# Patient Record
Sex: FEMALE | Race: WHITE | NOT HISPANIC OR LATINO | Employment: UNEMPLOYED
[De-identification: names, ages, dates, MRNs, and addresses within clinical notes are randomized per-mention and may not be internally consistent; named-entity substitution may affect disease eponyms.]

---

## 2021-11-16 ENCOUNTER — TRANSCRIBE ORDERS (OUTPATIENT)
Dept: OTHER | Age: 41
End: 2021-11-16

## 2021-11-16 DIAGNOSIS — Q82.8 DARIER'S DISEASE: Primary | ICD-10-CM

## 2021-12-04 ENCOUNTER — HEALTH MAINTENANCE LETTER (OUTPATIENT)
Age: 41
End: 2021-12-04

## 2022-06-15 ENCOUNTER — TRANSFERRED RECORDS (OUTPATIENT)
Dept: HEALTH INFORMATION MANAGEMENT | Facility: CLINIC | Age: 42
End: 2022-06-15

## 2022-07-21 ENCOUNTER — LAB REQUISITION (OUTPATIENT)
Dept: LAB | Facility: CLINIC | Age: 42
End: 2022-07-21
Payer: COMMERCIAL

## 2022-07-21 LAB — C DIFF TOX B STL QL: NEGATIVE

## 2022-07-21 PROCEDURE — 87493 C DIFF AMPLIFIED PROBE: CPT | Mod: ORL | Performed by: FAMILY MEDICINE

## 2022-09-17 ENCOUNTER — HEALTH MAINTENANCE LETTER (OUTPATIENT)
Age: 42
End: 2022-09-17

## 2023-01-28 ENCOUNTER — HEALTH MAINTENANCE LETTER (OUTPATIENT)
Age: 43
End: 2023-01-28

## 2023-10-24 ENCOUNTER — TRANSFERRED RECORDS (OUTPATIENT)
Dept: HEALTH INFORMATION MANAGEMENT | Facility: CLINIC | Age: 43
End: 2023-10-24
Payer: COMMERCIAL

## 2023-11-01 ENCOUNTER — MEDICAL CORRESPONDENCE (OUTPATIENT)
Dept: HEALTH INFORMATION MANAGEMENT | Facility: CLINIC | Age: 43
End: 2023-11-01
Payer: COMMERCIAL

## 2023-11-03 ENCOUNTER — TRANSCRIBE ORDERS (OUTPATIENT)
Dept: OTHER | Age: 43
End: 2023-11-03

## 2023-11-03 DIAGNOSIS — R11.15 CYCLICAL VOMITING SYNDROME: ICD-10-CM

## 2023-11-03 DIAGNOSIS — K44.9 HIATAL HERNIA: ICD-10-CM

## 2023-11-03 DIAGNOSIS — K76.0 FATTY LIVER: Primary | ICD-10-CM

## 2023-11-03 DIAGNOSIS — K22.4 ESOPHAGEAL DYSMOTILITY: ICD-10-CM

## 2023-11-03 DIAGNOSIS — K21.9 GASTROESOPHAGEAL REFLUX DISEASE WITHOUT ESOPHAGITIS: ICD-10-CM

## 2023-11-06 ENCOUNTER — DOCUMENTATION ONLY (OUTPATIENT)
Dept: GASTROENTEROLOGY | Facility: CLINIC | Age: 43
End: 2023-11-06
Payer: COMMERCIAL

## 2023-11-06 NOTE — PROGRESS NOTES
Called [facility] to request that they fax over medical records pertaining to recent referral.      MEGAN Representative noted that they had a consult from October 2023 and one from 2022.     Clinic Information:  [facility]  Phone #: [phone] extension [phone] sk

## 2023-11-09 ENCOUNTER — TELEPHONE (OUTPATIENT)
Dept: GASTROENTEROLOGY | Facility: CLINIC | Age: 43
End: 2023-11-09
Payer: COMMERCIAL

## 2023-11-09 NOTE — TELEPHONE ENCOUNTER
[facility] Call Center    Phone Message    May a detailed message be left on voicemail: Yes    Reason for Call: Other: Patient is currently scheduled on 02/20/2024, as visit type New Esophageal Urgent. This is outside the expected timeline for this referral. Patient has been added to the waitlist.      Action Taken: Message routed to:  Other: GI REFERRAL TRIAGE POOL     Travel Screening: Not Applicable

## 2023-11-20 ENCOUNTER — OFFICE VISIT (OUTPATIENT)
Dept: GASTROENTEROLOGY | Facility: CLINIC | Age: 43
End: 2023-11-20
Payer: COMMERCIAL

## 2023-11-20 ENCOUNTER — PRE VISIT (OUTPATIENT)
Dept: GASTROENTEROLOGY | Facility: CLINIC | Age: 43
End: 2023-11-20

## 2023-11-20 VITALS
HEART RATE: 87 BPM | DIASTOLIC BLOOD PRESSURE: 90 MMHG | BODY MASS INDEX: 26.93 KG/M2 | SYSTOLIC BLOOD PRESSURE: 120 MMHG | WEIGHT: 152 LBS | HEIGHT: 63 IN

## 2023-11-20 DIAGNOSIS — R09.A2 GLOBUS SENSATION: ICD-10-CM

## 2023-11-20 DIAGNOSIS — R11.10 REGURGITATION OF FOOD: Primary | ICD-10-CM

## 2023-11-20 DIAGNOSIS — Z98.84 H/O GASTRIC BYPASS: ICD-10-CM

## 2023-11-20 PROCEDURE — 99205 OFFICE O/P NEW HI 60 MIN: CPT | Performed by: INTERNAL MEDICINE

## 2023-11-20 RX ORDER — CLINDAMYCIN HCL 300 MG
CAPSULE ORAL
COMMUNITY
Start: 2023-05-11 | End: 2024-02-12

## 2023-11-20 RX ORDER — DIVALPROEX SODIUM 500 MG/1
TABLET, DELAYED RELEASE ORAL
COMMUNITY
Start: 2023-06-21 | End: 2024-02-12

## 2023-11-20 RX ORDER — IPRATROPIUM BROMIDE AND ALBUTEROL SULFATE 2.5; .5 MG/3ML; MG/3ML
SOLUTION RESPIRATORY (INHALATION)
COMMUNITY

## 2023-11-20 RX ORDER — HYDROXYZINE PAMOATE 50 MG/1
1 CAPSULE ORAL
COMMUNITY
Start: 2023-10-27 | End: 2024-02-12

## 2023-11-20 RX ORDER — ESZOPICLONE 1 MG/1
2 TABLET, FILM COATED ORAL
COMMUNITY
Start: 2023-03-20 | End: 2024-02-12

## 2023-11-20 RX ORDER — DULOXETIN HYDROCHLORIDE 30 MG/1
3 CAPSULE, DELAYED RELEASE ORAL DAILY
COMMUNITY
Start: 2023-03-30 | End: 2024-02-12

## 2023-11-20 RX ORDER — METHOCARBAMOL 500 MG/1
500 TABLET, FILM COATED ORAL
COMMUNITY
Start: 2023-10-27 | End: 2024-02-12

## 2023-11-20 RX ORDER — BUPROPION HYDROCHLORIDE 150 MG/1
1 TABLET ORAL DAILY
COMMUNITY
Start: 2023-03-20 | End: 2024-02-12

## 2023-11-20 RX ORDER — ALBUTEROL SULFATE 90 UG/1
1-2 AEROSOL, METERED RESPIRATORY (INHALATION)
Status: ON HOLD | COMMUNITY
Start: 2023-05-30 | End: 2024-05-07

## 2023-11-20 RX ORDER — LIDOCAINE 4 G/G
PATCH TOPICAL
COMMUNITY
Start: 2023-10-28 | End: 2024-10-05

## 2023-11-20 RX ORDER — ENOXAPARIN SODIUM 100 MG/ML
INJECTION SUBCUTANEOUS
COMMUNITY
End: 2024-02-12

## 2023-11-20 RX ORDER — DIPHENHYDRAMINE HCL 25 MG
25 CAPSULE ORAL EVERY 4 HOURS
COMMUNITY
End: 2024-02-12

## 2023-11-20 RX ORDER — CLONAZEPAM 0.5 MG/1
0.5 TABLET ORAL
COMMUNITY
Start: 2023-03-20 | End: 2024-02-12

## 2023-11-20 RX ORDER — RIVAROXABAN 20 MG/1
20 TABLET, FILM COATED ORAL
COMMUNITY
Start: 2023-03-07 | End: 2024-02-12

## 2023-11-20 RX ORDER — OLANZAPINE 5 MG/1
5 TABLET ORAL
COMMUNITY
Start: 2022-09-01 | End: 2024-02-12

## 2023-11-20 RX ORDER — MULTIVITAMIN,THER AND MINERALS
1 TABLET ORAL
COMMUNITY
Start: 2022-09-03 | End: 2024-02-12

## 2023-11-20 RX ORDER — UBROGEPANT 100 MG/1
100 TABLET ORAL
COMMUNITY
Start: 2022-09-01 | End: 2024-02-12

## 2023-11-20 RX ORDER — ASPIRIN 81 MG/1
81 TABLET ORAL
COMMUNITY
Start: 2022-09-03 | End: 2024-02-12

## 2023-11-20 RX ORDER — CEPHALEXIN 500 MG/1
CAPSULE ORAL
COMMUNITY
Start: 2023-09-13 | End: 2024-02-12

## 2023-11-20 RX ORDER — NORTRIPTYLINE HCL 10 MG
20 CAPSULE ORAL AT BEDTIME
COMMUNITY
Start: 2022-09-01

## 2023-11-20 RX ORDER — PREGABALIN 150 MG/1
150 CAPSULE ORAL
COMMUNITY
Start: 2022-09-01 | End: 2024-02-12

## 2023-11-20 RX ORDER — FAMOTIDINE 40 MG/1
40 TABLET, FILM COATED ORAL
Status: ON HOLD | COMMUNITY
End: 2024-02-27

## 2023-11-20 RX ORDER — POLYETHYLENE GLYCOL 3350 17 G/17G
17 POWDER, FOR SOLUTION ORAL 2 TIMES DAILY
Status: ON HOLD | COMMUNITY
Start: 2022-09-01 | End: 2024-02-27

## 2023-11-20 RX ORDER — ALPRAZOLAM 0.25 MG
0.25 TABLET ORAL
COMMUNITY
Start: 2022-09-01 | End: 2024-02-12

## 2023-11-20 RX ORDER — NYSTATIN 100000 [USP'U]/G
1 POWDER TOPICAL PRN
COMMUNITY
Start: 2022-09-01 | End: 2024-03-11

## 2023-11-20 RX ORDER — PROMETHAZINE HYDROCHLORIDE 12.5 MG/1
TABLET ORAL
Status: ON HOLD | COMMUNITY
Start: 2023-10-27 | End: 2024-02-27

## 2023-11-20 RX ORDER — ONDANSETRON 4 MG/1
1 TABLET, FILM COATED ORAL EVERY 6 HOURS PRN
Status: ON HOLD | COMMUNITY
End: 2024-05-07

## 2023-11-20 RX ORDER — AMMONIUM LACTATE 12 G/100G
CREAM TOPICAL DAILY PRN
COMMUNITY
Start: 2023-03-21

## 2023-11-20 RX ORDER — AMITRIPTYLINE HYDROCHLORIDE 10 MG/1
2 TABLET ORAL
COMMUNITY
End: 2024-02-12

## 2023-11-20 RX ORDER — PREDNISONE 20 MG/1
TABLET ORAL
COMMUNITY
End: 2024-02-12

## 2023-11-20 RX ORDER — GAUZE BANDAGE 2" X 2"
BANDAGE TOPICAL
COMMUNITY
End: 2024-02-12

## 2023-11-20 RX ORDER — CLOBETASOL PROPIONATE 0.05 G/100ML
SHAMPOO TOPICAL
Status: ON HOLD | COMMUNITY
Start: 2023-03-21 | End: 2024-05-07

## 2023-11-20 RX ORDER — PANTOPRAZOLE SODIUM 40 MG/1
40 TABLET, DELAYED RELEASE ORAL DAILY
Status: ON HOLD | COMMUNITY
Start: 2022-09-01 | End: 2024-05-07

## 2023-11-20 RX ORDER — HYOSCYAMINE SULFATE 0.125 MG
0.25 TABLET ORAL
COMMUNITY
End: 2024-02-12

## 2023-11-20 RX ORDER — OXYCODONE AND ACETAMINOPHEN 5; 325 MG/1; MG/1
TABLET ORAL
COMMUNITY
Start: 2023-01-26 | End: 2024-02-12

## 2023-11-20 RX ORDER — ESCITALOPRAM OXALATE 20 MG/1
20 TABLET ORAL DAILY
COMMUNITY
End: 2024-02-12

## 2023-11-20 RX ORDER — ATORVASTATIN CALCIUM 20 MG/1
20 TABLET, FILM COATED ORAL
COMMUNITY
Start: 2022-09-01 | End: 2024-02-12

## 2023-11-20 RX ORDER — CALCIUM POLYCARBOPHIL 625 MG
TABLET ORAL
COMMUNITY
Start: 2023-01-09 | End: 2024-02-12

## 2023-11-20 RX ORDER — DOCUSATE SODIUM 100 MG/1
1 CAPSULE, LIQUID FILLED ORAL
Status: ON HOLD | COMMUNITY
End: 2024-02-27

## 2023-11-20 RX ORDER — TRETINOIN 0.5 MG/G
CREAM TOPICAL DAILY PRN
Status: ON HOLD | COMMUNITY
Start: 2022-02-04 | End: 2024-05-07

## 2023-11-20 RX ORDER — THERMOMETER, ELECTRONIC,ORAL
EACH MISCELLANEOUS
COMMUNITY
Start: 2023-07-12 | End: 2024-02-12

## 2023-11-20 RX ORDER — SULFACETAMIDE SODIUM, SULFUR 100; 50 MG/G; MG/G
SUSPENSION TOPICAL DAILY PRN
Status: ON HOLD | COMMUNITY
Start: 2023-03-21 | End: 2024-05-07

## 2023-11-20 RX ORDER — HYDROCODONE BITARTRATE AND ACETAMINOPHEN 5; 325 MG/1; MG/1
TABLET ORAL
COMMUNITY
Start: 2023-05-10 | End: 2024-02-12

## 2023-11-20 ASSESSMENT — PAIN SCALES - GENERAL: PAINLEVEL: NO PAIN (0)

## 2023-11-20 NOTE — LETTER
"    11/20/2023         RE: Eduarda Nogueira  [address] [place] [address]        Dear Colleague,    Thank you for referring your patient, Eduarda Nogueira, to the [facility] [place]. Please see a copy of my visit note below.    Gastroenterology Visit for: Eduarda Nogueira 1980   MRN: [id number]     Reason for Visit:  chief complaint    Referred by: Louis  / [address] / [place] [address]  Patient Care Team:  No Ref-Primary, Physician as PCP - Hellen Rosario as Referring Physician (Physician Assistant)  Victor Hugo Diamond MD as MD (Dermatology)  Henri Davidson DO as Physician (Gastroenterology)  Esther Cochran MD as MD (Gastroenterology)  System, Provider Not In (Clinic)    History of Present Illness:   Eduarda Nogueira is a 43 year old female with anxiety, gastric bypass 3/17/22, subdural hematoma, EtOH abuse,migraine headaches previous stroke, asthma, reflux, morbid obesity, cyclic vomiting syndrome, marijuana dependence, PFO, chronic pain, urinary and fecal incontinence sp pelvic floor therapy, who is presenting as a new patient in consultation at the request of Dr. Myers with a chief complaint of regurgitation beginning post bypass.  ---------------------------------------------------------------  11/20/23  Eduarda Nogueira states se was in the hospital and was having issues with coffee ground emesis and black stools. She was told there was nothing to do in the hospital. Her \"advocate\" [facility]. She feels there is something trapped between the front and the back of her throat. Every day it feels like it is there and makes her want to gag and dry heave. She has associated gassiness. No issues with bowel movements although they are black. Symptoms were the same in the hospital. These symptoms have been ongoing for an undisclosed amount of time \"so long\". Anything that she eats she feels is going to come " "right back out. When she eats or drinks she has the sensation that something is there and it makes her want to vomit. There is an associated tightness in her stomach. Nothing makes it better and nothing makes it worse. While in the hospital she was on \"a whole bunch of meds\" and when she went home she says she stopped all medications. All pills including ondansetron would result in dry heave. Regurgitates and spits into buckets at home.     +nausea, all over chills, hot, cold, globus all associated with regurgitation events.    Currently on no medications except for a single ondansetron today.     Patient takes CBD.     Gabapentin causes heat sensation in head and itchiness all over.  Has tried lyrica without success.    Lost 10 pounds in the last week per patient.     Previous IV contrast caused hot sensation and nausea/vomiting. No respiratory symptoms or rash.     [facility] inpatient note 10/24/23      ---------------------------------------------------------------     Wt Readings from Last 5 Encounters:   11/20/23 68.9 kg (152 lb)   09/08/15 129.5 kg (285 lb 6.4 oz)        Esophageal Questionnaire(s)    BEDQ Questionnaire       No data to display                   No data to display                Eckardt Questionnaire       No data to display                Promis 10 Questionnaire       No data to display              STUDIES & PROCEDURES:    EGD:       Colonoscopy:  Date:  Impression:  Pathology Report:     EndoFLIP directed at the UES or LES (8cm (EF-325) balloon length or 16cm (EF-322) balloon length):   Date:  8cm balloon  Balloon inflation Balloon pressure CSA (mm^2) DI (mm^2/mmHg) Dmin (mm) Compliance   20 (ladmark ID)        30        40        50           16cm balloon  Balloon inflation Balloon pressure CSA (mm^2) DI (mm^2/mmHg) Dmin (mm) Compliance   30 (ladmark ID)        40        50        60        70           High Resolution " Manometry:  Date:  Impression:    PH/Impedance:  Date:  Impression:     Bravo:  48 or 96hr  Date:  Impression:    CT:  Date:  Impression:    Esophagram:  Date:  Impression:     Prior medical records were reviewed including, but not limited to, notes from referring providers, lab work, radiographic tests, and other diagnostic tests. Pertinent results were summarized above.     History     Past Medical History:   Diagnosis Date    Anemia     No Comments Provided    Anxiety state     No Comments Provided    Arthropathy     No Comments Provided    Asthma     No Comments Provided    Backache     No Comments Provided    Edema     No Comments Provided    Esophageal reflux     No Comments Provided    Female stress incontinence     No Comments Provided    Hypothyroidism     No Comments Provided    Irregular menstrual cycle     No Comments Provided    Irritable colon     No Comments Provided    Migraine     No Comments Provided    Morbid obesity (H)     No Comments Provided    Other chronic nonalcoholic liver disease     No Comments Provided    Other depressive disorder     No Comments Provided    Other malaise and fatigue     No Comments Provided    Pain in joint     hips, knees, ankles, feet, neck    Restless legs syndrome     self-diagnosed    Shortness of breath     No Comments Provided    Synovial cyst of popliteal space     No Comments Provided    Vitamin D deficiency     Rx 3/14, re check 6/14       Past Surgical History:   Procedure Laterality Date    ATTEMPTED ARTHROSCOPY      x3, rt knee    EXTRACTION(S) DENTAL      No Comments Provided    LAPAROSCOPIC CHOLECYSTECTOMY      2010    OSTEOTOMY FEMUR DISTAL      right       Social History     Socioeconomic History    Marital status: Single     Spouse name: Not on file    Number of children: Not on file    Years of education: Not on file    Highest education level: Not on file   Occupational History    Not on file   Tobacco Use    Smoking status: Every Day     Packs/day:  .3     Types: Cigarettes     Start date: 3/18/2013    Smokeless tobacco: Never    Tobacco comments:     3 cigarettes per day    Substance and Sexual Activity    Alcohol use: Yes     Comment: Alcoholic Drinks/day: Quit 3/2013    Drug use: Unknown     Types: Other     Comment: Drug use: No    Sexual activity: Not on file   Other Topics Concern    Not on file   Social History Narrative    Not on file     Social Determinants of Health     Financial Resource Strain: Not on file   Food Insecurity: Not on file   Transportation Needs: Not on file   Physical Activity: Not on file   Stress: Not on file   Social Connections: Not on file   Interpersonal Safety: Not on file   Housing Stability: Not on file       Family History   Problem Relation Age of Onset    Diabetes Mother         Diabetes    Diabetes Father         Diabetes    Other - See Comments Father         colon clot then DVT inpt    Other - See Comments Paternal Grandfather         PE     Family history reviewed and edited as appropriate    Medications and Allergies:     Outpatient Encounter Medications as of 11/20/2023   Medication Sig Dispense Refill    albuterol (PROAIR HFA/PROVENTIL HFA/VENTOLIN HFA) 108 (90 Base) MCG/ACT inhaler Inhale 1-2 puffs into the lungs      ALPRAZolam (XANAX) 0.25 MG tablet 0.25 mg      amitriptyline (ELAVIL) 10 MG tablet Take 2 tablets by mouth      ammonium lactate (AMLACTIN) 12 % external cream Twice daily for body      aspirin 81 MG EC tablet 81 mg      aspirin-acetaminophen-caffeine (EXCEDRIN MIGRAINE) 250-250-65 MG tablet Take by mouth 2 times daily as needed      atorvastatin (LIPITOR) 20 MG tablet 20 mg      buPROPion (WELLBUTRIN XL) 150 MG 24 hr tablet Take 1 tablet by mouth daily      Calcium Polycarbophil (FIBER) 625 MG tablet Take 1 Tablet (625 mg) by mouth once daily.      cephALEXin (KEFLEX) 500 MG capsule Take 1 capsule by mouth two times daily for 7 days      chlorhexidine (HIBICLENS) 4 % liquid Chlorhexidine Topical  Liquid 4 %  topically 1.0  once    active      clindamycin (CLEOCIN) 300 MG capsule Take 1 capsule 4 times a day until gone      clobetasol propionate (CLOBEX) 0.05 % external shampoo Apply a thin layer onto dry, affected area of scalp once daily. Leave for 15 minutes before lathering and rinsing.      clonazePAM (KLONOPIN) 0.5 MG tablet Take 0.5 mg by mouth      CLOTRIMAZOLE ANTI-FUNGAL 1 % external cream Apply topically to affected area(s) two times daily.      Cranberry 400 MG CAPS Take 400 mg by mouth      diphenhydrAMINE (BENADRYL) 25 MG capsule Take 25 mg by mouth every 4 hours      divalproex sodium delayed-release (DEPAKOTE) 500 MG DR tablet       docusate sodium (DSS) 100 MG capsule Take 1 capsule by mouth      DULoxetine (CYMBALTA) 30 MG capsule Take 3 capsules by mouth daily      enoxaparin ANTICOAGULANT (LOVENOX) 60 MG/0.6ML syringe Enoxaparin Subcutaneous  subcutaneously 60.0  every 12 hours    inactive      escitalopram (LEXAPRO) 20 MG tablet Take 20 mg by mouth daily      eszopiclone (LUNESTA) 1 MG tablet Take 2 mg by mouth      famotidine (PEPCID) 40 MG tablet Take 40 mg by mouth      HYDROcodone-acetaminophen (NORCO) 5-325 MG tablet take 1 tablet by mouth every 4 to 6 hours as needed for pain      hydrOXYzine (VISTARIL) 50 MG capsule Take 1 capsule by mouth      hyoscyamine (LEVSIN) 0.125 MG tablet Place 0.25 mg under the tongue      ipratropium - albuterol 0.5 mg/2.5 mg/3 mL (DUONEB) 0.5-2.5 (3) MG/3ML neb solution Inhale 1 Neb via a nebulizer 4 times daily if needed for Shortness Of Breath or Wheezing.      Lidocaine (LIDOCARE) 4 % Patch Apply 1-3 patches to intact skin to cover most painful area of back pain for max 12hr per 24hr period.      methocarbamol (ROBAXIN) 500 MG tablet Take 500 mg by mouth      Multiple Vitamins-Minerals (SUPER THERA CELESTINO M) TABS Take 1 tablet by mouth      nortriptyline (PAMELOR) 10 MG capsule 20 mg      nystatin (MYCOSTATIN) 532329 UNIT/GM external powder 1 strip    "   OLANZapine (ZYPREXA) 5 MG tablet 5 mg      ondansetron (ZOFRAN) 4 MG tablet Take 1 tablet by mouth      oxyCODONE-acetaminophen (PERCOCET) 5-325 MG tablet Take 1 tablet by mouth every 4 hours if needed for Pain. Max acetaminophen dose 4000mg in 24 hrs.      pantoprazole (PROTONIX) 40 MG EC tablet 40 mg      polyethylene glycol (MIRALAX) 17 g packet Take 17 g by mouth      predniSONE (DELTASONE) 20 MG tablet Take 2 Tablets (40 mg) by mouth once daily with a meal for 5 days.      pregabalin (LYRICA) 150 MG capsule 150 mg      promethazine (PHENERGAN) 12.5 MG tablet Take half a tablet three times daily before meals and every 6 hrs as needed for nausea      sodium chloride, PF, (NORMAL SALINE FLUSH) 0.9% PF flush Inject 5 mLs into the vein      Sulfacetamide Sodium-Sulfur 10-5 % SUSP Apply topically.      tretinoin (RETIN-A) 0.05 % external cream Apply topically to affected area(s) at bedtime if needed (Darier's facial flare).      UBRELVY 100 MG tablet 100 mg      vitamin B1 (THIAMINE) 100 MG tablet Miscellaneous Drug     Thiamine mononitrate, vit B1, (VITAMIN B1) 100mg tablet    active      XARELTO ANTICOAGULANT 20 MG TABS tablet 20 mg       No facility-administered encounter medications on file as of 11/20/2023.        Allergies   Allergen Reactions    Acetaminophen Hives, Itching, Rash and Shortness Of Breath     Several Rx's for Norco in 2021 & 22    No Clinical Screening - See Comments Anaphylaxis     steroids    Gabapentin Unknown and Itching    Hydrocodone-Acetaminophen Itching     Several Rx's for Norco in 2021 & 22    Iodinated Contrast Media Unknown and Nausea and Vomiting     Extremely anxious, vomited once after CT. No wheezing or SOB.    Latex Itching    Morphine And Related Itching     Several Rx's for Norco in 2021 & 22    Nsaids Other (See Comments)     H/o german-n-y gastric bypass\". AVOID NSAIDs and aspirin due to risk of gastric and/or G-J anastomotic ulcers. If Eduarda must be on short course of " "NSAIDs or aspirin, use enteric coated if possible and use PPI // Ira L Anabell RN, Bariatric Nurse Clinician, [facility] 3/23/2022    Oxycodone-Acetaminophen Itching     Several Rx's for Norco in 2021 & 22        Review of systems:  A full 10 point review of systems was obtained and was negative except for the pertinent positives and negatives stated within the HPI.    Objective Findings:   Physical Exam:    Constitutional: BP (!) 120/90 (BP Location: Left arm, Patient Position: Sitting, Cuff Size: Adult Regular)   Pulse 87   Ht 1.6 m (5' 3\")   Wt 68.9 kg (152 lb)   BMI 26.93 kg/m    General: Alert, cooperative, no distress, well-appearing  Head: Atraumatic, normocephalic, no obvious abnormalities   Eyes: EOMI, Sclera anicteric, no obvious conjunctival hemorrhage   Nose: Nares normal, no obvious malformation, no obvious rhinorrhea   Respiratory: normal appearing respirations, cough associated with regurgitation  Gastrointestinal: active regurgitation of gastric contents with dry heave, hiccups associated.   Musculoskeletal: uses walker to ambulate  Skin: No jaundice, no obvious rash  Neurologic: AAOx3, no obvious neurologic abnormality  Psychiatric: Normal Affect, appropriate mood  Extremities: No obvious edema, no obvious malformation     Labs, Radiology, Pathology     Lab Results   Component Value Date    HGB 16.4 (H) 09/08/2015     09/08/2015     (L) 09/08/2015    BUN 9 09/08/2015    CO2 26 09/08/2015      Liver Function Studies -   Recent Labs   Lab Test 09/08/15  1551   PROTTOTAL 7.1   ALBUMIN 4.2   BILITOTAL 0.5   ALKPHOS 52      Patient Active Problem List    Diagnosis Date Noted    Regurgitation of food 11/20/2023     Priority: Medium    H/O gastric bypass 11/20/2023     Priority: Medium    Globus sensation 11/20/2023     Priority: Medium      Assessment and Plan   Assessment:    Eduarda Nogueira is a 43 year old female with anxiety, gastric bypass 3/17/22, subdural " hematoma, EtOH abuse,migraine headaches previous stroke, asthma, reflux, morbid obesity, cyclic vomiting syndrome, marijuana dependence, PFO, chronic pain, urinary and fecal incontinence sp pelvic floor therapy, who is presenting as a new patient in consultation at the request of Dr. Myers with a chief complaint of regurgitation beginning post bypass.    At this time it is unclear what is driving the patient's symptoms of regurgitation which she actively did in the room with me. I do not believe it is aerophagia or true rumination syndrome. I have recommended barium esophagram and modified barium esophagram along with endoscopy and HRM +pH impedance off therapy. Ultimately she may require surgical intervention for her bypass as her symptoms began only after her bypass surgery.     1. Regurgitation of food    2. H/O gastric bypass    3. Globus sensation       Orders Placed This Encounter   Procedures    XR Esophagram    XR Video Swallow with SLP or OT - Order with Speech Therapy Referral    Adult GI  Referral - Procedure Only    Speech Therapy Referral     Plan:  Schedule endoscopy to evaluate foregut and to evaluate a cause of dark stool  Schedule high resolution esophageal manometry and 24hr pH impedance off of acid suppression therapy  Schedule barium esophagram and modified barium esophagram  Future consideration for surgical intervention pending above testing  Optimize nutritional supplementation with boost or ensure. If you continue to lose weight you may need a feeding tube    Follow up plan:   Return to clinic 6 months and as needed.    The risks and benefits of my recommendations, as well as other treatment options were discussed with the patient and any available family today. All questions were answered.     Follow up: As planned above. Today, I personally spent 40 minutes in direct face to face time with the patient, of which greater than 50% of the time was spent in patient education and  counseling as described above. Approximately 10 minutes were spent on indirect care associated with the patient's consultation including but not limited to review of: patient medical records to date, clinic visits, hospital records, lab results, imaging studies, procedural documentation, and coordinating care with other providers. The findings from this review are summarized in the above note. All of the above accounted for a cumulative time of 50 minutes and was performed on the date of service.     The patient verbalized understanding of the plan and was appreciative for the time spent and information provided during the office visit.     Author:   Henri Davidson DO   of Medicine  Director, Esophageal Disorders Program  Division of Gastroenterology, Hepatology, and Nutrition  [facility]    Dr. Davidson speaks for [employer] regarding dupilumab and has participated in advisory boards for the medication. He receives income for these activities. When discussing the medication, patients were informed of Dr. Davidson's role and potential conflict of interest. All questions and/or concerns were answered during the encounter.     Documentation assisted by voice recognition and documentation system.      Again, thank you for allowing me to participate in the care of your patient.      Sincerely,    Henri Davidson DO

## 2023-11-20 NOTE — NURSING NOTE
"Chief Complaint   Patient presents with    New Patient       Vitals:    11/20/23 1218   BP: (!) 120/90   BP Location: Left arm   Patient Position: Sitting   Cuff Size: Adult Regular   Pulse: 87   Weight: 68.9 kg (152 lb)   Height: 1.6 m (5' 3\")       Body mass index is 26.93 kg/m .    Patient didn't have list and said everything listed is what she is taking.    Sari Cheng LPN                      "

## 2023-11-20 NOTE — TELEPHONE ENCOUNTER
Records Requested     November 20, 2023 9:12 AM  [place] [address]   Facility  [facility]  Fax: [phone]    Outcome Urgent request faxed to [facility] to push images to PACS.     Update 11/20/23 12:18 PM -  Images resolved in PACS.        REFERRAL INFORMATION:  Referring Provider:  Dione Myers NP  Referring Clinic:  [facility]   Reason for Visit/Diagnosis: Gastroesophageal reflux disease without esophagitis [K21.9]; Hiatal hernia [K44.9]; Esophageal dysmotility [K22.4]; Cyclical vomiting syndrome [R11.15]; Fatty liver [K76.0]     FUTURE VISIT INFORMATION:  Appointment Date: 11/20/23  Appointment Time: 12:20 PM      NOTES STATUS DETAILS   OFFICE NOTE from Referring Provider Care Everywhere 11/1/23, 7/14/22, 1/21/21 - Dione Myers NP - [facility] - hospital follow up; GERD; cyclic vomiting syndrome; Esophageal dysmotility    OFFICE NOTE from Other Specialist Care Everywhere 10/24/23 - Isaura Robles MD; Gissell Carney PA-C - [facility] inpatient consult -  Vomiting; coffee ground emesis; dark stools     10/23/23 - Cadence Varela, CNP - [facility] - ED follow up; emesis; black stool     9/6/22 - Cally Martinez PA-C - [facility] - s/p gastric bypass; GERD    6/16/22 - Vasiliy Sosa MD - [facility] Inpatient Consult - Esophageal dysmotility    3/25/22 - Ashlee Anton PA-C - [facility] - s/p gastric bypass ; post-op follow up     11/16/21, 6/15/21 - Reginald Michaud MD - [facility] - Morbid obesity BMI 50-59.9 ; GERD; Hiatal Hernia     4/15/21 - Prieto Lowery MD - [facility] - Projectile vomiting; EGD follow up    HOSPITAL DISCHARGE SUMMARY/  ED VISITS Care Everywhere 10/23/23-10/27/23 - [facility] Luis Villegas MD - GERD; GI Bleed; Chronic pain syndrom; cyclic vomiting syndrome     10/20/23 - [facility]  [facility] ED - Reginald Michaud MD - black stools; low back pain     [facility] Elroy Olea [facility] - Acute ischemic left MCA stroke; s/p gastric bypass; constipation; cyclic vomiting syndrome; GERD; IBS     7/10/22-7/20/22 - [facility] - Cuauhtemoc Sweet DO - Failure to thrive in adult; dysphagia; s/p gastric bypass     7/7/22 - [facility] - Rian Nunn MD - Nausea; chronic pain syndrome; weakness of both lower extremities; Nausea and vomiting; S/p Gastric bypass    6/15/22-6/19/22 - [facility] ED to Hospital Admission - Christy Lang MD - Hypokalemia; Esophageal dysmotility    3/21/22 - [facility] ED - Eduarda Campos DO - post operative pain; s/p gastric bypass; nausea and vomiting     3/17/22-3/20/22 - [facility] - Reginald Michaud MD - S/p gastric bypass; hiatal hernia; GERD    OPERATIVE REPORT Care Everywhere 3/17/22 - LAPAROSCOPIC BYPASS GASTRIC DORINDA-N-Y AND HIATAL HERNIA REPAIR - Reginald Michaud MD - [facility]    MEDICATION LIST Care Everywhere         ENDOSCOPY  Care Everywhere 6/1/22, 2/17/21 - [facility]    STOOL TESTING Care Everywhere Ova and Parasite - 7/26/23, 7/25/23    Stool Pathogen Multiplex - 7/25/23    C. Diff Toxin - 7/25/23    Cryptosporidium/ Giardia - 7/25/23    H Pylori - 2/24/14   PERTINENT LABS Care Everywhere 11/1/23 - CBC no diff; Magnesium; Liver Panel     10/27/23 - CBC w/ Diff    PATHOLOGY REPORTS (RELATED) Care Everywhere 6/1/22 - [place] bx     2/17/21 - [facility] bx    IMAGING (CT, MRI, EGD, MRCP, Small Bowel Follow Through/SBT, MR/CT Enterography) In PACS [facility]:   CT Abdomen Pelvis - 10/23/23, 6/24/22, 3/21/22    MR Soft Tissue Neck - 6/17/23    CT Angio Head Neck Carotid - 8/15/22, 7/7/21    CT Chest - 6/17/23, 8/21/22    XR Chest - 10/8/22, 5/18/22, 4/13/20    XR Upper GI - 6/13/22

## 2023-11-20 NOTE — PATIENT INSTRUCTIONS
It was a pleasure taking care of you today.  I've included a brief summary of our discussion and care plan from today's visit below.  Please review this information with your primary care provider.  _______________________________________________________________________    My recommendations are summarized as follows:    Schedule endoscopy to evaluate foregut and to evaluate a cause of dark stool  Schedule high resolution esophageal manometry and 24hr pH impedance off of acid suppression therapy  Schedule barium esophagram and modified barium esophagram  Future consideration for surgical intervention pending above testing  Optimize nutritional supplementation with boost or ensure. If you continue to lose weight you may need a feeding tube      To schedule endoscopic procedures you may call: [phone]  To schedule radiology tests you may call: [phone]  To schedule an ENT appointment you may call: [phone]    Please call my nurse care coordinator Andie ([phone]) or Gabrielle ([phone]), with any questions or concerns.  If you were seen through the [facility] please feel free to reach out to Tammi at [phone]   --    Return to GI Clinic in 6 months to review your progress.    _______________________________________________________________________    Who do I call with any questions after my visit?  Please be in touch if there are any further questions that arise following today's visit.  There are multiple ways to contact your gastroenterology care team.      During business hours, you may reach a Gastroenterology nurse at [phone] and choose option 3.       To schedule or reschedule an appointment, please call [phone].     You can always send a secure message through LiquidHub.  L[employer] messages are answered by your nurse or doctor typically within 24 hours.  Please allow extra time on weekends and holidays.      For urgent/emergent questions after business hours, you may reach the  on-call GI Fellow by contacting the [facility]  at [phone].     How will I get the results of any tests ordered?    You will receive all of your results.  If you have signed up for [employer], any tests ordered at your visit will be available to you after your physician reviews them.  Typically this takes 1-2 weeks.  If there are urgent results that require a change in your care plan, your physician or nurse will call you to discuss the next steps.      What is L2?  L2 is a secure way for you to access all of your healthcare records from the [facility].  It is a web based computer program, so you can sign on to it from any location.  It also allows you to send secure messages to your care team.  I recommend signing up for L2 access if you have not already done so and are comfortable with using a computer.      How to I schedule a follow-up visit?  If you did not schedule a follow-up visit today, please call [phone] to schedule a follow-up office visit.      If you feel you received exceptional care and are interested in supporting the clinical and research goals of Dr. Davidson or the Division of Gastroenterology, Hepatology, and Nutrition please contact him directly through L2  to discuss opportunities to donate.    Sincerely,    Henri Davidson DO   of Medicine  Director, Esophageal Disorders Program  Division of Gastroenterology, Hepatology, and Nutrition  [facility]

## 2023-11-20 NOTE — PROGRESS NOTES
"Gastroenterology Visit for: Eduarda Nogueira 1980   MRN: [id number]     Reason for Visit:  chief complaint    Referred by: Louis  / [address] / [place] [id number]  Patient Care Team:  No Ref-Primary, Physician as PCP - Hellen Rosario as Referring Physician (Physician Assistant)  Victor Hugo Diamond MD as MD (Dermatology)  Henri Davidson DO as Physician (Gastroenterology)  Esther Cochran MD as MD (Gastroenterology)  System, Provider Not In (Clinic)    History of Present Illness:   Eduarda Nogueira is a 43 year old female with anxiety, gastric bypass 3/17/22, subdural hematoma, EtOH abuse,migraine headaches previous stroke, asthma, reflux, morbid obesity, cyclic vomiting syndrome, marijuana dependence, PFO, chronic pain, urinary and fecal incontinence sp pelvic floor therapy, who is presenting as a new patient in consultation at the request of Dr. Myers with a chief complaint of regurgitation beginning post bypass.  ---------------------------------------------------------------  11/20/23  Eduarda Nogueira states se was in the hospital and was having issues with coffee ground emesis and black stools. She was told there was nothing to do in the hospital. Her \"advocate\" directed her to the [facility] to get checked. She feels there is something trapped between the front and the back of her throat. Every day it feels like it is there and makes her want to gag and dry heave. She has associated gassiness. No issues with bowel movements although they are black. Symptoms were the same in the hospital. These symptoms have been ongoing for an undisclosed amount of time \"so long\". Anything that she eats she feels is going to come right back out. When she eats or drinks she has the sensation that something is there and it makes her want to vomit. There is an associated tightness in her stomach. Nothing makes it better and nothing makes it worse. While in the hospital she was on \"a whole bunch of " "meds\" and when she went home she says she stopped all medications. All pills including ondansetron would result in dry heave. Regurgitates and spits into buckets at home.     +nausea, all over chills, hot, cold, globus all associated with regurgitation events.    Currently on no medications except for a single ondansetron today.     Patient takes CBD.     Gabapentin causes heat sensation in head and itchiness all over.  Has tried lyrica without success.    Lost 10 pounds in the last week per patient.     Previous IV contrast caused hot sensation and nausea/vomiting. No respiratory symptoms or rash.     [facility] inpatient note 10/24/23      ---------------------------------------------------------------     Wt Readings from Last 5 Encounters:   11/20/23 68.9 kg (152 lb)   09/08/15 129.5 kg (285 lb 6.4 oz)        Esophageal Questionnaire(s)    BEDQ Questionnaire       No data to display                   No data to display                Eckardt Questionnaire       No data to display                Promis 10 Questionnaire       No data to display              STUDIES & PROCEDURES:    EGD:       Colonoscopy:  Date:  Impression:  Pathology Report:     EndoFLIP directed at the UES or LES (8cm (EF-325) balloon length or 16cm (EF-322) balloon length):   Date:  8cm balloon  Balloon inflation Balloon pressure CSA (mm^2) DI (mm^2/mmHg) Dmin (mm) Compliance   20 (ladmark ID)        30        40        50           16cm balloon  Balloon inflation Balloon pressure CSA (mm^2) DI (mm^2/mmHg) Dmin (mm) Compliance   30 (ladmark ID)        40        50        60        70           High Resolution Manometry:  Date:  Impression:    PH/Impedance:  Date:  Impression:     Bravo:  48 or 96hr  Date:  Impression:    CT:  Date:  Impression:    Esophagram:  Date:  Impression:     Prior medical records were reviewed including, but not limited to, notes from referring providers, lab work, radiographic tests, and other diagnostic tests. Pertinent " results were summarized above.     History     Past Medical History:   Diagnosis Date    Anemia     No Comments Provided    Anxiety state     No Comments Provided    Arthropathy     No Comments Provided    Asthma     No Comments Provided    Backache     No Comments Provided    Edema     No Comments Provided    Esophageal reflux     No Comments Provided    Female stress incontinence     No Comments Provided    Hypothyroidism     No Comments Provided    Irregular menstrual cycle     No Comments Provided    Irritable colon     No Comments Provided    Migraine     No Comments Provided    Morbid obesity (H)     No Comments Provided    Other chronic nonalcoholic liver disease     No Comments Provided    Other depressive disorder     No Comments Provided    Other malaise and fatigue     No Comments Provided    Pain in joint     hips, knees, ankles, feet, neck    Restless legs syndrome     self-diagnosed    Shortness of breath     No Comments Provided    Synovial cyst of popliteal space     No Comments Provided    Vitamin D deficiency     Rx 3/14, re check 6/14       Past Surgical History:   Procedure Laterality Date    ATTEMPTED ARTHROSCOPY      x3, rt knee    EXTRACTION(S) DENTAL      No Comments Provided    LAPAROSCOPIC CHOLECYSTECTOMY      2010    OSTEOTOMY FEMUR DISTAL      right       Social History     Socioeconomic History    Marital status: Single     Spouse name: Not on file    Number of children: Not on file    Years of education: Not on file    Highest education level: Not on file   Occupational History    Not on file   Tobacco Use    Smoking status: Every Day     Packs/day: .3     Types: Cigarettes     Start date: 3/18/2013    Smokeless tobacco: Never    Tobacco comments:     3 cigarettes per day    Substance and Sexual Activity    Alcohol use: Yes     Comment: Alcoholic Drinks/day: Quit 3/2013    Drug use: Unknown     Types: Other     Comment: Drug use: No    Sexual activity: Not on file   Other Topics Concern     Not on file   Social History Narrative    Not on file     Social Determinants of Health     Financial Resource Strain: Not on file   Food Insecurity: Not on file   Transportation Needs: Not on file   Physical Activity: Not on file   Stress: Not on file   Social Connections: Not on file   Interpersonal Safety: Not on file   Housing Stability: Not on file       Family History   Problem Relation Age of Onset    Diabetes Mother         Diabetes    Diabetes Father         Diabetes    Other - See Comments Father         colon clot then DVT inpt    Other - See Comments Paternal Grandfather         PE     Family history reviewed and edited as appropriate    Medications and Allergies:     Outpatient Encounter Medications as of 11/20/2023   Medication Sig Dispense Refill    albuterol (PROAIR HFA/PROVENTIL HFA/VENTOLIN HFA) 108 (90 Base) MCG/ACT inhaler Inhale 1-2 puffs into the lungs      ALPRAZolam (XANAX) 0.25 MG tablet 0.25 mg      amitriptyline (ELAVIL) 10 MG tablet Take 2 tablets by mouth      ammonium lactate (AMLACTIN) 12 % external cream Twice daily for body      aspirin 81 MG EC tablet 81 mg      aspirin-acetaminophen-caffeine (EXCEDRIN MIGRAINE) 250-250-65 MG tablet Take by mouth 2 times daily as needed      atorvastatin (LIPITOR) 20 MG tablet 20 mg      buPROPion (WELLBUTRIN XL) 150 MG 24 hr tablet Take 1 tablet by mouth daily      Calcium Polycarbophil (FIBER) 625 MG tablet Take 1 Tablet (625 mg) by mouth once daily.      cephALEXin (KEFLEX) 500 MG capsule Take 1 capsule by mouth two times daily for 7 days      chlorhexidine (HIBICLENS) 4 % liquid Chlorhexidine Topical Liquid 4 %  topically 1.0  once    active      clindamycin (CLEOCIN) 300 MG capsule Take 1 capsule 4 times a day until gone      clobetasol propionate (CLOBEX) 0.05 % external shampoo Apply a thin layer onto dry, affected area of scalp once daily. Leave for 15 minutes before lathering and rinsing.      clonazePAM (KLONOPIN) 0.5 MG tablet Take 0.5  mg by mouth      CLOTRIMAZOLE ANTI-FUNGAL 1 % external cream Apply topically to affected area(s) two times daily.      Cranberry 400 MG CAPS Take 400 mg by mouth      diphenhydrAMINE (BENADRYL) 25 MG capsule Take 25 mg by mouth every 4 hours      divalproex sodium delayed-release (DEPAKOTE) 500 MG DR tablet       docusate sodium (DSS) 100 MG capsule Take 1 capsule by mouth      DULoxetine (CYMBALTA) 30 MG capsule Take 3 capsules by mouth daily      enoxaparin ANTICOAGULANT (LOVENOX) 60 MG/0.6ML syringe Enoxaparin Subcutaneous  subcutaneously 60.0  every 12 hours    inactive      escitalopram (LEXAPRO) 20 MG tablet Take 20 mg by mouth daily      eszopiclone (LUNESTA) 1 MG tablet Take 2 mg by mouth      famotidine (PEPCID) 40 MG tablet Take 40 mg by mouth      HYDROcodone-acetaminophen (NORCO) 5-325 MG tablet take 1 tablet by mouth every 4 to 6 hours as needed for pain      hydrOXYzine (VISTARIL) 50 MG capsule Take 1 capsule by mouth      hyoscyamine (LEVSIN) 0.125 MG tablet Place 0.25 mg under the tongue      ipratropium - albuterol 0.5 mg/2.5 mg/3 mL (DUONEB) 0.5-2.5 (3) MG/3ML neb solution Inhale 1 Neb via a nebulizer 4 times daily if needed for Shortness Of Breath or Wheezing.      Lidocaine (LIDOCARE) 4 % Patch Apply 1-3 patches to intact skin to cover most painful area of back pain for max 12hr per 24hr period.      methocarbamol (ROBAXIN) 500 MG tablet Take 500 mg by mouth      Multiple Vitamins-Minerals (SUPER THERA CELESTINO M) TABS Take 1 tablet by mouth      nortriptyline (PAMELOR) 10 MG capsule 20 mg      nystatin (MYCOSTATIN) 632091 UNIT/GM external powder 1 strip      OLANZapine (ZYPREXA) 5 MG tablet 5 mg      ondansetron (ZOFRAN) 4 MG tablet Take 1 tablet by mouth      oxyCODONE-acetaminophen (PERCOCET) 5-325 MG tablet Take 1 tablet by mouth every 4 hours if needed for Pain. Max acetaminophen dose 4000mg in 24 hrs.      pantoprazole (PROTONIX) 40 MG EC tablet 40 mg      polyethylene glycol (MIRALAX) 17 g  "packet Take 17 g by mouth      predniSONE (DELTASONE) 20 MG tablet Take 2 Tablets (40 mg) by mouth once daily with a meal for 5 days.      pregabalin (LYRICA) 150 MG capsule 150 mg      promethazine (PHENERGAN) 12.5 MG tablet Take half a tablet three times daily before meals and every 6 hrs as needed for nausea      sodium chloride, PF, (NORMAL SALINE FLUSH) 0.9% PF flush Inject 5 mLs into the vein      Sulfacetamide Sodium-Sulfur 10-5 % SUSP Apply topically.      tretinoin (RETIN-A) 0.05 % external cream Apply topically to affected area(s) at bedtime if needed (Darier's facial flare).      UBRELVY 100 MG tablet 100 mg      vitamin B1 (THIAMINE) 100 MG tablet Miscellaneous Drug     Thiamine mononitrate, vit B1, (VITAMIN B1) 100mg tablet    active      XARELTO ANTICOAGULANT 20 MG TABS tablet 20 mg       No facility-administered encounter medications on file as of 11/20/2023.        Allergies   Allergen Reactions    Acetaminophen Hives, Itching, Rash and Shortness Of Breath     Several Rx's for Norco in 2021 & 22    No Clinical Screening - See Comments Anaphylaxis     steroids    Gabapentin Unknown and Itching    Hydrocodone-Acetaminophen Itching     Several Rx's for Norco in 2021 & 22    Iodinated Contrast Media Unknown and Nausea and Vomiting     Extremely anxious, vomited once after CT. No wheezing or SOB.    Latex Itching    Morphine And Related Itching     Several Rx's for Norco in 2021 & 22    Nsaids Other (See Comments)     H/o german-n-y gastric bypass\". AVOID NSAIDs and aspirin due to risk of gastric and/or G-J anastomotic ulcers. If Eduarda must be on short course of NSAIDs or aspirin, use enteric coated if possible and use PPI // Ira Hung RN, Bariatric Nurse Clinician, [facility] 3/23/2022    Oxycodone-Acetaminophen Itching     Several Rx's for Norco in 2021 & 22        Review of systems:  A full 10 point review of systems was obtained and was negative except for the pertinent " "positives and negatives stated within the HPI.    Objective Findings:   Physical Exam:    Constitutional: BP (!) 120/90 (BP Location: Left arm, Patient Position: Sitting, Cuff Size: Adult Regular)   Pulse 87   Ht 1.6 m (5' 3\")   Wt 68.9 kg (152 lb)   BMI 26.93 kg/m    General: Alert, cooperative, no distress, well-appearing  Head: Atraumatic, normocephalic, no obvious abnormalities   Eyes: EOMI, Sclera anicteric, no obvious conjunctival hemorrhage   Nose: Nares normal, no obvious malformation, no obvious rhinorrhea   Respiratory: normal appearing respirations, cough associated with regurgitation  Gastrointestinal: active regurgitation of gastric contents with dry heave, hiccups associated.   Musculoskeletal: uses walker to ambulate  Skin: No jaundice, no obvious rash  Neurologic: AAOx3, no obvious neurologic abnormality  Psychiatric: Normal Affect, appropriate mood  Extremities: No obvious edema, no obvious malformation     Labs, Radiology, Pathology     Lab Results   Component Value Date    HGB 16.4 (H) 09/08/2015     09/08/2015     (L) 09/08/2015    BUN 9 09/08/2015    CO2 26 09/08/2015        Liver Function Studies -   Recent Labs   Lab Test 09/08/15  1551   PROTTOTAL 7.1   ALBUMIN 4.2   BILITOTAL 0.5   ALKPHOS 52        Patient Active Problem List    Diagnosis Date Noted    Regurgitation of food 11/20/2023     Priority: Medium    H/O gastric bypass 11/20/2023     Priority: Medium    Globus sensation 11/20/2023     Priority: Medium      Assessment and Plan   Assessment:    Eduarda Nogueira is a 43 year old female with anxiety, gastric bypass 3/17/22, subdural hematoma, EtOH abuse,migraine headaches previous stroke, asthma, reflux, morbid obesity, cyclic vomiting syndrome, marijuana dependence, PFO, chronic pain, urinary and fecal incontinence sp pelvic floor therapy, who is presenting as a new patient in consultation at the request of Dr. Myers with a chief complaint of regurgitation beginning post " bypass.    At this time it is unclear what is driving the patient's symptoms of regurgitation which she actively did in the room with me. I do not believe it is aerophagia or true rumination syndrome. I have recommended barium esophagram and modified barium esophagram along with endoscopy and HRM +pH impedance off therapy. Ultimately she may require surgical intervention for her bypass as her symptoms began only after her bypass surgery.     1. Regurgitation of food    2. H/O gastric bypass    3. Globus sensation       Orders Placed This Encounter   Procedures    XR Esophagram    XR Video Swallow with SLP or OT - Order with Speech Therapy Referral    Adult GI  Referral - Procedure Only    Speech Therapy Referral     Plan:  Schedule endoscopy to evaluate foregut and to evaluate a cause of dark stool  Schedule high resolution esophageal manometry and 24hr pH impedance off of acid suppression therapy  Schedule barium esophagram and modified barium esophagram  Future consideration for surgical intervention pending above testing  Optimize nutritional supplementation with boost or ensure. If you continue to lose weight you may need a feeding tube    Follow up plan:   Return to clinic 6 months and as needed.    The risks and benefits of my recommendations, as well as other treatment options were discussed with the patient and any available family today. All questions were answered.     Follow up: As planned above. Today, I personally spent 40 minutes in direct face to face time with the patient, of which greater than 50% of the time was spent in patient education and counseling as described above. Approximately 10 minutes were spent on indirect care associated with the patient's consultation including but not limited to review of: patient medical records to date, clinic visits, hospital records, lab results, imaging studies, procedural documentation, and coordinating care with other providers. The findings from  this review are summarized in the above note. All of the above accounted for a cumulative time of 50 minutes and was performed on the date of service.     The patient verbalized understanding of the plan and was appreciative for the time spent and information provided during the office visit.     Author:   Henri Davidson DO   of Medicine  Director, Esophageal Disorders Program  Division of Gastroenterology, Hepatology, and Nutrition  [facility]    Dr. Davidson speaks for agreement24 [email or web address] regarding dupilumab and has participated in advisory boards for the medication. He receives income for these activities. When discussing the medication, patients were informed of Dr. Davidson's role and potential conflict of interest. All questions and/or concerns were answered during the encounter.     Documentation assisted by voice recognition and documentation system.

## 2023-11-21 DIAGNOSIS — R11.10 REGURGITATION OF FOOD: Primary | ICD-10-CM

## 2023-11-21 DIAGNOSIS — R09.A2 GLOBUS SENSATION: ICD-10-CM

## 2023-11-21 DIAGNOSIS — Z98.84 H/O GASTRIC BYPASS: ICD-10-CM

## 2023-11-24 ENCOUNTER — TELEPHONE (OUTPATIENT)
Dept: GASTROENTEROLOGY | Facility: CLINIC | Age: 43
End: 2023-11-24
Payer: COMMERCIAL

## 2023-11-24 NOTE — TELEPHONE ENCOUNTER
"Endoscopy Scheduling Screen    Have you had a positive Covid test in the last 14 days?  No    Are you active on MyChart?   No    What insurance is in the chart?  Other:  [facility]    Ordering/Referring Provider:DYLAN   (If ordering provider performs procedure, schedule with ordering provider unless otherwise instructed. )    BMI: Estimated body mass index is 26.93 kg/m  as calculated from the following:    Height as of 11/20/23: 1.6 m (5' 3\").    Weight as of 11/20/23: 68.9 kg (152 lb).     Sedation Ordered  moderate sedation.   If patient BMI > 50 do not schedule in [facility].    If patient BMI > 45 do not schedule at [facility].    Are you taking methadone or Suboxone?  No    Are you taking any prescription medications for pain 3 or more times per week?   No    Do you have a history of malignant hyperthermia or adverse reaction to anesthesia?  No    (Females) Are you currently pregnant?   No     Have you been diagnosed or told you have pulmonary hypertension?   No    Do you have an LVAD?  No    Have you been told you have moderate to severe sleep apnea?  No    Have you been told you have COPD, asthma, or any other lung disease?  Yes     What breathing problems do you have?  Asthma     Do you use home oxygen?  No    Have your breathing problems required an ED visit or hospitalization in the last year?  No    Do you have any heart conditions?  No     Have you ever had an organ transplant?   No    Have you ever had or are you awaiting a heart or lung transplant?   No    Have you had a stroke or transient ischemic attack (TIA aka \"mini stroke\" in the last 6 months?   Yes (RN Review required for scheduling.)    Have you been diagnosed with or been told you have cirrhosis of the liver?   No    Are you currently on dialysis?   No    Do you need assistance transferring?   Yes (Hospital Only)    BMI: Estimated body mass index is 26.93 kg/m  as calculated from the following:    Height as of 11/20/23: 1.6 m (5' 3\").    Weight as of 11/20/23: " 68.9 kg (152 lb).     Is patients BMI > 40 and scheduling location [facility]?  No    Do you take an injectable medication for weight loss or diabetes (excluding insulin)?  No    Do you take the medication Naltrexone?  No    Do you take blood thinners?  No       Prep   Are you currently on dialysis or do you have chronic kidney disease?  No    Do you have a diagnosis of diabetes?  No    Do you have a diagnosis of cystic fibrosis (CF)?  No    On a regular basis do you go 3 -5 days between bowel movements?  No    BMI > 40?  No    Preferred Pharmacy:    [facility] - [place] - [address] [place] [address]  [address] [place] [address] [place] [address]  Phone: [phone] Fax: [phone]      Patient Reminders:   You will receive a call from a Nurse to review instructions and health history.  This assessment must be completed prior to your procedure.  Failure to complete the Nurse assessment may result in the procedure being cancelled.      On the day of your procedure, please designate an adult(s) who can drive you home stay with you for the next 24 hours. The medicines used in the exam will make you sleepy. You will not be able to drive.      You cannot take public transportation, ride share services, or non-medical taxi service without a responsible caregiver.  Medical transport services are allowed with the requirement that a responsible caregiver will receive you at your destination.  We require that drivers and caregivers are confirmed prior to your procedure.

## 2023-11-24 NOTE — TELEPHONE ENCOUNTER
Pre Assessment RN Review    Focused Assessments    Stroke/TIA Review    Patient has hx of TIA/Stroke in the last 6 months. Contacted PCP and ordering provider to discuss appropriateness of procedure and recommended delay of procedure for at least 6 months. Appropriateness of procedure will be reevaluated according to provider recommendation.    No TIA/stroke noted in the last 6 months. Multiple ED trips with headaches, but all show no TIA/stroke. Does have history of stroke.     Scheduling Status & Recommendations    Sedation: General Anesthesia - Per order.    Samira Peterson RN  Endoscopy Procedure Pre Assessment RN

## 2023-11-28 NOTE — TELEPHONE ENCOUNTER
Final Scheduling Details   Colonoscopy prep sent?  N/A    Procedure scheduled  Upper endoscopy (EGD) - 60 MINS PER DYLAN    Surgeon:       Date of procedure:  1/30/2024     Pre-OP / PAC:   No - Not required for this site.    Location  [facility] - Per RN assessment.    Sedation   General Anesthesia - Per order.      Patient Reminders:   You will receive a call from a Nurse to review instructions and health history.  This assessment must be completed prior to your procedure.  Failure to complete the Nurse assessment may result in the procedure being cancelled.      On the day of your procedure, please designate an adult(s) who can drive you home stay with you for the next 24 hours. The medicines used in the exam will make you sleepy. You will not be able to drive.      You cannot take public transportation, ride share services, or non-medical taxi service without a responsible caregiver.  Medical transport services are allowed with the requirement that a responsible caregiver will receive you at your destination.  We require that drivers and caregivers are confirmed prior to your procedure.

## 2023-12-05 ENCOUNTER — PATIENT OUTREACH (OUTPATIENT)
Dept: GASTROENTEROLOGY | Facility: CLINIC | Age: 43
End: 2023-12-05
Payer: COMMERCIAL

## 2023-12-05 NOTE — TELEPHONE ENCOUNTER
Spoke with pt and went over manometry and pH testing. Pt is currently not taking any PPI/acid blocking/reducing medications. Sent [employer] message to pt with instructions and information.    Pt to bring test meal for end of manometry.

## 2023-12-06 NOTE — CONFIDENTIAL NOTE
DIAGNOSIS:   Fatty liver    Appt Date:  02.26.2024   NOTES STATUS DETAILS   OFFICE NOTE from referring provider     OFFICE NOTES from other specialists Care Everywhere 11.01.2023 Dione Myers NP [facility]   DISCHARGE SUMMARY from hospital     MEDICATION LIST Care Everywhere    LIVER BIOSPY (IF APPLICABLE)      PATHOLOGY REPORTS      IMAGING     ENDOSCOPY (IF AVAILABLE) Care Everywhere 02.17.2021   COLONOSCOPY (IF AVAILABLE)     ULTRASOUND LIVER     CT OF ABDOMEN Care Everywhere 10.23.2023 CT Abd Pelvis   MRI OF LIVER     FIBROSCAN, US ELASTOGRAPHY, FIBROSIS SCAN, MR ELASTOGRAPHY     LABS     HEPATIC PANEL (LIVER PANEL) Care Everywhere 11.01.2023   BASIC METABOLIC PANEL Care Everywhere 07.27.2023   COMPLETE METABOLIC PANEL Care Everywhere 11.01.2023   COMPLETE BLOOD COUNT (CBC) Care Everywhere 11.01.2023   INTERNATIONAL NORMALIZED RATIO (INR) Care Everywhere 06.17.2023   HEPATITIS C ANTIBODY     HEPATITIS C VIRAL LOAD/PCR     HEPATITIS C GENOTYPE     HEPATITIS B SURFACE ANTIGEN     HEPATITIS B SURFACE ANTIBODY     HEPATITIS B DNA QUANT LEVEL     HEPATITIS B CORE ANTIBODY

## 2024-01-05 ENCOUNTER — HOSPITAL ENCOUNTER (OUTPATIENT)
Dept: GENERAL RADIOLOGY | Facility: CLINIC | Age: 44
Discharge: HOME | End: 2024-01-05
Attending: INTERNAL MEDICINE
Payer: COMMERCIAL

## 2024-01-05 ENCOUNTER — THERAPY VISIT (OUTPATIENT)
Dept: SPEECH THERAPY | Facility: CLINIC | Age: 44
End: 2024-01-05
Attending: INTERNAL MEDICINE
Payer: COMMERCIAL

## 2024-01-05 DIAGNOSIS — R11.10 REGURGITATION OF FOOD: ICD-10-CM

## 2024-01-05 PROCEDURE — 92611 MOTION FLUOROSCOPY/SWALLOW: CPT | Mod: GN | Performed by: SPEECH-LANGUAGE PATHOLOGIST

## 2024-01-05 PROCEDURE — 74230 X-RAY XM SWLNG FUNCJ C+: CPT

## 2024-01-05 PROCEDURE — 92610 EVALUATE SWALLOWING FUNCTION: CPT | Mod: GN | Performed by: SPEECH-LANGUAGE PATHOLOGIST

## 2024-01-05 PROCEDURE — 74220 X-RAY XM ESOPHAGUS 1CNTRST: CPT

## 2024-01-05 RX ORDER — BARIUM SULFATE 400 MG/ML
SUSPENSION ORAL ONCE
Status: COMPLETED | OUTPATIENT
Start: 2024-01-05 | End: 2024-01-05

## 2024-01-05 RX ADMIN — BARIUM SULFATE 1 ML: 400 SUSPENSION ORAL at 08:00

## 2024-01-05 NOTE — PROGRESS NOTES
Appropriate assistive devices provided during their visit. yes (Yes, No, N/A) walker/tech (list device)    Exam table and/or cart  placed in the lowest position. yes (Yes, No, N/A)    Brakes on tables/carts/wheelchairs used at all times. Yes  (Yes, No, N/A)    Non slip footwear applied. yes (Yes, No, NA)    Patient was accompanied by staff throughout visit. yes (Yes, No, N/A)    Equipment safety straps used. N/a (Yes, No, N/A)    Assist with toileting. N/a (Yes, No, N/A)      Lita Leos  1/5/2024  7:52 AM

## 2024-01-05 NOTE — PROGRESS NOTES
"SPEECH LANGUAGE PATHOLOGY EVALUATION    See electronic medical record for Abuse and Falls Screening details.    Subjective      Presenting condition or subjective complaint: Patient is reporting that she feels like \"food is getting caught between the front and back of my throat and it just makes me want to gag all the time.\"    Date of onset: 11/20/23    Relevant medical history: Stroke; Migraines or headaches; Asthma  Dates & types of surgery: Gastric bypass (3/17/22)    Prior diagnostic imaging/testing results:   none noted    Prior therapy history for the same diagnosis, illness or injury: none noted       Living Environment  Social support:     Help at home:    Equipment owned:   Walker    Employment:      Hobbies/Interests:      Patient goals for therapy:      Pain assessment:  Pt reported having tenderness on upper gums.     Objective     SWALLOW EVALUATION  Dysphagia history: Pt has a history of regurgitating food and a globus feeling in the back of her throat when eating and drinking. Pt reported that she coughed up phlegm this morning and had a globus feeling after drinking some water. PMH includes hx of stroke, gastric bypass on 03/17/2022, reflux, obesity, coffee ground emesis, Etoh, asthma, reflux, and marijuana dependency.  Current Diet/Method of Nutritional Intake: Pt reports that she has difficulties tolerating \"everything\" and that she tolerates gravy and mashed potatoes the best.thin liquids (level 0), soft & bite-sized (level 6)        VIDEOFLUOROSCOPIC SWALLOW STUDY  Radiologist: Dr. Love  Views Taken: left lateral   Physical location of procedure: [facility]   Patient sitting upright on chair/stool     VFSS textures trialed: * trials limited due to radiologist wanting to focus more on esophagram due to lack of deficits noted in oral, transit, and pharyngeal phases  VFSS Eval: Thin Liquids  Mode of Presentation: cup, self-fed   Order of Presentation: 1,2,3,4  Preparatory Phase: " WFL  Oral Phase: WFL  Bolus Location When Swallow Initiated: posterior angle of ramus  Pharyngeal Phase:  Reflux noted, See radiology report for additional details  Rosenbeck's Penetration Aspiration Scale: 1 - no aspiration, contrast does not enter airway  Response to Aspiration:  N/a  Strategies and Compensations:  N/a  Diagnostic Statement: WFL, no s/sx of aspiration some reflux noted see radiologist's report     VFSS Eval: Mildly Thick Liquids  Mode of Presentation: cup, self-fed   Order of Presentation: 5  Preparatory Phase: WFL  Oral Phase: WFL  Bolus Location When Swallow Initiated: posterior angle of ramus  Pharyngeal Phase: WFL  Rosenbeck's Penetration Aspiration Scale: 1 - no aspiration, contrast does not enter airway  Response to Aspiration:  N/a  Strategies and Compensations:  N/a  Diagnostic Statement: WFL, no s/sx of aspiration some reflux noted see radiologist's report     VFSS Eval: Purees  Mode of Presentation: spoon, self-fed   Order of Presentation: 6  Preparatory Phase: WFL  Oral Phase: WFL  Bolus Location When Swallow Initiated: posterior angle of ramus  Pharyngeal Phase: WFL  Rosenbeck s Penetration Aspiration Scale: 1 - no aspiration, contrast does not enter airway  Response to Aspiration:  N/a  Strategies and Compensations:  N/a  Diagnostic Statement: WFL, no s/sx of aspiration some reflux noted see radiologist's report     VFSS Eval: Solids  Mode of Presentation: spoon, self-fed   Order of Presentation: 7  Preparatory Phase: WFL  Oral Phase: WFL  Bolus Location When Swallow Initiated: posterior angle of ramus  Pharyngeal Phase: WFL   Rosenbeck s Penetration Aspiration Scale: 1 - no aspiration, contrast does not enter airway  Response to Aspiration:  N/a  Strategies and Compensations:  N/a  Diagnostic Statement: WFL, no s/sx of aspiration some reflux and emesis noted-see radiologist's report      ESOPHAGEAL PHASE OF SWALLOW  please refer to radiologist's report for details     SWALLOW  ASSESSMENT CLINICAL IMPRESSIONS AND RATIONALE  Diet Consistency Recommendations: regular diet  , avoiding known choking hazards  Recommended Feeding/Eating Techniques: reflux precautions including small bites and sips, upright posture for at least 30 minutes following oral intake, pacing bites/sips to at least 30 seconds between sips/bites to allow for esophageal clearance   Medication Administration Recommendations: Pt can take medications whole and with thin liquids  Instrumental Assessment Recommendations: VFSS (videofluoroscopic swallowing study), VFSS completed      Assessment & Plan   CLINICAL IMPRESSIONS   Medical Diagnosis: Regurgitation of food    Treatment Diagnosis: Dysphagia   Impression/Assessment: Completed video swallow study with full variety of food and liquid trials.  No overt s/s of aspiration noted and no difficulties with oral, transit, or pharyngeal stages of the swallow.  Radiologist did note esophageal stage difficulties and please see radiologist's report for further details.  Reviewed videos with patient and discussed in depth results and recommendations of the swallow study and patient indicated good understanding.  No further skilled intervention needed at this time.    PLAN OF CARE  Treatment Interventions: Swallowing dysfunction and/or oral function for feeding    Prognosis to achieve stated therapy goals is excellent   Rehab potential is impacted by: pending medical intervention    Long Term Goals   SLP Goal 1  Goal Identifier: Evaluation Only      Frequency of Treatment: Evaluation only  Duration of Treatment:   NA    Recommended Referrals to Other Professionals:  none  Education Assessment:   Learner/Method: Patient  Education Comments: Educated pt on results of exam and reccomendations.    Risks and benefits of evaluation/treatment have been explained.   Patient/Family/caregiver agrees with Plan of Care.     Evaluation Time:          Present: Not applicable     Signing  Clinician: Sabi Chacon MA, CCC-SLP    [facility]                                                                                   OUTPATIENT SPEECH LANGUAGE PATHOLOGY      PLAN OF TREATMENT FOR OUTPATIENT REHABILITATION   Patient's Last Name, First Name, Eduarda Theodore YOB: 1980   Provider's Name   [facility]   Medical Record No.  [id number]     Onset Date: 11/20/23 Start of Care Date: 01/05/24     Medical Diagnosis:  Regurgitation of food      SLP Treatment Diagnosis: Dysphagia  Plan of Treatment  Frequency/Duration: Evaluation only  /       Certification date from 01/05/24   To 01/05/24          See note for plan of treatment details and functional goals     Sabi Chacon MA, CCC-SLP                         I CERTIFY THE NEED FOR THESE SERVICES FURNISHED UNDER        THIS PLAN OF TREATMENT AND WHILE UNDER MY CARE     (Physician attestation of this document indicates review and certification of the therapy plan).              Referring Provider:  Henri Davidson    Initial Assessment  See Epic Evaluation- 01/05/24

## 2024-01-16 ENCOUNTER — PATIENT OUTREACH (OUTPATIENT)
Dept: GASTROENTEROLOGY | Facility: CLINIC | Age: 44
End: 2024-01-16
Payer: COMMERCIAL

## 2024-01-16 ENCOUNTER — TELEPHONE (OUTPATIENT)
Dept: GASTROENTEROLOGY | Facility: CLINIC | Age: 44
End: 2024-01-16
Payer: COMMERCIAL

## 2024-01-16 NOTE — TELEPHONE ENCOUNTER
Pre assessment completed for upcoming procedure.   (Please see previous telephone encounter notes for complete details)    Procedure details:    Arrival time and facility location reviewed.    Pre op exam needed? N/A    Designated  policy reviewed. Instructed to have someone stay 24 hours post procedure.     COVID policy reviewed.      Medication review:    Patient states they do not take any medications. Denies being on blood thinners. Patient stopped cannabis and only takes CBD.     Prep for procedure:     Procedure prep instructions reviewed.        Additional information needed?  N/A      Patient  verbalized understanding and had no questions or concerns at this time.      Parul Madden RN  Endoscopy Procedure Pre Assessment RN  [phone] option 4

## 2024-01-16 NOTE — TELEPHONE ENCOUNTER
Non-Invasive GI Procedure Scheduling Questionnaire    Have you had a positive Covid test in the last 14 days?  No    Are you active on MyChart?   No    What insurance is in the chart?  Other:  BP    Ordering/Referring Provider:     PAOLA RICHARDSON      (If ordering provider performs procedure, schedule with ordering provider unless otherwise instructed. )    (Females) Are you currently pregnant? No - Okay to continue scheduling.    Have you ever had or are you awaiting a heart or lung transplant? No - Schedule next available.    Do you need assistance transferring? Yes - Send In-Basket message to Gabrielle Ramey and Andie Jovel as an FYI.    Do you take acid reflux medication or proton-pump inhibitors (PPI)? No - Continue scheduling.        Patient Reminders:  Please inform patients to review instructions sent via [employer] or letter two weeks before procedure.  The Manometry exam may take up to 90 minutes.  The Breath Test exam may take up to 4 hours.

## 2024-01-16 NOTE — TELEPHONE ENCOUNTER
Reached out to pt to further discuss esophageal testing ordered by provider: esophageal manometry and pH testing.  Pt understands care plan better now and plans to proceed with testing.    My recommendations are summarized as follows:     1. Schedule endoscopy to evaluate foregut and to evaluate a cause of dark stool  2. Schedule high resolution esophageal manometry and 24hr pH impedance off of acid suppression therapy  3. Schedule barium esophagram and modified barium esophagram  4. Future consideration for surgical intervention pending above testing  5. Optimize nutritional supplementation with boost or ensure. If you continue to lose weight you may need a feeding tube

## 2024-01-16 NOTE — TELEPHONE ENCOUNTER
Pre visit planning completed.      Procedure details:    Patient scheduled for Upper endoscopy (EGD) on 1/30/2024.     Arrival time: 0845. Procedure time 1015    Pre op exam needed? N/A    Facility location: [facility]; [address], [address], [place] [address]    Sedation type: General    Indication for procedure:   Regurgitation of food [R11.10]  - Primary      H/O gastric bypass [Z98.84]      Globus sensation [R09.A2]            Chart review:     Electronic implanted devices? No    Recent diagnosis of diverticulitis within the last 6 weeks? N/A    Diabetic? No    Diabetic medication HOLDING recommendations: (if applicable)  Oral diabetic medications: N/A  Diabetic injectables: N/A  Insulin: N/A      Medication review:    Anticoagulants? Yes Rivaroxaban (Xarelto): Recommended HOLD 2 days before procedure.  Consult with your managing provider.  Enoxaparin (Lovenox): Recommended HOLD 24 hours before procedure.  Consult with your managing provider.    NSAIDS? No NSAID medications per patient's medication list.  RN will verify with pre-assessment call.    Other medication HOLDING recommendations:  N/A      Prep for procedure:     Prep instructions sent via [employer]       Alix Cochran RN  Endoscopy Procedure Pre Assessment RN  [phone] option 4

## 2024-01-19 ENCOUNTER — TELEPHONE (OUTPATIENT)
Dept: GASTROENTEROLOGY | Facility: CLINIC | Age: 44
End: 2024-01-19
Payer: COMMERCIAL

## 2024-01-19 NOTE — TELEPHONE ENCOUNTER
Left message for pt to call back. Need updated weight on pt. Scheduled for EGD on 1/30 and HRM on 2/28. Follow up appointment with Anisha JUÁREZ on 2/9

## 2024-01-19 NOTE — TELEPHONE ENCOUNTER
[facility]    Phone Message    May a detailed message be left on voicemail: yes     Reason for Call: Other: Eduarda is calling in asking for a call back. Pt states that she has concerns with the fact that she has been continuously losing weight since her last appt with Dr. Davidson and has lost 9 pounds in just the past week. Please call back as soon as possible to discuss.     Action Taken: Message routed to:  [facility] GI    Travel Screening: Not Applicable

## 2024-01-23 NOTE — TELEPHONE ENCOUNTER
"Spoke with pt, \"constant everyday vomiting, frothy sputum\". Over the weekend attempted to eat pizza and chicken however the food came back up right after eating. Pt is currently able to keep down water, protein drinks and soup broth. Current weight 142 lbs.    Pt also reported she has new symptoms of constipation. Pt had small bowel movement this morning which she describes as black tar pellets with blood. Pt had painful straining and incomplete relief. Pt also described bright red blood in addition to the tarry pellets. Pt couldn't quantify the blood in the toilet as the water was red. Prior to this pt was having normal bowel movements however has not had a normal bowel movement in a few days. Pt note she has a history of hemorrhoids however this is an abnormal amount of blood from hemorrhoids for her. Pt denies fever, lightheadedness, weakness.    Based on pt's description of symptoms from recent bowel habits the writer recommended pt to seek further care through an emergency department setting for further evaluation. Pt is on her way to an infusion appointment at [facility] and will go to their ED for further evaluation. Pt will update the writer based on her visit.  "

## 2024-01-29 ENCOUNTER — ANESTHESIA EVENT (OUTPATIENT)
Dept: GASTROENTEROLOGY | Facility: CLINIC | Age: 44
End: 2024-01-29
Payer: COMMERCIAL

## 2024-01-29 NOTE — ANESTHESIA PREPROCEDURE EVALUATION
Anesthesia Pre-Procedure Evaluation    Patient: Eduarda Nogueira   MRN: [id number] : 1980        Procedure : Procedure(s):  Esophagoscopy, gastroscopy, duodenoscopy (EGD), combined          Past Medical History:   Diagnosis Date    Anemia     No Comments Provided    Anxiety state     No Comments Provided    Arthropathy     No Comments Provided    Asthma     No Comments Provided    Backache     No Comments Provided    Edema     No Comments Provided    Esophageal reflux     No Comments Provided    Female stress incontinence     No Comments Provided    Hypothyroidism     No Comments Provided    Irregular menstrual cycle     No Comments Provided    Irritable colon     No Comments Provided    Migraine     No Comments Provided    Morbid obesity (H)     No Comments Provided    Other chronic nonalcoholic liver disease     No Comments Provided    Other depressive disorder     No Comments Provided    Other malaise and fatigue     No Comments Provided    Pain in joint     hips, knees, ankles, feet, neck    Restless legs syndrome     self-diagnosed    Shortness of breath     No Comments Provided    Synovial cyst of popliteal space     No Comments Provided    Vitamin D deficiency     Rx 3/14, re check       Past Surgical History:   Procedure Laterality Date    ATTEMPTED ARTHROSCOPY      x3, rt knee    EXTRACTION(S) DENTAL      No Comments Provided    LAPAROSCOPIC CHOLECYSTECTOMY      2010    OSTEOTOMY FEMUR DISTAL      right      Allergies   Allergen Reactions    Acetaminophen Hives, Itching, Rash and Shortness Of Breath     Several Rx's for Norco in 2021    No Clinical Screening - See Comments Anaphylaxis     steroids    Gabapentin Unknown and Itching    Hydrocodone-Acetaminophen Itching     Several Rx's for Norco in 2021    Iodinated Contrast Media Unknown and Nausea and Vomiting     Extremely anxious, vomited once after CT. No wheezing or SOB.    Latex Itching    Morphine And Related Itching     Several  "Rx's for Norco in 2021 & 22    Nsaids Other (See Comments)     H/o german-n-y gastric bypass\". AVOID NSAIDs and aspirin due to risk of gastric and/or G-J anastomotic ulcers. If Eduarda must be on short course of NSAIDs or aspirin, use enteric coated if possible and use PPI // Ira Hung RN, Bariatric Nurse Clinician, [facility] 3/23/2022    Oxycodone-Acetaminophen Itching     Several Rx's for Norco in 2021 & 22      Social History     Tobacco Use    Smoking status: Every Day     Packs/day: .3     Types: Cigarettes     Start date: 3/18/2013    Smokeless tobacco: Never    Tobacco comments:     3 cigarettes per day    Substance Use Topics    Alcohol use: Yes     Comment: Alcoholic Drinks/day: Quit 3/2013      Wt Readings from Last 1 Encounters:   11/20/23 68.9 kg (152 lb)        Anesthesia Evaluation            ROS/MED HX  ENT/Pulmonary:     (+)                      asthma                  Neurologic:       Cardiovascular:  - neg cardiovascular ROS     METS/Exercise Tolerance:     Hematologic:       Musculoskeletal:       GI/Hepatic:     (+) GERD,            liver disease,       Renal/Genitourinary:       Endo:     (+)          thyroid problem,            Psychiatric/Substance Use:       Infectious Disease:       Malignancy:       Other:            Physical Exam    Airway        Mallampati: II   TM distance: > 3 FB   Neck ROM: full   Mouth opening: > 3 cm    Respiratory Devices and Support         Dental       (+) Minor Abnormalities - some fillings, tiny chips      Cardiovascular   cardiovascular exam normal       Rhythm and rate: regular and normal     Pulmonary   pulmonary exam normal        breath sounds clear to auscultation           OUTSIDE LABS:  CBC:   Lab Results   Component Value Date    HGB 16.4 (H) 09/08/2015    HCT 46.6 09/08/2015     09/08/2015     BMP:   Lab Results   Component Value Date     (L) 09/08/2015    POTASSIUM 3.8 09/08/2015    CHLORIDE 101 09/08/2015    " "CO2 26 09/08/2015    BUN 9 09/08/2015    CR 0.97 09/08/2015    GLC 86 09/08/2015     COAGS: No results found for: \"PTT\", \"INR\", \"FIBR\"  POC: No results found for: \"BGM\", \"HCG\", \"HCGS\"  HEPATIC:   Lab Results   Component Value Date    ALBUMIN 4.2 09/08/2015    PROTTOTAL 7.1 09/08/2015    ALKPHOS 52 09/08/2015    BILITOTAL 0.5 09/08/2015     OTHER:   Lab Results   Component Value Date    TAVO 8.6 09/08/2015    SED 4 09/08/2015       Anesthesia Plan    ASA Status:  2    NPO Status:  NPO Appropriate    Anesthesia Type: MAC.     - Reason for MAC: immobility needed, straight local not clinically adequate   Induction: Intravenous.   Maintenance: TIVA.        Consents    Anesthesia Plan(s) and associated risks, benefits, and realistic alternatives discussed. Questions answered and patient/representative(s) expressed understanding.     - Discussed: Risks, Benefits and Alternatives for the PROCEDURE were discussed     - Discussed with:  Patient            Postoperative Care    Pain management: IV analgesics.   PONV prophylaxis: Ondansetron (or other 5HT-3), Promethazine or metoclopramide     Comments:               Dennis Lucio MD    I have reviewed the pertinent notes and labs in the chart from the past 30 days and (re)examined the patient.  Any updates or changes from those notes are reflected in this note.                  "

## 2024-01-30 ENCOUNTER — HOSPITAL ENCOUNTER (OUTPATIENT)
Facility: CLINIC | Age: 44
Discharge: HOME | End: 2024-01-30
Attending: INTERNAL MEDICINE | Admitting: INTERNAL MEDICINE
Payer: COMMERCIAL

## 2024-01-30 ENCOUNTER — ANESTHESIA (OUTPATIENT)
Dept: GASTROENTEROLOGY | Facility: CLINIC | Age: 44
End: 2024-01-30
Payer: COMMERCIAL

## 2024-01-30 VITALS
RESPIRATION RATE: 18 BRPM | DIASTOLIC BLOOD PRESSURE: 88 MMHG | SYSTOLIC BLOOD PRESSURE: 124 MMHG | HEART RATE: 90 BPM | OXYGEN SATURATION: 98 %

## 2024-01-30 DIAGNOSIS — K28.9 ULCER JEJUNUM: Primary | ICD-10-CM

## 2024-01-30 LAB — UPPER GI ENDOSCOPY: NORMAL

## 2024-01-30 PROCEDURE — 88341 IMHCHEM/IMCYTCHM EA ADD ANTB: CPT | Mod: 26 | Performed by: PATHOLOGY

## 2024-01-30 PROCEDURE — 43239 EGD BIOPSY SINGLE/MULTIPLE: CPT | Performed by: INTERNAL MEDICINE

## 2024-01-30 PROCEDURE — 250N000011 HC RX IP 250 OP 636: Performed by: NURSE ANESTHETIST, CERTIFIED REGISTERED

## 2024-01-30 PROCEDURE — 250N000009 HC RX 250: Performed by: NURSE ANESTHETIST, CERTIFIED REGISTERED

## 2024-01-30 PROCEDURE — 88305 TISSUE EXAM BY PATHOLOGIST: CPT | Mod: 26 | Performed by: PATHOLOGY

## 2024-01-30 PROCEDURE — 258N000003 HC RX IP 258 OP 636: Performed by: NURSE ANESTHETIST, CERTIFIED REGISTERED

## 2024-01-30 PROCEDURE — 250N000011 HC RX IP 250 OP 636: Performed by: INTERNAL MEDICINE

## 2024-01-30 PROCEDURE — 370N000017 HC ANESTHESIA TECHNICAL FEE, PER MIN: Performed by: INTERNAL MEDICINE

## 2024-01-30 PROCEDURE — 88342 IMHCHEM/IMCYTCHM 1ST ANTB: CPT | Mod: 26 | Performed by: PATHOLOGY

## 2024-01-30 PROCEDURE — 88342 IMHCHEM/IMCYTCHM 1ST ANTB: CPT | Mod: TC | Performed by: INTERNAL MEDICINE

## 2024-01-30 RX ORDER — ONDANSETRON 2 MG/ML
4 INJECTION INTRAMUSCULAR; INTRAVENOUS
Status: DISCONTINUED | OUTPATIENT
Start: 2024-01-30 | End: 2024-01-30 | Stop reason: HOSPADM

## 2024-01-30 RX ORDER — HYDROMORPHONE HCL IN WATER/PF 6 MG/30 ML
0.2 PATIENT CONTROLLED ANALGESIA SYRINGE INTRAVENOUS EVERY 5 MIN PRN
Status: DISCONTINUED | OUTPATIENT
Start: 2024-01-30 | End: 2024-01-30 | Stop reason: HOSPADM

## 2024-01-30 RX ORDER — PROPOFOL 10 MG/ML
INJECTION, EMULSION INTRAVENOUS CONTINUOUS PRN
Status: DISCONTINUED | OUTPATIENT
Start: 2024-01-30 | End: 2024-01-30

## 2024-01-30 RX ORDER — NALOXONE HYDROCHLORIDE 0.4 MG/ML
0.4 INJECTION, SOLUTION INTRAMUSCULAR; INTRAVENOUS; SUBCUTANEOUS
Status: DISCONTINUED | OUTPATIENT
Start: 2024-01-30 | End: 2024-01-30 | Stop reason: HOSPADM

## 2024-01-30 RX ORDER — LIDOCAINE HYDROCHLORIDE 20 MG/ML
INJECTION, SOLUTION INFILTRATION; PERINEURAL PRN
Status: DISCONTINUED | OUTPATIENT
Start: 2024-01-30 | End: 2024-01-30

## 2024-01-30 RX ORDER — LIDOCAINE 40 MG/G
CREAM TOPICAL
Status: DISCONTINUED | OUTPATIENT
Start: 2024-01-30 | End: 2024-01-30 | Stop reason: HOSPADM

## 2024-01-30 RX ORDER — PROCHLORPERAZINE MALEATE 5 MG
10 TABLET ORAL EVERY 6 HOURS PRN
Status: DISCONTINUED | OUTPATIENT
Start: 2024-01-30 | End: 2024-01-30 | Stop reason: HOSPADM

## 2024-01-30 RX ORDER — OMEPRAZOLE 40 MG/1
40 CAPSULE, DELAYED RELEASE ORAL 2 TIMES DAILY
Qty: 60 CAPSULE | Refills: 1 | Status: SHIPPED | OUTPATIENT
Start: 2024-01-30 | End: 2024-02-01

## 2024-01-30 RX ORDER — HEPARIN SODIUM (PORCINE) LOCK FLUSH IV SOLN 100 UNIT/ML 100 UNIT/ML
5 SOLUTION INTRAVENOUS ONCE
Status: COMPLETED | OUTPATIENT
Start: 2024-01-30 | End: 2024-01-30

## 2024-01-30 RX ORDER — FLUMAZENIL 0.1 MG/ML
0.2 INJECTION, SOLUTION INTRAVENOUS
Status: DISCONTINUED | OUTPATIENT
Start: 2024-01-30 | End: 2024-01-30 | Stop reason: HOSPADM

## 2024-01-30 RX ORDER — ONDANSETRON 2 MG/ML
4 INJECTION INTRAMUSCULAR; INTRAVENOUS EVERY 30 MIN PRN
Status: DISCONTINUED | OUTPATIENT
Start: 2024-01-30 | End: 2024-01-30 | Stop reason: HOSPADM

## 2024-01-30 RX ORDER — SODIUM CHLORIDE, SODIUM LACTATE, POTASSIUM CHLORIDE, CALCIUM CHLORIDE 600; 310; 30; 20 MG/100ML; MG/100ML; MG/100ML; MG/100ML
INJECTION, SOLUTION INTRAVENOUS CONTINUOUS
Status: DISCONTINUED | OUTPATIENT
Start: 2024-01-30 | End: 2024-01-30 | Stop reason: HOSPADM

## 2024-01-30 RX ORDER — ONDANSETRON 4 MG/1
4 TABLET, ORALLY DISINTEGRATING ORAL EVERY 6 HOURS PRN
Status: DISCONTINUED | OUTPATIENT
Start: 2024-01-30 | End: 2024-01-30 | Stop reason: HOSPADM

## 2024-01-30 RX ORDER — FENTANYL CITRATE 50 UG/ML
25 INJECTION, SOLUTION INTRAMUSCULAR; INTRAVENOUS EVERY 5 MIN PRN
Status: DISCONTINUED | OUTPATIENT
Start: 2024-01-30 | End: 2024-01-30 | Stop reason: HOSPADM

## 2024-01-30 RX ORDER — OXYCODONE HYDROCHLORIDE 5 MG/1
5 TABLET ORAL
Status: DISCONTINUED | OUTPATIENT
Start: 2024-01-30 | End: 2024-01-30 | Stop reason: HOSPADM

## 2024-01-30 RX ORDER — OXYCODONE HYDROCHLORIDE 10 MG/1
10 TABLET ORAL
Status: DISCONTINUED | OUTPATIENT
Start: 2024-01-30 | End: 2024-01-30 | Stop reason: HOSPADM

## 2024-01-30 RX ORDER — ONDANSETRON 2 MG/ML
4 INJECTION INTRAMUSCULAR; INTRAVENOUS EVERY 6 HOURS PRN
Status: DISCONTINUED | OUTPATIENT
Start: 2024-01-30 | End: 2024-01-30 | Stop reason: HOSPADM

## 2024-01-30 RX ORDER — HYDROMORPHONE HCL IN WATER/PF 6 MG/30 ML
0.4 PATIENT CONTROLLED ANALGESIA SYRINGE INTRAVENOUS EVERY 5 MIN PRN
Status: DISCONTINUED | OUTPATIENT
Start: 2024-01-30 | End: 2024-01-30 | Stop reason: HOSPADM

## 2024-01-30 RX ORDER — SODIUM CHLORIDE, SODIUM LACTATE, POTASSIUM CHLORIDE, CALCIUM CHLORIDE 600; 310; 30; 20 MG/100ML; MG/100ML; MG/100ML; MG/100ML
INJECTION, SOLUTION INTRAVENOUS CONTINUOUS PRN
Status: DISCONTINUED | OUTPATIENT
Start: 2024-01-30 | End: 2024-01-30

## 2024-01-30 RX ORDER — SUCRALFATE ORAL 1 G/10ML
1 SUSPENSION ORAL 4 TIMES DAILY
Qty: 2400 ML | Refills: 0 | Status: ON HOLD | OUTPATIENT
Start: 2024-01-30 | End: 2024-02-27

## 2024-01-30 RX ORDER — NALOXONE HYDROCHLORIDE 0.4 MG/ML
0.2 INJECTION, SOLUTION INTRAMUSCULAR; INTRAVENOUS; SUBCUTANEOUS
Status: DISCONTINUED | OUTPATIENT
Start: 2024-01-30 | End: 2024-01-30 | Stop reason: HOSPADM

## 2024-01-30 RX ORDER — FENTANYL CITRATE 50 UG/ML
50 INJECTION, SOLUTION INTRAMUSCULAR; INTRAVENOUS EVERY 5 MIN PRN
Status: DISCONTINUED | OUTPATIENT
Start: 2024-01-30 | End: 2024-01-30 | Stop reason: HOSPADM

## 2024-01-30 RX ORDER — ONDANSETRON 4 MG/1
4 TABLET, ORALLY DISINTEGRATING ORAL EVERY 30 MIN PRN
Status: DISCONTINUED | OUTPATIENT
Start: 2024-01-30 | End: 2024-01-30 | Stop reason: HOSPADM

## 2024-01-30 RX ADMIN — PROPOFOL 150 MCG/KG/MIN: 10 INJECTION, EMULSION INTRAVENOUS at 11:37

## 2024-01-30 RX ADMIN — MIDAZOLAM 2 MG: 1 INJECTION INTRAMUSCULAR; INTRAVENOUS at 11:26

## 2024-01-30 RX ADMIN — LIDOCAINE HYDROCHLORIDE 100 MG: 20 INJECTION, SOLUTION INFILTRATION; PERINEURAL at 11:37

## 2024-01-30 RX ADMIN — SODIUM CHLORIDE, POTASSIUM CHLORIDE, SODIUM LACTATE AND CALCIUM CHLORIDE: 600; 310; 30; 20 INJECTION, SOLUTION INTRAVENOUS at 11:26

## 2024-01-30 RX ADMIN — SODIUM CHLORIDE, PRESERVATIVE FREE 5 ML: 5 INJECTION INTRAVENOUS at 13:16

## 2024-01-30 RX ADMIN — TOPICAL ANESTHETIC 2 EACH: 200 SPRAY DENTAL; PERIODONTAL at 11:31

## 2024-01-30 ASSESSMENT — ACTIVITIES OF DAILY LIVING (ADL)
ADLS_ACUITY_SCORE: 35

## 2024-01-30 NOTE — ANESTHESIA POSTPROCEDURE EVALUATION
Patient: Eduarda Nogueira    Procedure: Procedure(s):  ESOPHAGOGASTRODUODENOSCOPY, WITH BIOPSY       Anesthesia Type:  MAC    Note:  Disposition: Outpatient   Postop Pain Control: Uneventful            Sign Out: Well controlled pain   PONV: No   Neuro/Psych: Uneventful            Sign Out: Acceptable/Baseline neuro status   Airway/Respiratory: Uneventful            Sign Out: Acceptable/Baseline resp. status   CV/Hemodynamics: Uneventful            Sign Out: Acceptable CV status; No obvious hypovolemia; No obvious fluid overload   Other NRE: NONE   DID A NON-ROUTINE EVENT OCCUR? No           Last vitals:  Vitals Value Taken Time   /88 01/30/24 1236   Temp     Pulse 90 01/30/24 1236   Resp 18 01/30/24 1236   SpO2 100 % 01/30/24 1243   Vitals shown include unfiled device data.    Electronically Signed By: Dennis Lucio MD  January 30, 2024  1:10 PM

## 2024-01-30 NOTE — H&P
Eduarda Nogueira  [id number]  female  43 year old      Reason for procedure/surgery: hx of gastric bypass 3/22, nausea, regurgitation, globus    Patient Active Problem List   Diagnosis    Regurgitation of food    H/O gastric bypass    Globus sensation       Past Surgical History:    Past Surgical History:   Procedure Laterality Date    ATTEMPTED ARTHROSCOPY      x3, rt knee    EXTRACTION(S) DENTAL      No Comments Provided    LAPAROSCOPIC CHOLECYSTECTOMY      2010    OSTEOTOMY FEMUR DISTAL      right       Past Medical History:   Past Medical History:   Diagnosis Date    Anemia     No Comments Provided    Anxiety state     No Comments Provided    Arthropathy     No Comments Provided    Asthma     No Comments Provided    Backache     No Comments Provided    Edema     No Comments Provided    Esophageal reflux     No Comments Provided    Female stress incontinence     No Comments Provided    Hypothyroidism     No Comments Provided    Irregular menstrual cycle     No Comments Provided    Irritable colon     No Comments Provided    Migraine     No Comments Provided    Morbid obesity (H)     No Comments Provided    Other chronic nonalcoholic liver disease     No Comments Provided    Other depressive disorder     No Comments Provided    Other malaise and fatigue     No Comments Provided    Pain in joint     hips, knees, ankles, feet, neck    Restless legs syndrome     self-diagnosed    Shortness of breath     No Comments Provided    Synovial cyst of popliteal space     No Comments Provided    Vitamin D deficiency     Rx 3/14, re check 6/14       Social History:   Social History     Tobacco Use    Smoking status: Every Day     Packs/day: .3     Types: Cigarettes     Start date: 3/18/2013    Smokeless tobacco: Never    Tobacco comments:     3 cigarettes per day    Substance Use Topics    Alcohol use: Yes     Comment: Alcoholic Drinks/day: Quit 3/2013       Family History:   Family History   Problem Relation Age of Onset     "Diabetes Mother         Diabetes    Diabetes Father         Diabetes    Other - See Comments Father         colon clot then DVT inpt    Other - See Comments Paternal Grandfather         PE       Allergies:   Allergies   Allergen Reactions    Acetaminophen Hives, Itching, Rash and Shortness Of Breath     Several Rx's for Norco in 2021 & 22    No Clinical Screening - See Comments Anaphylaxis     steroids    Gabapentin Unknown and Itching    Hydrocodone-Acetaminophen Itching     Several Rx's for Norco in 2021 & 22    Iodinated Contrast Media Unknown and Nausea and Vomiting     Extremely anxious, vomited once after CT. No wheezing or SOB.    Latex Itching    Morphine And Related Itching     Several Rx's for Norco in 2021 & 22    Nsaids Other (See Comments)     H/o german-n-y gastric bypass\". AVOID NSAIDs and aspirin due to risk of gastric and/or G-J anastomotic ulcers. If Eduarda must be on short course of NSAIDs or aspirin, use enteric coated if possible and use PPI // Ira Hung RN, Bariatric Nurse Clinician, [facility] 3/23/2022    Oxycodone-Acetaminophen Itching     Several Rx's for Norco in 2021 & 22       Active Medications:   Current Outpatient Medications   Medication Sig Dispense Refill    albuterol (PROAIR HFA/PROVENTIL HFA/VENTOLIN HFA) 108 (90 Base) MCG/ACT inhaler Inhale 1-2 puffs into the lungs      ALPRAZolam (XANAX) 0.25 MG tablet 0.25 mg      amitriptyline (ELAVIL) 10 MG tablet Take 2 tablets by mouth      ammonium lactate (AMLACTIN) 12 % external cream Twice daily for body      aspirin 81 MG EC tablet 81 mg      aspirin-acetaminophen-caffeine (EXCEDRIN MIGRAINE) 250-250-65 MG tablet Take by mouth 2 times daily as needed      atorvastatin (LIPITOR) 20 MG tablet 20 mg      buPROPion (WELLBUTRIN XL) 150 MG 24 hr tablet Take 1 tablet by mouth daily      Calcium Polycarbophil (FIBER) 625 MG tablet Take 1 Tablet (625 mg) by mouth once daily.      cephALEXin (KEFLEX) 500 MG capsule " Take 1 capsule by mouth two times daily for 7 days      chlorhexidine (HIBICLENS) 4 % liquid Chlorhexidine Topical Liquid 4 %  topically 1.0  once    active      clindamycin (CLEOCIN) 300 MG capsule Take 1 capsule 4 times a day until gone      clobetasol propionate (CLOBEX) 0.05 % external shampoo Apply a thin layer onto dry, affected area of scalp once daily. Leave for 15 minutes before lathering and rinsing.      clonazePAM (KLONOPIN) 0.5 MG tablet Take 0.5 mg by mouth      CLOTRIMAZOLE ANTI-FUNGAL 1 % external cream Apply topically to affected area(s) two times daily.      Cranberry 400 MG CAPS Take 400 mg by mouth      diphenhydrAMINE (BENADRYL) 25 MG capsule Take 25 mg by mouth every 4 hours      divalproex sodium delayed-release (DEPAKOTE) 500 MG DR tablet       docusate sodium (DSS) 100 MG capsule Take 1 capsule by mouth      DULoxetine (CYMBALTA) 30 MG capsule Take 3 capsules by mouth daily      enoxaparin ANTICOAGULANT (LOVENOX) 60 MG/0.6ML syringe Enoxaparin Subcutaneous  subcutaneously 60.0  every 12 hours    inactive      escitalopram (LEXAPRO) 20 MG tablet Take 20 mg by mouth daily      eszopiclone (LUNESTA) 1 MG tablet Take 2 mg by mouth      famotidine (PEPCID) 40 MG tablet Take 40 mg by mouth      HYDROcodone-acetaminophen (NORCO) 5-325 MG tablet take 1 tablet by mouth every 4 to 6 hours as needed for pain      hydrOXYzine (VISTARIL) 50 MG capsule Take 1 capsule by mouth      hyoscyamine (LEVSIN) 0.125 MG tablet Place 0.25 mg under the tongue      ipratropium - albuterol 0.5 mg/2.5 mg/3 mL (DUONEB) 0.5-2.5 (3) MG/3ML neb solution Inhale 1 Neb via a nebulizer 4 times daily if needed for Shortness Of Breath or Wheezing.      Lidocaine (LIDOCARE) 4 % Patch Apply 1-3 patches to intact skin to cover most painful area of back pain for max 12hr per 24hr period.      methocarbamol (ROBAXIN) 500 MG tablet Take 500 mg by mouth      Multiple Vitamins-Minerals (SUPER THERA CELESTINO M) TABS Take 1 tablet by mouth       nortriptyline (PAMELOR) 10 MG capsule 20 mg      nystatin (MYCOSTATIN) 821156 UNIT/GM external powder 1 strip      OLANZapine (ZYPREXA) 5 MG tablet 5 mg      ondansetron (ZOFRAN) 4 MG tablet Take 1 tablet by mouth      oxyCODONE-acetaminophen (PERCOCET) 5-325 MG tablet Take 1 tablet by mouth every 4 hours if needed for Pain. Max acetaminophen dose 4000mg in 24 hrs.      pantoprazole (PROTONIX) 40 MG EC tablet 40 mg      polyethylene glycol (MIRALAX) 17 g packet Take 17 g by mouth      predniSONE (DELTASONE) 20 MG tablet Take 2 Tablets (40 mg) by mouth once daily with a meal for 5 days.      pregabalin (LYRICA) 150 MG capsule 150 mg      promethazine (PHENERGAN) 12.5 MG tablet Take half a tablet three times daily before meals and every 6 hrs as needed for nausea      sodium chloride, PF, (NORMAL SALINE FLUSH) 0.9% PF flush Inject 5 mLs into the vein      Sulfacetamide Sodium-Sulfur 10-5 % SUSP Apply topically.      tretinoin (RETIN-A) 0.05 % external cream Apply topically to affected area(s) at bedtime if needed (Darier's facial flare).      UBRELVY 100 MG tablet 100 mg      vitamin B1 (THIAMINE) 100 MG tablet Miscellaneous Drug     Thiamine mononitrate, vit B1, (VITAMIN B1) 100mg tablet    active      XARELTO ANTICOAGULANT 20 MG TABS tablet 20 mg         Systemic Review:   CONSTITUTIONAL: NEGATIVE for fever, chills, change in weight  ENT/MOUTH: NEGATIVE for ear, mouth and throat problems  RESP: NEGATIVE for significant cough or SOB  CV: NEGATIVE for chest pain, palpitations or peripheral edema    Physical Examination:   Vital Signs: There were no vitals taken for this visit.  GENERAL: healthy, alert and no distress  NECK: no adenopathy, no asymmetry, masses, or scars  RESP: lungs clear to auscultation - no rales, rhonchi or wheezes  CV: regular rate and rhythm, normal S1 S2, no S3 or S4, no murmur, click or rub, no peripheral edema and peripheral pulses strong  ABDOMEN: soft, nontender, no hepatosplenomegaly, no  masses and bowel sounds normal  MS: no gross musculoskeletal defects noted, no edema    Plan: Appropriate to proceed as scheduled.      Precious Albarran MD  1/29/2024    PCP:  Sussy Myers

## 2024-01-30 NOTE — OR NURSING
Dr. Albarran spoke with pt and pts dad regarding procedure findings, interventions, and follow up. All questions answered. Pt advised to  prescriptions at pharmacy also written on discharge instruction. Per pt request pt discharged with port accessed, pt has infusion tomorrow. Right chest port flushed with Heparin, green cap on port. Pt stable upon discharge via w/c with dad and transport.

## 2024-02-01 ENCOUNTER — TELEPHONE (OUTPATIENT)
Dept: GASTROENTEROLOGY | Facility: CLINIC | Age: 44
End: 2024-02-01
Payer: COMMERCIAL

## 2024-02-01 DIAGNOSIS — K28.9 ULCER JEJUNUM: ICD-10-CM

## 2024-02-01 RX ORDER — SUCRALFATE 1 G/1
1 TABLET ORAL 4 TIMES DAILY
Qty: 240 TABLET | Refills: 0 | Status: ON HOLD | OUTPATIENT
Start: 2024-02-01 | End: 2024-02-27

## 2024-02-01 RX ORDER — OMEPRAZOLE 40 MG/1
40 CAPSULE, DELAYED RELEASE ORAL DAILY
Qty: 30 CAPSULE | Refills: 1 | Status: ON HOLD | OUTPATIENT
Start: 2024-02-01 | End: 2024-02-27

## 2024-02-01 NOTE — TELEPHONE ENCOUNTER
Diarrhea about 2.5 hours after starting golytely, then nothing. Enema helped some but a few hours after sensation of feeling bloated and constipated.     Spoke with pt regarding medications and insurance requires prior authorization regarding omeprazole prescription (quantity limit) and insurance does not cover carafate. Discussed over the counter options for purchasing omeprazole while we wait for coverage through insurance. Told pt would discuss with provider regarding alternatives for carafate pt can purchase over the counter as currently quoted over $400 to pay for prescription without insurance.     Spoke with provider (Anisha Hill PA-C) who recommended tablet carafate to be crushed. Carafate tablet form appears to be covered under pt's insurance. Discussed pt will obtain omeprazole 40 mg once daily to see if insurance overs once daily dosing and pt can supplement BID through OTC. Will attempt PA for pt for BID dosing.     Pt will discuss EGD/colonoscopy follow up with Anisha at upcoming appointment on 2/9.

## 2024-02-01 NOTE — TELEPHONE ENCOUNTER
Received incoming call from patient.    Patient inquiring about post-procedure issues with prescription. Warm transferred to care team to help with this issue.    Patient verbalized understanding and had no further questions.    Diamond Patel RN  Endoscopy Procedure Pre Assessment RN  [phone] option 4

## 2024-02-02 ENCOUNTER — TELEPHONE (OUTPATIENT)
Dept: GASTROENTEROLOGY | Facility: CLINIC | Age: 44
End: 2024-02-02
Payer: COMMERCIAL

## 2024-02-02 NOTE — TELEPHONE ENCOUNTER
Rec'd call from pt with questions regarding upcoming appointments. Answered pt's questions regarding manometry testing. Manometry ordered with pH off of PPI but may be changed due to recent EGD. Pt will discuss with provider at upcoming appointment.

## 2024-02-09 ENCOUNTER — MYC MEDICAL ADVICE (OUTPATIENT)
Dept: GASTROENTEROLOGY | Facility: CLINIC | Age: 44
End: 2024-02-09

## 2024-02-09 ENCOUNTER — DOCUMENTATION ONLY (OUTPATIENT)
Dept: GASTROENTEROLOGY | Facility: CLINIC | Age: 44
End: 2024-02-09

## 2024-02-09 ENCOUNTER — TELEPHONE (OUTPATIENT)
Dept: GASTROENTEROLOGY | Facility: CLINIC | Age: 44
End: 2024-02-09

## 2024-02-09 ENCOUNTER — HOSPITAL ENCOUNTER (OUTPATIENT)
Facility: CLINIC | Age: 44
End: 2024-02-09
Attending: INTERNAL MEDICINE | Admitting: INTERNAL MEDICINE
Payer: COMMERCIAL

## 2024-02-09 ENCOUNTER — OFFICE VISIT (OUTPATIENT)
Dept: GASTROENTEROLOGY | Facility: CLINIC | Age: 44
End: 2024-02-09
Attending: INTERNAL MEDICINE
Payer: COMMERCIAL

## 2024-02-09 ENCOUNTER — LAB (OUTPATIENT)
Dept: LAB | Facility: CLINIC | Age: 44
End: 2024-02-09
Payer: COMMERCIAL

## 2024-02-09 ENCOUNTER — CARE COORDINATION (OUTPATIENT)
Dept: ENDOCRINOLOGY | Facility: CLINIC | Age: 44
End: 2024-02-09

## 2024-02-09 VITALS
HEART RATE: 71 BPM | SYSTOLIC BLOOD PRESSURE: 128 MMHG | OXYGEN SATURATION: 97 % | WEIGHT: 159.6 LBS | HEIGHT: 63 IN | DIASTOLIC BLOOD PRESSURE: 86 MMHG | BODY MASS INDEX: 28.28 KG/M2

## 2024-02-09 DIAGNOSIS — C17.9 ADENOCARCINOMA OF SMALL BOWEL (H): ICD-10-CM

## 2024-02-09 DIAGNOSIS — K28.9 ULCER JEJUNUM: Primary | ICD-10-CM

## 2024-02-09 DIAGNOSIS — K28.9 ULCER JEJUNUM: ICD-10-CM

## 2024-02-09 LAB
ALBUMIN SERPL BCG-MCNC: 3.4 G/DL (ref 3.5–5.2)
ALP SERPL-CCNC: 61 U/L (ref 40–150)
ALT SERPL W P-5'-P-CCNC: 27 U/L (ref 0–50)
ANION GAP SERPL CALCULATED.3IONS-SCNC: 10 MMOL/L (ref 7–15)
AST SERPL W P-5'-P-CCNC: 32 U/L (ref 0–45)
BILIRUB SERPL-MCNC: 0.2 MG/DL
BUN SERPL-MCNC: 11.7 MG/DL (ref 6–20)
CALCIUM SERPL-MCNC: 8.5 MG/DL (ref 8.6–10)
CHLORIDE SERPL-SCNC: 103 MMOL/L (ref 98–107)
CREAT SERPL-MCNC: 0.59 MG/DL (ref 0.51–0.95)
DEPRECATED HCO3 PLAS-SCNC: 29 MMOL/L (ref 22–29)
EGFRCR SERPLBLD CKD-EPI 2021: >90 ML/MIN/1.73M2
ERYTHROCYTE [DISTWIDTH] IN BLOOD BY AUTOMATED COUNT: 16 % (ref 10–15)
GLUCOSE SERPL-MCNC: 84 MG/DL (ref 70–99)
HCT VFR BLD AUTO: 33.4 % (ref 35–47)
HGB BLD-MCNC: 10.9 G/DL (ref 11.7–15.7)
INR PPP: 0.99 (ref 0.85–1.15)
MCH RBC QN AUTO: 30.7 PG (ref 26.5–33)
MCHC RBC AUTO-ENTMCNC: 32.6 G/DL (ref 31.5–36.5)
MCV RBC AUTO: 94 FL (ref 78–100)
PLATELET # BLD AUTO: 365 10E3/UL (ref 150–450)
POTASSIUM SERPL-SCNC: 2.8 MMOL/L (ref 3.4–5.3)
PROT SERPL-MCNC: 6 G/DL (ref 6.4–8.3)
RBC # BLD AUTO: 3.55 10E6/UL (ref 3.8–5.2)
SODIUM SERPL-SCNC: 142 MMOL/L (ref 135–145)
WBC # BLD AUTO: 8.1 10E3/UL (ref 4–11)

## 2024-02-09 PROCEDURE — 99215 OFFICE O/P EST HI 40 MIN: CPT | Performed by: PHYSICIAN ASSISTANT

## 2024-02-09 PROCEDURE — 99417 PROLNG OP E/M EACH 15 MIN: CPT | Performed by: PHYSICIAN ASSISTANT

## 2024-02-09 PROCEDURE — 85027 COMPLETE CBC AUTOMATED: CPT

## 2024-02-09 PROCEDURE — 80053 COMPREHEN METABOLIC PANEL: CPT

## 2024-02-09 PROCEDURE — 85610 PROTHROMBIN TIME: CPT

## 2024-02-09 PROCEDURE — 36591 DRAW BLOOD OFF VENOUS DEVICE: CPT

## 2024-02-09 RX ORDER — BISACODYL 5 MG/1
TABLET, DELAYED RELEASE ORAL
Qty: 4 TABLET | Refills: 0 | Status: SHIPPED | OUTPATIENT
Start: 2024-02-09 | End: 2024-03-11

## 2024-02-09 RX ORDER — ONDANSETRON 4 MG/1
4 TABLET, ORALLY DISINTEGRATING ORAL EVERY 8 HOURS PRN
Qty: 20 TABLET | Refills: 1 | Status: SHIPPED | OUTPATIENT
Start: 2024-02-09 | End: 2024-02-29

## 2024-02-09 ASSESSMENT — PAIN SCALES - GENERAL: PAINLEVEL: SEVERE PAIN (7)

## 2024-02-09 NOTE — TELEPHONE ENCOUNTER
Pre assessment completed for upcoming procedure.      Procedure details:    Patient scheduled for Colonoscopy  on 2.13.24.     Arrival time: 1000. Procedure time 1130    Pre op exam needed? 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    Designated  policy reviewed. Instructed to have someone stay 24 hours post procedure.       Chart review:     Electronic implanted devices? No    Recent diagnosis of diverticulitis within the last 6 weeks?  No    Diabetic? No    Medication review:    Anticoagulants? No    NSAIDS? No    Other medication HOLDING recommendations:  N/A      Prep for procedure:     Bowel prep recommendation: Standard Golytely   Due to: patient request/preference.     Prep instructions sent via [employer] Bowel prep script sent to [facility] [place], [place] - [address] [place] [address]    Reviewed procedure prep instructions.     Patient verbalized understanding and had no questions or concerns at this time.        Ira Haile RN  Endoscopy Procedure Pre Assessment RN  [phone] option 4

## 2024-02-09 NOTE — NURSING NOTE
"Chief Complaint   Patient presents with    Follow Up       Vitals:    02/09/24 1128   BP: 128/86   Pulse: 71   SpO2: 97%   Weight: 72.4 kg (159 lb 9.6 oz)   Height: 1.6 m (5' 3\")       Body mass index is 28.27 kg/m .    [employer]    "

## 2024-02-09 NOTE — PROGRESS NOTES
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 I immediately called colorectal surgery as well as bariatric surgery given the location of the jejunal lesions but also CT findings at an outside hospital of the thickening in the colon.     Plan is to arrange for urgent colonoscopy to determine if the adenocarcinoma of the jejunum is originating in the colon. Subsequent surgical interventions will be based on those findings. Outpatient follow up with bariatric surgery is being arranged as well to ensure close follow up and surgical planning.     Patient was seen today in clinic following coordination of care as stated above.    Total time spent coordinating care approximately 120 minutes.     Henri Davidson DO   of Medicine  Director, Esophageal Disorders Program  Division of Gastroenterology, Hepatology, and Nutrition  [facility]

## 2024-02-09 NOTE — TELEPHONE ENCOUNTER
Screening questions completed 11/24 patient states no changes.    Final Scheduling Details   Colonoscopy prep sent?  Standard MiraLAX    Procedure scheduled  Colonoscopy    Surgeon:  VIKAS     Date of procedure:  2/13     Pre-OP / PAC:   No - Not required for this site.    Location  [facility]  NEXT AVAILABLE    Sedation   MAC/Deep Sedation - Per order.      Patient Reminders:   You will receive a call from a Nurse to review instructions and health history.  This assessment must be completed prior to your procedure.  Failure to complete the Nurse assessment may result in the procedure being cancelled.      On the day of your procedure, please designate an adult(s) who can drive you home stay with you for the next 24 hours. The medicines used in the exam will make you sleepy. You will not be able to drive.      You cannot take public transportation, ride share services, or non-medical taxi service without a responsible caregiver.  Medical transport services are allowed with the requirement that a responsible caregiver will receive you at your destination.  We require that drivers and caregivers are confirmed prior to your procedure.

## 2024-02-09 NOTE — TELEPHONE ENCOUNTER
[facility] preferred per Anisha Hill and Dr. Davidson.     Final Scheduling Details   Colonoscopy prep sent?  Standard MiraLAX    Procedure scheduled  Colonoscopy    Surgeon:  TWILA     Date of procedure:  2/13/2024     Pre-OP / PAC:   No - Not required for this site.    Location  [facility] - Per exclusion criteria.    Sedation   MAC/Deep Sedation - Per order.      Patient Reminders:   You will receive a call from a Nurse to review instructions and health history.  This assessment must be completed prior to your procedure.  Failure to complete the Nurse assessment may result in the procedure being cancelled.      On the day of your procedure, please designate an adult(s) who can drive you home stay with you for the next 24 hours. The medicines used in the exam will make you sleepy. You will not be able to drive.      You cannot take public transportation, ride share services, or non-medical taxi service without a responsible caregiver.  Medical transport services are allowed with the requirement that a responsible caregiver will receive you at your destination.  We require that drivers and caregivers are confirmed prior to your procedure.

## 2024-02-09 NOTE — NURSING NOTE
Chief Complaint   Patient presents with    Port Draw     Labs drawn via port by RN     Port accessed with gripper needle by RN, labs collected, line flushed with saline and heparin.  Vitals taken. Pt checked in for appointment(s).\

## 2024-02-09 NOTE — PATIENT INSTRUCTIONS
It was a pleasure taking care of you today.  I've included a brief summary of our discussion and care plan from today's visit below.  Please review this information with your primary care provider.  _______________________________________________________________________    My recommendations are summarized as follows:    - Labs  - Colonoscopy   - Consultation with bariatrics surgery next week   - Continue Zofran 4 mg ODT as needed   - Increase Ensure/Boost to 3 bottles per day   - Start MiraLAX 17g (2 capful) daily this can be increased to three times daily dosing if needed  - Okay to use as needed enemas either tap water or fleet enemas  - Continue Omeprazole 40 mg once daily daily to be taken on an empty stomach 30-60 minuets prior to meals   - Carafate four times daily       To schedule endoscopic procedures you may call: [phone]  To schedule radiology (imaging) tests you may call: [phone]  To schedule an ENT appointment you may call: [phone]    Please call my nurse Gabrielle ([phone])Andie ([phone]) with any questions or concerns.      Return to GI Clinic as needed   _______________________________________________________________________    Who do I call with any questions after my visit?  Please be in touch if there are any further questions that arise following today's visit.  There are multiple ways to contact your gastroenterology care team.      During business hours, you may reach a Gastroenterology nurse at [phone] and choose option 3.       To schedule or reschedule an appointment, please call [phone].     You can always send a secure message through [employer].  [employer] messages are answered by your nurse or doctor typically within 24 hours.  Please allow extra time on weekends and holidays.      For urgent/emergent questions after business hours, you may reach the on-call GI Fellow by contacting the [facility]  at [phone].     How will I get the  results of any tests ordered?    You will receive all of your results.  If you have signed up for [employer], any tests ordered at your visit will be available to you after your physician reviews them.  Typically this takes 1-2 weeks.  If there are urgent results that require a change in your care plan, your physician or nurse will call you to discuss the next steps.      What is [employer]?  [employer] is a secure way for you to access all of your healthcare records from the [facility].  It is a web based computer program, so you can sign on to it from any location.  It also allows you to send secure messages to your care team.  I recommend signing up for [employer] access if you have not already done so and are comfortable with using a computer.      How to I schedule a follow-up visit?  If you did not schedule a follow-up visit today, please call [phone] to schedule a follow-up office visit.      If you feel you received exceptional care and are interested in supporting the clinical and research goals of Anisha Hill PA-C or the Division of Gastroenterology, Hepatology, and Nutrition please contact [email or web address] from the [facility] to discuss opportunities to donate.    Sincerely,    Anisha Hill PA-C  Division of Gastroenterology, Hepatology, and Nutrition  [facility]

## 2024-02-09 NOTE — LETTER
2/9/2024         RE: Eduarda Nogueira  [address] [place] [address]        Dear Colleague,    Thank you for referring your patient, Eduarda Nogueira, to the [facility] [place]. Please see a copy of my visit note below.    Gastroenterology Visit for: Eduarda Nogueira 1980   MRN: [id number]     Reason for Visit:  chief complaint    Referred by: Stuart  / [address] [place] [address] / [facility] [id number]  Patient Care Team:  Sussy Myers as PCP - General  Hellen Rosales as Referring Physician (Physician Assistant)  Victor Hugo Diamond MD as MD (Dermatology)  Henri Davidson DO as Physician (Gastroenterology)  Esther Cochran MD as MD (Gastroenterology)  System, Provider Not In (Clinic)  Henri Davidson DO as Assigned Gastroenterology Provider  Anisha Hill PA-C as Physician Assistant (Gastroenterology)    History of Present Illness:   Eduarda Nogueira is a 43 year old female with anxiety, gastric bypass 3/17/22, subdural hematoma, EtOH abuse,migraine headaches previous stroke, asthma, reflux, morbid obesity, cyclic vomiting syndrome, marijuana dependence, PFO, chronic pain, urinary and fecal incontinence sp pelvic floor therapy, who is presenting as a follow-up patient after endoscopy to review results with a chief concern of melena, nausea with emesis and abdominal pain.    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    She reports that she has had persistent melena onset September/October 2023.  Associated symptoms include early satiety and early satiation.  She also reports a 20 pound subjective weight loss.  Additionally, she is having nausea and associated emesis daily.  She has experienced intermittent hematemesis.     Eduarda continues to use cannabinoid products.  She has had complete abstinence from alcohol for the past 2 weeks.     Mother  "had uterine and lung cancer.     Maternal grandmother had uterine cancer     No family history or GI related malignancy (esophageal, gastric, pancreatic, liver or colon).     ---------------------------------------------------------------------------------------------------------------------------------------------------------------------------------------------    HPI Dr. Henri Davidson 11/20/23  Eduarda Nogueira states se was in the hospital and was having issues with coffee ground emesis and black stools. She was told there was nothing to do in the hospital. Her \"advocate\" directed her to the [facility] to get checked. She feels there is something trapped between the front and the back of her throat. Every day it feels like it is there and makes her want to gag and dry heave. She has associated gassiness. No issues with bowel movements although they are black. Symptoms were the same in the hospital. These symptoms have been ongoing for an undisclosed amount of time \"so long\". Anything that she eats she feels is going to come right back out. When she eats or drinks she has the sensation that something is there and it makes her want to vomit. There is an associated tightness in her stomach. Nothing makes it better and nothing makes it worse. While in the hospital she was on \"a whole bunch of meds\" and when she went home she says she stopped all medications. All pills including ondansetron would result in dry heave. Regurgitates and spits into buckets at home.      +nausea, all over chills, hot, cold, globus all associated with regurgitation events.     Currently on no medications except for a single ondansetron today.      Patient takes CBD.      Gabapentin causes heat sensation in head and itchiness all over.  Has tried lyrica without success.     Lost 10 pounds in the last week per patient.       Esophageal Questionnaire(s)    BEDQ Questionnaire      2/2/2024     2:57 PM   BEDQ Questionnaire: How Often Have You Had " the Following?   Trouble eating solid food (meat, bread, vegetables) 5   Trouble eating soft foods (yogurt, jello, pudding) 4   Trouble swallowing liquids 4   Pain while swallowing 4   Coughing or choking while swallowing foods or liquids 4   Total Score: 21         2/2/2024     2:57 PM   BEDQ Questionnaire: Discomfort/Pain Ratings   Eating solid food (meat, bread, vegetables) 4   Eating soft foods (yogurt, jello, pudding) 3   Drinking liquid 4   Total Score: 11       Eckardt Questionnaire      2/2/2024     2:58 PM   Eckardt Questionnaire   Dysphagia 2   Regurgitation 2   Weight Loss (kg) 1       Promis 10 Questionnaire      2/2/2024     3:02 PM   PROMIS 10 FLOWSHEET DATA   In general, would you say your health is: 1   In general, would you say your quality of life is: 2   In general, how would you rate your physical health? 2   In general, how would you rate your mental health, including your mood and your ability to think? 1   In general, how would you rate your satisfaction with your social activities and relationships? 2   In general, please rate how well you carry out your usual social activities and roles. (This includes activities at home, at work and in your community, and responsibilities as a parent, child, spouse, employee, friend, etc.) 1   To what extent are you able to carry out your everyday physical activities such as walking, climbing stairs, carrying groceries, or moving a chair? 2   In the past 7 days, how often have you been bothered by emotional problems such as feeling anxious, depressed, or irritable? 5   In the past 7 days, how would you rate your fatigue on average? 4   In the past 7 days, how would you rate your pain on average, where 0 means no pain, and 10 means worst imaginable pain? 7   Mental health question re-calculation - no clinical value 1   Physical health question re-calculation - no clinical value 2   Pain question re-calculation - no clinical value 2   Global Mental Health  Score 6   Global Physical Health Score 8   PROMIS TOTAL - SUBSCORES 14           STUDIES & PROCEDURES:    EGD:     Findings:       The examined esophagus was normal. No narrowing or stricture.        Evidence of a gastric bypass was found. A gastric pouch with a medium        size was found. The gastrojejunal anastomosis was characterized by        healthy appearing mucosa. This was traversed. The jejunum was ulcerated        (large extensive area was ulcerated, no active bleeding was noted, no        obvious mass. Biopsies were taken with a cold forceps for histology.        The examined jejunal limb was normal.                                                                                    Impression:            - Normal esophagus.                          - Gastric bypass with a medium-sized pouch.                          Gastrojejunal anastomosis characterized by healthy                          appearing mucosa. Jejunum severely ulcerated. Biopsied.                          - Normal examined jejunum.     Final Diagnosis   A. JEJUNUM, BIOPSIES:  - Moderately-differentiated adenocarcinoma; see comment      Electronically signed by Olaf Fierro DO on 2/2/2024 at  2:08 PM            Colonoscopy:  Colonoscopy:     EndoFLIP directed at the UES or LES (8cm (EF-325) balloon length or 16cm (EF-322) balloon length):   Date:  8cm balloon  Balloon inflation Balloon pressure CSA (mm^2) DI (mm^2/mmHg) Dmin (mm) Compliance   20 (ladmark ID)        30        40        50           16cm balloon  Balloon inflation Balloon pressure CSA (mm^2) DI (mm^2/mmHg) Dmin (mm) Compliance   30 (ladmark ID)        40        50        60        70           High Resolution Manometry:    PH/Impedance:     Bravo:    CT:    1/26/2023 CT AP WO Contrast    Impression    1.  Considerable retained fecal debris throughout the colon. No obstruction.  2.  Circumferential wall thickening involving the distal transverse colon which may represent an  infectious or inflammatory colitis for stercoral colitis.  3.  Somewhat amorphous appearance of the cecum and ascending colon with prominent mural fat attenuation. While this can be seen in normal individuals, it can also be seen as a sequela of inflammatory bowel disease. Clinical correlation recommended.  4.  Similar severe hepatic steatosis and fluid attenuation lesions described previously.  5.  Cholecystectomy.  6.  Similar appearance of polycystic kidneys. No hydronephrosis.          Esophagram:    FL VSS:     GES:    U/S:     XRAY:    Other:     [facility] inpatient note 10/24/23      ---------------------------------------------------------------          Prior medical records were reviewed including, but not limited to, notes from referring providers, lab work, radiographic tests, and other diagnostic tests. Pertinent results were summarized above.     History     Past Medical History:   Diagnosis Date    Anemia     No Comments Provided    Anxiety state     No Comments Provided    Arthropathy     No Comments Provided    Asthma     No Comments Provided    Backache     No Comments Provided    Edema     No Comments Provided    Esophageal reflux     No Comments Provided    Female stress incontinence     No Comments Provided    Hypothyroidism     No Comments Provided    Irregular menstrual cycle     No Comments Provided    Irritable colon     No Comments Provided    Migraine     No Comments Provided    Morbid obesity (H)     No Comments Provided    Other chronic nonalcoholic liver disease     No Comments Provided    Other depressive disorder     No Comments Provided    Other malaise and fatigue     No Comments Provided    Pain in joint     hips, knees, ankles, feet, neck    Restless legs syndrome     self-diagnosed    Shortness of breath     No Comments Provided    Synovial cyst of popliteal space     No Comments Provided    Vitamin D deficiency     Rx 3/14, re check 6/14       Past Surgical History:   Procedure  Laterality Date    ATTEMPTED ARTHROSCOPY      x3, rt knee    ESOPHAGOSCOPY, GASTROSCOPY, DUODENOSCOPY (EGD), COMBINED N/A 1/30/2024    Procedure: ESOPHAGOGASTRODUODENOSCOPY, WITH BIOPSY;  Surgeon: Precious Albarran MD;  Location: [facility]    EXTRACTION(S) DENTAL      No Comments Provided    LAPAROSCOPIC CHOLECYSTECTOMY      2010    OSTEOTOMY FEMUR DISTAL      right       Social History     Socioeconomic History    Marital status: Single     Spouse name: Not on file    Number of children: Not on file    Years of education: Not on file    Highest education level: Not on file   Occupational History    Not on file   Tobacco Use    Smoking status: Every Day     Packs/day: .3     Types: Cigarettes     Start date: 3/18/2013    Smokeless tobacco: Never    Tobacco comments:     3 cigarettes per day    Substance and Sexual Activity    Alcohol use: Yes     Comment: Alcoholic Drinks/day: Quit 3/2013    Drug use: Unknown     Types: Other     Comment: Drug use: No    Sexual activity: Not on file   Other Topics Concern    Not on file   Social History Narrative    Not on file     Social Determinants of Health     Financial Resource Strain: Not on file   Food Insecurity: Not on file   Transportation Needs: Not on file   Physical Activity: Not on file   Stress: Not on file   Social Connections: Not on file   Interpersonal Safety: Not on file   Housing Stability: Not on file       Family History   Problem Relation Age of Onset    Diabetes Mother         Diabetes    Diabetes Father         Diabetes    Other - See Comments Father         colon clot then DVT inpt    Other - See Comments Paternal Grandfather         PE     Family history reviewed and edited as appropriate    Medications and Allergies:     Outpatient Encounter Medications as of 2/9/2024   Medication Sig Dispense Refill    albuterol (PROAIR HFA/PROVENTIL HFA/VENTOLIN HFA) 108 (90 Base) MCG/ACT inhaler Inhale 1-2 puffs into the lungs      ALPRAZolam (XANAX) 0.25 MG tablet 0.25  mg      amitriptyline (ELAVIL) 10 MG tablet Take 2 tablets by mouth      ammonium lactate (AMLACTIN) 12 % external cream Twice daily for body      aspirin 81 MG EC tablet 81 mg      aspirin-acetaminophen-caffeine (EXCEDRIN MIGRAINE) 250-250-65 MG tablet Take by mouth 2 times daily as needed      atorvastatin (LIPITOR) 20 MG tablet 20 mg      buPROPion (WELLBUTRIN XL) 150 MG 24 hr tablet Take 1 tablet by mouth daily      Calcium Polycarbophil (FIBER) 625 MG tablet Take 1 Tablet (625 mg) by mouth once daily.      cephALEXin (KEFLEX) 500 MG capsule Take 1 capsule by mouth two times daily for 7 days      chlorhexidine (HIBICLENS) 4 % liquid Chlorhexidine Topical Liquid 4 %  topically 1.0  once    active      clindamycin (CLEOCIN) 300 MG capsule Take 1 capsule 4 times a day until gone      clobetasol propionate (CLOBEX) 0.05 % external shampoo Apply a thin layer onto dry, affected area of scalp once daily. Leave for 15 minutes before lathering and rinsing.      clonazePAM (KLONOPIN) 0.5 MG tablet Take 0.5 mg by mouth      CLOTRIMAZOLE ANTI-FUNGAL 1 % external cream Apply topically to affected area(s) two times daily.      Cranberry 400 MG CAPS Take 400 mg by mouth      diphenhydrAMINE (BENADRYL) 25 MG capsule Take 25 mg by mouth every 4 hours      divalproex sodium delayed-release (DEPAKOTE) 500 MG DR tablet       docusate sodium (DSS) 100 MG capsule Take 1 capsule by mouth      DULoxetine (CYMBALTA) 30 MG capsule Take 3 capsules by mouth daily      enoxaparin ANTICOAGULANT (LOVENOX) 60 MG/0.6ML syringe Enoxaparin Subcutaneous  subcutaneously 60.0  every 12 hours    inactive      escitalopram (LEXAPRO) 20 MG tablet Take 20 mg by mouth daily      eszopiclone (LUNESTA) 1 MG tablet Take 2 mg by mouth      famotidine (PEPCID) 40 MG tablet Take 40 mg by mouth      HYDROcodone-acetaminophen (NORCO) 5-325 MG tablet take 1 tablet by mouth every 4 to 6 hours as needed for pain      hydrOXYzine (VISTARIL) 50 MG capsule Take 1  capsule by mouth      hyoscyamine (LEVSIN) 0.125 MG tablet Place 0.25 mg under the tongue      ipratropium - albuterol 0.5 mg/2.5 mg/3 mL (DUONEB) 0.5-2.5 (3) MG/3ML neb solution Inhale 1 Neb via a nebulizer 4 times daily if needed for Shortness Of Breath or Wheezing.      Lidocaine (LIDOCARE) 4 % Patch Apply 1-3 patches to intact skin to cover most painful area of back pain for max 12hr per 24hr period.      methocarbamol (ROBAXIN) 500 MG tablet Take 500 mg by mouth      Multiple Vitamins-Minerals (SUPER THERA CELESTINO M) TABS Take 1 tablet by mouth      nortriptyline (PAMELOR) 10 MG capsule 20 mg      nystatin (MYCOSTATIN) 254684 UNIT/GM external powder 1 strip      OLANZapine (ZYPREXA) 5 MG tablet 5 mg      omeprazole (PRILOSEC) 40 MG DR capsule Take 1 capsule (40 mg) by mouth daily 30 capsule 1    ondansetron (ZOFRAN) 4 MG tablet Take 1 tablet by mouth      oxyCODONE-acetaminophen (PERCOCET) 5-325 MG tablet Take 1 tablet by mouth every 4 hours if needed for Pain. Max acetaminophen dose 4000mg in 24 hrs.      pantoprazole (PROTONIX) 40 MG EC tablet 40 mg      polyethylene glycol (MIRALAX) 17 g packet Take 17 g by mouth      predniSONE (DELTASONE) 20 MG tablet Take 2 Tablets (40 mg) by mouth once daily with a meal for 5 days.      pregabalin (LYRICA) 150 MG capsule 150 mg      promethazine (PHENERGAN) 12.5 MG tablet Take half a tablet three times daily before meals and every 6 hrs as needed for nausea      sodium chloride, PF, (NORMAL SALINE FLUSH) 0.9% PF flush Inject 5 mLs into the vein      sucralfate (CARAFATE) 1 GM tablet Take 1 tablet (1 g) by mouth 4 times daily 240 tablet 0    sucralfate (CARAFATE) 1 GM/10ML suspension Take 10 mLs (1 g) by mouth 4 times daily for 60 days 2400 mL 0    Sulfacetamide Sodium-Sulfur 10-5 % SUSP Apply topically.      tretinoin (RETIN-A) 0.05 % external cream Apply topically to affected area(s) at bedtime if needed (Darier's facial flare).      UBRELVY 100 MG tablet 100 mg       "vitamin B1 (THIAMINE) 100 MG tablet Miscellaneous Drug     Thiamine mononitrate, vit B1, (VITAMIN B1) 100mg tablet    active      XARELTO ANTICOAGULANT 20 MG TABS tablet 20 mg       No facility-administered encounter medications on file as of 2/9/2024.        Allergies   Allergen Reactions    Acetaminophen Hives, Itching, Rash and Shortness Of Breath     Several Rx's for Norco in 2021 & 22    No Clinical Screening - See Comments Anaphylaxis     steroids    Gabapentin Unknown and Itching    Hydrocodone-Acetaminophen Itching     Several Rx's for Norco in 2021 & 22    Iodinated Contrast Media Unknown and Nausea and Vomiting     Extremely anxious, vomited once after CT. No wheezing or SOB.    Latex Itching    Morphine And Related Itching     Several Rx's for Norco in 2021 & 22    Nsaids Other (See Comments)     H/o german-n-y gastric bypass\". AVOID NSAIDs and aspirin due to risk of gastric and/or G-J anastomotic ulcers. If Eduarda must be on short course of NSAIDs or aspirin, use enteric coated if possible and use PPI // Ira Hung RN, Bariatric Nurse Clinician, [facility] 3/23/2022    Oxycodone-Acetaminophen Itching     Several Rx's for Norco in 2021 & 22        Review of systems:  A full 10 point review of systems was obtained and was negative except for the pertinent positives and negatives stated within the HPI.    Objective Findings:   Physical Exam:    Constitutional: There were no vitals taken for this visit.  General: Alert, cooperative, no distress, well-appearing  Head: Atraumatic, normocephalic, no obvious abnormalities   Eyes: Sclera anicteric, no obvious conjunctival hemorrhage   Nose: Nares normal, no obvious malformation, no obvious rhinorrhea   Respiratory: Resting comfortably, no apparent distress, no cough.   Gastrointestinal: Round, distended without guarding or rigidity  Skin: No jaundice, no obvious rash  Neurologic: AAOx3, no obvious neurologic abnormality  Psychiatric: " "Normal Affect, appropriate mood  Extremities: No obvious edema, no obvious malformation     Labs, Radiology, Pathology     Lab Results   Component Value Date    HGB 16.4 (H) 09/08/2015     09/08/2015     (L) 09/08/2015    BUN 9 09/08/2015    CO2 26 09/08/2015        Liver Function Studies - No results for input(s): \"PROTTOTAL\", \"ALBUMIN\", \"BILITOTAL\", \"ALKPHOS\", \"AST\", \"ALT\", \"BILIDIRECT\" in the last 35305 hours.       Patient Active Problem List    Diagnosis Date Noted    Regurgitation of food 11/20/2023     Priority: Medium    H/O gastric bypass 11/20/2023     Priority: Medium    Globus sensation 11/20/2023     Priority: Medium      Assessment and Plan   Assessment/Plan:    Eduarda Nogueira is a 43 year old female with anxiety, gastric bypass 3/17/22, subdural hematoma, EtOH abuse,migraine headaches previous stroke, asthma, reflux, morbid obesity, cyclic vomiting syndrome, marijuana dependence, PFO, chronic pain, urinary and fecal incontinence sp pelvic floor therapy, who is presenting as a follow-up patient after endoscopy to review results with a chief concern of melena, nausea with emesis and abdominal pain.     Patient presents today with 4-5 month history of melena, generalized abdominal pain, nausea with associated emesis and intermittent hematemesis.  Associated symptoms include a subjective 20 pound weight loss as well as early satiety and early satiation. Eduarda also reports intermittent headaches with gait disturbances. She underwent upper endoscopy 1/30/2024 which showed severe ulcerations of her jejunum in the setting of prior Addis-en-Y gastric bypass.  Biopsies resulted and were pertinent for moderately differentiated adenocarcinoma of the jejunum.  Additional pertinent evaluation for a CT AP without contrast 1/26/2024 with findings of circumferential wall thickening involving the distal transverse colon as well as somewhat amorphous appearance of the cecum and ascending colon with " prominent mural fat attenuation.  With findings on CT scan concern is for colonic primary with metastasis to the small bowel.     Case has been reviewed with GI attending, bariatrics and colorectal surgery.  Plan will be to obtain colonoscopy early next week for and consultation with bariatric surgery with Dr. Tran Thursday, 2/15/2024 which was scheduled today in office.     Patient was advised that if she was to develop lightheadedness dizziness (presyncopal symptoms) or inability to tolerate oral intake over the weekend that she should report to the ER for further evaluation/supportive cares.    - Labs to be completed today  - Colonoscopy   - Consultation with bariatrics surgery next week   - Continue Zofran 4 mg ODT as needed   - Increase Ensure/Boost to 3 bottles per day   - Start MiraLAX 17g (2 capful) daily this can be increased to three times daily dosing if needed  - Okay to use as needed enemas either tap water or fleet enemas  - Continue Omeprazole 40 mg once daily daily to be taken on an empty stomach 30-60 minuets prior to meals   - Carafate four times daily     Follow up plan:   Return to clinic 4 months as needed.    The risks and benefits of my recommendations, as well as other treatment options were discussed with the patient and any available family today. All questions were answered.     Follow up: As planned above. Today, I personally spent 49 minutes in direct face to face time with the patient, of which greater than 50% of the time was spent in patient education and counseling as described above. Approximately 37 minutes were spent on indirect care associated with the patient's consultation including but not limited to review of: patient medical records to date, clinic visits, hospital records, lab results, imaging studies, procedural documentation, and coordinating care with other providers. The findings from this review are summarized in the above note. All of the above accounted for a  cumulative time of 86 minutes and was performed on the date of service.     The patient verbalized understanding of the plan and was appreciative for the time spent and information provided during the office visit.     Documentation assisted by voice recognition and documentation system.        Again, thank you for allowing me to participate in the care of your patient.      Sincerely,    Anisha Hill PA-C

## 2024-02-09 NOTE — PROGRESS NOTES
Gastroenterology Visit for: Eduarda Nogueira 1980   MRN: [id number]     Reason for Visit:  chief complaint    Referred by: Stuart  / [address] [place] [address] / [facility] [id number]  Patient Care Team:  Sussy Myers as PCP - General  Hellen Rosales as Referring Physician (Physician Assistant)  Victor Hugo Diamond MD as MD (Dermatology)  Henri Davidson DO as Physician (Gastroenterology)  Esther Cochran MD as MD (Gastroenterology)  System, Provider Not In (Clinic)  Henri Davidson DO as Assigned Gastroenterology Provider  Anisha Hill PA-C as Physician Assistant (Gastroenterology)    History of Present Illness:   Eduarda Nogueira is a 43 year old female with anxiety, gastric bypass 3/17/22, subdural hematoma, EtOH abuse,migraine headaches previous stroke, asthma, reflux, morbid obesity, cyclic vomiting syndrome, marijuana dependence, PFO, chronic pain, urinary and fecal incontinence sp pelvic floor therapy, who is presenting as a follow-up patient after endoscopy to review results with a chief concern of melena, nausea with emesis and abdominal pain.    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    She reports that she has had persistent melena onset September/October 2023.  Associated symptoms include early satiety and early satiation.  She also reports a 20 pound subjective weight loss.  Additionally, she is having nausea and associated emesis daily.  She has experienced intermittent hematemesis.     Eduarda continues to use cannabinoid products.  She has had complete abstinence from alcohol for the past 2 weeks.     Mother had uterine and lung cancer.     Maternal grandmother had uterine cancer     No family history or GI related malignancy (esophageal, gastric, pancreatic, liver or colon).  "    ---------------------------------------------------------------------------------------------------------------------------------------------------------------------------------------------    HPI Dr. Henri Davidson 11/20/23  Eduarda Nogueira states se was in the hospital and was having issues with coffee ground emesis and black stools. She was told there was nothing to do in the hospital. Her \"advocate\" directed her to the [facility] to get checked. She feels there is something trapped between the front and the back of her throat. Every day it feels like it is there and makes her want to gag and dry heave. She has associated gassiness. No issues with bowel movements although they are black. Symptoms were the same in the hospital. These symptoms have been ongoing for an undisclosed amount of time \"so long\". Anything that she eats she feels is going to come right back out. When she eats or drinks she has the sensation that something is there and it makes her want to vomit. There is an associated tightness in her stomach. Nothing makes it better and nothing makes it worse. While in the hospital she was on \"a whole bunch of meds\" and when she went home she says she stopped all medications. All pills including ondansetron would result in dry heave. Regurgitates and spits into buckets at home.      +nausea, all over chills, hot, cold, globus all associated with regurgitation events.     Currently on no medications except for a single ondansetron today.      Patient takes CBD.      Gabapentin causes heat sensation in head and itchiness all over.  Has tried lyrica without success.     Lost 10 pounds in the last week per patient.       Esophageal Questionnaire(s)    BEDQ Questionnaire      2/2/2024     2:57 PM   BEDQ Questionnaire: How Often Have You Had the Following?   Trouble eating solid food (meat, bread, vegetables) 5   Trouble eating soft foods (yogurt, jello, pudding) 4   Trouble swallowing liquids 4   Pain while " swallowing 4   Coughing or choking while swallowing foods or liquids 4   Total Score: 21         2/2/2024     2:57 PM   BEDQ Questionnaire: Discomfort/Pain Ratings   Eating solid food (meat, bread, vegetables) 4   Eating soft foods (yogurt, jello, pudding) 3   Drinking liquid 4   Total Score: 11       Eckardt Questionnaire      2/2/2024     2:58 PM   Eckardt Questionnaire   Dysphagia 2   Regurgitation 2   Weight Loss (kg) 1       Promis 10 Questionnaire      2/2/2024     3:02 PM   PROMIS 10 FLOWSHEET DATA   In general, would you say your health is: 1   In general, would you say your quality of life is: 2   In general, how would you rate your physical health? 2   In general, how would you rate your mental health, including your mood and your ability to think? 1   In general, how would you rate your satisfaction with your social activities and relationships? 2   In general, please rate how well you carry out your usual social activities and roles. (This includes activities at home, at work and in your community, and responsibilities as a parent, child, spouse, employee, friend, etc.) 1   To what extent are you able to carry out your everyday physical activities such as walking, climbing stairs, carrying groceries, or moving a chair? 2   In the past 7 days, how often have you been bothered by emotional problems such as feeling anxious, depressed, or irritable? 5   In the past 7 days, how would you rate your fatigue on average? 4   In the past 7 days, how would you rate your pain on average, where 0 means no pain, and 10 means worst imaginable pain? 7   Mental health question re-calculation - no clinical value 1   Physical health question re-calculation - no clinical value 2   Pain question re-calculation - no clinical value 2   Global Mental Health Score 6   Global Physical Health Score 8   PROMIS TOTAL - SUBSCORES 14           STUDIES & PROCEDURES:    EGD:     Findings:       The examined esophagus was normal. No  narrowing or stricture.        Evidence of a gastric bypass was found. A gastric pouch with a medium        size was found. The gastrojejunal anastomosis was characterized by        healthy appearing mucosa. This was traversed. The jejunum was ulcerated        (large extensive area was ulcerated, no active bleeding was noted, no        obvious mass. Biopsies were taken with a cold forceps for histology.        The examined jejunal limb was normal.                                                                                    Impression:            - Normal esophagus.                          - Gastric bypass with a medium-sized pouch.                          Gastrojejunal anastomosis characterized by healthy                          appearing mucosa. Jejunum severely ulcerated. Biopsied.                          - Normal examined jejunum.     Final Diagnosis   A. JEJUNUM, BIOPSIES:  - Moderately-differentiated adenocarcinoma; see comment      Electronically signed by Olaf Fierro DO on 2/2/2024 at  2:08 PM            Colonoscopy:  Colonoscopy:     EndoFLIP directed at the UES or LES (8cm (EF-325) balloon length or 16cm (EF-322) balloon length):   Date:  8cm balloon  Balloon inflation Balloon pressure CSA (mm^2) DI (mm^2/mmHg) Dmin (mm) Compliance   20 (ladmark ID)        30        40        50           16cm balloon  Balloon inflation Balloon pressure CSA (mm^2) DI (mm^2/mmHg) Dmin (mm) Compliance   30 (ladmark ID)        40        50        60        70           High Resolution Manometry:    PH/Impedance:     Bravo:    CT:    1/26/2023 CT AP WO Contrast    Impression    1.  Considerable retained fecal debris throughout the colon. No obstruction.  2.  Circumferential wall thickening involving the distal transverse colon which may represent an infectious or inflammatory colitis for stercoral colitis.  3.  Somewhat amorphous appearance of the cecum and ascending colon with prominent mural fat attenuation.  While this can be seen in normal individuals, it can also be seen as a sequela of inflammatory bowel disease. Clinical correlation recommended.  4.  Similar severe hepatic steatosis and fluid attenuation lesions described previously.  5.  Cholecystectomy.  6.  Similar appearance of polycystic kidneys. No hydronephrosis.          Esophagram:    FL VSS:     GES:    U/S:     XRAY:    Other:     [facility] inpatient note 10/24/23      ---------------------------------------------------------------          Prior medical records were reviewed including, but not limited to, notes from referring providers, lab work, radiographic tests, and other diagnostic tests. Pertinent results were summarized above.     History     Past Medical History:   Diagnosis Date     Anemia     No Comments Provided     Anxiety state     No Comments Provided     Arthropathy     No Comments Provided     Asthma     No Comments Provided     Backache     No Comments Provided     Edema     No Comments Provided     Esophageal reflux     No Comments Provided     Female stress incontinence     No Comments Provided     Hypothyroidism     No Comments Provided     Irregular menstrual cycle     No Comments Provided     Irritable colon     No Comments Provided     Migraine     No Comments Provided     Morbid obesity (H)     No Comments Provided     Other chronic nonalcoholic liver disease     No Comments Provided     Other depressive disorder     No Comments Provided     Other malaise and fatigue     No Comments Provided     Pain in joint     hips, knees, ankles, feet, neck     Restless legs syndrome     self-diagnosed     Shortness of breath     No Comments Provided     Synovial cyst of popliteal space     No Comments Provided     Vitamin D deficiency     Rx 3/14, re check 6/14       Past Surgical History:   Procedure Laterality Date     ATTEMPTED ARTHROSCOPY      x3, rt knee     ESOPHAGOSCOPY, GASTROSCOPY, DUODENOSCOPY (EGD), COMBINED N/A 1/30/2024    Procedure:  ESOPHAGOGASTRODUODENOSCOPY, WITH BIOPSY;  Surgeon: Precious Albarran MD;  Location: [facility]     EXTRACTION(S) DENTAL      No Comments Provided     LAPAROSCOPIC CHOLECYSTECTOMY      2010     OSTEOTOMY FEMUR DISTAL      right       Social History     Socioeconomic History     Marital status: Single     Spouse name: Not on file     Number of children: Not on file     Years of education: Not on file     Highest education level: Not on file   Occupational History     Not on file   Tobacco Use     Smoking status: Every Day     Packs/day: .3     Types: Cigarettes     Start date: 3/18/2013     Smokeless tobacco: Never     Tobacco comments:     3 cigarettes per day    Substance and Sexual Activity     Alcohol use: Yes     Comment: Alcoholic Drinks/day: Quit 3/2013     Drug use: Unknown     Types: Other     Comment: Drug use: No     Sexual activity: Not on file   Other Topics Concern     Not on file   Social History Narrative     Not on file     Social Determinants of Health     Financial Resource Strain: Not on file   Food Insecurity: Not on file   Transportation Needs: Not on file   Physical Activity: Not on file   Stress: Not on file   Social Connections: Not on file   Interpersonal Safety: Not on file   Housing Stability: Not on file       Family History   Problem Relation Age of Onset     Diabetes Mother         Diabetes     Diabetes Father         Diabetes     Other - See Comments Father         colon clot then DVT inpt     Other - See Comments Paternal Grandfather         PE     Family history reviewed and edited as appropriate    Medications and Allergies:     Outpatient Encounter Medications as of 2/9/2024   Medication Sig Dispense Refill     albuterol (PROAIR HFA/PROVENTIL HFA/VENTOLIN HFA) 108 (90 Base) MCG/ACT inhaler Inhale 1-2 puffs into the lungs       ALPRAZolam (XANAX) 0.25 MG tablet 0.25 mg       amitriptyline (ELAVIL) 10 MG tablet Take 2 tablets by mouth       ammonium lactate (AMLACTIN) 12 % external cream  Twice daily for body       aspirin 81 MG EC tablet 81 mg       aspirin-acetaminophen-caffeine (EXCEDRIN MIGRAINE) 250-250-65 MG tablet Take by mouth 2 times daily as needed       atorvastatin (LIPITOR) 20 MG tablet 20 mg       buPROPion (WELLBUTRIN XL) 150 MG 24 hr tablet Take 1 tablet by mouth daily       Calcium Polycarbophil (FIBER) 625 MG tablet Take 1 Tablet (625 mg) by mouth once daily.       cephALEXin (KEFLEX) 500 MG capsule Take 1 capsule by mouth two times daily for 7 days       chlorhexidine (HIBICLENS) 4 % liquid Chlorhexidine Topical Liquid 4 %  topically 1.0  once    active       clindamycin (CLEOCIN) 300 MG capsule Take 1 capsule 4 times a day until gone       clobetasol propionate (CLOBEX) 0.05 % external shampoo Apply a thin layer onto dry, affected area of scalp once daily. Leave for 15 minutes before lathering and rinsing.       clonazePAM (KLONOPIN) 0.5 MG tablet Take 0.5 mg by mouth       CLOTRIMAZOLE ANTI-FUNGAL 1 % external cream Apply topically to affected area(s) two times daily.       Cranberry 400 MG CAPS Take 400 mg by mouth       diphenhydrAMINE (BENADRYL) 25 MG capsule Take 25 mg by mouth every 4 hours       divalproex sodium delayed-release (DEPAKOTE) 500 MG DR tablet        docusate sodium (DSS) 100 MG capsule Take 1 capsule by mouth       DULoxetine (CYMBALTA) 30 MG capsule Take 3 capsules by mouth daily       enoxaparin ANTICOAGULANT (LOVENOX) 60 MG/0.6ML syringe Enoxaparin Subcutaneous  subcutaneously 60.0  every 12 hours    inactive       escitalopram (LEXAPRO) 20 MG tablet Take 20 mg by mouth daily       eszopiclone (LUNESTA) 1 MG tablet Take 2 mg by mouth       famotidine (PEPCID) 40 MG tablet Take 40 mg by mouth       HYDROcodone-acetaminophen (NORCO) 5-325 MG tablet take 1 tablet by mouth every 4 to 6 hours as needed for pain       hydrOXYzine (VISTARIL) 50 MG capsule Take 1 capsule by mouth       hyoscyamine (LEVSIN) 0.125 MG tablet Place 0.25 mg under the tongue        ipratropium - albuterol 0.5 mg/2.5 mg/3 mL (DUONEB) 0.5-2.5 (3) MG/3ML neb solution Inhale 1 Neb via a nebulizer 4 times daily if needed for Shortness Of Breath or Wheezing.       Lidocaine (LIDOCARE) 4 % Patch Apply 1-3 patches to intact skin to cover most painful area of back pain for max 12hr per 24hr period.       methocarbamol (ROBAXIN) 500 MG tablet Take 500 mg by mouth       Multiple Vitamins-Minerals (SUPER THERA CELESTINO M) TABS Take 1 tablet by mouth       nortriptyline (PAMELOR) 10 MG capsule 20 mg       nystatin (MYCOSTATIN) 263097 UNIT/GM external powder 1 strip       OLANZapine (ZYPREXA) 5 MG tablet 5 mg       omeprazole (PRILOSEC) 40 MG DR capsule Take 1 capsule (40 mg) by mouth daily 30 capsule 1     ondansetron (ZOFRAN) 4 MG tablet Take 1 tablet by mouth       oxyCODONE-acetaminophen (PERCOCET) 5-325 MG tablet Take 1 tablet by mouth every 4 hours if needed for Pain. Max acetaminophen dose 4000mg in 24 hrs.       pantoprazole (PROTONIX) 40 MG EC tablet 40 mg       polyethylene glycol (MIRALAX) 17 g packet Take 17 g by mouth       predniSONE (DELTASONE) 20 MG tablet Take 2 Tablets (40 mg) by mouth once daily with a meal for 5 days.       pregabalin (LYRICA) 150 MG capsule 150 mg       promethazine (PHENERGAN) 12.5 MG tablet Take half a tablet three times daily before meals and every 6 hrs as needed for nausea       sodium chloride, PF, (NORMAL SALINE FLUSH) 0.9% PF flush Inject 5 mLs into the vein       sucralfate (CARAFATE) 1 GM tablet Take 1 tablet (1 g) by mouth 4 times daily 240 tablet 0     sucralfate (CARAFATE) 1 GM/10ML suspension Take 10 mLs (1 g) by mouth 4 times daily for 60 days 2400 mL 0     Sulfacetamide Sodium-Sulfur 10-5 % SUSP Apply topically.       tretinoin (RETIN-A) 0.05 % external cream Apply topically to affected area(s) at bedtime if needed (Darier's facial flare).       UBRELVY 100 MG tablet 100 mg       vitamin B1 (THIAMINE) 100 MG tablet Miscellaneous Drug     Thiamine  "mononitrate, vit B1, (VITAMIN B1) 100mg tablet    active       XARELTO ANTICOAGULANT 20 MG TABS tablet 20 mg       No facility-administered encounter medications on file as of 2/9/2024.        Allergies   Allergen Reactions     Acetaminophen Hives, Itching, Rash and Shortness Of Breath     Several Rx's for Norco in 2021 & 22     No Clinical Screening - See Comments Anaphylaxis     steroids     Gabapentin Unknown and Itching     Hydrocodone-Acetaminophen Itching     Several Rx's for Norco in 2021 & 22     Iodinated Contrast Media Unknown and Nausea and Vomiting     Extremely anxious, vomited once after CT. No wheezing or SOB.     Latex Itching     Morphine And Related Itching     Several Rx's for Norco in 2021 & 22     Nsaids Other (See Comments)     H/o german-n-y gastric bypass\". AVOID NSAIDs and aspirin due to risk of gastric and/or G-J anastomotic ulcers. If Eduarda must be on short course of NSAIDs or aspirin, use enteric coated if possible and use PPI // Iraoleg Hung RN, Bariatric Nurse Clinician, [facility] 3/23/2022     Oxycodone-Acetaminophen Itching     Several Rx's for Norco in 2021 & 22        Review of systems:  A full 10 point review of systems was obtained and was negative except for the pertinent positives and negatives stated within the HPI.    Objective Findings:   Physical Exam:    Constitutional: There were no vitals taken for this visit.  General: Alert, cooperative, no distress, well-appearing  Head: Atraumatic, normocephalic, no obvious abnormalities   Eyes: Sclera anicteric, no obvious conjunctival hemorrhage   Nose: Nares normal, no obvious malformation, no obvious rhinorrhea   Respiratory: Resting comfortably, no apparent distress, no cough.   Gastrointestinal: Round, distended without guarding or rigidity  Skin: No jaundice, no obvious rash  Neurologic: AAOx3, no obvious neurologic abnormality  Psychiatric: Normal Affect, appropriate mood  Extremities: No obvious " "edema, no obvious malformation     Labs, Radiology, Pathology     Lab Results   Component Value Date    HGB 16.4 (H) 09/08/2015     09/08/2015     (L) 09/08/2015    BUN 9 09/08/2015    CO2 26 09/08/2015        Liver Function Studies - No results for input(s): \"PROTTOTAL\", \"ALBUMIN\", \"BILITOTAL\", \"ALKPHOS\", \"AST\", \"ALT\", \"BILIDIRECT\" in the last 43646 hours.       Patient Active Problem List    Diagnosis Date Noted     Regurgitation of food 11/20/2023     Priority: Medium     H/O gastric bypass 11/20/2023     Priority: Medium     Globus sensation 11/20/2023     Priority: Medium      Assessment and Plan   Assessment/Plan:    Eduarda Nogueira is a 43 year old female with anxiety, gastric bypass 3/17/22, subdural hematoma, EtOH abuse,migraine headaches previous stroke, asthma, reflux, morbid obesity, cyclic vomiting syndrome, marijuana dependence, PFO, chronic pain, urinary and fecal incontinence sp pelvic floor therapy, who is presenting as a follow-up patient after endoscopy to review results with a chief concern of melena, nausea with emesis and abdominal pain.     Patient presents today with 4-5 month history of melena, generalized abdominal pain, nausea with associated emesis and intermittent hematemesis.  Associated symptoms include a subjective 20 pound weight loss as well as early satiety and early satiation. Eduarda also reports intermittent headaches with gait disturbances. She underwent upper endoscopy 1/30/2024 which showed severe ulcerations of her jejunum in the setting of prior Addis-en-Y gastric bypass.  Biopsies resulted and were pertinent for moderately differentiated adenocarcinoma of the jejunum.  Additional pertinent evaluation for a CT AP without contrast 1/26/2024 with findings of circumferential wall thickening involving the distal transverse colon as well as somewhat amorphous appearance of the cecum and ascending colon with prominent mural fat attenuation.  With findings on CT scan " concern is for colonic primary with metastasis to the small bowel.     Case has been reviewed with GI attending, bariatrics and colorectal surgery.  Plan will be to obtain colonoscopy early next week for and consultation with bariatric surgery with Dr. Tran Thursday, 2/15/2024 which was scheduled today in office.     Patient was advised that if she was to develop lightheadedness dizziness (presyncopal symptoms) or inability to tolerate oral intake over the weekend that she should report to the ER for further evaluation/supportive cares.    - Labs to be completed today  - Colonoscopy   - Consultation with bariatrics surgery next week   - Continue Zofran 4 mg ODT as needed   - Increase Ensure/Boost to 3 bottles per day   - Start MiraLAX 17g (2 capful) daily this can be increased to three times daily dosing if needed  - Okay to use as needed enemas either tap water or fleet enemas  - Continue Omeprazole 40 mg once daily daily to be taken on an empty stomach 30-60 minuets prior to meals   - Carafate four times daily     Follow up plan:   Return to clinic 4 months as needed.    The risks and benefits of my recommendations, as well as other treatment options were discussed with the patient and any available family today. All questions were answered.     o Follow up: As planned above. Today, I personally spent 49 minutes in direct face to face time with the patient, of which greater than 50% of the time was spent in patient education and counseling as described above. Approximately 37 minutes were spent on indirect care associated with the patient's consultation including but not limited to review of: patient medical records to date, clinic visits, hospital records, lab results, imaging studies, procedural documentation, and coordinating care with other providers. The findings from this review are summarized in the above note. All of the above accounted for a cumulative time of 86 minutes and was performed on the date of  service.     The patient verbalized understanding of the plan and was appreciative for the time spent and information provided during the office visit.           Anisha Hill PA-C  Division of Gastroenterology, Hepatology, and Nutrition  [facility]       Documentation assisted by voice recognition and documentation system.

## 2024-02-09 NOTE — TELEPHONE ENCOUNTER
REFERRAL INFORMATION:  Referring Provider: AWILDA Estrella  Referring Clinic: [facility] - Gastroenterology  Reason for Visit/Diagnosis: Colon cancer with met to jejunum. Hx RNYGB 2022 at [facility]        FUTURE VISIT INFORMATION:  Appointment Date: 2/15/2024  Appointment Time: 11 AM     NOTES RECORD STATUS  DETAILS   OFFICE NOTE from Referring Provider Internal [facility]:  2/9/24 - GI OV with AWILDA Estrella   OFFICE NOTE from Other Specialists Care Everywhere / Internal [facility]:  2/8/24 - PCC OV with Dione Myers NP  9/6/22 - BARIATRIC OV AWILDA Allan  3/21/22 - GEN SURG OV with Geovanna Lopes RN  8/18/21 - DIET OV with Michelle Greenberg RD  6/15/21 - GEN SURG OV with Dr. Michaud    [facility]:  11/20/23 - GI OV with Dr. Davidson   [facility] DISCHARGE SUMMARY/ ED VISITS  Care Everywhere [facility]:  1/26/24 - ED OV with Dr. Coyle  1/23/24 - ED OV with Dr. Canseco  10/23/23 - ED OV with Dr. Bejarano  * Additional in Care Everywhere   OPERATIVE REPORT Care Everywhere [facility]:  3/17/22 - OP Note for LAPAROSCOPIC BYPASS GASTRIC DORINDA-N-Y with Dr. Michaud   ENDOSCOPY (EGD)  Internal [facility]:  1/30/24 - EGD   PERTINENT LABS Care Everywhere / Internal    PATHOLOGY REPORTS (RELATED) Internal [facility]:  1/30/24 - Jejunum (Case: [id number])   IMAGING (CT, MRI, US, XR)  Received / Internal [facility]:  1/26/24, 10/23/23 - CT Abd/pelvis  10/26/23 - CT Lumbar Spine  6/17/23 - CT Chest    [facility]:  1/5/24 - XR Video Swallow  1/5/24 - XR Esophogram     Records Requested    Facility  [facility]  Fax: [phone]   Outcome * 2/9/24 1:03 PM Faxed urgent request to [facility] for images to be pushed to [facility]s. - Tina    * 2/12/24 7:32 AM Images received from [facility] and attached to the patient in PACs. - Tina

## 2024-02-11 ENCOUNTER — HOSPITAL ENCOUNTER (INPATIENT)
Facility: CLINIC | Age: 44
LOS: 16 days | Discharge: HOME | End: 2024-02-27
Attending: EMERGENCY MEDICINE | Admitting: INTERNAL MEDICINE
Payer: COMMERCIAL

## 2024-02-11 ENCOUNTER — NURSE TRIAGE (OUTPATIENT)
Dept: NURSING | Facility: CLINIC | Age: 44
End: 2024-02-11
Payer: COMMERCIAL

## 2024-02-11 DIAGNOSIS — C17.1: ICD-10-CM

## 2024-02-11 DIAGNOSIS — Z78.9 DEEP VEIN THROMBOSIS (DVT) PROPHYLAXIS PRESCRIBED AT DISCHARGE: ICD-10-CM

## 2024-02-11 DIAGNOSIS — G43.E19 INTRACTABLE CHRONIC MIGRAINE WITH AURA AND WITHOUT STATUS MIGRAINOSUS: ICD-10-CM

## 2024-02-11 DIAGNOSIS — G89.18 POSTOPERATIVE PAIN: ICD-10-CM

## 2024-02-11 DIAGNOSIS — Z98.84 H/O GASTRIC BYPASS: ICD-10-CM

## 2024-02-11 DIAGNOSIS — K92.2 UPPER GI BLEED: ICD-10-CM

## 2024-02-11 DIAGNOSIS — R11.10 REGURGITATION OF FOOD: Primary | ICD-10-CM

## 2024-02-11 LAB
ABO/RH(D): NORMAL
ALBUMIN SERPL BCG-MCNC: 3.2 G/DL (ref 3.5–5.2)
ALP SERPL-CCNC: 46 U/L (ref 40–150)
ALT SERPL W P-5'-P-CCNC: 26 U/L (ref 0–50)
ANION GAP SERPL CALCULATED.3IONS-SCNC: 9 MMOL/L (ref 7–15)
ANTIBODY SCREEN: NEGATIVE
AST SERPL W P-5'-P-CCNC: 34 U/L (ref 0–45)
BASOPHILS # BLD AUTO: 0 10E3/UL (ref 0–0.2)
BASOPHILS NFR BLD AUTO: 0 %
BILIRUB SERPL-MCNC: 0.2 MG/DL
BLD PROD TYP BPU: NORMAL
BLOOD COMPONENT TYPE: NORMAL
BUN SERPL-MCNC: 16.9 MG/DL (ref 6–20)
CALCIUM SERPL-MCNC: 8.1 MG/DL (ref 8.6–10)
CHLORIDE SERPL-SCNC: 107 MMOL/L (ref 98–107)
CODING SYSTEM: NORMAL
CREAT SERPL-MCNC: 0.57 MG/DL (ref 0.51–0.95)
CROSSMATCH: NORMAL
DEPRECATED HCO3 PLAS-SCNC: 25 MMOL/L (ref 22–29)
EGFRCR SERPLBLD CKD-EPI 2021: >90 ML/MIN/1.73M2
EOSINOPHIL # BLD AUTO: 0.1 10E3/UL (ref 0–0.7)
EOSINOPHIL NFR BLD AUTO: 1 %
ERYTHROCYTE [DISTWIDTH] IN BLOOD BY AUTOMATED COUNT: 16.7 % (ref 10–15)
ERYTHROCYTE [DISTWIDTH] IN BLOOD BY AUTOMATED COUNT: 16.8 % (ref 10–15)
GLUCOSE SERPL-MCNC: 90 MG/DL (ref 70–99)
HCT VFR BLD AUTO: 21.7 % (ref 35–47)
HCT VFR BLD AUTO: 23 % (ref 35–47)
HGB BLD-MCNC: 6.8 G/DL (ref 11.7–15.7)
HGB BLD-MCNC: 7.2 G/DL (ref 11.7–15.7)
IMM GRANULOCYTES # BLD: 0.1 10E3/UL
IMM GRANULOCYTES NFR BLD: 1 %
INR PPP: 1.09 (ref 0.85–1.15)
ISSUE DATE AND TIME: NORMAL
LACTATE SERPL-SCNC: 0.6 MMOL/L (ref 0.7–2)
LIPASE SERPL-CCNC: 47 U/L (ref 13–60)
LYMPHOCYTES # BLD AUTO: 3 10E3/UL (ref 0.8–5.3)
LYMPHOCYTES NFR BLD AUTO: 26 %
MCH RBC QN AUTO: 30.2 PG (ref 26.5–33)
MCH RBC QN AUTO: 30.4 PG (ref 26.5–33)
MCHC RBC AUTO-ENTMCNC: 31.3 G/DL (ref 31.5–36.5)
MCHC RBC AUTO-ENTMCNC: 31.3 G/DL (ref 31.5–36.5)
MCV RBC AUTO: 96 FL (ref 78–100)
MCV RBC AUTO: 97 FL (ref 78–100)
MONOCYTES # BLD AUTO: 0.4 10E3/UL (ref 0–1.3)
MONOCYTES NFR BLD AUTO: 3 %
NEUTROPHILS # BLD AUTO: 7.7 10E3/UL (ref 1.6–8.3)
NEUTROPHILS NFR BLD AUTO: 69 %
NRBC # BLD AUTO: 0 10E3/UL
NRBC BLD AUTO-RTO: 0 /100
PLATELET # BLD AUTO: 283 10E3/UL (ref 150–450)
PLATELET # BLD AUTO: 286 10E3/UL (ref 150–450)
POTASSIUM SERPL-SCNC: 3.4 MMOL/L (ref 3.4–5.3)
PROT SERPL-MCNC: 5.3 G/DL (ref 6.4–8.3)
RBC # BLD AUTO: 2.25 10E6/UL (ref 3.8–5.2)
RBC # BLD AUTO: 2.37 10E6/UL (ref 3.8–5.2)
SODIUM SERPL-SCNC: 141 MMOL/L (ref 135–145)
SPECIMEN EXPIRATION DATE: NORMAL
UNIT ABO/RH: NORMAL
UNIT NUMBER: NORMAL
UNIT STATUS: NORMAL
UNIT TYPE ISBT: 6200
WBC # BLD AUTO: 11.3 10E3/UL (ref 4–11)
WBC # BLD AUTO: 9.2 10E3/UL (ref 4–11)

## 2024-02-11 PROCEDURE — XW0G886 INTRODUCTION OF MINERAL-BASED TOPICAL HEMOSTATIC AGENT INTO UPPER GI, VIA NATURAL OR ARTIFICIAL OPENING ENDOSCOPIC, NEW TECHNOLOGY GROUP 6: ICD-10-PCS | Performed by: INTERNAL MEDICINE

## 2024-02-11 PROCEDURE — 85610 PROTHROMBIN TIME: CPT | Performed by: EMERGENCY MEDICINE

## 2024-02-11 PROCEDURE — P9016 RBC LEUKOCYTES REDUCED: HCPCS

## 2024-02-11 PROCEDURE — 99285 EMERGENCY DEPT VISIT HI MDM: CPT | Mod: 25 | Performed by: EMERGENCY MEDICINE

## 2024-02-11 PROCEDURE — 250N000011 HC RX IP 250 OP 636: Performed by: EMERGENCY MEDICINE

## 2024-02-11 PROCEDURE — 83605 ASSAY OF LACTIC ACID: CPT | Performed by: EMERGENCY MEDICINE

## 2024-02-11 PROCEDURE — C9113 INJ PANTOPRAZOLE SODIUM, VIA: HCPCS

## 2024-02-11 PROCEDURE — 250N000011 HC RX IP 250 OP 636

## 2024-02-11 PROCEDURE — 43255 EGD CONTROL BLEEDING ANY: CPT | Performed by: INTERNAL MEDICINE

## 2024-02-11 PROCEDURE — 83690 ASSAY OF LIPASE: CPT | Performed by: EMERGENCY MEDICINE

## 2024-02-11 PROCEDURE — 86923 COMPATIBILITY TEST ELECTRIC: CPT

## 2024-02-11 PROCEDURE — 99223 1ST HOSP IP/OBS HIGH 75: CPT | Mod: GC | Performed by: STUDENT IN AN ORGANIZED HEALTH CARE EDUCATION/TRAINING PROGRAM

## 2024-02-11 PROCEDURE — 3E0G8TZ INTRODUCTION OF DESTRUCTIVE AGENT INTO UPPER GI, VIA NATURAL OR ARTIFICIAL OPENING ENDOSCOPIC: ICD-10-PCS | Performed by: INTERNAL MEDICINE

## 2024-02-11 PROCEDURE — 86900 BLOOD TYPING SEROLOGIC ABO: CPT | Performed by: EMERGENCY MEDICINE

## 2024-02-11 PROCEDURE — 82040 ASSAY OF SERUM ALBUMIN: CPT | Performed by: EMERGENCY MEDICINE

## 2024-02-11 PROCEDURE — 36415 COLL VENOUS BLD VENIPUNCTURE: CPT | Performed by: EMERGENCY MEDICINE

## 2024-02-11 PROCEDURE — 85025 COMPLETE CBC W/AUTO DIFF WBC: CPT | Performed by: EMERGENCY MEDICINE

## 2024-02-11 PROCEDURE — 85027 COMPLETE CBC AUTOMATED: CPT

## 2024-02-11 PROCEDURE — 36591 DRAW BLOOD OFF VENOUS DEVICE: CPT

## 2024-02-11 PROCEDURE — 120N000005 HC R&B MS OVERFLOW UMMC

## 2024-02-11 PROCEDURE — 258N000003 HC RX IP 258 OP 636: Performed by: EMERGENCY MEDICINE

## 2024-02-11 PROCEDURE — 250N000011 HC RX IP 250 OP 636: Performed by: STUDENT IN AN ORGANIZED HEALTH CARE EDUCATION/TRAINING PROGRAM

## 2024-02-11 PROCEDURE — 96374 THER/PROPH/DIAG INJ IV PUSH: CPT | Performed by: EMERGENCY MEDICINE

## 2024-02-11 PROCEDURE — 96361 HYDRATE IV INFUSION ADD-ON: CPT | Performed by: EMERGENCY MEDICINE

## 2024-02-11 PROCEDURE — 250N000011 HC RX IP 250 OP 636: Performed by: INTERNAL MEDICINE

## 2024-02-11 PROCEDURE — C9113 INJ PANTOPRAZOLE SODIUM, VIA: HCPCS | Performed by: EMERGENCY MEDICINE

## 2024-02-11 PROCEDURE — 93010 ELECTROCARDIOGRAM REPORT: CPT | Performed by: EMERGENCY MEDICINE

## 2024-02-11 PROCEDURE — 0W3P8ZZ CONTROL BLEEDING IN GASTROINTESTINAL TRACT, VIA NATURAL OR ARTIFICIAL OPENING ENDOSCOPIC: ICD-10-PCS | Performed by: INTERNAL MEDICINE

## 2024-02-11 RX ORDER — HYDROMORPHONE HCL IN WATER/PF 6 MG/30 ML
0.2 PATIENT CONTROLLED ANALGESIA SYRINGE INTRAVENOUS ONCE
Status: COMPLETED | OUTPATIENT
Start: 2024-02-11 | End: 2024-02-11

## 2024-02-11 RX ORDER — DIPHENHYDRAMINE HYDROCHLORIDE 50 MG/ML
50 INJECTION INTRAMUSCULAR; INTRAVENOUS
Status: COMPLETED | OUTPATIENT
Start: 2024-02-11 | End: 2024-02-11

## 2024-02-11 RX ORDER — FENTANYL CITRATE 50 UG/ML
25-50 INJECTION, SOLUTION INTRAMUSCULAR; INTRAVENOUS
Status: DISCONTINUED | OUTPATIENT
Start: 2024-02-11 | End: 2024-02-14

## 2024-02-11 RX ORDER — ALBUTEROL SULFATE 90 UG/1
2 AEROSOL, METERED RESPIRATORY (INHALATION) EVERY 6 HOURS PRN
Status: DISCONTINUED | OUTPATIENT
Start: 2024-02-11 | End: 2024-02-27 | Stop reason: HOSPADM

## 2024-02-11 RX ORDER — LIDOCAINE 40 MG/G
CREAM TOPICAL
Status: DISCONTINUED | OUTPATIENT
Start: 2024-02-11 | End: 2024-02-14

## 2024-02-11 RX ORDER — EPINEPHRINE 0.1 MG/ML
INJECTION INTRAVENOUS PRN
Status: DISCONTINUED | OUTPATIENT
Start: 2024-02-11 | End: 2024-02-13 | Stop reason: HOSPADM

## 2024-02-11 RX ORDER — ONDANSETRON 2 MG/ML
4 INJECTION INTRAMUSCULAR; INTRAVENOUS EVERY 6 HOURS PRN
Status: DISCONTINUED | OUTPATIENT
Start: 2024-02-11 | End: 2024-02-14

## 2024-02-11 RX ORDER — LORAZEPAM 2 MG/ML
0.5 INJECTION INTRAMUSCULAR ONCE
Status: COMPLETED | OUTPATIENT
Start: 2024-02-11 | End: 2024-02-12

## 2024-02-11 RX ORDER — HYDROMORPHONE HYDROCHLORIDE 1 MG/ML
0.3 INJECTION, SOLUTION INTRAMUSCULAR; INTRAVENOUS; SUBCUTANEOUS
Status: DISCONTINUED | OUTPATIENT
Start: 2024-02-11 | End: 2024-02-14

## 2024-02-11 RX ORDER — FENTANYL CITRATE 50 UG/ML
INJECTION, SOLUTION INTRAMUSCULAR; INTRAVENOUS
Status: DISCONTINUED
Start: 2024-02-11 | End: 2024-02-11 | Stop reason: HOSPADM

## 2024-02-11 RX ORDER — ONDANSETRON 4 MG/1
4 TABLET, ORALLY DISINTEGRATING ORAL EVERY 6 HOURS PRN
Status: DISCONTINUED | OUTPATIENT
Start: 2024-02-11 | End: 2024-02-14

## 2024-02-11 RX ORDER — DIPHENHYDRAMINE HYDROCHLORIDE 50 MG/ML
INJECTION INTRAMUSCULAR; INTRAVENOUS
Status: DISCONTINUED
Start: 2024-02-11 | End: 2024-02-11 | Stop reason: HOSPADM

## 2024-02-11 RX ORDER — CALCIUM CARBONATE 500 MG/1
1000 TABLET, CHEWABLE ORAL 4 TIMES DAILY PRN
Status: DISCONTINUED | OUTPATIENT
Start: 2024-02-11 | End: 2024-02-14

## 2024-02-11 RX ORDER — FENTANYL CITRATE 50 UG/ML
50 INJECTION, SOLUTION INTRAMUSCULAR; INTRAVENOUS
Status: DISCONTINUED | OUTPATIENT
Start: 2024-02-11 | End: 2024-02-14

## 2024-02-11 RX ADMIN — FENTANYL CITRATE 25 MCG: 50 INJECTION, SOLUTION INTRAMUSCULAR; INTRAVENOUS at 17:30

## 2024-02-11 RX ADMIN — MIDAZOLAM HYDROCHLORIDE 1 MG: 1 INJECTION, SOLUTION INTRAMUSCULAR; INTRAVENOUS at 17:24

## 2024-02-11 RX ADMIN — FENTANYL CITRATE 25 MCG: 50 INJECTION, SOLUTION INTRAMUSCULAR; INTRAVENOUS at 17:24

## 2024-02-11 RX ADMIN — DIPHENHYDRAMINE HYDROCHLORIDE 50 MG: 50 INJECTION INTRAMUSCULAR; INTRAVENOUS at 17:03

## 2024-02-11 RX ADMIN — MIDAZOLAM HYDROCHLORIDE 2 MG: 1 INJECTION, SOLUTION INTRAMUSCULAR; INTRAVENOUS at 17:07

## 2024-02-11 RX ADMIN — MIDAZOLAM HYDROCHLORIDE 1 MG: 1 INJECTION, SOLUTION INTRAMUSCULAR; INTRAVENOUS at 17:51

## 2024-02-11 RX ADMIN — MIDAZOLAM HYDROCHLORIDE 1 MG: 1 INJECTION, SOLUTION INTRAMUSCULAR; INTRAVENOUS at 17:12

## 2024-02-11 RX ADMIN — HYDROMORPHONE HYDROCHLORIDE 0.2 MG: 0.2 INJECTION, SOLUTION INTRAMUSCULAR; INTRAVENOUS; SUBCUTANEOUS at 16:02

## 2024-02-11 RX ADMIN — FENTANYL CITRATE 25 MCG: 50 INJECTION, SOLUTION INTRAMUSCULAR; INTRAVENOUS at 17:13

## 2024-02-11 RX ADMIN — PANTOPRAZOLE SODIUM 40 MG: 40 INJECTION, POWDER, FOR SOLUTION INTRAVENOUS at 12:08

## 2024-02-11 RX ADMIN — HYDROMORPHONE HYDROCHLORIDE 0.3 MG: 1 INJECTION, SOLUTION INTRAMUSCULAR; INTRAVENOUS; SUBCUTANEOUS at 22:14

## 2024-02-11 RX ADMIN — MIDAZOLAM HYDROCHLORIDE 1 MG: 1 INJECTION, SOLUTION INTRAMUSCULAR; INTRAVENOUS at 18:02

## 2024-02-11 RX ADMIN — FENTANYL CITRATE 25 MCG: 50 INJECTION, SOLUTION INTRAMUSCULAR; INTRAVENOUS at 17:34

## 2024-02-11 RX ADMIN — MIDAZOLAM HYDROCHLORIDE 1 MG: 1 INJECTION, SOLUTION INTRAMUSCULAR; INTRAVENOUS at 17:39

## 2024-02-11 RX ADMIN — MIDAZOLAM HYDROCHLORIDE 2 MG: 1 INJECTION, SOLUTION INTRAMUSCULAR; INTRAVENOUS at 17:03

## 2024-02-11 RX ADMIN — FENTANYL CITRATE 25 MCG: 50 INJECTION, SOLUTION INTRAMUSCULAR; INTRAVENOUS at 18:04

## 2024-02-11 RX ADMIN — SODIUM CHLORIDE 1000 ML: 9 INJECTION, SOLUTION INTRAVENOUS at 12:09

## 2024-02-11 RX ADMIN — FENTANYL CITRATE 25 MCG: 50 INJECTION, SOLUTION INTRAMUSCULAR; INTRAVENOUS at 17:08

## 2024-02-11 RX ADMIN — ONDANSETRON 4 MG: 2 INJECTION INTRAMUSCULAR; INTRAVENOUS at 22:16

## 2024-02-11 RX ADMIN — FENTANYL CITRATE 100 MCG: 50 INJECTION, SOLUTION INTRAMUSCULAR; INTRAVENOUS at 17:02

## 2024-02-11 RX ADMIN — PANTOPRAZOLE SODIUM 40 MG: 40 INJECTION, POWDER, FOR SOLUTION INTRAVENOUS at 20:05

## 2024-02-11 RX ADMIN — DIPHENHYDRAMINE HYDROCHLORIDE 50 MG: 50 INJECTION, SOLUTION INTRAMUSCULAR; INTRAVENOUS at 17:03

## 2024-02-11 RX ADMIN — MIDAZOLAM HYDROCHLORIDE 1 MG: 1 INJECTION, SOLUTION INTRAMUSCULAR; INTRAVENOUS at 17:30

## 2024-02-11 RX ADMIN — FENTANYL CITRATE 25 MCG: 50 INJECTION, SOLUTION INTRAMUSCULAR; INTRAVENOUS at 17:51

## 2024-02-11 ASSESSMENT — ACTIVITIES OF DAILY LIVING (ADL)
ADLS_ACUITY_SCORE: 35
ADLS_ACUITY_SCORE: 33

## 2024-02-11 NOTE — ED PROVIDER NOTES
"    [facility] ([facility])    2/11/24       ED PROVIDER NOTE   Vertical Triage C   History     Chief Complaint   Patient presents with    Rectal Bleeding    Hematemesis     The history is provided by the patient and medical records.     Eduarda Nogueira is a 43 year old female with a history of asthma, nonalcoholic liver disease, obesity status post gastric bypass and recent diagnosis of jejunal ulcer positive for adenocarcinoma who presents to the emergency department with GI bleed.  She reports she is not currently anticoagulated.  She has had intermittent episodes of coffee-ground emesis for a number of months and underwent an EGD 2 weeks ago with biopsy of a jejunal ulcer.  This was noted to be positive for adenocarcinoma.  On Friday she had an episode of vomiting bright red blood.  She was seen at [facility] and was admitted and discharged yesterday.  She did have a drop in her hemoglobin from 11.3 to 9.7, however it normalized.  She was recommended to follow-up at [facility] for ongoing care.  She is scheduled for colonoscopy on Tuesday.  This morning she again started to have epigastric pain and was nauseated.  She vomited a large amount of bright red blood with multiple clots and then had bowel movement with black stools noted.  She has been feeling lightheaded.  She denies any chest pain or shortness of breath.      This part of the document was transcribed by Destinee Barrios, Medical Scribe.      Physical Exam   BP: 128/85  Pulse: 96  Temp: 98.1  F (36.7  C)  Resp: 16  Height: 160 cm (5' 3\")  Weight: 72.1 kg (159 lb)  SpO2: 99 %      Physical Exam  General: patient is alert and oriented and in no acute distress   Head: atraumatic and normocephalic   EENT: moist mucus membranes, sclera anicteric   neck: supple   Cardiovascular: regular rate and rhythm, extremities warm and well perfused, no lower extremity edema  Pulmonary: lungs clear to auscultation bilaterally   Abdomen: soft, mild " generalized discomfort, no rebound or guarding  Rectal: No external hemorrhoid, melanic stool present on digital rectal exam  Musculoskeletal: normal range of motion   Neurological: alert and oriented, moving all extremities symmetrically, gait normal   Skin: warm, dry    ED Course        Procedures           Results for orders placed or performed during the hospital encounter of 02/11/24 (from the past 24 hour(s))   EKG 12-lead, tracing only   Result Value Ref Range    Systolic Blood Pressure  mmHg    Diastolic Blood Pressure  mmHg    Ventricular Rate 74 BPM    Atrial Rate 74 BPM    MT Interval 136 ms    QRS Duration 84 ms     ms    QTc 450 ms    P Axis 37 degrees    R AXIS 31 degrees    T Axis 60 degrees    Interpretation ECG Sinus rhythm  Normal ECG        Medications   sodium chloride 0.9% BOLUS 1,000 mL (has no administration in time range)   pantoprazole (PROTONIX) IV push injection 40 mg (has no administration in time range)             Critical care was not performed.     Medical Decision Making  The patient's presentation was of high complexity (an acute health issue posing potential threat to life or bodily function).    The patient's evaluation involved:  review of external note(s) from 2 sources (Hospital discharge summary, ED notes)  ordering and/or review of 3+ test(s) in this encounter (see separate area of note for details)  discussion of management or test interpretation with another health professional (GI, IM)    The patient's management necessitated moderate risk (prescription drug management including medications given in the ED), high risk (a decision regarding emergency major procedure (EGD)), and high risk (a decision regarding hospitalization).    Assessments & Plan (with Medical Decision Making)     Ms. Nogueira is a 43-year-old female with a history of morbid obesity status post gastric bypass and recent EGD showing jejunal ulcer with positive biopsy for adenocarcinoma who presents to  the emergency department with hematemesis and melena.  She is noted to be hemodynamically stable, afebrile and in no respiratory distress.  Abdomen is benign on exam and low suspicion for bowel ischemia.  She did have a CT done on Friday which did not show any active bleeding at that time.  She did take pictures from this morning and does appear to have fairly significant amount of bleeding.  Repeat laboratory evaluation today shows drop in her hemoglobin from 10.92 days ago to 7.2.  White count has increased from 8.1-11.3, normal platelets.  She was given IV fluids and Protonix.   2 peripheral lines established.  She was made NPO.  Discussed with GI and will complete urgent bedside endoscopy.  Patient will be admitted to medicine for further management.    This part of the document was transcribed by Destinee Barrios, Medical Scribe.      I have reviewed the nursing notes.    I have reviewed the findings, diagnosis, plan and need for follow up with the patient.    New Prescriptions    No medications on file       Final diagnoses:   Upper GI bleed       Tina Gregg MD    2/11/2024   [facility] EMERGENCY DEPARTMENT        Tina Gregg MD  02/11/24 1640

## 2024-02-11 NOTE — PROGRESS NOTES
Assisted in ED for EGD with GI/endoscopy team. VS monitored throughout. Moderate sedation given for procedure, dosing directed by Vic Rodriguez MD and Saw Morales MD.

## 2024-02-11 NOTE — CONSULTS
GASTROENTEROLOGY CONSULTATION      Date of Admission:  2/11/2024           Reason for Consultation:   We were asked by Dr. Aguilar of ED to evaluate this patient with jejunal cancer, upper GI bleed           ASSESSMENT AND RECOMMENDATIONS:   Assessment:    43 year old female with a history of RYGB (2022), JASON, prev EtOH use diorder, chronic pain, migraines, asthma, GERD, MJ use disorder, and recently diagnosed jejunal adenocarcinoma is presenting with hematemesis, hematochezia and acute blood loss anemia for which our service is consulted.     #. Jejunal adenocarcinoma, ulcerated  #. UGIB  #. Acute blood loss anemia  #. S/p german-en-Y gastric bypass  Almost certainly bleeding from ulcerated tumor bed as seen on recent 1/30/24 EGD. CTA negative this AM so bleed may be low velocity to some degree. Significant hgb drop in 72 hours however (10.9-7.2). We will proceed to EGD this afternoon to temporize bleed with the intent of near future definitive surgical management. I have briefly discussed the case with surgical oncology staff Dr. Krishnamurthy. Will defer long term and adjuvant management decisions to surgical and medical oncology, it would appear most adjuvant management is extrapolated from large bowel cancer, data from [facility] pending. Abdominal imaging (reports from outside) thus far without large mass so possible that malignancy is hyperlocal and growing laterally along mucosal aspect.        Recommendations:  -- NPO  -- IV PPI BID  -- 2x large bore IVs  -- resuscitate, transfuse to defend hgb of 7  -- EGD in STAB room in ED  -- surgical oncology consult, discussed with Dr. Krishnamurthy  -- CT chest/abdomen/pelvis for staging, please order after EGD tonight  -- please request outside images from [facility] to be pushed into our system  -- luminal GI will continue to follow closely  -- consider colonoscopy in the future given increased risk for large bowel adenomas and adenoCA    Gastroenterology outpatient follow up  recommendations: TBD based on hospital course    Thank you for involving us in this patient's care. Please do not hesitate to contact the GI service with any questions or concerns.     Pt care plan discussed with Dr. Morales, GI staff physician.    Vic Rodriguez MD, PGY6  Gastroenterology Fellow  [facility]  See AMCOM/Qgenda for GI on-call information    -------------------------------------------------------------------------------------------------------------------      Findings:       The examined esophagus was normal. No narrowing or stricture.        Evidence of a gastric bypass was found. A gastric pouch with a medium        size was found. The gastrojejunal anastomosis was characterized by        healthy appearing mucosa. This was traversed. The jejunum was ulcerated        (large extensive area was ulcerated, no active bleeding was noted, no        obvious mass. Biopsies were taken with a cold forceps for histology.        The examined jejunal limb was normal.                                                                                    Impression:            - Normal esophagus.                          - Gastric bypass with a medium-sized pouch.                          Gastrojejunal anastomosis characterized by healthy                          appearing mucosa. Jejunum severely ulcerated. Biopsied.                          - Normal examined jejunum.   Recommendation:        - Await pathology results.                          - PPI                          - Carafate (will place order)                          - Tobacco cessation.          History of Present Illness:   43 year old female with a history of RYGB (2022), JASON, prev EtOH use diorder, chronic pain, migraines, asthma, GERD, MJ use disorder, and recently diagnosed jejunal adenocarcinoma is presenting with hematemesis, hematochezia and acute blood loss anemia for which our service is consulted.     Please see discharge summary  from [facility] where she was hospitalized from 2/9 to 2/11 for details leading to today's presentation.     In short, she was having nausea, pain, and coffee ground emesis in January 2024 which led to outpatient EGD on 1/30/23 identifying a jejunal ulcer beyond her gastric pouch. Pathology consistent with jejunal adenocarcinoma. She was then set up to have a colonoscopy and to be seen by oncology. In the interim, developed worsening pain, andrew hematemesis, and hematochezia which prompted her admission to [facility]. While there, her stools reportedly became brown. Seen by GI who deferred endoscopy as she was having a colonoscopy ostensibly later this week. She provides that her car team at [facility] told her to present here because most of her care has been at the [facility]. Since discharge this morning from [facility] she has gone one to have multiple episodes of hematemesis.     She is joined in the ER today by her father Rian. They ask good questions and are interested in next steps. Eager to undergo endoscopy and speak with surgical oncology.     All questions answered, plan of care understood.          Data:   Key relevant labs:   Lab Results   Component Value Date    WBC 11.3 (H) 02/11/2024    HGB 7.2 (L) 02/11/2024    HCT 23.0 (L) 02/11/2024     02/11/2024     02/11/2024    POTASSIUM 3.4 02/11/2024    CHLORIDE 107 02/11/2024    CO2 25 02/11/2024    BUN 16.9 02/11/2024    CR 0.57 02/11/2024    GLC 90 02/11/2024    SED 4 09/08/2015    AST 34 02/11/2024    ALT 26 02/11/2024    ALKPHOS 46 02/11/2024    BILITOTAL 0.2 02/11/2024    INR 1.09 02/11/2024        Key relevant imaging:    CTA a/p: 2/11/24    INDICATION: GI bleed upper   COMPARISON: 01/26/2024, 10/23/2023.   TECHNIQUE: CT angiogram abdomen pelvis during arterial phase of injection of IV contrast. 2D and 3D MIP reconstructions were performed by the CT technologist. Dose reduction techniques were used.   CONTRAST: Omnipaque [id number]     FINDINGS:    ANGIOGRAM ABDOMEN/PELVIS: No extravascular contrast to suggest active bleeding, either gastrointestinal or otherwise. Abdominal aorta normal in caliber and without aneurysm, dissection, or stenosis. No significant aortic atherosclerotic calcification or atheromatous plaque. Celiac, superior mesenteric, bilateral renal, and inferior mesenteric arteries are normal. Bilateral common, internal, external, and common femoral arteries appear normal.     LOWER CHEST: Prior ASD repair.     HEPATOBILIARY: Diffuse hepatic steatosis. Prior cholecystectomy. No suspicious liver lesion.     PANCREAS: Normal.     SPLEEN: Normal.     ADRENAL GLANDS: Normal.     KIDNEYS/BLADDER: Polycystic. No hydronephrosis. Urinary bladder unremarkable.     BOWEL: No abnormal bowel wall thickening or enhancement. No intraluminal contrast material to suggest active gastrointestinal bleeding. Prior Addis-en-Y gastric bypass. No bowel obstruction. Large amount of stool in the colon.     LYMPH NODES: Normal.     PELVIC ORGANS: Normal.     MUSCULOSKELETAL: Mild diffuse subcutaneous edema, greatest around the pelvis. No suspicious bone lesion.     CTAP: 1/23/24    Impression    1.  Considerable retained fecal debris throughout the colon. No obstruction.  2.  Circumferential wall thickening involving the distal transverse colon which may represent an infectious or inflammatory colitis for stercoral colitis.  3.  Somewhat amorphous appearance of the cecum and ascending colon with prominent mural fat attenuation. While this can be seen in normal individuals, it can also be seen as a sequela of inflammatory bowel disease. Clinical correlation recommended.  4.  Similar severe hepatic steatosis and fluid attenuation lesions described previously.  5.  Cholecystectomy.  6.  Similar appearance of polycystic kidneys. No hydronephrosis.               Previous Endoscopy:     EGD: 1/30/24      PATHOLOGY FROM EGD:    A. JEJUNUM, BIOPSIES:  - Moderately-differentiated  adenocarcinoma; see comment      Electronically signed by Olaf Fierro DO on 2/2/2024 at  2:08 PM   Comment  [facility]   Sections of jejunum show adenocarcinoma in a background of fibroadipose tissue with desmoplastic reaction and ulcer debris.  The tumor is positive for CK 7 and negative for CK 20, CDX-2, and INSM1. There is no intact small intestinal mucosa present.            Medications:     Current Facility-Administered Medications   Medication    calcium carbonate (TUMS) chewable tablet 1,000 mg    lidocaine (LMX4) cream    lidocaine 1 % 0.1-1 mL    ondansetron (ZOFRAN ODT) ODT tab 4 mg    Or    ondansetron (ZOFRAN) injection 4 mg    pantoprazole (PROTONIX) IV push injection 40 mg    sodium chloride (PF) 0.9% PF flush 3 mL    sodium chloride (PF) 0.9% PF flush 3 mL     Current Outpatient Medications   Medication Sig    albuterol (PROAIR HFA/PROVENTIL HFA/VENTOLIN HFA) 108 (90 Base) MCG/ACT inhaler Inhale 1-2 puffs into the lungs    ALPRAZolam (XANAX) 0.25 MG tablet 0.25 mg    amitriptyline (ELAVIL) 10 MG tablet Take 2 tablets by mouth    ammonium lactate (AMLACTIN) 12 % external cream Twice daily for body    aspirin 81 MG EC tablet 81 mg    aspirin-acetaminophen-caffeine (EXCEDRIN MIGRAINE) 250-250-65 MG tablet Take by mouth 2 times daily as needed    atorvastatin (LIPITOR) 20 MG tablet 20 mg    bisacodyl (DULCOLAX) 5 MG EC tablet Take 2 tablets at 3 pm the day before your procedure. If your procedure is before 11 am, take 2 additional tablets at 11 pm. If your procedure is after 11 am, take 2 additional tablets at 6 am. For additional instructions refer to your colonoscopy prep instructions.    buPROPion (WELLBUTRIN XL) 150 MG 24 hr tablet Take 1 tablet by mouth daily    Calcium Polycarbophil (FIBER) 625 MG tablet Take 1 Tablet (625 mg) by mouth once daily.    cephALEXin (KEFLEX) 500 MG capsule Take 1 capsule by mouth two times daily for 7 days    chlorhexidine (HIBICLENS) 4 % liquid Chlorhexidine Topical  Liquid 4 %  topically 1.0  once    active    clindamycin (CLEOCIN) 300 MG capsule Take 1 capsule 4 times a day until gone    clobetasol propionate (CLOBEX) 0.05 % external shampoo Apply a thin layer onto dry, affected area of scalp once daily. Leave for 15 minutes before lathering and rinsing.    clonazePAM (KLONOPIN) 0.5 MG tablet Take 0.5 mg by mouth    CLOTRIMAZOLE ANTI-FUNGAL 1 % external cream Apply topically to affected area(s) two times daily.    Cranberry 400 MG CAPS Take 400 mg by mouth    diphenhydrAMINE (BENADRYL) 25 MG capsule Take 25 mg by mouth every 4 hours    divalproex sodium delayed-release (DEPAKOTE) 500 MG DR tablet     docusate sodium (DSS) 100 MG capsule Take 1 capsule by mouth    DULoxetine (CYMBALTA) 30 MG capsule Take 3 capsules by mouth daily    enoxaparin ANTICOAGULANT (LOVENOX) 60 MG/0.6ML syringe Enoxaparin Subcutaneous  subcutaneously 60.0  every 12 hours    inactive    escitalopram (LEXAPRO) 20 MG tablet Take 20 mg by mouth daily    eszopiclone (LUNESTA) 1 MG tablet Take 2 mg by mouth    famotidine (PEPCID) 40 MG tablet Take 40 mg by mouth    HYDROcodone-acetaminophen (NORCO) 5-325 MG tablet take 1 tablet by mouth every 4 to 6 hours as needed for pain    hydrOXYzine (VISTARIL) 50 MG capsule Take 1 capsule by mouth    hyoscyamine (LEVSIN) 0.125 MG tablet Place 0.25 mg under the tongue    ipratropium - albuterol 0.5 mg/2.5 mg/3 mL (DUONEB) 0.5-2.5 (3) MG/3ML neb solution Inhale 1 Neb via a nebulizer 4 times daily if needed for Shortness Of Breath or Wheezing.    Lidocaine (LIDOCARE) 4 % Patch Apply 1-3 patches to intact skin to cover most painful area of back pain for max 12hr per 24hr period.    methocarbamol (ROBAXIN) 500 MG tablet Take 500 mg by mouth    Multiple Vitamins-Minerals (SUPER THERA CELESTINO M) TABS Take 1 tablet by mouth    nortriptyline (PAMELOR) 10 MG capsule 20 mg    nystatin (MYCOSTATIN) 108349 UNIT/GM external powder 1 strip    OLANZapine (ZYPREXA) 5 MG tablet 5 mg     omeprazole (PRILOSEC) 40 MG DR capsule Take 1 capsule (40 mg) by mouth daily    ondansetron (ZOFRAN ODT) 4 MG ODT tab Take 1 tablet (4 mg) by mouth every 8 hours as needed for nausea    ondansetron (ZOFRAN) 4 MG tablet Take 1 tablet by mouth    oxyCODONE-acetaminophen (PERCOCET) 5-325 MG tablet Take 1 tablet by mouth every 4 hours if needed for Pain. Max acetaminophen dose 4000mg in 24 hrs.    pantoprazole (PROTONIX) 40 MG EC tablet 40 mg    polyethylene glycol (GOLYTELY) 236 g suspension The night before the exam at 6 pm drink an 8-ounce glass every 15 minutes until the jug is half empty. If you arrive before 11 AM: Drink the other half of the Golytely jug at 11 PM night before procedure. If you arrive after 11 AM: Drink the other half of the Golytely jug at 6 AM day of procedure. For additional instructions refer to your colonoscopy prep instructions.    polyethylene glycol (MIRALAX) 17 g packet Take 17 g by mouth    predniSONE (DELTASONE) 20 MG tablet Take 2 Tablets (40 mg) by mouth once daily with a meal for 5 days.    pregabalin (LYRICA) 150 MG capsule 150 mg    promethazine (PHENERGAN) 12.5 MG tablet Take half a tablet three times daily before meals and every 6 hrs as needed for nausea    sodium chloride, PF, (NORMAL SALINE FLUSH) 0.9% PF flush Inject 5 mLs into the vein    sucralfate (CARAFATE) 1 GM tablet Take 1 tablet (1 g) by mouth 4 times daily    sucralfate (CARAFATE) 1 GM/10ML suspension Take 10 mLs (1 g) by mouth 4 times daily for 60 days    Sulfacetamide Sodium-Sulfur 10-5 % SUSP Apply topically.    tretinoin (RETIN-A) 0.05 % external cream Apply topically to affected area(s) at bedtime if needed (Darier's facial flare).    UBRELVY 100 MG tablet 100 mg    vitamin B1 (THIAMINE) 100 MG tablet Miscellaneous Drug     Thiamine mononitrate, vit B1, (VITAMIN B1) 100mg tablet    active    XARELTO ANTICOAGULANT 20 MG TABS tablet 20 mg             Physical Exam:   BP (!) 132/104   Pulse 76   Temp 98.4  F  "(36.9  C) (Oral)   Resp (!) 44   Ht 1.6 m (5' 3\")   Wt 72.1 kg (159 lb)   SpO2 99%   BMI 28.17 kg/m    Wt:   Wt Readings from Last 2 Encounters:   02/11/24 72.1 kg (159 lb)   02/09/24 72.4 kg (159 lb 9.6 oz)      Constitutional: cooperative, pleasant, pale  Eyes: Sclera anicteric/injected  Ears/nose/mouth/throat: Normal oropharynx without ulcers or exudate, mucus membranes moist, hearing intact  CV: No edema  Respiratory: Unlabored breathing on RA  Abd: Nondistended, mildly tender in epigastrium, +bs, no hepatosplenomegaly, nontender, no peritoneal signs  Skin: warm, perfused, no jaundice, pale  Neuro: AAO x 3, No asterixis  Psych: Anxious affect  MSK: No gross deformities          Past Medical History:   Reviewed and edited as appropriate  Past Medical History:   Diagnosis Date    Anemia     No Comments Provided    Anxiety state     No Comments Provided    Arthropathy     No Comments Provided    Asthma     No Comments Provided    Backache     No Comments Provided    Edema     No Comments Provided    Esophageal reflux     No Comments Provided    Female stress incontinence     No Comments Provided    Hypothyroidism     No Comments Provided    Irregular menstrual cycle     No Comments Provided    Irritable colon     No Comments Provided    Migraine     No Comments Provided    Morbid obesity (H)     No Comments Provided    Other chronic nonalcoholic liver disease     No Comments Provided    Other depressive disorder     No Comments Provided    Other malaise and fatigue     No Comments Provided    Pain in joint     hips, knees, ankles, feet, neck    Restless legs syndrome     self-diagnosed    Shortness of breath     No Comments Provided    Synovial cyst of popliteal space     No Comments Provided    Vitamin D deficiency     Rx 3/14, re check 6/14            Past Surgical History:   Reviewed and edited as appropriate   Past Surgical History:   Procedure Laterality Date    ATTEMPTED ARTHROSCOPY      x3, rt knee    " "ESOPHAGOSCOPY, GASTROSCOPY, DUODENOSCOPY (EGD), COMBINED N/A 1/30/2024    Procedure: ESOPHAGOGASTRODUODENOSCOPY, WITH BIOPSY;  Surgeon: Precious Albarran MD;  Location: [facility]    EXTRACTION(S) DENTAL      No Comments Provided    LAPAROSCOPIC CHOLECYSTECTOMY      2010    OSTEOTOMY FEMUR DISTAL      right            Social History:   Alcohol use: denies  Smoking history: smoked for 8 years, quit two months ago  Living situation: at home with family         Family History:   Reviewed and edited as appropriate  Family History   Problem Relation Age of Onset    Diabetes Mother         Diabetes    Diabetes Father         Diabetes    Other - See Comments Father         colon clot then DVT inpt    Other - See Comments Paternal Grandfather         PE     No known history of colorectal cancer or inflammatory bowel disease.    Known history of hepatosteatosis          Allergies:   Reviewed and edited as appropriate     Allergies   Allergen Reactions    Acetaminophen Hives, Itching, Rash and Shortness Of Breath     Several Rx's for Norco in 2021 & 22    No Clinical Screening - See Comments Anaphylaxis     steroids    Gabapentin Unknown and Itching    Hydrocodone-Acetaminophen Itching     Several Rx's for Norco in 2021 & 22    Iodinated Contrast Media Unknown and Nausea and Vomiting     Extremely anxious, vomited once after CT. No wheezing or SOB.    Latex Itching    Morphine And Related Itching     Several Rx's for Norco in 2021 & 22    Nsaids Other (See Comments)     H/o german-n-y gastric bypass\". AVOID NSAIDs and aspirin due to risk of gastric and/or G-J anastomotic ulcers. If Eduarda must be on short course of NSAIDs or aspirin, use enteric coated if possible and use PPI // Ira Hung RN, Bariatric Nurse Clinician, [facility] 3/23/2022    Oxycodone-Acetaminophen Itching     Several Rx's for Norco in 2021 & 22              Review of Systems:     A complete 10 point review of systems was performed and " is negative except as noted in the HPI

## 2024-02-11 NOTE — H&P
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 She presented to the ED for concerns of ongoing hematemesis and melena.     #upper GI bleed 2/2 jejunal ulcer positive for adenocarcinoma  #acute blood loss anemia   Patient presented to the ED with large volume hematemesis and bright red blood in her stool in the setting of known jejunal adenocarcinoma. Concerns that her ulcer has continued to bleed at this time. Patient hemodynamically stable upon arrival in the ED and has remained hemodynamically stable. Hemoglobin 7.2 upon arrival, but recheck was 6.8. GI consulted in the ED who noted they will complete EGD this afternoon to temporize bleeding for now. GI also discussed with surgical oncology staff and noted that procedure to remove will likely occur this week. Would like additional CT C/A/P to stage at this point.   - IV pantoprazole 40 mg IV BID  - s/p 80 mg IV pantoprazole  - given 1U pRBC  - consented for blood  - type and screen  - CBC Q8H  - transfuse if hemoglobin <7  - GI consulted    > NPO   > EGD this afternoon   > CT C/A/P for staging   > consider colonoscopy in future given increased risk for large bowel adenomas and adenocarcinoma  - Surgical Oncology Consulted   - CT Chest/Abdomen/Pelvis for staging of jejunal adenocarcinoma ordered  - Zofran PRN  - hold PTA PPI    #Asthma  - continue PTA albuterol PRN    #Obesity s/p gastric bypass  Patient with history of gastric bypass. Will continue to monitor for any concerns of worsening abdominal pain, but no specific concerns at this point.    Patient denies taking any other home medications. Notes that she discontinued all her other medications and has  "only been taking prescribed PPI.        Diet: NPO for Medical/Clinical Reasons Except for: Meds    DVT Prophylaxis: VTE Prophylaxis contraindicated due to GI bleed  Villalba Catheter: Not present  Fluids: None  Lines: PRESENT             Cardiac Monitoring: ACTIVE order. Indication: GI bleed  Code Status: Full Code      Clinically Significant Risk Factors Present on Admission          # Hypocalcemia: Lowest Ca = 8.1 mg/dL in last 2 days, will monitor and replace as appropriate     # Hypoalbuminemia: Lowest albumin = 3.2 g/dL at 2/11/2024 11:49 AM, will monitor as appropriate  # Drug Induced Coagulation Defect: home medication list includes an anticoagulant medication  # Drug Induced Platelet Defect: home medication list includes an antiplatelet medication        # Overweight: Estimated body mass index is 28.17 kg/m  as calculated from the following:    Height as of this encounter: 1.6 m (5' 3\").    Weight as of this encounter: 72.1 kg (159 lb).              Disposition Plan      Expected Discharge Date: 02/13/2024                The patient's care was discussed with the Attending Physician, Dr. Guillory .      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 She presented to the ED for concerns of ongoing hematemesis and melena.     Patient notes that this morning she woke up and had a large volume hematemesis of bright red blood and large diarrhea bowel movement " of bright red blood. She has felt significantly fatigued, dizzy, nauseous, and has had a very limited appetite. She recently had an EGD completed about 2 weeks ago with a biopsy of a jejunal ulcer found to be positive for adenocarcinoma. She was recently discharged from [facility] on 2/9, after an additional episode of bright red blood hematemesis. Did have drop in hemoglobin from 11.3 to 9.7, but it then normalized. Patient was not scoped at [facility] as she had outpatient follow up to have a colonoscopy completed on Tuesday 2/13. Patient with hemoglobin level of 7.2, but not transfused in the ED. Patient noted to be struggling with these symptoms since October.     Patient confirms abdominal pain, nausea/vomiting, fatigue, lightheadedness, dizziness, and decreased appetite. Denies any fevers/chills, NSAID use. Confirms heavy prior alcohol use. Patient has an extensive home medication list, but notes that she does not take any of them at this time except for her PPI.       Past Medical History    Past Medical History:   Diagnosis Date    Anemia     No Comments Provided    Anxiety state     No Comments Provided    Arthropathy     No Comments Provided    Asthma     No Comments Provided    Backache     No Comments Provided    Edema     No Comments Provided    Esophageal reflux     No Comments Provided    Female stress incontinence     No Comments Provided    Hypothyroidism     No Comments Provided    Irregular menstrual cycle     No Comments Provided    Irritable colon     No Comments Provided    Migraine     No Comments Provided    Morbid obesity (H)     No Comments Provided    Other chronic nonalcoholic liver disease     No Comments Provided    Other depressive disorder     No Comments Provided    Other malaise and fatigue     No Comments Provided    Pain in joint     hips, knees, ankles, feet, neck    Restless legs syndrome     self-diagnosed    Shortness of breath     No Comments Provided    Synovial cyst of  popliteal space     No Comments Provided    Vitamin D deficiency     Rx 3/14, re check        Past Surgical History   Past Surgical History:   Procedure Laterality Date    ATTEMPTED ARTHROSCOPY      x3, rt knee    ESOPHAGOSCOPY, GASTROSCOPY, DUODENOSCOPY (EGD), COMBINED N/A 2024    Procedure: ESOPHAGOGASTRODUODENOSCOPY, WITH BIOPSY;  Surgeon: Precious Albarran MD;  Location: [facility]    EXTRACTION(S) DENTAL      No Comments Provided    LAPAROSCOPIC CHOLECYSTECTOMY          OSTEOTOMY FEMUR DISTAL      right       Prior to Admission Medications   Prior to Admission Medications   Prescriptions Last Dose Informant Patient Reported? Taking?   ALPRAZolam (XANAX) 0.25 MG tablet   Yes No   Si.25 mg   CLOTRIMAZOLE ANTI-FUNGAL 1 % external cream   Yes No   Sig: Apply topically to affected area(s) two times daily.   Calcium Polycarbophil (FIBER) 625 MG tablet   Yes No   Sig: Take 1 Tablet (625 mg) by mouth once daily.   Cranberry 400 MG CAPS   Yes No   Sig: Take 400 mg by mouth   DULoxetine (CYMBALTA) 30 MG capsule   Yes No   Sig: Take 3 capsules by mouth daily   HYDROcodone-acetaminophen (NORCO) 5-325 MG tablet   Yes No   Sig: take 1 tablet by mouth every 4 to 6 hours as needed for pain   Lidocaine (LIDOCARE) 4 % Patch   Yes No   Sig: Apply 1-3 patches to intact skin to cover most painful area of back pain for max 12hr per 24hr period.   Multiple Vitamins-Minerals (SUPER THERA CELESTINO M) TABS   Yes No   Sig: Take 1 tablet by mouth   OLANZapine (ZYPREXA) 5 MG tablet   Yes No   Si mg   Sulfacetamide Sodium-Sulfur 10-5 % SUSP   Yes No   Sig: Apply topically.   UBRELVY 100 MG tablet   Yes No   Si mg   XARELTO ANTICOAGULANT 20 MG TABS tablet   Yes No   Si mg   albuterol (PROAIR HFA/PROVENTIL HFA/VENTOLIN HFA) 108 (90 Base) MCG/ACT inhaler   Yes No   Sig: Inhale 1-2 puffs into the lungs   amitriptyline (ELAVIL) 10 MG tablet   Yes No   Sig: Take 2 tablets by mouth   ammonium lactate (AMLACTIN) 12 % external  cream   Yes No   Sig: Twice daily for body   aspirin 81 MG EC tablet   Yes No   Si mg   aspirin-acetaminophen-caffeine (EXCEDRIN MIGRAINE) 250-250-65 MG tablet   Yes No   Sig: Take by mouth 2 times daily as needed   atorvastatin (LIPITOR) 20 MG tablet   Yes No   Si mg   bisacodyl (DULCOLAX) 5 MG EC tablet   No No   Sig: Take 2 tablets at 3 pm the day before your procedure. If your procedure is before 11 am, take 2 additional tablets at 11 pm. If your procedure is after 11 am, take 2 additional tablets at 6 am. For additional instructions refer to your colonoscopy prep instructions.   buPROPion (WELLBUTRIN XL) 150 MG 24 hr tablet   Yes No   Sig: Take 1 tablet by mouth daily   cephALEXin (KEFLEX) 500 MG capsule   Yes No   Sig: Take 1 capsule by mouth two times daily for 7 days   chlorhexidine (HIBICLENS) 4 % liquid   Yes No   Sig: Chlorhexidine Topical Liquid 4 %  topically 1.0  once    active   clindamycin (CLEOCIN) 300 MG capsule   Yes No   Sig: Take 1 capsule 4 times a day until gone   clobetasol propionate (CLOBEX) 0.05 % external shampoo   Yes No   Sig: Apply a thin layer onto dry, affected area of scalp once daily. Leave for 15 minutes before lathering and rinsing.   clonazePAM (KLONOPIN) 0.5 MG tablet   Yes No   Sig: Take 0.5 mg by mouth   diphenhydrAMINE (BENADRYL) 25 MG capsule   Yes No   Sig: Take 25 mg by mouth every 4 hours   divalproex sodium delayed-release (DEPAKOTE) 500 MG DR tablet   Yes No   docusate sodium (DSS) 100 MG capsule   Yes No   Sig: Take 1 capsule by mouth   enoxaparin ANTICOAGULANT (LOVENOX) 60 MG/0.6ML syringe   Yes No   Sig: Enoxaparin Subcutaneous  subcutaneously 60.0  every 12 hours    inactive   escitalopram (LEXAPRO) 20 MG tablet   Yes No   Sig: Take 20 mg by mouth daily   eszopiclone (LUNESTA) 1 MG tablet   Yes No   Sig: Take 2 mg by mouth   famotidine (PEPCID) 40 MG tablet   Yes No   Sig: Take 40 mg by mouth   hydrOXYzine (VISTARIL) 50 MG capsule   Yes No   Sig: Take 1  capsule by mouth   hyoscyamine (LEVSIN) 0.125 MG tablet   Yes No   Sig: Place 0.25 mg under the tongue   ipratropium - albuterol 0.5 mg/2.5 mg/3 mL (DUONEB) 0.5-2.5 (3) MG/3ML neb solution   Yes No   Sig: Inhale 1 Neb via a nebulizer 4 times daily if needed for Shortness Of Breath or Wheezing.   methocarbamol (ROBAXIN) 500 MG tablet   Yes No   Sig: Take 500 mg by mouth   nortriptyline (PAMELOR) 10 MG capsule   Yes No   Si mg   nystatin (MYCOSTATIN) 739724 UNIT/GM external powder   Yes No   Si strip   omeprazole (PRILOSEC) 40 MG DR capsule   No No   Sig: Take 1 capsule (40 mg) by mouth daily   ondansetron (ZOFRAN ODT) 4 MG ODT tab   No No   Sig: Take 1 tablet (4 mg) by mouth every 8 hours as needed for nausea   ondansetron (ZOFRAN) 4 MG tablet   Yes No   Sig: Take 1 tablet by mouth   oxyCODONE-acetaminophen (PERCOCET) 5-325 MG tablet   Yes No   Sig: Take 1 tablet by mouth every 4 hours if needed for Pain. Max acetaminophen dose 4000mg in 24 hrs.   pantoprazole (PROTONIX) 40 MG EC tablet   Yes No   Si mg   polyethylene glycol (GOLYTELY) 236 g suspension   No No   Sig: The night before the exam at 6 pm drink an 8-ounce glass every 15 minutes until the jug is half empty. If you arrive before 11 AM: Drink the other half of the Golytely jug at 11 PM night before procedure. If you arrive after 11 AM: Drink the other half of the Golytely jug at 6 AM day of procedure. For additional instructions refer to your colonoscopy prep instructions.   polyethylene glycol (MIRALAX) 17 g packet   Yes No   Sig: Take 17 g by mouth   predniSONE (DELTASONE) 20 MG tablet   Yes No   Sig: Take 2 Tablets (40 mg) by mouth once daily with a meal for 5 days.   pregabalin (LYRICA) 150 MG capsule   Yes No   Si mg   promethazine (PHENERGAN) 12.5 MG tablet   Yes No   Sig: Take half a tablet three times daily before meals and every 6 hrs as needed for nausea   sodium chloride, PF, (NORMAL SALINE FLUSH) 0.9% PF flush   Yes No   Sig:  Inject 5 mLs into the vein   sucralfate (CARAFATE) 1 GM tablet   No No   Sig: Take 1 tablet (1 g) by mouth 4 times daily   sucralfate (CARAFATE) 1 GM/10ML suspension   No No   Sig: Take 10 mLs (1 g) by mouth 4 times daily for 60 days   tretinoin (RETIN-A) 0.05 % external cream   Yes No   Sig: Apply topically to affected area(s) at bedtime if needed (Darier's facial flare).   vitamin B1 (THIAMINE) 100 MG tablet   Yes No   Sig: Miscellaneous Drug     Thiamine mononitrate, vit B1, (VITAMIN B1) 100mg tablet    active      Facility-Administered Medications: None        Review of Systems    The 10 point Review of Systems is negative other than noted in the HPI or here.      Physical Exam   Vital Signs: Temp: 98.4  F (36.9  C) Temp src: Oral BP: (!) 132/104 Pulse: 76   Resp: (!) 44 SpO2: 99 % O2 Device: None (Room air)    Weight: 159 lbs 0 oz    General Appearance: NAD, sitting comfortably in bed  Chest: RUQ chest port noted, no erythema or edema surrounding it  Respiratory: CTAB, breathing non-labored on room air  Cardiovascular: RRR, no murmur noted  GI: soft, non-distended, generalized abdominal pain throughout her abdomen with palpation  Extremities: no noted peripheral edema noted in her bilateral lower extremities  Neuro: alert and oriented x3, appropriately conversational      Medical Decision Making       Please see A&P for additional details of medical decision making.      Data     I have personally reviewed the following data over the past 24 hrs:    11.3 (H)  \   7.2 (L)   / 283     141 107 16.9 /  90   3.4 25 0.57 \     ALT: 26 AST: 34 AP: 46 TBILI: 0.2   ALB: 3.2 (L) TOT PROTEIN: 5.3 (L) LIPASE: 47     Procal: N/A CRP: N/A Lactic Acid: 0.6 (L)       INR:  1.09 PTT:  N/A   D-dimer:  N/A Fibrinogen:  N/A       Imaging results reviewed over the past 24 hrs:   No results found for this or any previous visit (from the past 24 hour(s)).

## 2024-02-11 NOTE — ED TRIAGE NOTES
"Triage Assessment & Note:    /85   Pulse 96   Temp 98.1  F (36.7  C) (Oral)   Resp 16   Ht 1.6 m (5' 3\")   Wt 72.1 kg (159 lb)   SpO2 99%   BMI 28.17 kg/m        Patient presents with: Recent dx oncology pt comes to triage with reports of rectal bleeding and hematemesis. Pt just discharged from OSH and has procedures scheduled for this week. No reports of fever, cough, SOB, CP, or travel.     Home Treatments/Remedies: Home medications    Febrile / Afebrile: afebrile    Duration of C/o: 24+ hrs    Wendi Johnson RN  February 11, 2024            "

## 2024-02-11 NOTE — TELEPHONE ENCOUNTER
"Patient diagnosed with cancer on Friday. That evening patient started vomiting blood and blood clots. Patient called 911 and was taken to [facility] ED and was sent home yesterday. At 5:30 this am patient got up to go to the bathroom and had another episode of vomiting blood and large blood clots.   Patient then had bleeding from her rectum passing blood and clots.   Blood pressure is normal, denies lightheadedness.  Protocol recommends 911.   Patient refusing 911 as they will take her back to [facility] who has not been able to help her. Patient states her father is with her and he will drive her to the ED. Patient asking who to go for [facility]. Gave address to [facility] [address] [facility]   Meghana Matute RN   02/11/24 8:29 AM  [facility] Nurse Advisor    Reason for Disposition   [1] Vomiting AND [2] contains red blood or black (\"coffee ground\") material  (Exception: Few red streaks in vomit that only happened once.)    Additional Information   Negative: Shock suspected (e.g., cold/pale/clammy skin, too weak to stand, low BP, rapid pulse)   Negative: Difficult to awaken or acting confused (e.g., disoriented, slurred speech)   Negative: Passed out (i.e., lost consciousness, collapsed and was not responding)    Protocols used: Rectal Bleeding-A-AH    "

## 2024-02-11 NOTE — LETTER
Transition Communication Hand-off for Care Transitions to Next Level of Care Provider    Name: Eduarda Nogueira  : 1980  MRN #: [id number]  Primary Care Provider: Sussy Myers     Primary Clinic: [address] [place] [address]     Reason for Hospitalization:  Upper GI bleed [K92.2]  Admit Date/Time: 2024 10:43 AM  Discharge Date: 24  Payor Source: Payor: BLUE PLUS / Plan: BLUE PLUS ADVANTAGE MA / Product Type: HMO /     Readmission Assessment Measure (LONNIE) Risk Score/category: 21%       Reason for Communication Hand-off Referral: Other New services - TF via [facility]   Discharge Plan: Home with family and [facility] - Port de-accessed prior to discharge   Concern for non-adherence with plan of care:   Y/N no  Discharge Needs Assessment:  Needs      Flowsheet Row Most Recent Value   Equipment Currently Used at Home grab bar, toilet, grab bar, tub/shower, walker, standard, walker, rolling, wheelchair, manual, shower chair   # of Referrals Placed by CM Internal Clinic Care Coordination, Senior Linkage Line, Post Acute Facilities, Communication hand-offs to next level of Care Providers        Already enrolled in Tele-monitoring program and name of program:  no    Follow-up specialty is recommended: Yes      Follow-up plan:    Future Appointments   Date Time Provider Department Center   2024  9:30 AM Alex Matthews, PT [facility] O   2024 11:30 AM Zoe Valdez, OT [facility] O   3/11/2024  5:50 PM Christoph Krishnamurthy MD [facility]       Any outstanding tests or procedures:        Referrals       Future Labs/Procedures    Adult Neurology  Referral     Comments:    Please be aware that coverage of these services is subject to the terms and limitations of your health insurance plan.  Call member services at your health plan with any benefit or coverage questions.   [facility] will call you to coordinate your care as prescribed by your provider. If you don't hear  from a representative within 2 business days, please call [phone].    Home Infusion Referral     Comments:    [facility], TF              Key Recommendations:  OP port support via PCP Clinic- maintenance (i.e. flushing PRN)    Ricky Corona, RN    AVS/Discharge Summary is the source of truth; this is a helpful guide for improved communication of patient story

## 2024-02-12 ENCOUNTER — APPOINTMENT (OUTPATIENT)
Dept: CT IMAGING | Facility: CLINIC | Age: 44
End: 2024-02-12
Payer: COMMERCIAL

## 2024-02-12 ENCOUNTER — ANESTHESIA EVENT (OUTPATIENT)
Dept: GASTROENTEROLOGY | Facility: CLINIC | Age: 44
End: 2024-02-12
Payer: COMMERCIAL

## 2024-02-12 LAB
ANION GAP SERPL CALCULATED.3IONS-SCNC: 11 MMOL/L (ref 7–15)
ATRIAL RATE - MUSE: 74 BPM
BLD PROD TYP BPU: NORMAL
BLOOD COMPONENT TYPE: NORMAL
BUN SERPL-MCNC: 12.5 MG/DL (ref 6–20)
CALCIUM SERPL-MCNC: 8.4 MG/DL (ref 8.6–10)
CHLORIDE SERPL-SCNC: 106 MMOL/L (ref 98–107)
CODING SYSTEM: NORMAL
CREAT SERPL-MCNC: 0.59 MG/DL (ref 0.51–0.95)
CROSSMATCH: NORMAL
DEPRECATED HCO3 PLAS-SCNC: 25 MMOL/L (ref 22–29)
DIASTOLIC BLOOD PRESSURE - MUSE: NORMAL MMHG
EGFRCR SERPLBLD CKD-EPI 2021: >90 ML/MIN/1.73M2
ERYTHROCYTE [DISTWIDTH] IN BLOOD BY AUTOMATED COUNT: 17.4 % (ref 10–15)
ERYTHROCYTE [DISTWIDTH] IN BLOOD BY AUTOMATED COUNT: 17.7 % (ref 10–15)
GLUCOSE SERPL-MCNC: 139 MG/DL (ref 70–99)
HCT VFR BLD AUTO: 23 % (ref 35–47)
HCT VFR BLD AUTO: 27.5 % (ref 35–47)
HGB BLD-MCNC: 6.8 G/DL (ref 11.7–15.7)
HGB BLD-MCNC: 7.5 G/DL (ref 11.7–15.7)
HGB BLD-MCNC: 8.7 G/DL (ref 11.7–15.7)
INTERPRETATION ECG - MUSE: NORMAL
ISSUE DATE AND TIME: NORMAL
MCH RBC QN AUTO: 29.8 PG (ref 26.5–33)
MCH RBC QN AUTO: 30.4 PG (ref 26.5–33)
MCHC RBC AUTO-ENTMCNC: 29.6 G/DL (ref 31.5–36.5)
MCHC RBC AUTO-ENTMCNC: 31.6 G/DL (ref 31.5–36.5)
MCV RBC AUTO: 103 FL (ref 78–100)
MCV RBC AUTO: 94 FL (ref 78–100)
P AXIS - MUSE: 37 DEGREES
PLATELET # BLD AUTO: 190 10E3/UL (ref 150–450)
PLATELET # BLD AUTO: 292 10E3/UL (ref 150–450)
POTASSIUM SERPL-SCNC: 3 MMOL/L (ref 3.4–5.3)
PR INTERVAL - MUSE: 136 MS
QRS DURATION - MUSE: 84 MS
QT - MUSE: 406 MS
QTC - MUSE: 450 MS
R AXIS - MUSE: 31 DEGREES
RBC # BLD AUTO: 2.24 10E6/UL (ref 3.8–5.2)
RBC # BLD AUTO: 2.92 10E6/UL (ref 3.8–5.2)
SODIUM SERPL-SCNC: 142 MMOL/L (ref 135–145)
SYSTOLIC BLOOD PRESSURE - MUSE: NORMAL MMHG
T AXIS - MUSE: 60 DEGREES
UNIT ABO/RH: NORMAL
UNIT NUMBER: NORMAL
UNIT STATUS: NORMAL
UNIT TYPE ISBT: 6200
UPPER GI ENDOSCOPY: NORMAL
VENTRICULAR RATE- MUSE: 74 BPM
WBC # BLD AUTO: 6.6 10E3/UL (ref 4–11)
WBC # BLD AUTO: 8.2 10E3/UL (ref 4–11)

## 2024-02-12 PROCEDURE — 250N000011 HC RX IP 250 OP 636

## 2024-02-12 PROCEDURE — 74177 CT ABD & PELVIS W/CONTRAST: CPT | Mod: 26 | Performed by: STUDENT IN AN ORGANIZED HEALTH CARE EDUCATION/TRAINING PROGRAM

## 2024-02-12 PROCEDURE — 250N000013 HC RX MED GY IP 250 OP 250 PS 637: Performed by: STUDENT IN AN ORGANIZED HEALTH CARE EDUCATION/TRAINING PROGRAM

## 2024-02-12 PROCEDURE — 36415 COLL VENOUS BLD VENIPUNCTURE: CPT

## 2024-02-12 PROCEDURE — 250N000013 HC RX MED GY IP 250 OP 250 PS 637

## 2024-02-12 PROCEDURE — 71260 CT THORAX DX C+: CPT

## 2024-02-12 PROCEDURE — 99233 SBSQ HOSP IP/OBS HIGH 50: CPT | Mod: GC | Performed by: STUDENT IN AN ORGANIZED HEALTH CARE EDUCATION/TRAINING PROGRAM

## 2024-02-12 PROCEDURE — 80048 BASIC METABOLIC PNL TOTAL CA: CPT

## 2024-02-12 PROCEDURE — C9113 INJ PANTOPRAZOLE SODIUM, VIA: HCPCS

## 2024-02-12 PROCEDURE — 120N000005 HC R&B MS OVERFLOW UMMC

## 2024-02-12 PROCEDURE — P9016 RBC LEUKOCYTES REDUCED: HCPCS

## 2024-02-12 PROCEDURE — 85027 COMPLETE CBC AUTOMATED: CPT

## 2024-02-12 PROCEDURE — 999N000127 HC STATISTIC PERIPHERAL IV START W US GUIDANCE

## 2024-02-12 PROCEDURE — 71260 CT THORAX DX C+: CPT | Mod: 26 | Performed by: STUDENT IN AN ORGANIZED HEALTH CARE EDUCATION/TRAINING PROGRAM

## 2024-02-12 PROCEDURE — 250N000011 HC RX IP 250 OP 636: Performed by: STUDENT IN AN ORGANIZED HEALTH CARE EDUCATION/TRAINING PROGRAM

## 2024-02-12 RX ORDER — POTASSIUM CHLORIDE 7.45 MG/ML
10 INJECTION INTRAVENOUS ONCE
Status: COMPLETED | OUTPATIENT
Start: 2024-02-12 | End: 2024-02-12

## 2024-02-12 RX ORDER — IOPAMIDOL 755 MG/ML
95 INJECTION, SOLUTION INTRAVASCULAR ONCE
Status: COMPLETED | OUTPATIENT
Start: 2024-02-12 | End: 2024-02-12

## 2024-02-12 RX ORDER — METHYLPREDNISOLONE SODIUM SUCCINATE 40 MG/ML
40 INJECTION, POWDER, LYOPHILIZED, FOR SOLUTION INTRAMUSCULAR; INTRAVENOUS EVERY 4 HOURS
Qty: 2 ML | Refills: 0 | Status: DISCONTINUED | OUTPATIENT
Start: 2024-02-12 | End: 2024-02-12

## 2024-02-12 RX ORDER — DIPHENHYDRAMINE HYDROCHLORIDE 50 MG/ML
50 INJECTION INTRAMUSCULAR; INTRAVENOUS ONCE
Status: COMPLETED | OUTPATIENT
Start: 2024-02-12 | End: 2024-02-12

## 2024-02-12 RX ORDER — SUMATRIPTAN 25 MG/1
25 TABLET, FILM COATED ORAL ONCE
Status: COMPLETED | OUTPATIENT
Start: 2024-02-12 | End: 2024-02-12

## 2024-02-12 RX ORDER — METOCLOPRAMIDE HYDROCHLORIDE 5 MG/ML
10 INJECTION INTRAMUSCULAR; INTRAVENOUS ONCE
Status: DISCONTINUED | OUTPATIENT
Start: 2024-02-12 | End: 2024-02-12

## 2024-02-12 RX ORDER — METHYLPREDNISOLONE SODIUM SUCCINATE 40 MG/ML
40 INJECTION, POWDER, LYOPHILIZED, FOR SOLUTION INTRAMUSCULAR; INTRAVENOUS EVERY 4 HOURS
Status: COMPLETED | OUTPATIENT
Start: 2024-02-12 | End: 2024-02-12

## 2024-02-12 RX ORDER — DIPHENHYDRAMINE HYDROCHLORIDE 50 MG/ML
50 INJECTION INTRAMUSCULAR; INTRAVENOUS ONCE
Status: DISCONTINUED | OUTPATIENT
Start: 2024-02-12 | End: 2024-02-12

## 2024-02-12 RX ORDER — POTASSIUM CHLORIDE 750 MG/1
40 TABLET, EXTENDED RELEASE ORAL ONCE
Status: COMPLETED | OUTPATIENT
Start: 2024-02-12 | End: 2024-02-12

## 2024-02-12 RX ORDER — NALOXONE HYDROCHLORIDE 0.4 MG/ML
0.2 INJECTION, SOLUTION INTRAMUSCULAR; INTRAVENOUS; SUBCUTANEOUS
Status: DISCONTINUED | OUTPATIENT
Start: 2024-02-12 | End: 2024-02-14

## 2024-02-12 RX ORDER — SUCRALFATE ORAL 1 G/10ML
1 SUSPENSION ORAL 4 TIMES DAILY PRN
Status: DISCONTINUED | OUTPATIENT
Start: 2024-02-12 | End: 2024-02-14

## 2024-02-12 RX ORDER — HYDROXYZINE HYDROCHLORIDE 25 MG/1
25 TABLET, FILM COATED ORAL
Status: DISCONTINUED | OUTPATIENT
Start: 2024-02-12 | End: 2024-02-14

## 2024-02-12 RX ORDER — METOCLOPRAMIDE HYDROCHLORIDE 5 MG/ML
10 INJECTION INTRAMUSCULAR; INTRAVENOUS
Status: DISCONTINUED | OUTPATIENT
Start: 2024-02-12 | End: 2024-02-14

## 2024-02-12 RX ORDER — DIPHENHYDRAMINE HCL 50 MG
50 CAPSULE ORAL ONCE
Status: DISCONTINUED | OUTPATIENT
Start: 2024-02-12 | End: 2024-02-12

## 2024-02-12 RX ORDER — MAGNESIUM SULFATE HEPTAHYDRATE 40 MG/ML
4 INJECTION, SOLUTION INTRAVENOUS ONCE
Status: COMPLETED | OUTPATIENT
Start: 2024-02-12 | End: 2024-02-12

## 2024-02-12 RX ORDER — NALOXONE HYDROCHLORIDE 0.4 MG/ML
0.4 INJECTION, SOLUTION INTRAMUSCULAR; INTRAVENOUS; SUBCUTANEOUS
Status: DISCONTINUED | OUTPATIENT
Start: 2024-02-12 | End: 2024-02-14

## 2024-02-12 RX ORDER — OXYCODONE HYDROCHLORIDE 5 MG/1
5 TABLET ORAL EVERY 4 HOURS PRN
Status: DISCONTINUED | OUTPATIENT
Start: 2024-02-12 | End: 2024-02-14

## 2024-02-12 RX ADMIN — HYDROMORPHONE HYDROCHLORIDE 0.3 MG: 1 INJECTION, SOLUTION INTRAMUSCULAR; INTRAVENOUS; SUBCUTANEOUS at 06:25

## 2024-02-12 RX ADMIN — DIPHENHYDRAMINE HYDROCHLORIDE 50 MG: 50 INJECTION, SOLUTION INTRAMUSCULAR; INTRAVENOUS at 07:32

## 2024-02-12 RX ADMIN — HYDROMORPHONE HYDROCHLORIDE 0.3 MG: 1 INJECTION, SOLUTION INTRAMUSCULAR; INTRAVENOUS; SUBCUTANEOUS at 10:49

## 2024-02-12 RX ADMIN — POLYETHYLENE GLYCOL 3350, SODIUM SULFATE ANHYDROUS, SODIUM BICARBONATE, SODIUM CHLORIDE, POTASSIUM CHLORIDE 4000 ML: 236; 22.74; 6.74; 5.86; 2.97 POWDER, FOR SOLUTION ORAL at 14:19

## 2024-02-12 RX ADMIN — MAGNESIUM SULFATE IN WATER 4 G: 4 INJECTION, SOLUTION INTRAVENOUS at 20:41

## 2024-02-12 RX ADMIN — HYDROMORPHONE HYDROCHLORIDE 0.3 MG: 1 INJECTION, SOLUTION INTRAMUSCULAR; INTRAVENOUS; SUBCUTANEOUS at 20:58

## 2024-02-12 RX ADMIN — ONDANSETRON 4 MG: 2 INJECTION INTRAMUSCULAR; INTRAVENOUS at 06:26

## 2024-02-12 RX ADMIN — POTASSIUM CHLORIDE 10 MEQ: 7.46 INJECTION, SOLUTION INTRAVENOUS at 08:11

## 2024-02-12 RX ADMIN — SUCRALFATE 1 G: 1 SUSPENSION ORAL at 23:01

## 2024-02-12 RX ADMIN — OXYCODONE HYDROCHLORIDE 5 MG: 5 TABLET ORAL at 16:49

## 2024-02-12 RX ADMIN — PANTOPRAZOLE SODIUM 40 MG: 40 INJECTION, POWDER, FOR SOLUTION INTRAVENOUS at 20:42

## 2024-02-12 RX ADMIN — POTASSIUM CHLORIDE 10 MEQ: 7.46 INJECTION, SOLUTION INTRAVENOUS at 09:21

## 2024-02-12 RX ADMIN — HYDROXYZINE HYDROCHLORIDE 25 MG: 25 TABLET, FILM COATED ORAL at 23:01

## 2024-02-12 RX ADMIN — LORAZEPAM 0.5 MG: 2 INJECTION INTRAMUSCULAR; INTRAVENOUS at 08:07

## 2024-02-12 RX ADMIN — POTASSIUM CHLORIDE 40 MEQ: 750 TABLET, EXTENDED RELEASE ORAL at 12:15

## 2024-02-12 RX ADMIN — PANTOPRAZOLE SODIUM 40 MG: 40 INJECTION, POWDER, FOR SOLUTION INTRAVENOUS at 07:32

## 2024-02-12 RX ADMIN — METHYLPREDNISOLONE SODIUM SUCCINATE 40 MG: 40 INJECTION, POWDER, FOR SOLUTION INTRAMUSCULAR; INTRAVENOUS at 06:28

## 2024-02-12 RX ADMIN — METHYLPREDNISOLONE SODIUM SUCCINATE 40 MG: 40 INJECTION, POWDER, FOR SOLUTION INTRAMUSCULAR; INTRAVENOUS at 02:31

## 2024-02-12 RX ADMIN — SUMATRIPTAN SUCCINATE 25 MG: 25 TABLET ORAL at 20:42

## 2024-02-12 RX ADMIN — IOPAMIDOL 95 ML: 755 INJECTION, SOLUTION INTRAVENOUS at 08:40

## 2024-02-12 ASSESSMENT — ACTIVITIES OF DAILY LIVING (ADL)
ADLS_ACUITY_SCORE: 35

## 2024-02-12 NOTE — ED NOTES
Ativan was previously pulled by Esther Saldivar RN for CT scan - medication was charted not given. Ativan pulled now for CT scan scheduled at 0830

## 2024-02-12 NOTE — PROGRESS NOTES
GASTROENTEROLOGY PROGRESS NOTE    Date: 02/12/2024     ASSESSMENT:  43 year old female with a history of RYGB (2022), JASON, prev EtOH use diorder, chronic pain, migraines, asthma, GERD, MJ use disorder, and recently diagnosed jejunal adenocarcinoma is presenting with hematemesis, hematochezia and acute blood loss anemia for which our service is consulted.      #. Jejunal adenocarcinoma, ulcerated  #. UGIB  #. Acute blood loss anemia  #. S/p german-en-Y gastric bypass  -Patient now s/p EGD that revealed a bleeding jejunal ulcer at the GJ with an adherent clot; irrigation revealed visible vessel with active bleeding. Attempts at mechanical hemostasis with hemoclip's unsuccessful  given friable nature of mucosa; now s/p Bipolar cautery, Epi, and hemospray with ultimate achievement of hemostasis.   -In setting of resolution of bleeding and stable H/H and HDS, plan for colonoscopy as scheduled tomorrow for staging purposes.     RECOMMENDATIONS:  -Continue with IV PPI BID for 72-hours post-intervention   -Continue trending H/H; q12hr appropriate   -If patient were to rebleed or become Hemodynamicalyl unstable, recommend stat CT angiography abdomen pelvis and IR consult versus proceeding to definitive surgical management with surgical oncology   -- Colonoscopy 2/13/2024  -  Clear liquids, no red coloring today  - Colonoscopy prep  - Give 2L Go-Lytely today at 1600  - Give 2L Go-Lytely tomorrow at 0300  - If not clear by 0500, please give an additional 2L Go-Lytely  - NPO at midnight except medications.  - Hold all anti-coagulant medications pre-procedurally and in the setting of GI bleeding.  - Primary team to trend Hgb. Transfuse for Hgb < 7  or active bleeding. Keep type and screen up to date.  - Monitor for/document signs of active bleeding & monitor vital signs.  - Maintain 2 large bore IVs (18-20g) at all times    Gastroenterology follow up recommendations: Pending clinical course.      Thank you for involving us in  "this patient's care. Please do not hesitate to contact the GI service with any questions or concerns.      Pt care plan discussed with Dr. Denis, GI staff physician.      Nafisa Ellison MD   Gastroenterology Fellow  Division of Gastroenterology, Hepatology and Nutrition  [facility]    _______________________________________________________________      Subjective:    Patient seen at bedside this AM, NAD. Reports that she is feeling well overall but continues to experiencing mid-line abdominal pain that sometimes now radiates downwards. Denies any nausea, vomiting. Denies any additional episodes of hematochezia or coffee ground emesis. Denies any fever, chills.     Objective:  Blood pressure 114/77, pulse 79, temperature 97.9  F (36.6  C), temperature source Oral, resp. rate 22, height 1.6 m (5' 3\"), weight 72.1 kg (159 lb), SpO2 98%.    Gen: A&Ox3, NAD  HEENT: absent dentition   CV: RRR  Lungs: CTA b/l  Abd: +bs, soft, + ttp without rebound or guarding   Skin: no jaundice, no stigmata of chronic liver disease  MS: decreased  muscle mass for age  Neuro: non focal       LABS:  BMP  Recent Labs   Lab 02/12/24  0624 02/11/24  1149 02/09/24  1331    141 142   POTASSIUM 3.0* 3.4 2.8*   CHLORIDE 106 107 103   TAVO 8.4* 8.1* 8.5*   CO2 25 25 29   BUN 12.5 16.9 11.7   CR 0.59 0.57 0.59   * 90 84     CBC  Recent Labs   Lab 02/12/24  1102 02/12/24  0020 02/11/24  1557 02/11/24  1149 02/09/24  1331   WBC 6.6  --  9.2 11.3* 8.1   RBC 2.92*  --  2.25* 2.37* 3.55*   HGB 8.7*   < > 6.8* 7.2* 10.9*   HCT 27.5*  --  21.7* 23.0* 33.4*   MCV 94  --  96 97 94   MCH 29.8  --  30.2 30.4 30.7   MCHC 31.6  --  31.3* 31.3* 32.6   RDW 17.4*  --  16.7* 16.8* 16.0*     --  286 283 365    < > = values in this interval not displayed.     INR  Recent Labs   Lab 02/11/24  1149 02/09/24  1331   INR 1.09 0.99     LFTs  Recent Labs   Lab 02/11/24  1149 02/09/24  1331   ALKPHOS 46 61   AST 34 32   ALT 26 27 "   BILITOTAL 0.2 0.2   PROTTOTAL 5.3* 6.0*   ALBUMIN 3.2* 3.4*      PANC  Recent Labs   Lab 02/11/24  1149   LIPASE 47

## 2024-02-12 NOTE — MEDICATION SCRIBE - ADMISSION MEDICATION HISTORY
Medication Scribe Admission Medication History    Admission medication history is complete. The information provided in this note is only as accurate as the sources available at the time of the update.    Information Source(s): Patient via in-person    Pertinent Information: Pt reported taking medications on PTA medication list as directed. Pt stated that her stomach is unable to hold down any medication in pill form, and would rather have her prescriptions/medications in liquid/suspension  form.     Changes made to PTA medication list:  Added: None  Deleted: Alprazolam 0.25 MG tablet, Amitriptyline 10 MG tablet, Aspirin 81 mg EC tablet, Aspirin-acetaminophen-caffeine 250-250-65 MG tabs, Atorvastatin 20 mg tabs, Bupropion 150 mg 24 hr tabs,  Calcium Polycarbophil 625 mg tabs,  Cephalexin 500 mg capsule,  Chlorhexidine 4 % liquid, Clindamycin 300 mg caps, Clonazepam 0.5 MG tabs, Clotrimazole Anti-fungal 1 % external cream, Diphenhydramine 25 MG caps, Depakote 500 mg DR tabs,  Duloxetine  30 mg capsule, Lovenox 60 mg/0.6ml syringe, Escitalopram 20 mg tabs,  Eszopiclone 1 mg tabs, Famotidine  40 mg tabs, Hydrocodone-acetaminophen  5-325 mg tabs, Hydroxyzine 50 mg caps, Hyoscyamine 0.125 mg tabs, Methocarbamol 500 mg tabs, Multiple Vitamins-Minerals tabs, Olanzapine 5 mg tabs, Prednisone 20 mg tabs, Pregabalin 150 mg caps.             Changed: Albuterol 108 (90 base) mcg/act inhaler.     Allergies reviewed with patient and updates made in EHR: yes    Medication History Completed By: Elvira Rojas 2/12/2024 3:49 AM    No outpatient medications have been marked as taking for the 2/11/24 encounter (Hospital Encounter).

## 2024-02-12 NOTE — PROGRESS NOTES
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 Saw Morales   Fellow/Assistant(s):  Dr. Vic Rodriguez     Specimens:  No specimens obatined   Findings:  Normal-appearing esophagus.  GEJ at 40 cm.  Small gastric pouch with visible staple line.  Evidence of previous hiatal hernia repair.    1 mL of carbon black was injected to tattoo the alimentary limb on opposing walls approximately 7 cm downstream from the edge of the ulcer.    Large cratered cratered ulcer in gastrojejunostomy inlet taking up majority of one wall (progress since last EGD, 1/29/34).  Large burden of adherent clot.  Clots lavaged and dislodged revealing visible artery with active hemorrhage.  Hemoclips, bipolar cautery, intramucosal dilute epinephrine, and ultimately hemostatic powder were used to achieve satisfactory hemostasis at the termination of the case. No active bleeding seen at withdrawal.    Complications:  None.   Condition:  Stable   Recommendations  - IV PPI twice daily  - Strict n.p.o. tonight  - Trend hemoglobin closely, some blood loss during procedure, would expect she will require transfusion this evening  - Please obtain CT chest abdomen pelvis with IV contrast to stage her malignancy this evening  - If she were to rebleed or become hemodynamically unstable would recommend stat CT angiography abdomen pelvis and IR consult versus proceeding to definitive surgical management with surgical oncology  - Luminal GI will continue to follow  - Formal procedure note pending      Vic Rodriguez MD, PGY6  Gastroenterology Fellow  [facility]  See AMCOM/genda for GI on-call information    GI staff: Dr. Morales

## 2024-02-12 NOTE — PROGRESS NOTES
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 She presented to the ED for concerns of ongoing hematemesis and melena 2/2 bleeding malignancy.     Today:  - CT c/a/p for staging  - NPO at midnight  - GoLYTELY to start in the afternoon  - clear liquid diet  - BID hgb     #upper GI bleed 2/2 jejunal ulcer positive for adenocarcinoma  #acute blood loss anemia   Patient presented to the ED with large volume hematemesis and bright red blood in her stool in the setting of known jejunal adenocarcinoma. Concerns that her ulcer has continued to bleed at this time. Patient hemodynamically stable upon arrival in the ED and has remained hemodynamically stable. Hemoglobin 7.2 upon arrival, but recheck was 6.8. GI consulted in the ED, performed an EGD 2/11 with visualization and temporization of an arterial jejunal bleed. Received one unit pRBCs after this with stabilization of hemoglobin. Would like additional CT C/A/P to stage at this point. Colonoscopy while inpatient for staging, pending.  - IV pantoprazole 40 mg IV BID (continue for 72 hours post-intervention)  - given 1U pRBC 2/11  - CBC BID  - transfuse if hemoglobin <7  - GI consulted               > NPO at midnight for colonoscopy  > 2L golytely at 1600, another 2L tomorrow at 0300, then additional 2L at 0500 if stools not clear yet   > clear liquid diet              > CT C/A/P for staging 2/12   > Maintain 2 large bore IVs (18-20g) at all times  - Surgical Oncology Consulted               > no surgical indication at this time              >If bleeding continues, urgent OR for tumor resection  - Zofran PRN  - hold PTA PPI       #Asthma  - continue PTA albuterol PRN     #Obesity  "s/p gastric bypass  Patient with history of gastric bypass. Will continue to monitor for any concerns of worsening abdominal pain, but no specific concerns at this point.     Patient denies taking any other home medications. Notes that she discontinued all her other medications and has only been taking prescribed PPI.        Diet: Clear Liquid Diet    DVT Prophylaxis: Pneumatic Compression Devices  Villalba Catheter: Not present  Fluids: None  Lines: PRESENT             Cardiac Monitoring: ACTIVE order. Indication: GI bleed  Code Status: Full Code      Clinically Significant Risk Factors        # Hypokalemia: Lowest K = 3 mmol/L in last 2 days, will replace as needed   # Hypocalcemia: Lowest Ca = 8.1 mg/dL in last 2 days, will monitor and replace as appropriate     # Hypoalbuminemia: Lowest albumin = 3.2 g/dL at 2/11/2024 11:49 AM, will monitor as appropriate            # Overweight: Estimated body mass index is 28.17 kg/m  as calculated from the following:    Height as of this encounter: 1.6 m (5' 3\").    Weight as of this encounter: 72.1 kg (159 lb)., PRESENT ON ADMISSION            Disposition Plan      Expected Discharge Date: 02/16/2024        Discharge Comments: Likely surgery inpatient. Ostomy possible.        The patient's care was discussed with the Attending Physician, Dr. Guillory .    Kalpesh Stubbs  Medical Student  Medicine Service, [facility] TEAM [address] [place] [address]  Securely message with Vocera (more info)  Text page via [facility] Paging/Directory   See signed in provider for up to date coverage information    I, Sheri Velazco, was present with the medical/MISHA student who participated in the service and in the documentation of the note.  I have verified the history and personally performed the physical exam and medical decision making.  I agree with the assessment and plan of care as documented in the note.      Sheri Velazco, PGY2 "   ______________________________________________________________________    Interval History   Nursing notes reviewed. Reports one instance of melena, showed to nursing staff. Ongoing diffuse abdominal pain and also L shoulder pain without trauma. Reports being tired and unable to sleep in the ED. No other concerns for the team at this time.    Physical Exam   Vital Signs: Temp: 97.9  F (36.6  C) Temp src: Oral BP: 114/77 Pulse: 79   Resp: 22 SpO2: 98 % O2 Device: None (Room air) Oxygen Delivery: 2 LPM  Weight: 159 lbs 0 oz    Constitutional: awake, alert, cooperative, no apparent distress, and appears stated age  Respiratory: No increased work of breathing, good air exchange, clear to auscultation bilaterally, no crackles or wheezing  Cardiovascular: Normal apical impulse, regular rate and rhythm, normal S1 and S2, no S3 or S4, and no murmur noted  abd: hypoactive bowel sounds, tenderness noted diffusely  Neuro: AAOx4, no focal deficits    Medical Decision Making       Please see A&P for additional details of medical decision making.      Data     I have personally reviewed the following data over the past 24 hrs:    6.6  \   8.7 (L)   / 292     142 106 12.5 /  139 (H)   3.0 (L) 25 0.59 \       Imaging results reviewed over the past 24 hrs:   Recent Results (from the past 24 hour(s))   CT Chest/Abdomen/Pelvis w Contrast    Impression    RESIDENT PRELIMINARY INTERPRETATION  IMPRESSION:    1. Suspected bleeding from gastrojejunal anastomotic ulcer, previously  identified on EGD 2/11/2023, evidenced by mild surrounding  inflammatory changes with increased wall thickening at the region.    2. 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    3. Diffuse hepatic steatosis.

## 2024-02-12 NOTE — PROGRESS NOTES
"/89   Pulse 72   Temp 98.4  F (36.9  C) (Axillary)   Resp 14   Ht 1.6 m (5' 3\")   Wt 72.1 kg (159 lb)   SpO2 99%   BMI 28.17 kg/m      [id number]    Alert and oriented x4. Pt came for GI bleed, none this shift. At 1630 pt went to the procedure room for EGD and returned to her room at 1835. Upon arrival, she was lightly sedated, avss. She jayce pain. Writer gave report to the up coming RN.  "

## 2024-02-12 NOTE — CONSULTS
"    [facility]    Consult Note - Surgical Oncology Service  Date of Admission:  2/11/2024  Consult Requested by: Dr Rodriguez  Reason for Consult: jejunal adenocarcinoma    Assessment & Plan: Surgery   Eduarda Nogueira is a 43 year old female with recent diagnosis of jejunal adenocarcinoma who presented with recurrent symptoms of haematemesis and maelena on 2/11/2024. Underwent EGD with successful haemostasis of her bleeding ulcerated tumour. Surg Onc consulted for possible tumour resection.     If patient remains hemodynamically stable would recommend completion of staging and oncologic work-up. If she were to bleed again, would recommend urgent OR for tumor resection.       Drains: None     Code Status: Full Code      Clinically Significant Risk Factors Present on Admission          # Hypocalcemia: Lowest Ca = 8.1 mg/dL in last 2 days, will monitor and replace as appropriate     # Hypoalbuminemia: Lowest albumin = 3.2 g/dL at 2/11/2024 11:49 AM, will monitor as appropriate  # Drug Induced Coagulation Defect: home medication list includes an anticoagulant medication  # Drug Induced Platelet Defect: home medication list includes an antiplatelet medication        # Overweight: Estimated body mass index is 28.17 kg/m  as calculated from the following:    Height as of this encounter: 1.6 m (5' 3\").    Weight as of this encounter: 72.1 kg (159 lb).            The patient's care was discussed with the staff, Dr. Krishnamurthy.    Deana Michelle MD  [facility]  Non-urgent messages: Securely message with Catmoji (more info)  Text page via [facility] Paging/Directory     ______________________________________________________________________    Chief Complaint   Jejunal adenocarcinoma    History is obtained from the patient    History of Present Illness   Eduarda Nogueira is a 43 year old female with PMH obesity s/p RNYGB ([facility]) in 2022, " subdural haematoma, EtOH use disorder, chronic pain on suboxone, migraine headaches, THC use with cyclic vomiting, asthma, and KURTZ who presented with recurrent symptoms of haematemesis and maelena.     Patient was recently diagnosed with jejunal adenocarcinoma. She underwent EGD on 1/30 2/2 symptoms of dysphagia and coffee-ground emesis. She was found to have significant ulceration in her jejunum which revealed moderately differentiated adenocarcinoma on pathology report. Today, she presents to hospital with recurrent haematemesis and maelena and evidence of Hgb drop from 10.9 to 6.8. Underwent EGD with GI earlier today 2/11 which identified ulcerated tumour extending from her GJ staple line and approximately 5cm into her jejunum with bleeding vessel. Haemostasis was successfully obtained with epinephrine injection, clips (unsuccessful), bipolar probe, and finally haemostatic spray. Jejunum was tattooed 5-10 cm distal to the tumour. Considering symptomatic disease and high re-bleeding risk of tumour, Surg Onc consulted for possible resection of tumour. Awaiting staging workup with CT CAP.     Patient is currently haemodynamically stable. Hgb is 7.5 after transfusion of 1u pRBC. At this time, she reports continued melena but no further episodes of hematemasis. Complains mostly of constant headache and stomach pain.       Past Medical History    Past Medical History:   Diagnosis Date    Anemia     No Comments Provided    Anxiety state     No Comments Provided    Arthropathy     No Comments Provided    Asthma     No Comments Provided    Backache     No Comments Provided    Edema     No Comments Provided    Esophageal reflux     No Comments Provided    Female stress incontinence     No Comments Provided    Hypothyroidism     No Comments Provided    Irregular menstrual cycle     No Comments Provided    Irritable colon     No Comments Provided    Migraine     No Comments Provided    Morbid obesity (H)     No Comments  Provided    Other chronic nonalcoholic liver disease     No Comments Provided    Other depressive disorder     No Comments Provided    Other malaise and fatigue     No Comments Provided    Pain in joint     hips, knees, ankles, feet, neck    Restless legs syndrome     self-diagnosed    Shortness of breath     No Comments Provided    Synovial cyst of popliteal space     No Comments Provided    Vitamin D deficiency     Rx 3/14, re check 6/14       Past Surgical History   Past Surgical History:   Procedure Laterality Date    ATTEMPTED ARTHROSCOPY      x3, rt knee    ESOPHAGOSCOPY, GASTROSCOPY, DUODENOSCOPY (EGD), COMBINED N/A 1/30/2024    Procedure: ESOPHAGOGASTRODUODENOSCOPY, WITH BIOPSY;  Surgeon: Precious Albarran MD;  Location: [facility]    EXTRACTION(S) DENTAL      No Comments Provided    LAPAROSCOPIC CHOLECYSTECTOMY      2010    OSTEOTOMY FEMUR DISTAL      right       Prior to Admission Medications   I have reviewed this patient's current medications       Review of Systems    The 10 point Review of Systems is negative other than noted in the HPI or here.     Social History   I have reviewed this patient's social history and updated it with pertinent information if needed.  Social History     Tobacco Use    Smoking status: Every Day     Packs/day: .3     Types: Cigarettes     Start date: 3/18/2013    Smokeless tobacco: Never    Tobacco comments:     3 cigarettes per day    Substance Use Topics    Alcohol use: Yes     Comment: Alcoholic Drinks/day: Quit 3/2013    Drug use: Unknown     Types: Other     Comment: Drug use: No         Allergies   Allergies   Allergen Reactions    Acetaminophen Hives, Itching, Rash and Shortness Of Breath     Several Rx's for Norco in 2021 & 22    No Clinical Screening - See Comments Anaphylaxis     steroids    Gabapentin Unknown and Itching    Hydrocodone-Acetaminophen Itching     Several Rx's for Norco in 2021 & 22    Iodinated Contrast Media Unknown and Nausea and Vomiting     Extremely  "anxious, vomited once after CT. No wheezing or SOB.    Latex Itching    Morphine And Related Itching     Several Rx's for Norco in 2021 & 22    Nsaids Other (See Comments)     H/o german-n-y gastric bypass\". AVOID NSAIDs and aspirin due to risk of gastric and/or G-J anastomotic ulcers. If Eduarda must be on short course of NSAIDs or aspirin, use enteric coated if possible and use PPI // Ira Hung RN, Bariatric Nurse Clinician, [facility] 3/23/2022    Oxycodone-Acetaminophen Itching     Several Rx's for Norco in 2021 & 22        Physical Exam   Vital Signs: Temp: 98.3  F (36.8  C) Temp src: Oral BP: 137/82 Pulse: 80   Resp: 16 SpO2: 99 % O2 Device: None (Room air) Oxygen Delivery: 2 LPM  Weight: 159 lbs 0 ozNo intake or output data in the 24 hours ending 02/11/24 2101  General Appearance: Appears comfortable sitting up in bed  Respiratory: NLB   Cardiovascular: RRR, extremities warm, no cyanosis  GI: Abdomen soft, mildy tender epigastrium, no peritonitis  Skin: No jaundice  Other: Ext WWP           Data     I have personally reviewed the following data over the past 24 hrs:    9.2  \   6.8 (LL)   / 286     141 107 16.9 /  90   3.4 25 0.57 \     ALT: 26 AST: 34 AP: 46 TBILI: 0.2   ALB: 3.2 (L) TOT PROTEIN: 5.3 (L) LIPASE: 47     Procal: N/A CRP: N/A Lactic Acid: 0.6 (L)       INR:  1.09 PTT:  N/A   D-dimer:  N/A Fibrinogen:  N/A       Imaging results reviewed over the past 24 hrs:     UPPER GI ENDOSCOPY 2/11/2024    Findings:       The examined esophagus was normal.        Evidence of a gastric bypass was found. A gastric pouch with a normal        size was found containing blood and staples. The staple line appeared        intact. The gastrojejunal anastomosis was characterized by ulceration.        This was traversed. The pouch-to-jejunum limb was characterized by        ulceration. The duodenum-to-jejunum limb was not examined as it could        not be traversed. The excluded stomach was " not examined as it could not        be reached.        Ulcerated tumor extends from gastrojejunal staple line approximately 5        cm into the jejunum with a large amount of adherent clot which was        extensively washed and exposed, revealing a bleeding visible vessel        within the middle of the tumor field. Area was partially successfully        injected with 2 mL of a 0.1 mg/mL solution of epinephrine for        hemostasis. For hemostasis, two hemostatic clips were unsuccessfully        placed (MR conditional). Clip : [employer]. Coagulation for        hemostasis using bipolar probe was partially successful. To stop active        bleeding, hemostatic spray was deployed. Extensive sprays were applied.        There was no bleeding at the end of the procedure.        Jejunum was normal starting approximately 8cm downstream from the        gastrojejunal anastomosis. Area was tattooed with an injection of 3 mL        of Spot (carbon black). This tattoo was placed 5-10cm downstream from        the most distal aspect of the tumor over 2 opposite walls.       EXAM: CT ANGIO ABDOMEN PELVIS   LOCATION: [facility]   DATE: 2/10/2024     INDICATION: GI bleed upper   COMPARISON: 01/26/2024, 10/23/2023.   TECHNIQUE: CT angiogram abdomen pelvis during arterial phase of injection of IV contrast. 2D and 3D MIP reconstructions were performed by the CT technologist. Dose reduction techniques were used.   CONTRAST: Omnipaque [id number]     FINDINGS:   ANGIOGRAM ABDOMEN/PELVIS: No extravascular contrast to suggest active bleeding, either gastrointestinal or otherwise. Abdominal aorta normal in caliber and without aneurysm, dissection, or stenosis. No significant aortic atherosclerotic calcification or atheromatous plaque. Celiac, superior mesenteric, bilateral renal, and inferior mesenteric arteries are normal. Bilateral common, internal, external, and common femoral arteries appear normal.     LOWER CHEST: Prior  ASD repair.     HEPATOBILIARY: Diffuse hepatic steatosis. Prior cholecystectomy. No suspicious liver lesion.     PANCREAS: Normal.     SPLEEN: Normal.     ADRENAL GLANDS: Normal.     KIDNEYS/BLADDER: Polycystic. No hydronephrosis. Urinary bladder unremarkable.     BOWEL: No abnormal bowel wall thickening or enhancement. No intraluminal contrast material to suggest active gastrointestinal bleeding. Prior Addis-en-Y gastric bypass. No bowel obstruction. Large amount of stool in the colon.     LYMPH NODES: Normal.     PELVIC ORGANS: Normal.     MUSCULOSKELETAL: Mild diffuse subcutaneous edema, greatest around the pelvis. No suspicious bone lesion.     IMPRESSION  1.  No evidence for active gastrointestinal bleeding.   2.  Severe hepatic steatosis.   3.  Polycystic kidney disease.

## 2024-02-12 NOTE — OR NURSING
Pt underwent EGD with SPOT injection and hemorrhage control (dilute epi injection, hemostatic clip x1, hemospray, bipolar gold probe) under conscious sedation. Sedation and monitoring managed by float RN. Pt transferred from trauma bay back into ED27 and report given to bedside RN. Pt's dad updated on pt condition by fellow.     Charissa Martin RN

## 2024-02-13 ENCOUNTER — ANESTHESIA (OUTPATIENT)
Dept: GASTROENTEROLOGY | Facility: CLINIC | Age: 44
End: 2024-02-13
Payer: COMMERCIAL

## 2024-02-13 ENCOUNTER — ANESTHESIA EVENT (OUTPATIENT)
Dept: SURGERY | Facility: CLINIC | Age: 44
End: 2024-02-13
Payer: COMMERCIAL

## 2024-02-13 LAB
ACANTHOCYTES BLD QL SMEAR: NORMAL
ANION GAP SERPL CALCULATED.3IONS-SCNC: 11 MMOL/L (ref 7–15)
AUER BODIES BLD QL SMEAR: NORMAL
BASO STIPL BLD QL SMEAR: NORMAL
BITE CELLS BLD QL SMEAR: NORMAL
BLISTER CELLS BLD QL SMEAR: NORMAL
BUN SERPL-MCNC: 9.1 MG/DL (ref 6–20)
BURR CELLS BLD QL SMEAR: NORMAL
C DIFF TOX B STL QL: NEGATIVE
CALCIUM SERPL-MCNC: 8.5 MG/DL (ref 8.6–10)
CHLORIDE SERPL-SCNC: 108 MMOL/L (ref 98–107)
COLONOSCOPY: NORMAL
CREAT SERPL-MCNC: 0.71 MG/DL (ref 0.51–0.95)
DACRYOCYTES BLD QL SMEAR: NORMAL
DEPRECATED HCO3 PLAS-SCNC: 22 MMOL/L (ref 22–29)
EGFRCR SERPLBLD CKD-EPI 2021: >90 ML/MIN/1.73M2
ELLIPTOCYTES BLD QL SMEAR: NORMAL
ERYTHROCYTE [DISTWIDTH] IN BLOOD BY AUTOMATED COUNT: 17.2 % (ref 10–15)
ERYTHROCYTE [DISTWIDTH] IN BLOOD BY AUTOMATED COUNT: 17.3 % (ref 10–15)
FRAGMENTS BLD QL SMEAR: NORMAL
GLUCOSE SERPL-MCNC: 78 MG/DL (ref 70–99)
HCT VFR BLD AUTO: 27.2 % (ref 35–47)
HCT VFR BLD AUTO: 32.3 % (ref 35–47)
HGB BLD-MCNC: 10.2 G/DL (ref 11.7–15.7)
HGB BLD-MCNC: 5.4 G/DL (ref 11.7–15.7)
HGB BLD-MCNC: 8.8 G/DL (ref 11.7–15.7)
HGB BLD-MCNC: 9.4 G/DL (ref 11.7–15.7)
HGB C CRYSTALS: NORMAL
HOWELL-JOLLY BOD BLD QL SMEAR: NORMAL
MCH RBC QN AUTO: 30.5 PG (ref 26.5–33)
MCH RBC QN AUTO: 30.6 PG (ref 26.5–33)
MCHC RBC AUTO-ENTMCNC: 31.6 G/DL (ref 31.5–36.5)
MCHC RBC AUTO-ENTMCNC: 32.4 G/DL (ref 31.5–36.5)
MCV RBC AUTO: 94 FL (ref 78–100)
MCV RBC AUTO: 97 FL (ref 78–100)
NEUTS HYPERSEG BLD QL SMEAR: NORMAL
PLAT MORPH BLD: NORMAL
PLATELET # BLD AUTO: 330 10E3/UL (ref 150–450)
PLATELET # BLD AUTO: 365 10E3/UL (ref 150–450)
POLYCHROMASIA BLD QL SMEAR: NORMAL
POTASSIUM SERPL-SCNC: 3.5 MMOL/L (ref 3.4–5.3)
RBC # BLD AUTO: 2.88 10E6/UL (ref 3.8–5.2)
RBC # BLD AUTO: 3.34 10E6/UL (ref 3.8–5.2)
RBC AGGLUT BLD QL: NORMAL
RBC MORPH BLD: NORMAL
ROULEAUX BLD QL SMEAR: NORMAL
SICKLE CELLS BLD QL SMEAR: NORMAL
SMUDGE CELLS BLD QL SMEAR: NORMAL
SODIUM SERPL-SCNC: 141 MMOL/L (ref 135–145)
SPHEROCYTES BLD QL SMEAR: NORMAL
STOMATOCYTES BLD QL SMEAR: NORMAL
TARGETS BLD QL SMEAR: NORMAL
TOXIC GRANULES BLD QL SMEAR: NORMAL
VARIANT LYMPHS BLD QL SMEAR: NORMAL
WBC # BLD AUTO: 7.1 10E3/UL (ref 4–11)
WBC # BLD AUTO: 9.3 10E3/UL (ref 4–11)

## 2024-02-13 PROCEDURE — 258N000003 HC RX IP 258 OP 636: Performed by: ANESTHESIOLOGY

## 2024-02-13 PROCEDURE — 85027 COMPLETE CBC AUTOMATED: CPT | Performed by: STUDENT IN AN ORGANIZED HEALTH CARE EDUCATION/TRAINING PROGRAM

## 2024-02-13 PROCEDURE — 87493 C DIFF AMPLIFIED PROBE: CPT | Performed by: INTERNAL MEDICINE

## 2024-02-13 PROCEDURE — 250N000011 HC RX IP 250 OP 636: Performed by: ANESTHESIOLOGY

## 2024-02-13 PROCEDURE — 250N000011 HC RX IP 250 OP 636

## 2024-02-13 PROCEDURE — 370N000017 HC ANESTHESIA TECHNICAL FEE, PER MIN: Performed by: INTERNAL MEDICINE

## 2024-02-13 PROCEDURE — 999N000128 HC STATISTIC PERIPHERAL IV START W/O US GUIDANCE

## 2024-02-13 PROCEDURE — 999N000248 HC STATISTIC IV INSERT WITH US BY RN

## 2024-02-13 PROCEDURE — 85027 COMPLETE CBC AUTOMATED: CPT

## 2024-02-13 PROCEDURE — 36415 COLL VENOUS BLD VENIPUNCTURE: CPT

## 2024-02-13 PROCEDURE — 36415 COLL VENOUS BLD VENIPUNCTURE: CPT | Performed by: STUDENT IN AN ORGANIZED HEALTH CARE EDUCATION/TRAINING PROGRAM

## 2024-02-13 PROCEDURE — 250N000013 HC RX MED GY IP 250 OP 250 PS 637

## 2024-02-13 PROCEDURE — 250N000009 HC RX 250: Performed by: ANESTHESIOLOGY

## 2024-02-13 PROCEDURE — 0DJD8ZZ INSPECTION OF LOWER INTESTINAL TRACT, VIA NATURAL OR ARTIFICIAL OPENING ENDOSCOPIC: ICD-10-PCS | Performed by: INTERNAL MEDICINE

## 2024-02-13 PROCEDURE — C9113 INJ PANTOPRAZOLE SODIUM, VIA: HCPCS

## 2024-02-13 PROCEDURE — 80048 BASIC METABOLIC PNL TOTAL CA: CPT

## 2024-02-13 PROCEDURE — 99233 SBSQ HOSP IP/OBS HIGH 50: CPT | Mod: GC | Performed by: INTERNAL MEDICINE

## 2024-02-13 PROCEDURE — 85018 HEMOGLOBIN: CPT

## 2024-02-13 PROCEDURE — 120N000005 HC R&B MS OVERFLOW UMMC

## 2024-02-13 PROCEDURE — 45378 DIAGNOSTIC COLONOSCOPY: CPT | Performed by: INTERNAL MEDICINE

## 2024-02-13 RX ORDER — SUMATRIPTAN 25 MG/1
25 TABLET, FILM COATED ORAL ONCE
Status: COMPLETED | OUTPATIENT
Start: 2024-02-13 | End: 2024-02-14

## 2024-02-13 RX ORDER — PROPOFOL 10 MG/ML
INJECTION, EMULSION INTRAVENOUS PRN
Status: DISCONTINUED | OUTPATIENT
Start: 2024-02-13 | End: 2024-02-13

## 2024-02-13 RX ORDER — FENTANYL CITRATE 50 UG/ML
INJECTION, SOLUTION INTRAMUSCULAR; INTRAVENOUS PRN
Status: DISCONTINUED | OUTPATIENT
Start: 2024-02-13 | End: 2024-02-13

## 2024-02-13 RX ORDER — LIDOCAINE HYDROCHLORIDE 20 MG/ML
INJECTION, SOLUTION INFILTRATION; PERINEURAL PRN
Status: DISCONTINUED | OUTPATIENT
Start: 2024-02-13 | End: 2024-02-13

## 2024-02-13 RX ORDER — SODIUM CHLORIDE, SODIUM LACTATE, POTASSIUM CHLORIDE, CALCIUM CHLORIDE 600; 310; 30; 20 MG/100ML; MG/100ML; MG/100ML; MG/100ML
INJECTION, SOLUTION INTRAVENOUS CONTINUOUS PRN
Status: DISCONTINUED | OUTPATIENT
Start: 2024-02-13 | End: 2024-02-13

## 2024-02-13 RX ORDER — PROPOFOL 10 MG/ML
INJECTION, EMULSION INTRAVENOUS CONTINUOUS PRN
Status: DISCONTINUED | OUTPATIENT
Start: 2024-02-13 | End: 2024-02-13

## 2024-02-13 RX ADMIN — FENTANYL CITRATE 25 MCG: 50 INJECTION INTRAMUSCULAR; INTRAVENOUS at 11:56

## 2024-02-13 RX ADMIN — OXYCODONE HYDROCHLORIDE 5 MG: 5 TABLET ORAL at 04:43

## 2024-02-13 RX ADMIN — POLYETHYLENE GLYCOL 3350, SODIUM SULFATE ANHYDROUS, SODIUM BICARBONATE, SODIUM CHLORIDE, POTASSIUM CHLORIDE 2000 ML: 236; 22.74; 6.74; 5.86; 2.97 POWDER, FOR SOLUTION ORAL at 03:46

## 2024-02-13 RX ADMIN — PANTOPRAZOLE SODIUM 40 MG: 40 INJECTION, POWDER, FOR SOLUTION INTRAVENOUS at 19:13

## 2024-02-13 RX ADMIN — HYDROMORPHONE HYDROCHLORIDE 0.3 MG: 1 INJECTION, SOLUTION INTRAMUSCULAR; INTRAVENOUS; SUBCUTANEOUS at 22:53

## 2024-02-13 RX ADMIN — LIDOCAINE HYDROCHLORIDE 20 MG: 20 INJECTION, SOLUTION INFILTRATION; PERINEURAL at 11:58

## 2024-02-13 RX ADMIN — SODIUM CHLORIDE, POTASSIUM CHLORIDE, SODIUM LACTATE AND CALCIUM CHLORIDE: 600; 310; 30; 20 INJECTION, SOLUTION INTRAVENOUS at 11:37

## 2024-02-13 RX ADMIN — HYDROMORPHONE HYDROCHLORIDE 0.3 MG: 1 INJECTION, SOLUTION INTRAMUSCULAR; INTRAVENOUS; SUBCUTANEOUS at 04:05

## 2024-02-13 RX ADMIN — PROPOFOL 150 MCG/KG/MIN: 10 INJECTION, EMULSION INTRAVENOUS at 12:22

## 2024-02-13 RX ADMIN — OXYCODONE HYDROCHLORIDE 5 MG: 5 TABLET ORAL at 00:20

## 2024-02-13 RX ADMIN — HYDROMORPHONE HYDROCHLORIDE 0.3 MG: 1 INJECTION, SOLUTION INTRAMUSCULAR; INTRAVENOUS; SUBCUTANEOUS at 00:02

## 2024-02-13 RX ADMIN — LIDOCAINE HYDROCHLORIDE 40 MG: 20 INJECTION, SOLUTION INFILTRATION; PERINEURAL at 11:54

## 2024-02-13 RX ADMIN — HYDROMORPHONE HYDROCHLORIDE 0.3 MG: 1 INJECTION, SOLUTION INTRAMUSCULAR; INTRAVENOUS; SUBCUTANEOUS at 09:30

## 2024-02-13 RX ADMIN — PROPOFOL 30 MG: 10 INJECTION, EMULSION INTRAVENOUS at 12:11

## 2024-02-13 RX ADMIN — PROPOFOL 100 MCG/KG/MIN: 10 INJECTION, EMULSION INTRAVENOUS at 11:45

## 2024-02-13 RX ADMIN — ONDANSETRON 4 MG: 4 TABLET, ORALLY DISINTEGRATING ORAL at 06:12

## 2024-02-13 RX ADMIN — FENTANYL CITRATE 25 MCG: 50 INJECTION INTRAMUSCULAR; INTRAVENOUS at 11:47

## 2024-02-13 RX ADMIN — LIDOCAINE HYDROCHLORIDE 30 MG: 20 INJECTION, SOLUTION INFILTRATION; PERINEURAL at 12:02

## 2024-02-13 RX ADMIN — LIDOCAINE HYDROCHLORIDE 40 MG: 20 INJECTION, SOLUTION INFILTRATION; PERINEURAL at 11:45

## 2024-02-13 ASSESSMENT — ACTIVITIES OF DAILY LIVING (ADL)
ADLS_ACUITY_SCORE: 35
CHANGE_IN_FUNCTIONAL_STATUS_SINCE_ONSET_OF_CURRENT_ILLNESS/INJURY: NO
ADLS_ACUITY_SCORE: 43
TOILETING: 1-->ASSISTANCE (EQUIPMENT/PERSON) NEEDED
FALL_HISTORY_WITHIN_LAST_SIX_MONTHS: YES
DRESSING/BATHING_DIFFICULTY: YES
ADLS_ACUITY_SCORE: 36
DRESSING/BATHING: BATHING DIFFICULTY, REQUIRES EQUIPMENT;DRESSING DIFFICULTY, REQUIRES EQUIPMENT
WALKING_OR_CLIMBING_STAIRS: STAIR CLIMBING DIFFICULTY, REQUIRES EQUIPMENT
TOILETING: 1-->ASSISTANCE (EQUIPMENT/PERSON) NEEDED (NOT DEVELOPMENTALLY APPROPRIATE)
ADLS_ACUITY_SCORE: 43
ADLS_ACUITY_SCORE: 35
ADLS_ACUITY_SCORE: 35
TOILETING_ISSUES: YES
ADLS_ACUITY_SCORE: 35
WERE_AUXILIARY_AIDS_OFFERED?: YES
ADLS_ACUITY_SCORE: 36
HEARING_DIFFICULTY_OR_DEAF: YES
NUMBER_OF_TIMES_PATIENT_HAS_FALLEN_WITHIN_LAST_SIX_MONTHS: 20
WEAR_GLASSES_OR_BLIND: YES
THE_FOLLOWING_AIDS_WERE_PROVIDED;: PATIENT DECLINED OFFER OF AUXILIARY AIDS
ADLS_ACUITY_SCORE: 44
DOING_ERRANDS_INDEPENDENTLY_DIFFICULTY: YES
COMMUNICATION: DIFFICULTY SPEAKING
DESCRIBE_HEARING_LOSS: HEARING LOSS ON LEFT SIDE
ADLS_ACUITY_SCORE: 35
WALKING_OR_CLIMBING_STAIRS_DIFFICULTY: YES
ADLS_ACUITY_SCORE: 35
CONCENTRATING,_REMEMBERING_OR_MAKING_DECISIONS_DIFFICULTY: YES
ADLS_ACUITY_SCORE: 43
DIFFICULTY_COMMUNICATING: YES
TOILETING_ASSISTANCE: TOILETING DIFFICULTY, ASSISTANCE 1 PERSON;TOILETING DIFFICULTY, REQUIRES EQUIPMENT
DIFFICULTY_EATING/SWALLOWING: YES
EATING/SWALLOWING_MANAGEMENT: ESOPHAGEAL DYSPHAGIA

## 2024-02-13 NOTE — PROGRESS NOTES
[facility]    Progress Note - Medicine Service, ADRIAN TEAM 1       Date of Admission:  2/11/2024    Assessment & Plan   Eduarda Nogueira is a 43 year old female with a PMH of asthma, KURTZ, and obestity s/p gastric bypass who was recently diagnosed with a jejunal ulcer positive for adenocarcinoma. She presented to the ED for concerns of ongoing hematemesis and melena 2/2 bleeding malignancy.     Today:  - BID hemoglobin  - Colonoscopy completed today - no sign of malignant or non-malignant lesions  - tentative plan for surgical intervention 2/14/2024 per surgical oncology  - Clear liquid diet  - NPO at midnight      #upper GI bleed 2/2 jejunal ulcer positive for adenocarcinoma  #acute blood loss anemia   Patient presented to the ED with large volume hematemesis and bright red blood in her stool in the setting of known jejunal adenocarcinoma. Concerns that her ulcer has continued to bleed at this time. Patient hemodynamically stable upon arrival in the ED and has remained hemodynamically stable. Hemoglobin 7.2 upon arrival, but recheck was 6.8. GI consulted in the ED, performed an EGD 2/11 with visualization and temporization of an arterial jejunal bleed. Received one unit pRBCs after this with stabilization of hemoglobin. Additional hemoglobin drop yesterday evening down to 6.8, responsive to 1 unit of pRBCs. Surgical Oncology consulted overnight, with no plan for emergent OR. Colonoscopy completed today is negative for any malignant or non-malignant lesions. Tentative plan is for procedure 2/14/2024, so will continue clear liquid diets today.   - IV pantoprazole 40 mg IV BID (continue for 72 hours post-intervention)  - CBC BID  - transfuse if hemoglobin <7  - GI consulted               > Colonoscopy completed today with no malignant or non-malignant lesions   > clear liquid diet   > NPO at midnight              > CT C/A/P for staging 2/12   > Maintain 2 large bore IVs  "(18-20g) at all times  - Surgical Oncology Consulted               > tentative plan for OR 2/14/2024              >If bleeding continues, urgent OR for tumor resection  - Zofran PRN  - hold PTA PPI       #Asthma  - continue PTA albuterol PRN     #Obesity s/p gastric bypass  Patient with history of gastric bypass. Will continue to monitor for any concerns of worsening abdominal pain, but no specific concerns at this point.     Patient denies taking any other home medications. Notes that she discontinued all her other medications and has only been taking prescribed PPI.        Diet: Clear Liquid Diet    DVT Prophylaxis: Pneumatic Compression Devices  Villalba Catheter: Not present  Fluids: None  Lines: PRESENT             Cardiac Monitoring: ACTIVE order. Indication: GI bleed  Code Status: Full Code      Clinically Significant Risk Factors        # Hypokalemia: Lowest K = 3 mmol/L in last 2 days, will replace as needed       # Hypoalbuminemia: Lowest albumin = 3.2 g/dL at 2/11/2024 11:49 AM, will monitor as appropriate            # Overweight: Estimated body mass index is 28.17 kg/m  as calculated from the following:    Height as of this encounter: 1.6 m (5' 3\").    Weight as of this encounter: 72.1 kg (159 lb)., PRESENT ON ADMISSION            Disposition Plan      Expected Discharge Date: 02/15/2024        Discharge Comments: Likely surgery inpatient. Ostomy possible.        The patient's care was discussed with the attending physician, Dr. Zheng.    Kristan Bloom MD  Internal Medicine Resident, PGY-1  Medicine Service, [address]  Securely message with [employer] (more info)  Text page via [employer] Paging/Directory   See signed in provider for up to date coverage information    ______________________________________________________________________    Interval History   Nursing notes reviewed, patient feeling mostly fine today. Notes that she had multiple " bowel movements overnight with her bowel prep. Otherwise, she continues to endorse chronic pain that she has been struggling with. Anxious about going to procedure this AM, but discussed that she is overall feeling well. Did have an acute hemoglobin drop last night of two points, that recovered well after transfusion.     Physical Exam   Vital Signs: Temp: 97.3  F (36.3  C) Temp src: Oral BP: 102/66 Pulse: 62   Resp: 12 SpO2: 100 %      Weight: 159 lbs 0 oz    Constitutional: awake, alert, cooperative, no apparent distress, and appears stated age  Respiratory: No increased work of breathing, good air exchange, clear to auscultation bilaterally, no crackles or wheezing  Cardiovascular: Normal apical impulse, regular rate and rhythm, normal S1 and S2, no S3 or S4, and no murmur noted  Abdomen: hyperactive bowel sounds, tenderness to palpation throughout abdomen but specific tenderness in RUQ and epigastric region  Neuro: AAOx4, no focal deficits    Medical Decision Making       Please see A&P for additional details of medical decision making.      Data     I have personally reviewed the following data over the past 24 hrs:    9.3  \   10.2 (L)   / 365     141 108 (H) 9.1 /  78   3.5 22 0.71 \       Imaging results reviewed over the past 24 hrs:   No results found for this or any previous visit (from the past 24 hour(s)).

## 2024-02-13 NOTE — PROGRESS NOTES
Paged by primary team regarding ongoing melena with Hgb drop from 8.7 to 6.8 this evening.     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 She underwent an EGD on 2/11 that demonstrated an ulcerated mass extending from GJ 5cm into the jejunum with a bleeding vessel within middle of the tumor field that was treated with epi, bipolar, and hemostatic spray (attempted placing clips but did not hold).     At bedside, she continues to endorse having melena and some epigastric discomfort. HR ranged from 70s to low 80s during my exam and BP was 133/97. Patient was mentating well and abd exam was soft with epigastric tenderness.     Patient is not tachycardic/hypotensive nor is her pulse pressure narrow. Cannot assess UOP as it has not been collected. Low suspicion that patient is actively hemorrhaging and may have a slow oozing bleed.     Spoke to chief resident Dr. Trujillo about findings, and she agreed with transfusion of 1U pRBCs which primary team has already ordered. Additionally, ok to continue Golytely for planned colonoscopy tomorrow.    Addendum: Hgb recheck post-transfusion was 9.4 from 6.8 pre-transfusion. More than adequate response to transfusion.     Sebastian Craig,   General Surgery, PGY-2  [phone]

## 2024-02-13 NOTE — ANESTHESIA POSTPROCEDURE EVALUATION
Patient: Eduarda Nogueira    Procedure: Procedure(s):  Colonoscopy       Anesthesia Type:  MAC    Note:  Disposition: Outpatient   Postop Pain Control: Uneventful            Sign Out: Well controlled pain   PONV: No   Neuro/Psych: Uneventful            Sign Out: Acceptable/Baseline neuro status   Airway/Respiratory: Uneventful            Sign Out: Acceptable/Baseline resp. status   CV/Hemodynamics: Uneventful            Sign Out: Acceptable CV status; No obvious hypovolemia; No obvious fluid overload   Other NRE: NONE   DID A NON-ROUTINE EVENT OCCUR?            Last vitals:  Vitals Value Taken Time   /66 02/13/24 1245   Temp 36.3  C (97.3  F) 02/13/24 1241   Pulse 62 02/13/24 1245   Resp     SpO2 100 % 02/13/24 1248       Electronically Signed By: Rajendra Ryan MD  February 13, 2024  12:54 PM

## 2024-02-13 NOTE — ANESTHESIA CARE TRANSFER NOTE
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    Anesthesia Type:   MAC     Note:    Oropharynx: oropharynx clear of all foreign objects and spontaneously breathing  Level of Consciousness: awake  Oxygen Supplementation: room air    Independent Airway: airway patency satisfactory and stable  Dentition: dentition unchanged  Vital Signs Stable: post-procedure vital signs reviewed and stable  Report to RN Given: handoff report given  Patient transferred to: Phase II    Handoff Report: Identifed the Patient, Identified the Reponsible Provider, Reviewed the pertinent medical history, Discussed the surgical course, Reviewed Intra-OP anesthesia mangement and issues during anesthesia, Set expectations for post-procedure period and Allowed opportunity for questions and acknowledgement of understanding      Vitals:  Vitals Value Taken Time   /79    Temp 36.2C    Pulse 81    Resp 16    SpO2 100 % 02/13/24 1241   Vitals shown include unfiled device data.    Electronically Signed By: LUCIA Ayers CRNA  February 13, 2024  12:42 PM   Detail Level: Detailed General Sunscreen Counseling: I recommended a broad spectrum sunscreen with a SPF of 30 or higher.  I explained that SPF 30 sunscreens block approximately 97 percent of the sun's harmful rays.  Sunscreens should be applied at least 15 minutes prior to expected sun exposure and then every 2 hours after that as long as sun exposure continues. If swimming or exercising sunscreen should be reapplied every 45 minutes to an hour after getting wet or sweating.  One ounce, or the equivalent of a shot glass full of sunscreen, is adequate to protect the skin not covered by a bathing suit. I also recommended a lip balm with a sunscreen as well. Sun protective clothing can be used in lieu of sunscreen but must be worn the entire time you are exposed to the sun's rays.

## 2024-02-13 NOTE — PROGRESS NOTES
"Brief SurgOn progress note  2/13/2024    Seen pt at bedside. Discussed case with GI fellow who performed colonoscopy which did not reveal any colorectal malignancies. We will add her on for exploratory laparotomy and tumor removal tomorrow. I discussed the plan for operation with the patient and went over some of the risks. She would prefer her father to be present for this discussion but assures me that she is her own decision maker. I will further discuss risks and benefits tomorrow with patient and her father.     BP (!) 129/95 (BP Location: Right arm)   Pulse 77   Temp 97.9  F (36.6  C) (Oral)   Resp 16   Ht 1.6 m (5' 3\")   Wt 72.1 kg (159 lb)   SpO2 99%   BMI 28.17 kg/m    Appears comfortable  NLB on RA  RRR  Abdomen soft, mildly tender  Normal gait  Ext WWP    43 year old woman with history of RYGB and recent diagnosis of biopsy proven jejunal adenocarcinoma.     - Plan for OR tomorrow (in add on pool)  - Okay for CLD after colonoscopy  - NPO at MN for OR  - Keep T&S updated  - Continue H&H q12    Natalie Rosales MD  Surgery PGY-5  "

## 2024-02-13 NOTE — PROGRESS NOTES
Brief GI Follow-up Note:     Date: February 13, 2024    43 year old female with a history of RYGB (2022), JASON, prev EtOH use diorder, chronic pain, migraines, asthma, GERD, MJ use disorder, and recently diagnosed jejunal adenocarcinoma is presenting with hematemesis, hematochezia and acute blood loss anemia for which our service is consulted.      #. Jejunal adenocarcinoma, ulcerated  #. UGIB  #. Acute blood loss anemia  #. S/p german-en-Y gastric bypass  -Patient s/p EGD 2/11/2024 that revealed a bleeding jejunal ulcer at the GJ with an adherent clot; irrigation revealed visible vessel with active bleeding. Attempts at mechanical hemostasis with hemoclip's unsuccessful  given friable nature of mucosa; now s/p Bipolar cautery, Epi, and hemospray with ultimate achievement of hemostasis.   -In setting of HDS and absence of further ongoing s/s of GIB, patient prepped for colonoscopy to evaluate for metastatic vs synchronous neoplasia in colon/TI   -Now s/p colonoscopy on 2/13/2024 that was without endoscopic evidence of malignant or premalignant lesions.   -Discussed above with surg-onc, tentative plan for definitive surgical mgmt tomorrow.     Recommendations:   -Continue with IV PPI BID for 72-hours post-intervention:       > then 40 mg PO BID x 8 weeks,         >then 40 mg PO daily x 4 weeks         >If patient undergoes definitive surgical resection, can discontinue if no ulcer/high risk lesion remains.   -Continue trending H/H; q24hr appropriate  -Keep CLD, NPO at midnight for plan OR tomorrow   -If patient were to rebleed or become Hemodynamicalyl unstable, recommend stat CT angiography abdomen pelvis and IR consult versus proceeding to definitive surgical management with surgical oncology   - Hold all anti-coagulant medications in the setting of GI bleeding and planned surgery   - Primary team to trend Hgb. Transfuse for Hgb < 7  or active bleeding. Keep type and screen up to date.  - Monitor for/document signs of  active bleeding & monitor vital signs.  - Maintain 2 large bore IVs (18-20g) at all times    Thank you for involving us in this patient's care. GI-Luminal Service will sign off at this time. Please do not hesitate to contact the GI service with any questions or concerns.       Pt seen and care plan discussed with Dr. Denis, GI staff physician.    Nafisa Ellison MD  GI Fellow

## 2024-02-13 NOTE — PLAN OF CARE
3023-7243:  AVSS. A&Ox4.  Pain controlled on current regimen.  Recently diagnosed jejunal adenocarcinoma.  Colonoscopy performed today, no signs of cancer or bleeding found.  Clear liquid diet now, NPO after midnight.  Plan for surgical resection tomorrow.  Transferred to Unit 5C at 1720.

## 2024-02-13 NOTE — ANESTHESIA POSTPROCEDURE EVALUATION
Patient: Eduarda Nogueira    Procedure: Procedure(s):  Colonoscopy       Anesthesia Type:  MAC    Note:  Disposition: Outpatient   Postop Pain Control: Uneventful            Sign Out: Well controlled pain   PONV: No   Neuro/Psych: Uneventful            Sign Out: Acceptable/Baseline neuro status   Airway/Respiratory: Uneventful            Sign Out: Acceptable/Baseline resp. status   CV/Hemodynamics: Uneventful            Sign Out: Acceptable CV status; No obvious hypovolemia; No obvious fluid overload   Other NRE: NONE   DID A NON-ROUTINE EVENT OCCUR? No           Last vitals:  Vitals Value Taken Time   /66 02/13/24 1245   Temp 36.3  C (97.3  F) 02/13/24 1241   Pulse 62 02/13/24 1245   Resp     SpO2 100 % 02/13/24 1248       Electronically Signed By: Caroline Marshall MD  February 13, 2024  3:04 PM   2018

## 2024-02-13 NOTE — ANESTHESIA PREPROCEDURE EVALUATION
Anesthesia Pre-Procedure Evaluation    Patient: Eduarda Nogueira   MRN: [id number] : 1980        Procedure : Procedure(s):  Colonoscopy          Past Medical History:   Diagnosis Date    Anemia     No Comments Provided    Anxiety state     No Comments Provided    Arthropathy     No Comments Provided    Asthma     No Comments Provided    Backache     No Comments Provided    Edema     No Comments Provided    Esophageal reflux     No Comments Provided    Female stress incontinence     No Comments Provided    Hypothyroidism     No Comments Provided    Irregular menstrual cycle     No Comments Provided    Irritable colon     No Comments Provided    Migraine     No Comments Provided    Morbid obesity (H)     No Comments Provided    Other chronic nonalcoholic liver disease     No Comments Provided    Other depressive disorder     No Comments Provided    Other malaise and fatigue     No Comments Provided    Pain in joint     hips, knees, ankles, feet, neck    Restless legs syndrome     self-diagnosed    Shortness of breath     No Comments Provided    Synovial cyst of popliteal space     No Comments Provided    Vitamin D deficiency     Rx 3/14, re check       Past Surgical History:   Procedure Laterality Date    ATTEMPTED ARTHROSCOPY      x3, rt knee    ESOPHAGOSCOPY, GASTROSCOPY, DUODENOSCOPY (EGD), COMBINED N/A 2024    Procedure: ESOPHAGOGASTRODUODENOSCOPY, WITH BIOPSY;  Surgeon: Precious Albarran MD;  Location: [facility]    EXTRACTION(S) DENTAL      No Comments Provided    LAPAROSCOPIC CHOLECYSTECTOMY          OSTEOTOMY FEMUR DISTAL      right      Allergies   Allergen Reactions    Acetaminophen Hives, Itching, Rash and Shortness Of Breath     Several Rx's for Norco in 2021    No Clinical Screening - See Comments Anaphylaxis     steroids    Gabapentin Unknown and Itching    Hydrocodone-Acetaminophen Itching     Several Rx's for Norco in 2021    Iodinated Contrast Media Unknown and Nausea and  "Vomiting     Extremely anxious, vomited once after CT. No wheezing or SOB.    Latex Itching    Morphine And Related Itching     Several Rx's for Norco in 2021 & 22    Nsaids Other (See Comments)     H/o german-n-y gastric bypass\". AVOID NSAIDs and aspirin due to risk of gastric and/or G-J anastomotic ulcers. If Eduarda must be on short course of NSAIDs or aspirin, use enteric coated if possible and use PPI // Ira Hung RN, Bariatric Nurse Clinician, [facility] 3/23/2022    Oxycodone-Acetaminophen Itching     Several Rx's for Norco in 2021 & 22      Social History     Tobacco Use    Smoking status: Every Day     Packs/day: .3     Types: Cigarettes     Start date: 3/18/2013    Smokeless tobacco: Never    Tobacco comments:     3 cigarettes per day    Substance Use Topics    Alcohol use: Yes     Comment: Alcoholic Drinks/day: Quit 3/2013      Wt Readings from Last 1 Encounters:   02/11/24 72.1 kg (159 lb)        Anesthesia Evaluation   Pt has had prior anesthetic. Type: General and MAC.        ROS/MED HX  ENT/Pulmonary:     (+)                      asthma  Treatment: Inhaler prn,                 Neurologic:     (+)    peripheral neuropathy, - Lower extremity neuropathy 2 to nutritional deficiency.                           Cardiovascular:       METS/Exercise Tolerance:     Hematologic:  - neg hematologic  ROS   (+)       history of blood transfusion, no previous transfusion reaction,        Musculoskeletal:  - neg musculoskeletal ROS     GI/Hepatic: Comment: Jejunal adenocarcinoma  Gastric bypass 2 years ago    (+) GERD,            liver disease,       Renal/Genitourinary:       Endo:     (+)          thyroid problem,     Obesity,       Psychiatric/Substance Use:  - neg psychiatric ROS     Infectious Disease:  - neg infectious disease ROS     Malignancy: Comment: See GI      Other: Comment: Port in place (IV Hydration)           Physical Exam    Airway        Mallampati: II   TM distance: > 3 " "FB   Neck ROM: full   Mouth opening: > 3 cm    Respiratory Devices and Support         Dental       (+) Edentulous      Cardiovascular   cardiovascular exam normal          Pulmonary   pulmonary exam normal                OUTSIDE LABS:  CBC:   Lab Results   Component Value Date    WBC 9.3 02/13/2024    WBC 8.2 02/12/2024    HGB 10.2 (L) 02/13/2024    HGB 9.4 (L) 02/13/2024    HCT 32.3 (L) 02/13/2024    HCT 23.0 (L) 02/12/2024     02/13/2024     02/12/2024     BMP:   Lab Results   Component Value Date     02/13/2024     02/12/2024    POTASSIUM 3.5 02/13/2024    POTASSIUM 3.0 (L) 02/12/2024    CHLORIDE 108 (H) 02/13/2024    CHLORIDE 106 02/12/2024    CO2 22 02/13/2024    CO2 25 02/12/2024    BUN 9.1 02/13/2024    BUN 12.5 02/12/2024    CR 0.71 02/13/2024    CR 0.59 02/12/2024    GLC 78 02/13/2024     (H) 02/12/2024     COAGS:   Lab Results   Component Value Date    INR 1.09 02/11/2024     POC: No results found for: \"BGM\", \"HCG\", \"HCGS\"  HEPATIC:   Lab Results   Component Value Date    ALBUMIN 3.2 (L) 02/11/2024    PROTTOTAL 5.3 (L) 02/11/2024    ALT 26 02/11/2024    AST 34 02/11/2024    ALKPHOS 46 02/11/2024    BILITOTAL 0.2 02/11/2024     OTHER:   Lab Results   Component Value Date    LACT 0.6 (L) 02/11/2024    TAVO 8.5 (L) 02/13/2024    LIPASE 47 02/11/2024    SED 4 09/08/2015       Anesthesia Plan    ASA Status:  3    NPO Status:  NPO Appropriate    Anesthesia Type: MAC.   Induction: Propofol.   Maintenance: TIVA.        Consents    Anesthesia Plan(s) and associated risks, benefits, and realistic alternatives discussed. Questions answered and patient/representative(s) expressed understanding.     - Discussed:     - Discussed with:  Patient      - Extended Intubation/Ventilatory Support Discussed: Yes.      - Patient is DNR/DNI Status: No     Use of blood products discussed: Yes.     - Discussed with: Patient.     - Consented: consented to blood products     Postoperative Care    Pain " management: Multi-modal analgesia.   PONV prophylaxis: Ondansetron (or other 5HT-3)     Comments:              PAC Discussion and Assessment    ASA Classification: 3    Anesthetic techniques and relevant risks discussed: MAC with GA as backup  Invasive monitoring and risk discussed: No    Possibility and Risk of blood transfusion discussed: No  NPO instructions given: NPO after midnight    Needs early admission to pre-op area: No                                            Caroline Marshall MD    I have reviewed the pertinent notes and labs in the chart from the past 30 days and (re)examined the patient.  Any updates or changes from those notes are reflected in this note.

## 2024-02-14 ENCOUNTER — ANESTHESIA (OUTPATIENT)
Dept: SURGERY | Facility: CLINIC | Age: 44
End: 2024-02-14
Payer: COMMERCIAL

## 2024-02-14 LAB
ALBUMIN SERPL BCG-MCNC: 3 G/DL (ref 3.5–5.2)
ANION GAP SERPL CALCULATED.3IONS-SCNC: 10 MMOL/L (ref 7–15)
BUN SERPL-MCNC: 5.3 MG/DL (ref 6–20)
CALCIUM SERPL-MCNC: 7.9 MG/DL (ref 8.6–10)
CHLORIDE SERPL-SCNC: 111 MMOL/L (ref 98–107)
CREAT SERPL-MCNC: 0.64 MG/DL (ref 0.51–0.95)
DEPRECATED HCO3 PLAS-SCNC: 22 MMOL/L (ref 22–29)
EGFRCR SERPLBLD CKD-EPI 2021: >90 ML/MIN/1.73M2
ERYTHROCYTE [DISTWIDTH] IN BLOOD BY AUTOMATED COUNT: 17.2 % (ref 10–15)
ERYTHROCYTE [DISTWIDTH] IN BLOOD BY AUTOMATED COUNT: 17.2 % (ref 10–15)
GLUCOSE BLDC GLUCOMTR-MCNC: 70 MG/DL (ref 70–99)
GLUCOSE BLDC GLUCOMTR-MCNC: 71 MG/DL (ref 70–99)
GLUCOSE BLDC GLUCOMTR-MCNC: 72 MG/DL (ref 70–99)
GLUCOSE SERPL-MCNC: 69 MG/DL (ref 70–99)
HCT VFR BLD AUTO: 26.3 % (ref 35–47)
HCT VFR BLD AUTO: 26.5 % (ref 35–47)
HGB BLD-MCNC: 8.4 G/DL (ref 11.7–15.7)
HGB BLD-MCNC: 8.6 G/DL (ref 11.7–15.7)
HOLD SPECIMEN: NORMAL
MCH RBC QN AUTO: 30 PG (ref 26.5–33)
MCH RBC QN AUTO: 30.2 PG (ref 26.5–33)
MCHC RBC AUTO-ENTMCNC: 31.7 G/DL (ref 31.5–36.5)
MCHC RBC AUTO-ENTMCNC: 32.7 G/DL (ref 31.5–36.5)
MCV RBC AUTO: 92 FL (ref 78–100)
MCV RBC AUTO: 95 FL (ref 78–100)
PLATELET # BLD AUTO: 310 10E3/UL (ref 150–450)
PLATELET # BLD AUTO: 333 10E3/UL (ref 150–450)
POTASSIUM SERPL-SCNC: 3.7 MMOL/L (ref 3.4–5.3)
RBC # BLD AUTO: 2.8 10E6/UL (ref 3.8–5.2)
RBC # BLD AUTO: 2.85 10E6/UL (ref 3.8–5.2)
SODIUM SERPL-SCNC: 143 MMOL/L (ref 135–145)
WBC # BLD AUTO: 6.4 10E3/UL (ref 4–11)
WBC # BLD AUTO: 8.6 10E3/UL (ref 4–11)

## 2024-02-14 PROCEDURE — 250N000025 HC SEVOFLURANE, PER MIN: Performed by: SURGERY

## 2024-02-14 PROCEDURE — C9113 INJ PANTOPRAZOLE SODIUM, VIA: HCPCS

## 2024-02-14 PROCEDURE — 250N000013 HC RX MED GY IP 250 OP 250 PS 637

## 2024-02-14 PROCEDURE — 43860 REVJ GSTR/JJ ANAST W/O VGTMY: CPT | Mod: GC | Performed by: SURGERY

## 2024-02-14 PROCEDURE — 88331 PATH CONSLTJ SURG 1 BLK 1SPC: CPT | Mod: 26 | Performed by: PATHOLOGY

## 2024-02-14 PROCEDURE — 250N000011 HC RX IP 250 OP 636: Performed by: STUDENT IN AN ORGANIZED HEALTH CARE EDUCATION/TRAINING PROGRAM

## 2024-02-14 PROCEDURE — 88305 TISSUE EXAM BY PATHOLOGIST: CPT | Mod: 26 | Performed by: PATHOLOGY

## 2024-02-14 PROCEDURE — 272N000001 HC OR GENERAL SUPPLY STERILE: Performed by: SURGERY

## 2024-02-14 PROCEDURE — 88342 IMHCHEM/IMCYTCHM 1ST ANTB: CPT | Mod: TC | Performed by: SURGERY

## 2024-02-14 PROCEDURE — 710N000010 HC RECOVERY PHASE 1, LEVEL 2, PER MIN: Performed by: SURGERY

## 2024-02-14 PROCEDURE — 250N000011 HC RX IP 250 OP 636: Performed by: SURGERY

## 2024-02-14 PROCEDURE — 258N000003 HC RX IP 258 OP 636: Performed by: STUDENT IN AN ORGANIZED HEALTH CARE EDUCATION/TRAINING PROGRAM

## 2024-02-14 PROCEDURE — 250N000013 HC RX MED GY IP 250 OP 250 PS 637: Performed by: STUDENT IN AN ORGANIZED HEALTH CARE EDUCATION/TRAINING PROGRAM

## 2024-02-14 PROCEDURE — 88307 TISSUE EXAM BY PATHOLOGIST: CPT | Mod: 26 | Performed by: PATHOLOGY

## 2024-02-14 PROCEDURE — 250N000009 HC RX 250: Performed by: STUDENT IN AN ORGANIZED HEALTH CARE EDUCATION/TRAINING PROGRAM

## 2024-02-14 PROCEDURE — 47100 WEDGE BIOPSY OF LIVER: CPT | Mod: 51 | Performed by: SURGERY

## 2024-02-14 PROCEDURE — 80048 BASIC METABOLIC PNL TOTAL CA: CPT

## 2024-02-14 PROCEDURE — 250N000011 HC RX IP 250 OP 636

## 2024-02-14 PROCEDURE — 0DH63UZ INSERTION OF FEEDING DEVICE INTO STOMACH, PERCUTANEOUS APPROACH: ICD-10-PCS | Performed by: SURGERY

## 2024-02-14 PROCEDURE — 88341 IMHCHEM/IMCYTCHM EA ADD ANTB: CPT | Mod: 26 | Performed by: PATHOLOGY

## 2024-02-14 PROCEDURE — 272N000002 HC OR SUPPLY OTHER OPNP: Performed by: SURGERY

## 2024-02-14 PROCEDURE — 0FB00ZZ EXCISION OF LIVER, OPEN APPROACH: ICD-10-PCS | Performed by: SURGERY

## 2024-02-14 PROCEDURE — 85027 COMPLETE CBC AUTOMATED: CPT

## 2024-02-14 PROCEDURE — 88342 IMHCHEM/IMCYTCHM 1ST ANTB: CPT | Mod: 26 | Performed by: PATHOLOGY

## 2024-02-14 PROCEDURE — 99207 PR NO BILLABLE SERVICE THIS VISIT: CPT | Performed by: INTERNAL MEDICINE

## 2024-02-14 PROCEDURE — 120N000002 HC R&B MED SURG/OB UMMC

## 2024-02-14 PROCEDURE — 370N000017 HC ANESTHESIA TECHNICAL FEE, PER MIN: Performed by: SURGERY

## 2024-02-14 PROCEDURE — 0D9600Z DRAINAGE OF STOMACH WITH DRAINAGE DEVICE, OPEN APPROACH: ICD-10-PCS | Performed by: SURGERY

## 2024-02-14 PROCEDURE — 88331 PATH CONSLTJ SURG 1 BLK 1SPC: CPT | Mod: TC | Performed by: SURGERY

## 2024-02-14 PROCEDURE — 360N000077 HC SURGERY LEVEL 4, PER MIN: Performed by: SURGERY

## 2024-02-14 PROCEDURE — 0DB80ZZ EXCISION OF SMALL INTESTINE, OPEN APPROACH: ICD-10-PCS | Performed by: SURGERY

## 2024-02-14 PROCEDURE — 258N000003 HC RX IP 258 OP 636

## 2024-02-14 PROCEDURE — 999N000141 HC STATISTIC PRE-PROCEDURE NURSING ASSESSMENT: Performed by: SURGERY

## 2024-02-14 PROCEDURE — 0DBL0ZZ EXCISION OF TRANSVERSE COLON, OPEN APPROACH: ICD-10-PCS | Performed by: SURGERY

## 2024-02-14 PROCEDURE — 82040 ASSAY OF SERUM ALBUMIN: CPT | Performed by: STUDENT IN AN ORGANIZED HEALTH CARE EDUCATION/TRAINING PROGRAM

## 2024-02-14 RX ORDER — ONDANSETRON 2 MG/ML
4 INJECTION INTRAMUSCULAR; INTRAVENOUS EVERY 6 HOURS PRN
Status: DISCONTINUED | OUTPATIENT
Start: 2024-02-14 | End: 2024-02-27 | Stop reason: HOSPADM

## 2024-02-14 RX ORDER — SODIUM CHLORIDE, SODIUM LACTATE, POTASSIUM CHLORIDE, CALCIUM CHLORIDE 600; 310; 30; 20 MG/100ML; MG/100ML; MG/100ML; MG/100ML
INJECTION, SOLUTION INTRAVENOUS CONTINUOUS PRN
Status: DISCONTINUED | OUTPATIENT
Start: 2024-02-14 | End: 2024-02-14

## 2024-02-14 RX ORDER — SODIUM CHLORIDE, SODIUM LACTATE, POTASSIUM CHLORIDE, CALCIUM CHLORIDE 600; 310; 30; 20 MG/100ML; MG/100ML; MG/100ML; MG/100ML
INJECTION, SOLUTION INTRAVENOUS CONTINUOUS
Status: DISCONTINUED | OUTPATIENT
Start: 2024-02-14 | End: 2024-02-14 | Stop reason: HOSPADM

## 2024-02-14 RX ORDER — DEXAMETHASONE SODIUM PHOSPHATE 4 MG/ML
INJECTION, SOLUTION INTRA-ARTICULAR; INTRALESIONAL; INTRAMUSCULAR; INTRAVENOUS; SOFT TISSUE PRN
Status: DISCONTINUED | OUTPATIENT
Start: 2024-02-14 | End: 2024-02-14

## 2024-02-14 RX ORDER — LIDOCAINE 40 MG/G
CREAM TOPICAL
Status: DISCONTINUED | OUTPATIENT
Start: 2024-02-14 | End: 2024-02-14 | Stop reason: HOSPADM

## 2024-02-14 RX ORDER — SODIUM CHLORIDE, SODIUM LACTATE, POTASSIUM CHLORIDE, CALCIUM CHLORIDE 600; 310; 30; 20 MG/100ML; MG/100ML; MG/100ML; MG/100ML
INJECTION, SOLUTION INTRAVENOUS CONTINUOUS
Status: DISCONTINUED | OUTPATIENT
Start: 2024-02-14 | End: 2024-02-15

## 2024-02-14 RX ORDER — ONDANSETRON 2 MG/ML
4 INJECTION INTRAMUSCULAR; INTRAVENOUS EVERY 30 MIN PRN
Status: DISCONTINUED | OUTPATIENT
Start: 2024-02-14 | End: 2024-02-14 | Stop reason: HOSPADM

## 2024-02-14 RX ORDER — FENTANYL CITRATE 50 UG/ML
25 INJECTION, SOLUTION INTRAMUSCULAR; INTRAVENOUS EVERY 5 MIN PRN
Status: DISCONTINUED | OUTPATIENT
Start: 2024-02-14 | End: 2024-02-14 | Stop reason: HOSPADM

## 2024-02-14 RX ORDER — ONDANSETRON 2 MG/ML
INJECTION INTRAMUSCULAR; INTRAVENOUS PRN
Status: DISCONTINUED | OUTPATIENT
Start: 2024-02-14 | End: 2024-02-14

## 2024-02-14 RX ORDER — HYDRALAZINE HYDROCHLORIDE 20 MG/ML
2.5-5 INJECTION INTRAMUSCULAR; INTRAVENOUS EVERY 10 MIN PRN
Status: DISCONTINUED | OUTPATIENT
Start: 2024-02-14 | End: 2024-02-14 | Stop reason: HOSPADM

## 2024-02-14 RX ORDER — ONDANSETRON 4 MG/1
4 TABLET, ORALLY DISINTEGRATING ORAL EVERY 30 MIN PRN
Status: DISCONTINUED | OUTPATIENT
Start: 2024-02-14 | End: 2024-02-14 | Stop reason: HOSPADM

## 2024-02-14 RX ORDER — NICOTINE POLACRILEX 4 MG
15-30 LOZENGE BUCCAL
Status: DISCONTINUED | OUTPATIENT
Start: 2024-02-14 | End: 2024-02-14

## 2024-02-14 RX ORDER — FENTANYL CITRATE 50 UG/ML
INJECTION, SOLUTION INTRAMUSCULAR; INTRAVENOUS PRN
Status: DISCONTINUED | OUTPATIENT
Start: 2024-02-14 | End: 2024-02-14

## 2024-02-14 RX ORDER — NALOXONE HYDROCHLORIDE 0.4 MG/ML
0.4 INJECTION, SOLUTION INTRAMUSCULAR; INTRAVENOUS; SUBCUTANEOUS
Status: DISCONTINUED | OUTPATIENT
Start: 2024-02-14 | End: 2024-02-27 | Stop reason: HOSPADM

## 2024-02-14 RX ORDER — HYDROXYZINE HYDROCHLORIDE 25 MG/1
25 TABLET, FILM COATED ORAL ONCE
Status: COMPLETED | OUTPATIENT
Start: 2024-02-14 | End: 2024-02-14

## 2024-02-14 RX ORDER — ONDANSETRON 4 MG/1
4 TABLET, ORALLY DISINTEGRATING ORAL EVERY 6 HOURS PRN
Status: DISCONTINUED | OUTPATIENT
Start: 2024-02-14 | End: 2024-02-27 | Stop reason: HOSPADM

## 2024-02-14 RX ORDER — ENOXAPARIN SODIUM 100 MG/ML
40 INJECTION SUBCUTANEOUS
Status: COMPLETED | OUTPATIENT
Start: 2024-02-14 | End: 2024-02-14

## 2024-02-14 RX ORDER — LIDOCAINE 40 MG/G
CREAM TOPICAL
Status: DISCONTINUED | OUTPATIENT
Start: 2024-02-14 | End: 2024-02-27 | Stop reason: HOSPADM

## 2024-02-14 RX ORDER — HYDROMORPHONE HYDROCHLORIDE 1 MG/ML
0.5 INJECTION, SOLUTION INTRAMUSCULAR; INTRAVENOUS; SUBCUTANEOUS
Status: COMPLETED | OUTPATIENT
Start: 2024-02-14 | End: 2024-02-14

## 2024-02-14 RX ORDER — PROCHLORPERAZINE 25 MG
25 SUPPOSITORY, RECTAL RECTAL EVERY 12 HOURS PRN
Status: DISCONTINUED | OUTPATIENT
Start: 2024-02-14 | End: 2024-02-26

## 2024-02-14 RX ORDER — DEXTROSE MONOHYDRATE 25 G/50ML
25-50 INJECTION, SOLUTION INTRAVENOUS
Status: DISCONTINUED | OUTPATIENT
Start: 2024-02-14 | End: 2024-02-14

## 2024-02-14 RX ORDER — HYDROMORPHONE HCL IN WATER/PF 6 MG/30 ML
0.2 PATIENT CONTROLLED ANALGESIA SYRINGE INTRAVENOUS EVERY 5 MIN PRN
Status: DISCONTINUED | OUTPATIENT
Start: 2024-02-14 | End: 2024-02-14 | Stop reason: HOSPADM

## 2024-02-14 RX ORDER — NALOXONE HYDROCHLORIDE 0.4 MG/ML
0.2 INJECTION, SOLUTION INTRAMUSCULAR; INTRAVENOUS; SUBCUTANEOUS
Status: DISCONTINUED | OUTPATIENT
Start: 2024-02-14 | End: 2024-02-27 | Stop reason: HOSPADM

## 2024-02-14 RX ORDER — PROCHLORPERAZINE MALEATE 10 MG
10 TABLET ORAL EVERY 6 HOURS PRN
Status: DISCONTINUED | OUTPATIENT
Start: 2024-02-14 | End: 2024-02-26

## 2024-02-14 RX ORDER — DEXTROSE MONOHYDRATE 50 MG/ML
INJECTION, SOLUTION INTRAVENOUS CONTINUOUS
Status: DISCONTINUED | OUTPATIENT
Start: 2024-02-14 | End: 2024-02-14

## 2024-02-14 RX ORDER — ENOXAPARIN SODIUM 100 MG/ML
40 INJECTION SUBCUTANEOUS EVERY 24 HOURS
Status: DISCONTINUED | OUTPATIENT
Start: 2024-02-15 | End: 2024-02-27 | Stop reason: HOSPADM

## 2024-02-14 RX ORDER — HALOPERIDOL 5 MG/ML
1 INJECTION INTRAMUSCULAR
Status: DISCONTINUED | OUTPATIENT
Start: 2024-02-14 | End: 2024-02-14 | Stop reason: HOSPADM

## 2024-02-14 RX ORDER — ACETAMINOPHEN 325 MG/1
975 TABLET ORAL ONCE
Status: DISCONTINUED | OUTPATIENT
Start: 2024-02-14 | End: 2024-02-14 | Stop reason: HOSPADM

## 2024-02-14 RX ORDER — LABETALOL HYDROCHLORIDE 5 MG/ML
10 INJECTION, SOLUTION INTRAVENOUS
Status: DISCONTINUED | OUTPATIENT
Start: 2024-02-14 | End: 2024-02-14 | Stop reason: HOSPADM

## 2024-02-14 RX ORDER — PROPOFOL 10 MG/ML
INJECTION, EMULSION INTRAVENOUS PRN
Status: DISCONTINUED | OUTPATIENT
Start: 2024-02-14 | End: 2024-02-14

## 2024-02-14 RX ORDER — HYDROMORPHONE HCL IN WATER/PF 6 MG/30 ML
0.4 PATIENT CONTROLLED ANALGESIA SYRINGE INTRAVENOUS EVERY 5 MIN PRN
Status: DISCONTINUED | OUTPATIENT
Start: 2024-02-14 | End: 2024-02-14 | Stop reason: HOSPADM

## 2024-02-14 RX ORDER — LIDOCAINE HYDROCHLORIDE 20 MG/ML
INJECTION, SOLUTION INFILTRATION; PERINEURAL PRN
Status: DISCONTINUED | OUTPATIENT
Start: 2024-02-14 | End: 2024-02-14

## 2024-02-14 RX ORDER — BUPIVACAINE HYDROCHLORIDE 5 MG/ML
INJECTION, SOLUTION PERINEURAL PRN
Status: DISCONTINUED | OUTPATIENT
Start: 2024-02-14 | End: 2024-02-14 | Stop reason: HOSPADM

## 2024-02-14 RX ORDER — CEFAZOLIN SODIUM 2 G/100ML
2 INJECTION, SOLUTION INTRAVENOUS SEE ADMIN INSTRUCTIONS
Status: DISCONTINUED | OUTPATIENT
Start: 2024-02-14 | End: 2024-02-14 | Stop reason: HOSPADM

## 2024-02-14 RX ORDER — FENTANYL CITRATE 50 UG/ML
50 INJECTION, SOLUTION INTRAMUSCULAR; INTRAVENOUS EVERY 5 MIN PRN
Status: DISCONTINUED | OUTPATIENT
Start: 2024-02-14 | End: 2024-02-14 | Stop reason: HOSPADM

## 2024-02-14 RX ORDER — CEFAZOLIN SODIUM 2 G/100ML
2 INJECTION, SOLUTION INTRAVENOUS
Status: COMPLETED | OUTPATIENT
Start: 2024-02-14 | End: 2024-02-14

## 2024-02-14 RX ADMIN — PROPOFOL 100 MG: 10 INJECTION, EMULSION INTRAVENOUS at 10:17

## 2024-02-14 RX ADMIN — FENTANYL CITRATE 50 MCG: 50 INJECTION INTRAMUSCULAR; INTRAVENOUS at 13:17

## 2024-02-14 RX ADMIN — DEXAMETHASONE SODIUM PHOSPHATE 6 MG: 4 INJECTION, SOLUTION INTRA-ARTICULAR; INTRALESIONAL; INTRAMUSCULAR; INTRAVENOUS; SOFT TISSUE at 10:24

## 2024-02-14 RX ADMIN — ONDANSETRON 4 MG: 2 INJECTION INTRAMUSCULAR; INTRAVENOUS at 14:17

## 2024-02-14 RX ADMIN — SUGAMMADEX 150 MG: 100 INJECTION, SOLUTION INTRAVENOUS at 14:29

## 2024-02-14 RX ADMIN — PROPOFOL 30 MG: 10 INJECTION, EMULSION INTRAVENOUS at 14:28

## 2024-02-14 RX ADMIN — METHYLENE BLUE 10 ML: 5 INJECTION INTRAVENOUS at 13:29

## 2024-02-14 RX ADMIN — SODIUM CHLORIDE, POTASSIUM CHLORIDE, SODIUM LACTATE AND CALCIUM CHLORIDE: 600; 310; 30; 20 INJECTION, SOLUTION INTRAVENOUS at 10:11

## 2024-02-14 RX ADMIN — ENOXAPARIN SODIUM 40 MG: 40 INJECTION SUBCUTANEOUS at 10:02

## 2024-02-14 RX ADMIN — SODIUM CHLORIDE, POTASSIUM CHLORIDE, SODIUM LACTATE AND CALCIUM CHLORIDE: 600; 310; 30; 20 INJECTION, SOLUTION INTRAVENOUS at 11:05

## 2024-02-14 RX ADMIN — HYDROMORPHONE HYDROCHLORIDE 0.4 MG: 0.2 INJECTION, SOLUTION INTRAMUSCULAR; INTRAVENOUS; SUBCUTANEOUS at 17:13

## 2024-02-14 RX ADMIN — Medication 2 G: at 14:26

## 2024-02-14 RX ADMIN — FENTANYL CITRATE 100 MCG: 50 INJECTION INTRAMUSCULAR; INTRAVENOUS at 10:14

## 2024-02-14 RX ADMIN — HYDROMORPHONE HYDROCHLORIDE 0.2 MG: 0.2 INJECTION, SOLUTION INTRAMUSCULAR; INTRAVENOUS; SUBCUTANEOUS at 16:14

## 2024-02-14 RX ADMIN — MIDAZOLAM 1 MG: 1 INJECTION INTRAMUSCULAR; INTRAVENOUS at 15:19

## 2024-02-14 RX ADMIN — OXYCODONE HYDROCHLORIDE 5 MG: 5 TABLET ORAL at 05:58

## 2024-02-14 RX ADMIN — FOSAPREPITANT 150 MG: 150 INJECTION, POWDER, LYOPHILIZED, FOR SOLUTION INTRAVENOUS at 12:37

## 2024-02-14 RX ADMIN — PROPOFOL 40 MG: 10 INJECTION, EMULSION INTRAVENOUS at 14:38

## 2024-02-14 RX ADMIN — Medication 20 MG: at 11:09

## 2024-02-14 RX ADMIN — FENTANYL CITRATE 50 MCG: 50 INJECTION INTRAMUSCULAR; INTRAVENOUS at 10:45

## 2024-02-14 RX ADMIN — FENTANYL CITRATE 50 MCG: 50 INJECTION, SOLUTION INTRAMUSCULAR; INTRAVENOUS at 15:49

## 2024-02-14 RX ADMIN — HYDROMORPHONE HYDROCHLORIDE 0.5 MG: 1 INJECTION, SOLUTION INTRAMUSCULAR; INTRAVENOUS; SUBCUTANEOUS at 15:16

## 2024-02-14 RX ADMIN — ONDANSETRON 4 MG: 4 TABLET, ORALLY DISINTEGRATING ORAL at 04:13

## 2024-02-14 RX ADMIN — PHENYLEPHRINE HYDROCHLORIDE 100 MCG: 10 INJECTION INTRAVENOUS at 11:09

## 2024-02-14 RX ADMIN — PHENYLEPHRINE HYDROCHLORIDE 100 MCG: 10 INJECTION INTRAVENOUS at 14:04

## 2024-02-14 RX ADMIN — HYDROMORPHONE HYDROCHLORIDE 0.5 MG: 1 INJECTION, SOLUTION INTRAMUSCULAR; INTRAVENOUS; SUBCUTANEOUS at 13:16

## 2024-02-14 RX ADMIN — HYDROMORPHONE HYDROCHLORIDE 0.3 MG: 1 INJECTION, SOLUTION INTRAMUSCULAR; INTRAVENOUS; SUBCUTANEOUS at 07:55

## 2024-02-14 RX ADMIN — DEXTROSE MONOHYDRATE: 50 INJECTION, SOLUTION INTRAVENOUS at 06:00

## 2024-02-14 RX ADMIN — HYDROMORPHONE HYDROCHLORIDE 0.3 MG: 1 INJECTION, SOLUTION INTRAMUSCULAR; INTRAVENOUS; SUBCUTANEOUS at 04:13

## 2024-02-14 RX ADMIN — LIDOCAINE HYDROCHLORIDE 100 MG: 20 INJECTION, SOLUTION INFILTRATION; PERINEURAL at 10:17

## 2024-02-14 RX ADMIN — SUMATRIPTAN SUCCINATE 25 MG: 25 TABLET ORAL at 00:20

## 2024-02-14 RX ADMIN — MIDAZOLAM 1 MG: 1 INJECTION INTRAMUSCULAR; INTRAVENOUS at 10:09

## 2024-02-14 RX ADMIN — SUCCINYLCHOLINE CHLORIDE 100 MG: 20 INJECTION, SOLUTION INTRAMUSCULAR; INTRAVENOUS; PARENTERAL at 10:17

## 2024-02-14 RX ADMIN — HYDROXYZINE HYDROCHLORIDE 25 MG: 25 TABLET, FILM COATED ORAL at 16:43

## 2024-02-14 RX ADMIN — HYDROMORPHONE HYDROCHLORIDE 0.2 MG: 0.2 INJECTION, SOLUTION INTRAMUSCULAR; INTRAVENOUS; SUBCUTANEOUS at 16:03

## 2024-02-14 RX ADMIN — SODIUM CHLORIDE, POTASSIUM CHLORIDE, SODIUM LACTATE AND CALCIUM CHLORIDE: 600; 310; 30; 20 INJECTION, SOLUTION INTRAVENOUS at 16:16

## 2024-02-14 RX ADMIN — PANTOPRAZOLE SODIUM 40 MG: 40 INJECTION, POWDER, FOR SOLUTION INTRAVENOUS at 07:55

## 2024-02-14 RX ADMIN — PROCHLORPERAZINE EDISYLATE 5 MG: 5 INJECTION INTRAMUSCULAR; INTRAVENOUS at 17:34

## 2024-02-14 RX ADMIN — Medication: at 20:36

## 2024-02-14 RX ADMIN — HYDROMORPHONE HYDROCHLORIDE 0.4 MG: 0.2 INJECTION, SOLUTION INTRAMUSCULAR; INTRAVENOUS; SUBCUTANEOUS at 16:25

## 2024-02-14 RX ADMIN — MIDAZOLAM 1 MG: 1 INJECTION INTRAMUSCULAR; INTRAVENOUS at 10:14

## 2024-02-14 RX ADMIN — PROPOFOL 20 MG: 10 INJECTION, EMULSION INTRAVENOUS at 14:58

## 2024-02-14 RX ADMIN — HYDROMORPHONE HYDROCHLORIDE 0.5 MG: 1 INJECTION, SOLUTION INTRAMUSCULAR; INTRAVENOUS; SUBCUTANEOUS at 20:07

## 2024-02-14 RX ADMIN — FENTANYL CITRATE 25 MCG: 50 INJECTION, SOLUTION INTRAMUSCULAR; INTRAVENOUS at 15:41

## 2024-02-14 RX ADMIN — PROPOFOL 30 MG: 10 INJECTION, EMULSION INTRAVENOUS at 14:55

## 2024-02-14 RX ADMIN — Medication 30 MG: at 10:26

## 2024-02-14 RX ADMIN — Medication 2 G: at 10:24

## 2024-02-14 RX ADMIN — PROPOFOL 30 MG: 10 INJECTION, EMULSION INTRAVENOUS at 14:17

## 2024-02-14 RX ADMIN — FENTANYL CITRATE 25 MCG: 50 INJECTION, SOLUTION INTRAMUSCULAR; INTRAVENOUS at 15:56

## 2024-02-14 RX ADMIN — HYDROMORPHONE HYDROCHLORIDE 0.4 MG: 0.2 INJECTION, SOLUTION INTRAMUSCULAR; INTRAVENOUS; SUBCUTANEOUS at 16:52

## 2024-02-14 RX ADMIN — SODIUM CHLORIDE, POTASSIUM CHLORIDE, SODIUM LACTATE AND CALCIUM CHLORIDE: 600; 310; 30; 20 INJECTION, SOLUTION INTRAVENOUS at 19:28

## 2024-02-14 RX ADMIN — ONDANSETRON 4 MG: 2 INJECTION INTRAMUSCULAR; INTRAVENOUS at 15:46

## 2024-02-14 RX ADMIN — Medication 20 MG: at 11:57

## 2024-02-14 ASSESSMENT — ACTIVITIES OF DAILY LIVING (ADL)
ADLS_ACUITY_SCORE: 43

## 2024-02-14 NOTE — PLAN OF CARE
"VSS. Mild HTN. Reported mild nausea this shift, zofran given. Reports pain to abdomen, legs, and head. Imitrex given once, dilaudid twice and oxycodone. Up independently. NPO this am. Blood glucose 69 with lab drawn and 71 with meter, started D5 this am.       Problem: Fatigue  Goal: Improved Activity Tolerance  Outcome: Not Progressing  Intervention: Promote Improved Energy  Recent Flowsheet Documentation  Taken 2/14/2024 0400 by Yarelis Bradley, RN  Activity Management: activity adjusted per tolerance  Taken 2/13/2024 2300 by Yarelis Bradley, RN  Fatigue Management: frequent rest breaks encouraged  Activity Management: activity adjusted per tolerance     Problem: Adult Inpatient Plan of Care  Goal: Plan of Care Review  Description: The Plan of Care Review/Shift note should be completed every shift.  The Outcome Evaluation is a brief statement about your assessment that the patient is improving, declining, or no change.  This information will be displayed automatically on your shift  note.  Outcome: Progressing  Goal: Patient-Specific Goal (Individualized)  Description: You can add care plan individualizations to a care plan. Examples of Individualization might be:  \"Parent requests to be called daily at 9am for status\", \"I have a hard time hearing out of my right ear\", or \"Do not touch me to wake me up as it startles  me\".  Outcome: Progressing  Goal: Absence of Hospital-Acquired Illness or Injury  Outcome: Progressing  Intervention: Identify and Manage Fall Risk  Recent Flowsheet Documentation  Taken 2/14/2024 0400 by Yarelis Bradley, RN  Safety Promotion/Fall Prevention:   activity supervised   clutter free environment maintained   increased rounding and observation   mobility aid in reach   safety round/check completed  Taken 2/13/2024 2300 by Yarelis Bradley, RN  Safety Promotion/Fall Prevention:   activity supervised   clutter free environment maintained   increased rounding and observation   mobility aid in reach   safety " round/check completed  Taken 2/13/2024 2000 by Yarelis Bradley RN  Safety Promotion/Fall Prevention:   activity supervised   clutter free environment maintained   increased rounding and observation   mobility aid in reach   safety round/check completed  Intervention: Prevent Skin Injury  Recent Flowsheet Documentation  Taken 2/13/2024 2300 by Yarelis Bradley RN  Body Position: position changed independently  Skin Protection:   protective footwear used   transparent dressing maintained  Device Skin Pressure Protection: tubing/devices free from skin contact  Intervention: Prevent Infection  Recent Flowsheet Documentation  Taken 2/14/2024 0400 by Yarelis Bradley RN  Infection Prevention:   single patient room provided   visitors restricted/screened   rest/sleep promoted   personal protective equipment utilized  Taken 2/13/2024 2300 by Yarelis Bradley RN  Infection Prevention:   single patient room provided   visitors restricted/screened   rest/sleep promoted   personal protective equipment utilized  Taken 2/13/2024 2000 by Yarelis Bradley RN  Infection Prevention:   single patient room provided   visitors restricted/screened   rest/sleep promoted   personal protective equipment utilized  Goal: Optimal Comfort and Wellbeing  Outcome: Progressing  Intervention: Monitor Pain and Promote Comfort  Recent Flowsheet Documentation  Taken 2/14/2024 0558 by Yarelis Bradley RN  Pain Management Interventions: medication (see MAR)  Taken 2/14/2024 0413 by Yarelis Bradley RN  Pain Management Interventions: medication (see MAR)  Taken 2/14/2024 0020 by Yarelis Bradley RN  Pain Management Interventions: medication (see MAR)  Taken 2/13/2024 2253 by Yarelis Bradley RN  Pain Management Interventions: medication (see MAR)  Goal: Readiness for Transition of Care  Outcome: Progressing     Problem: Fall Injury Risk  Goal: Absence of Fall and Fall-Related Injury  Outcome: Progressing  Intervention: Identify and Manage Contributors  Recent Flowsheet Documentation  Taken  2/14/2024 0400 by Yarelis Bradley RN  Medication Review/Management: medications reviewed  Taken 2/13/2024 2300 by Yarelis Bradley RN  Medication Review/Management: medications reviewed  Taken 2/13/2024 2000 by Yarelis Bradley RN  Medication Review/Management: medications reviewed  Intervention: Promote Injury-Free Environment  Recent Flowsheet Documentation  Taken 2/14/2024 0400 by Yarelis Bradley RN  Safety Promotion/Fall Prevention:   activity supervised   clutter free environment maintained   increased rounding and observation   mobility aid in reach   safety round/check completed  Taken 2/13/2024 2300 by Yarelis Bradley RN  Safety Promotion/Fall Prevention:   activity supervised   clutter free environment maintained   increased rounding and observation   mobility aid in reach   safety round/check completed  Taken 2/13/2024 2000 by Yarelis Bradley RN  Safety Promotion/Fall Prevention:   activity supervised   clutter free environment maintained   increased rounding and observation   mobility aid in reach   safety round/check completed     Problem: Gastrointestinal Bleeding  Goal: Optimal Coping with Acute Illness  Outcome: Progressing  Goal: Hemostasis  Outcome: Progressing   Goal Outcome Evaluation:

## 2024-02-14 NOTE — PROVIDER NOTIFICATION
"MD paged, re \"Blood sugar of 69 and 71. NPO. Can we get PRN dextrose incase of dropping while waiting for surgery?\"    Ordered glycemic protocol and D5 continuous infusion.   "

## 2024-02-14 NOTE — PROGRESS NOTES
Double skin check with second RN, JR Saunders. Noted rash on chest, arms, abdomen, no new per pt. One tunneling wound on abdomen under fold. Some blanchable redness between buttocks, pt reports multiple stools due to medication received recently.

## 2024-02-14 NOTE — PROGRESS NOTES
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 She presented to the ED for concerns of ongoing hematemesis and melena 2/2 bleeding malignancy.     Today:  - OR today for exploratory laparotomy and tumor resection  - monitoring hemoglobin post-procedure  - will monitor for additional recommendations after out of procedure  - on D5 this AM for some hypoglycemia, will monitor after out of procedure to discontinue    #upper GI bleed 2/2 jejunal ulcer positive for adenocarcinoma  #acute blood loss anemia   Patient presented to the ED with large volume hematemesis and bright red blood in her stool in the setting of known jejunal adenocarcinoma. Concerns that her ulcer has continued to bleed at this time. Patient hemodynamically stable upon arrival in the ED and has remained hemodynamically stable. Hemoglobin 7.2 upon arrival, but recheck was 6.8. GI consulted in the ED, performed an EGD 2/11 with visualization and temporization of an arterial jejunal bleed. Received one unit pRBCs after this with stabilization of hemoglobin. Additional hemoglobin drop yesterday evening down to 6.8, responsive to 1 unit of pRBCs. Surgical Oncology consulted overnight, with no plan for emergent OR. Colonoscopy completed today is negative for any malignant or non-malignant lesions. In the OR today for exploratory laparotomy and tumor resection.   - IV pantoprazole 40 mg IV BID (continue for 72 hours post-intervention), will likely be able to discontinue after tumor resection  - CBC BID  - transfuse if hemoglobin <7  - consented for blood  - GI consulted               > Colonoscopy completed today with no malignant or non-malignant lesions               > CT C/A/P for staging 2/12   > Maintain 2 large bore IVs (18-20g) at all times  - Surgical Oncology Consulted               > In OR 2/14/2024 for exploratory laparotomy and tumor resection              > awaiting any further recommendations pending finishing procedure  - Zofran PRN  - hold PTA PPI     #Asthma  - continue PTA albuterol PRN     #Obesity s/p gastric bypass  Patient with history of gastric bypass. Will continue to monitor for any concerns of worsening abdominal pain, but no specific concerns at this point.    #Migraines  Patient has noted history of migraines. Was on prior medications of divalproex and pregabalin at home for migraines, but upon admission to [facility], patient noted she has not been taking any of these medications. Did note migraines at one point during her stay and was treated with sumatriptan and reglan with improvement in her symptoms.  - will continue to evaluate need for additional treatment    #Prior history of L parietal ischemic stroke 2/2 to emboli  Previous discharge summaries discuss patient with concern for possible CVA in June of 2023. Had presented with symptoms that were consistent with previous stroke that she had had in 2022. L parietal ischemic stroke from 2022 noted to be likely embolic with evidence of DVT and PFO s/p closure. MRI on this admission did not show any new acute infarct. Had been recommended to be on aspirin at this time.  Patient noted that she has not been taking apixaban or aspirin.       #Chronic Pain syndrome  Thought to be fibromyalgia. Patient previously on pregabalin, robaxin, and nortriptyline. No longer taking these.  - dilaudid 0.3 mg PRN  - oxycodone 5 mg Q4H PRN        Diet: NPO per Anesthesia Guidelines for Procedure/Surgery Except for: Meds    DVT Prophylaxis: Pneumatic Compression Devices  Villalba Catheter: PRESENT, indication:    Fluids: None  Lines: PRESENT      Port a Cath 02/09/24 Single Lumen Right Chest wall-Site Assessment: WDL     "  Cardiac Monitoring: ACTIVE order. Indication: GI bleed  Code Status: Full Code      Clinically Significant Risk Factors              # Hypoalbuminemia: Lowest albumin = 3.2 g/dL at 2/11/2024 11:49 AM, will monitor as appropriate            # Overweight: Estimated body mass index is 28.17 kg/m  as calculated from the following:    Height as of this encounter: 1.6 m (5' 3\").    Weight as of this encounter: 72.1 kg (159 lb)., PRESENT ON ADMISSION            Disposition Plan     Expected Discharge Date: 02/15/2024        Discharge Comments: Likely surgery inpatient. Ostomy possible.        The patient's care was discussed with the attending physician, Dr. Zheng.    Kristan Bloom MD  Internal Medicine Resident, PGY-1  Medicine Service, [address]  Securely message with "[employer] (more info)  Text page via [facility] Paging/Directory   See signed in provider for up to date coverage information    ______________________________________________________________________    Interval History   Nursing notes reviewed, patient slightly hypoglycemic this AM but has been NPO since MN and on clear liquid diet with decreased intake. Patient seen briefly before heading to OR, but with few questions. Patient is excited to get her procedure done this AM.     Physical Exam   Vital Signs: Temp: 98  F (36.7  C) Temp src: Oral BP: (!) 147/97 Pulse: 72   Resp: 16 SpO2: 99 % O2 Device: None (Room air)    Weight: 159 lbs 0 oz    Constitutional: awake, alert, cooperative, no apparent distress, and appears stated age  Respiratory: No increased work of breathing, good air exchange, clear to auscultation bilaterally, no crackles or wheezing  Cardiovascular: Normal apical impulse, regular rate and rhythm, normal S1 and S2, no S3 or S4, and no murmur noted  Abdomen: hyperactive bowel sounds, tenderness to palpation throughout abdomen but specific tenderness in RUQ and epigastric " region  Neuro: AAOx4, no focal deficits    Medical Decision Making       Please see A&P for additional details of medical decision making.      Data     I have personally reviewed the following data over the past 24 hrs:    6.4  \   8.6 (L)   / 310     143 111 (H) 5.3 (L) /  72   3.7 22 0.64 \       Imaging results reviewed over the past 24 hrs:   No results found for this or any previous visit (from the past 24 hour(s)).

## 2024-02-14 NOTE — ANESTHESIA CARE TRANSFER NOTE
Patient: Eduarda Nogueira    Procedure: Procedure(s):  SMALL BOWEL RESECTION, PARTIAL COLON RESECTION, PARTIAL LIVER RESECTION, PARTIAL GASTRIC RESECTION, GASTROSTOMY TUBE PLACEMENT  EXPLORATORY LAPAROTOMY       Diagnosis: Upper GI bleed [K92.2]  Diagnosis Additional Information: No value filed.    Anesthesia Type:   General     Note:  Anesthesia Care Transfer Notewriter  Vitals:  Vitals Value Taken Time   /96 02/14/24 1527   Temp     Pulse 93 02/14/24 1529   Resp     SpO2 100 % 02/14/24 1529   Vitals shown include unfiled device data.    Electronically Signed By: LUCIA Naqvi CRNA  February 14, 2024  3:30 PM

## 2024-02-14 NOTE — PROVIDER NOTIFICATION
"Dr. Davies paged re \" Bilateral swelling to her legs. It seems to be a little more tonight. She is also having tingling that seems worse. Tingling mostly in bottom of feet, radiates up her legs into thighs. \"    MD to pass on to primary team.   "

## 2024-02-14 NOTE — ANESTHESIA PROCEDURE NOTES
Airway       Patient location during procedure: OR       Procedure Start/Stop Times: 2/14/2024 10:18 AM  Staff -        Anesthesiologist:  Feliciano Linder MD       Resident/Fellow: James Patterson MD       Performed By: resident  Consent for Airway        Urgency: elective  Indications and Patient Condition       Indications for airway management: tres-procedural       Induction type:RSI       Mask difficulty assessment: 0 - not attempted    Final Airway Details       Final airway type: endotracheal airway       Successful airway: ETT - single  Endotracheal Airway Details        ETT size (mm): 7.0       Cuffed: yes       Successful intubation technique: direct laryngoscopy       DL Blade Type: MAC 4       Grade View of Cords: 1       Adjucts: stylet       Position: Right       Measured from: lips       Secured at (cm): 21       Bite block used: Soft    Post intubation assessment        Placement verified by: capnometry, equal breath sounds and chest rise        Number of attempts at approach: 1       Number of other approaches attempted: 0       Secured with: sutures       Ease of procedure: easy       Dentition: Intact and Unchanged    Medication(s) Administered   Medication Administration Time: 2/14/2024 10:18 AM

## 2024-02-14 NOTE — OP NOTE
Preoperative Diagnosis: Adenocarcinoma of the jejunum    Postoperative Diagnosis: Same    Procedure: Exploratory laparotomy excisional liver biopsy, small bowel resection, partial colon resection, partial liver resection, partial gastric resection, and gastrostomy tube placement    Surgeons: Dr. Christoph Krishnamurthy and Dr. Natalie Rosales    Anesthesia: General    Indications for Surgery: The patient is a 43-year-old woman with a history of a Addis-en-Y gastric bypass.  She has developed upper GI bleeding and was found to have an ulcerated adenocarcinoma in the proximal jejunum near her gastrojejunostomy anastomosis.  She has no evidence of metastatic disease.    Procedure in Detail: After informed consent the patient was brought the operative room and given a general anesthetic.  She was prepped and draped in usual fashion.  I made a midline incision with a scalpel.  The Bovie cautery was used to incise the subcutaneous tissues and the fascia.  The peritoneal cavity was entered.  There was no evidence of any peritoneal metastasis.  There were numerous large and small cyst on her liver.  There was 1 cyst in the mass that seem to dominant in the left lateral segment.  I excised this 2 cm area with a ligature.  The specimen was sent for frozen for frozen section and was negative for metastatic disease.  We did  feel near the gastrojejunostomy anastomosis.  The mass in the jejunum were adherent to the liver.  In fact weight in size the capsule of the liver and then dissected around the mass.  We left a section of the liver on the mass.  We obtained hemostasis in the liver with Prolene sutures and with clips.  We could appreciate the mass better at this point we can see that the mass was involving the jejunum just distal to the anastomosis the gastric remnant and the transverse colon.  At this point we divided the jejunum distal to the mass.  This was performed with a BRITTNEY stapler.  The mesentery was divided with the LigaSure.  Next  we can see a small knuckle of the transverse colon was involved with the mass.  We freed up the transverse colon and then divided the transverse colon obliquely.  This was performed with a BRITTNEY stapler.  There was no narrowing of the colon.  We felt the NG tube in the gastric pouch.  We divided the gastric pouch just proximal to the anastomosis with a BRITTNEY stapler.  The last attachments of the mass or on the disconnected stomach.  We divided the disconnect the stomach with a BRITTNEY stapler and sent the specimen to pathology.  I thought the margin was close on the stomach and so I sent a second margin at the staple line.  I then closed the gastrotomy with a running 3-0 PDS suture for the seromuscular layer and interrupted 3-0 silk sutures for the seromuscular layer.  Next we performed a gastrotomy tube in the distal stomach.  A pursestring suture was performed with a 3-0 silk suture.  An 18 Palestinian Villalba catheter was placed in the left upper quadrant and into the stomach and the pursestring was tied.  I then sutured the stomach to the abdominal wall with 2-0 silk sutures.  Next we injected methylene blue into the Villalba catheter and found no leak at the suture line.  We performed a new gastro jejunal anastomosis with 29 EEA stapler. The stapler was introduced through the jejunum.  The pin and the anvil were connected.  The anastomosis was performed successfully and there were 2 rings in the stapler.  A methylene balloon leak test was performed and there was no leak.  Next we performed tap blocks using quarter percent Marcaine.  The fascia was closed with a running 2-0 PDS suture.  The skin was closed with interrupted 3-0 Vicryl sutures.  The skin was closed a running 4-0 PDS.  Pressure dressings were placed the patient was taken the recovery room in stable condition.      Christoph Krishnamurthy MD, MS  Surgical Oncology

## 2024-02-14 NOTE — BRIEF OP NOTE
[facility]    Brief Operative Note    Pre-operative diagnosis: Upper GI bleed [K92.2]  Post-operative diagnosis Same as pre-operative diagnosis    Procedure: SMALL BOWEL RESECTION, PARTIAL COLON RESECTION, PARTIAL LIVER RESECTION, PARTIAL GASTRIC RESECTION, GASTROSTOMY TUBE PLACEMENT, N/A - Abdomen  EXPLORATORY LAPAROTOMY, N/A - Abdomen    Surgeon: Surgeon(s) and Role:     * Christoph Krishnamurthy MD - Primary     * Natalie Rosales MD - Resident - Assisting     * Sebastián Flores MD - Resident - Assisting  Anesthesia: General   Estimated Blood Loss: 300 ml    Drains: 18 fr jones gastrostomy tube  Specimens:   ID Type Source Tests Collected by Time Destination   1 : Liver Biopsy, Frozen Biopsy Liver SURGICAL PATHOLOGY EXAM Christoph Krishnamurthy MD 2/14/2024 11:11 AM    2 : Stomach, small bowel, transverse colon, and liver Tissue Stomach SURGICAL PATHOLOGY EXAM Christoph Krishnamurthy MD 2/14/2024 12:32 PM    3 : Gastric staple line Tissue Stomach SURGICAL PATHOLOGY EXAM Christoph Krishnamurthy MD 2/14/2024 12:41 PM      Findings:   GJ tumor invasive to transverse colon, liver and gastric remnant .  Complications: Misfire of TIA stapler leading to blood loss and requirement of gastric remnant resection .  Implants: * No implants in log *    Natalie Rosales MD  Surgery PGY-5

## 2024-02-14 NOTE — CONSULTS
"SPIRITUAL HEALTH SERVICES Consult Note  [facility]C    Saw pt Eduarda Nogueira per STAT Consult. Eduarda requested prayer and emotional support before her surgery. Eduarda's Amish benson is very important to her. She reported feeling \"overwhelmed, up here\" as she gestured to her head, and \"incredibly at peace here\" as she gestured to her heart. Eduarda requested a follow up from [facility] after her surgery. [facility] will continue to follow and remains available.    Dorian Dye  Chaplain Resident  Pager [phone]    * [facility] remains available 24/7 for emergent requests/referrals, either by having the switchboard page the on-call  or by entering an ASAP/STAT consult in Epic (this will also page the on-call ).*     "

## 2024-02-14 NOTE — PLAN OF CARE
Goal Outcome Evaluation:      Plan of Care Reviewed With: patient    Overall Patient Progress: improvingOverall Patient Progress: improving    Pt denied pain.  Denied nausea; on clear liquids until midnight when she is NPO for surgery to remove cancer in small bowel - am.  Needs pm pre-surgical shower yet tonight. Legs to toes are swollen - moderate level of non-pitting edema. Does have a wheelchair in her room, but is up independently.

## 2024-02-14 NOTE — ANESTHESIA PREPROCEDURE EVALUATION
Anesthesia Pre-Procedure Evaluation    Patient: Eduarda Nogueira   MRN: [id number] : 1980        Procedure : Procedure(s):  GASTROJEJUNOSTOMY, OPEN  EXCISION, SMALL INTESTINE, WITHOUT OSTOMY CREATION          Past Medical History:   Diagnosis Date    Anemia     No Comments Provided    Anxiety state     No Comments Provided    Arthropathy     No Comments Provided    Asthma     No Comments Provided    Backache     No Comments Provided    Edema     No Comments Provided    Esophageal reflux     No Comments Provided    Female stress incontinence     No Comments Provided    Hypothyroidism     No Comments Provided    Irregular menstrual cycle     No Comments Provided    Irritable colon     No Comments Provided    Migraine     No Comments Provided    Morbid obesity (H)     No Comments Provided    Other chronic nonalcoholic liver disease     No Comments Provided    Other depressive disorder     No Comments Provided    Other malaise and fatigue     No Comments Provided    Pain in joint     hips, knees, ankles, feet, neck    Restless legs syndrome     self-diagnosed    Shortness of breath     No Comments Provided    Synovial cyst of popliteal space     No Comments Provided    Vitamin D deficiency     Rx 3/14, re check       Past Surgical History:   Procedure Laterality Date    ATTEMPTED ARTHROSCOPY      x3, rt knee    COLONOSCOPY N/A 2024    Procedure: Colonoscopy;  Surgeon: Cuauhtemoc Denis MD;  Location:  GI    ESOPHAGOSCOPY, GASTROSCOPY, DUODENOSCOPY (EGD), COMBINED N/A 2024    Procedure: ESOPHAGOGASTRODUODENOSCOPY, WITH BIOPSY;  Surgeon: Precious Albarran MD;  Location:  GI    EXTRACTION(S) DENTAL      No Comments Provided    LAPAROSCOPIC CHOLECYSTECTOMY      2010    OSTEOTOMY FEMUR DISTAL      right      Allergies   Allergen Reactions    Acetaminophen Hives, Itching, Rash and Shortness Of Breath     Several Rx's for Norco in  &     No Clinical Screening - See Comments Anaphylaxis      "steroids    Gabapentin Unknown and Itching    Hydrocodone-Acetaminophen Itching     Several Rx's for Norco in 2021 & 22    Iodinated Contrast Media Unknown and Nausea and Vomiting     Extremely anxious, vomited once after CT. No wheezing or SOB.    Latex Itching    Morphine And Related Itching     Several Rx's for Norco in 2021 & 22    Nsaids Other (See Comments)     H/o german-n-y gastric bypass\". AVOID NSAIDs and aspirin due to risk of gastric and/or G-J anastomotic ulcers. If Eduarda must be on short course of NSAIDs or aspirin, use enteric coated if possible and use PPI // Ira Hung RN, Bariatric Nurse Clinician, [facility] 3/23/2022    Oxycodone-Acetaminophen Itching     Several Rx's for Norco in 2021 & 22      Social History     Tobacco Use    Smoking status: Every Day     Packs/day: .3     Types: Cigarettes     Start date: 3/18/2013    Smokeless tobacco: Never    Tobacco comments:     3 cigarettes per day    Substance Use Topics    Alcohol use: Yes     Comment: Alcoholic Drinks/day: Quit 3/2013      Wt Readings from Last 1 Encounters:   02/11/24 72.1 kg (159 lb)          43 year old female with a history of RYGB (2022), JASON, prev EtOH use diorder, chronic pain, migraines, asthma, GERD, MJ use disorder, and recently diagnosed jejunal adenocarcinoma is presenting with hematemesis, hematochezia and acute blood loss anemia s/p colonoscopy 2/14.   Anesthesia Evaluation   Pt has had prior anesthetic. Type: General and MAC.    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         TIA, date: 11/2023,                 Cardiovascular:     (+)  - -   -  - -                                 Previous cardiac testing   Echo: Date: Results:    Stress Test:  Date: Results:    ECG Reviewed:  Date: " "2/11/24 Results:  Sinus rhythm  Cath:  Date: Results:      METS/Exercise Tolerance:     Hematologic:  - neg hematologic  ROS   (+)      anemia, history of blood transfusion, no previous transfusion reaction,        Musculoskeletal:  - neg musculoskeletal ROS     GI/Hepatic: Comment: Jejunal adenocarcinoma, ulcerated   UGIB  Gastric bypass 2 years ago    (+) GERD,            liver disease,       Renal/Genitourinary:  - neg Renal ROS     Endo:     (+)          thyroid problem,     Obesity,       Psychiatric/Substance Use:  - neg psychiatric ROS     Infectious Disease:  - neg infectious disease ROS     Malignancy: Comment: See GI      Other: Comment: Port in place (IV Hydration)           Physical Exam    Airway        Mallampati: I   TM distance: > 3 FB   Neck ROM: full   Mouth opening: > 3 cm    Respiratory Devices and Support         Dental       (+) Edentulous      Cardiovascular   cardiovascular exam normal          Pulmonary   pulmonary exam normal                OUTSIDE LABS:  CBC:   Lab Results   Component Value Date    WBC 6.4 02/14/2024    WBC 7.1 02/13/2024    HGB 8.6 (L) 02/14/2024    HGB 8.8 (L) 02/13/2024    HCT 26.3 (L) 02/14/2024    HCT 27.2 (L) 02/13/2024     02/14/2024     02/13/2024     BMP:   Lab Results   Component Value Date     02/14/2024     02/13/2024    POTASSIUM 3.7 02/14/2024    POTASSIUM 3.5 02/13/2024    CHLORIDE 111 (H) 02/14/2024    CHLORIDE 108 (H) 02/13/2024    CO2 22 02/14/2024    CO2 22 02/13/2024    BUN 5.3 (L) 02/14/2024    BUN 9.1 02/13/2024    CR 0.64 02/14/2024    CR 0.71 02/13/2024    GLC 72 02/14/2024    GLC 70 02/14/2024     COAGS:   Lab Results   Component Value Date    INR 1.09 02/11/2024     POC: No results found for: \"BGM\", \"HCG\", \"HCGS\"  HEPATIC:   Lab Results   Component Value Date    ALBUMIN 3.2 (L) 02/11/2024    PROTTOTAL 5.3 (L) 02/11/2024    ALT 26 02/11/2024    AST 34 02/11/2024    ALKPHOS 46 02/11/2024    BILITOTAL 0.2 02/11/2024 "     OTHER:   Lab Results   Component Value Date    LACT 0.6 (L) 02/11/2024    TAVO 7.9 (L) 02/14/2024    LIPASE 47 02/11/2024    SED 4 09/08/2015       Anesthesia Plan    ASA Status:  3    NPO Status:  NPO Appropriate    Anesthesia Type: General.     - Airway: ETT   Induction: Propofol, RSI.   Maintenance: Balanced.   Techniques and Equipment:     - Lines/Monitors: 2nd IV, BIS, PICC in situ     - Blood: T&C, T&S     Consents    Anesthesia Plan(s) and associated risks, benefits, and realistic alternatives discussed. Questions answered and patient/representative(s) expressed understanding.     - Discussed: Risks, Benefits and Alternatives for BOTH SEDATION and the PROCEDURE were discussed     - Discussed with:  Patient      - Extended Intubation/Ventilatory Support Discussed: No.      - Patient is DNR/DNI Status: No     Use of blood products discussed: Yes.     - Discussed with: Patient.     - Consented: consented to blood products     Postoperative Care    Pain management: IV analgesics, Oral pain medications, Multi-modal analgesia.   PONV prophylaxis: Ondansetron (or other 5HT-3), Dexamethasone or Solumedrol, Aprepitant     Comments:               James Patterson MD    I have reviewed the pertinent notes and labs in the chart from the past 30 days and (re)examined the patient.  Any updates or changes from those notes are reflected in this note.

## 2024-02-15 ENCOUNTER — APPOINTMENT (OUTPATIENT)
Dept: PHYSICAL THERAPY | Facility: CLINIC | Age: 44
End: 2024-02-15
Attending: STUDENT IN AN ORGANIZED HEALTH CARE EDUCATION/TRAINING PROGRAM
Payer: COMMERCIAL

## 2024-02-15 ENCOUNTER — APPOINTMENT (OUTPATIENT)
Dept: GENERAL RADIOLOGY | Facility: CLINIC | Age: 44
End: 2024-02-15
Payer: COMMERCIAL

## 2024-02-15 ENCOUNTER — PRE VISIT (OUTPATIENT)
Dept: SURGERY | Facility: CLINIC | Age: 44
End: 2024-02-15

## 2024-02-15 ENCOUNTER — APPOINTMENT (OUTPATIENT)
Dept: GENERAL RADIOLOGY | Facility: CLINIC | Age: 44
End: 2024-02-15
Attending: STUDENT IN AN ORGANIZED HEALTH CARE EDUCATION/TRAINING PROGRAM
Payer: COMMERCIAL

## 2024-02-15 LAB
ALBUMIN SERPL BCG-MCNC: 2.8 G/DL (ref 3.5–5.2)
ALP SERPL-CCNC: 47 U/L (ref 40–150)
ALT SERPL W P-5'-P-CCNC: 74 U/L (ref 0–50)
ANION GAP SERPL CALCULATED.3IONS-SCNC: 13 MMOL/L (ref 7–15)
AST SERPL W P-5'-P-CCNC: 167 U/L (ref 0–45)
BASOPHILS # BLD AUTO: 0.1 10E3/UL (ref 0–0.2)
BASOPHILS NFR BLD AUTO: 0 %
BILIRUB SERPL-MCNC: 0.4 MG/DL
BUN SERPL-MCNC: 6.1 MG/DL (ref 6–20)
CALCIUM SERPL-MCNC: 8.2 MG/DL (ref 8.6–10)
CHLORIDE SERPL-SCNC: 103 MMOL/L (ref 98–107)
CREAT SERPL-MCNC: 0.72 MG/DL (ref 0.51–0.95)
DEPRECATED HCO3 PLAS-SCNC: 22 MMOL/L (ref 22–29)
EGFRCR SERPLBLD CKD-EPI 2021: >90 ML/MIN/1.73M2
EOSINOPHIL # BLD AUTO: 0.1 10E3/UL (ref 0–0.7)
EOSINOPHIL NFR BLD AUTO: 0 %
ERYTHROCYTE [DISTWIDTH] IN BLOOD BY AUTOMATED COUNT: 17 % (ref 10–15)
GLUCOSE BLDC GLUCOMTR-MCNC: 91 MG/DL (ref 70–99)
GLUCOSE SERPL-MCNC: 88 MG/DL (ref 70–99)
HCT VFR BLD AUTO: 24.7 % (ref 35–47)
HGB BLD-MCNC: 8 G/DL (ref 11.7–15.7)
IMM GRANULOCYTES # BLD: 0.1 10E3/UL
IMM GRANULOCYTES NFR BLD: 0 %
LYMPHOCYTES # BLD AUTO: 1.4 10E3/UL (ref 0.8–5.3)
LYMPHOCYTES NFR BLD AUTO: 11 %
MAGNESIUM SERPL-MCNC: 1.6 MG/DL (ref 1.7–2.3)
MCH RBC QN AUTO: 31 PG (ref 26.5–33)
MCHC RBC AUTO-ENTMCNC: 32.4 G/DL (ref 31.5–36.5)
MCV RBC AUTO: 96 FL (ref 78–100)
MONOCYTES # BLD AUTO: 0.9 10E3/UL (ref 0–1.3)
MONOCYTES NFR BLD AUTO: 7 %
NEUTROPHILS # BLD AUTO: 10.9 10E3/UL (ref 1.6–8.3)
NEUTROPHILS NFR BLD AUTO: 82 %
NRBC # BLD AUTO: 0 10E3/UL
NRBC BLD AUTO-RTO: 0 /100
PHOSPHATE SERPL-MCNC: 4.6 MG/DL (ref 2.5–4.5)
PLATELET # BLD AUTO: 350 10E3/UL (ref 150–450)
POTASSIUM SERPL-SCNC: 3.9 MMOL/L (ref 3.4–5.3)
PROT SERPL-MCNC: 4.4 G/DL (ref 6.4–8.3)
RBC # BLD AUTO: 2.58 10E6/UL (ref 3.8–5.2)
SODIUM SERPL-SCNC: 138 MMOL/L (ref 135–145)
WBC # BLD AUTO: 13.5 10E3/UL (ref 4–11)

## 2024-02-15 PROCEDURE — 74018 RADEX ABDOMEN 1 VIEW: CPT | Mod: 77

## 2024-02-15 PROCEDURE — 258N000003 HC RX IP 258 OP 636: Performed by: STUDENT IN AN ORGANIZED HEALTH CARE EDUCATION/TRAINING PROGRAM

## 2024-02-15 PROCEDURE — 74018 RADEX ABDOMEN 1 VIEW: CPT

## 2024-02-15 PROCEDURE — 36591 DRAW BLOOD OFF VENOUS DEVICE: CPT | Performed by: STUDENT IN AN ORGANIZED HEALTH CARE EDUCATION/TRAINING PROGRAM

## 2024-02-15 PROCEDURE — 83735 ASSAY OF MAGNESIUM: CPT | Performed by: STUDENT IN AN ORGANIZED HEALTH CARE EDUCATION/TRAINING PROGRAM

## 2024-02-15 PROCEDURE — 97530 THERAPEUTIC ACTIVITIES: CPT | Mod: GP

## 2024-02-15 PROCEDURE — 120N000002 HC R&B MED SURG/OB UMMC

## 2024-02-15 PROCEDURE — 82040 ASSAY OF SERUM ALBUMIN: CPT | Performed by: STUDENT IN AN ORGANIZED HEALTH CARE EDUCATION/TRAINING PROGRAM

## 2024-02-15 PROCEDURE — 74018 RADEX ABDOMEN 1 VIEW: CPT | Mod: 26 | Performed by: RADIOLOGY

## 2024-02-15 PROCEDURE — 84100 ASSAY OF PHOSPHORUS: CPT | Performed by: STUDENT IN AN ORGANIZED HEALTH CARE EDUCATION/TRAINING PROGRAM

## 2024-02-15 PROCEDURE — C9113 INJ PANTOPRAZOLE SODIUM, VIA: HCPCS | Performed by: STUDENT IN AN ORGANIZED HEALTH CARE EDUCATION/TRAINING PROGRAM

## 2024-02-15 PROCEDURE — 258N000003 HC RX IP 258 OP 636: Performed by: PHYSICIAN ASSISTANT

## 2024-02-15 PROCEDURE — 250N000011 HC RX IP 250 OP 636: Performed by: STUDENT IN AN ORGANIZED HEALTH CARE EDUCATION/TRAINING PROGRAM

## 2024-02-15 PROCEDURE — 250N000011 HC RX IP 250 OP 636

## 2024-02-15 PROCEDURE — 85025 COMPLETE CBC W/AUTO DIFF WBC: CPT | Performed by: STUDENT IN AN ORGANIZED HEALTH CARE EDUCATION/TRAINING PROGRAM

## 2024-02-15 PROCEDURE — 999N000128 HC STATISTIC PERIPHERAL IV START W/O US GUIDANCE

## 2024-02-15 PROCEDURE — 97161 PT EVAL LOW COMPLEX 20 MIN: CPT | Mod: GP

## 2024-02-15 RX ORDER — MAGNESIUM SULFATE HEPTAHYDRATE 40 MG/ML
2 INJECTION, SOLUTION INTRAVENOUS ONCE
Status: COMPLETED | OUTPATIENT
Start: 2024-02-15 | End: 2024-02-15

## 2024-02-15 RX ORDER — DIPHENHYDRAMINE HYDROCHLORIDE 50 MG/ML
50 INJECTION INTRAMUSCULAR; INTRAVENOUS EVERY 6 HOURS PRN
Status: DISCONTINUED | OUTPATIENT
Start: 2024-02-15 | End: 2024-02-15

## 2024-02-15 RX ORDER — DIPHENHYDRAMINE HYDROCHLORIDE 50 MG/ML
50 INJECTION INTRAMUSCULAR; INTRAVENOUS ONCE
Status: COMPLETED | OUTPATIENT
Start: 2024-02-15 | End: 2024-02-15

## 2024-02-15 RX ORDER — LORAZEPAM 2 MG/ML
0.25 INJECTION INTRAMUSCULAR EVERY 4 HOURS PRN
Status: DISCONTINUED | OUTPATIENT
Start: 2024-02-15 | End: 2024-02-22

## 2024-02-15 RX ORDER — HYDROMORPHONE HYDROCHLORIDE 1 MG/ML
0.5 INJECTION, SOLUTION INTRAMUSCULAR; INTRAVENOUS; SUBCUTANEOUS ONCE
Status: COMPLETED | OUTPATIENT
Start: 2024-02-15 | End: 2024-02-15

## 2024-02-15 RX ORDER — DEXTROSE MONOHYDRATE, SODIUM CHLORIDE, AND POTASSIUM CHLORIDE 50; 1.49; 4.5 G/1000ML; G/1000ML; G/1000ML
INJECTION, SOLUTION INTRAVENOUS CONTINUOUS
Status: DISCONTINUED | OUTPATIENT
Start: 2024-02-15 | End: 2024-02-20

## 2024-02-15 RX ADMIN — ONDANSETRON 4 MG: 2 INJECTION INTRAMUSCULAR; INTRAVENOUS at 00:32

## 2024-02-15 RX ADMIN — POTASSIUM CHLORIDE, DEXTROSE MONOHYDRATE AND SODIUM CHLORIDE: 150; 5; 450 INJECTION, SOLUTION INTRAVENOUS at 23:47

## 2024-02-15 RX ADMIN — DIPHENHYDRAMINE HYDROCHLORIDE 50 MG: 50 INJECTION, SOLUTION INTRAMUSCULAR; INTRAVENOUS at 21:54

## 2024-02-15 RX ADMIN — POTASSIUM CHLORIDE, DEXTROSE MONOHYDRATE AND SODIUM CHLORIDE: 150; 5; 450 INJECTION, SOLUTION INTRAVENOUS at 08:42

## 2024-02-15 RX ADMIN — ONDANSETRON 4 MG: 2 INJECTION INTRAMUSCULAR; INTRAVENOUS at 06:07

## 2024-02-15 RX ADMIN — MAGNESIUM SULFATE IN WATER 2 G: 40 INJECTION, SOLUTION INTRAVENOUS at 08:42

## 2024-02-15 RX ADMIN — SODIUM CHLORIDE, POTASSIUM CHLORIDE, SODIUM LACTATE AND CALCIUM CHLORIDE: 600; 310; 30; 20 INJECTION, SOLUTION INTRAVENOUS at 01:45

## 2024-02-15 RX ADMIN — Medication: at 23:43

## 2024-02-15 RX ADMIN — MAGNESIUM SULFATE IN WATER 2 G: 40 INJECTION, SOLUTION INTRAVENOUS at 21:54

## 2024-02-15 RX ADMIN — HYDROMORPHONE HYDROCHLORIDE 0.5 MG: 1 INJECTION, SOLUTION INTRAMUSCULAR; INTRAVENOUS; SUBCUTANEOUS at 18:50

## 2024-02-15 RX ADMIN — ONDANSETRON 4 MG: 2 INJECTION INTRAMUSCULAR; INTRAVENOUS at 14:09

## 2024-02-15 RX ADMIN — ENOXAPARIN SODIUM 40 MG: 40 INJECTION SUBCUTANEOUS at 10:28

## 2024-02-15 RX ADMIN — PANTOPRAZOLE SODIUM 40 MG: 40 INJECTION, POWDER, FOR SOLUTION INTRAVENOUS at 08:27

## 2024-02-15 ASSESSMENT — ACTIVITIES OF DAILY LIVING (ADL)
ADLS_ACUITY_SCORE: 43

## 2024-02-15 NOTE — PLAN OF CARE
Goal Outcome Evaluation:      Plan of Care Reviewed With: patient    Overall Patient Progress: no changeOverall Patient Progress: no change    Outcome Evaluation: Continue with POC    No acute changes. Aox4, VSS on 2L NC and capno. Kcl running at 75 mls/hr through port. R PIV running LR with dilaudid PCA at 75 mls/hr. No complaints of chest pain this shift. Villalba patent and draining blue output. Given PRN zofran for nausea. Call light in reach and able to make needs known.

## 2024-02-15 NOTE — PROGRESS NOTES
"   02/15/24 0930   Appointment Info   Signing Clinician's Name / Credentials (PT) Agatha Martines, SPT   Student Supervision Direct Patient Contact Provided       Present no   Living Environment   People in Home parent(s)  (Father)   Current Living Arrangements mobile home   Home Accessibility stairs to enter home   Number of Stairs, Main Entrance 3   Stair Railings, Main Entrance railing on right side (ascending)   Transportation Anticipated family or friend will provide   Living Environment Comments Pt lives in mobile home with 3 BRIDGER. Reports that father is around all the time to assist, father serves as pt PCA and she receives 6 hours per day of PCA hours.   Self-Care   Usual Activity Tolerance moderate   Current Activity Tolerance fair   Regular Exercise No   Equipment Currently Used at Home grab bar, toilet;grab bar, tub/shower;shower chair;walker, rolling;walker, standard;wheelchair, manual   Fall history within last six months yes   Number of times patient has fallen within last six months   (Too many to count)   Activity/Exercise/Self-Care Comment Utilizes FWW and manual w/c at baseline. Ambulates within house and short distances with FWW, utilizes w/c for further distances.   General Information   Onset of Illness/Injury or Date of Surgery 02/11/24   Referring Physician Natalie Rosales MD   Patient/Family Therapy Goals Statement (PT) To return home and become more confident in stability prior to return.   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 She presented to the ED for concerns of ongoing hematemesis and melena 2/2 bleeding malignancy.\"   Existing Precautions/Restrictions abdominal   General Observations Up with assist   Cognition "   Affect/Mental Status (Cognition) WFL   Orientation Status (Cognition) oriented x 4   Follows Commands (Cognition) WFL   Pain Assessment   Patient Currently in Pain Yes, see Vital Sign flowsheet  (Abdominal pain)   Integumentary/Edema   Integumentary/Edema no deficits were identifed   Posture    Posture Forward head position;Protracted shoulders   Range of Motion (ROM)   Range of Motion ROM is WFL   ROM Comment Observed BLE ROM during functional tasks WFL.   Strength (Manual Muscle Testing)   Strength (Manual Muscle Testing) strength is WFL   Strength Comments BLE strength WFL, at least 3/5 bilaterally per observation   Bed Mobility   Comment, (Bed Mobility) John Paul for supine > side lying due to pain, John Paul for sidelying > seated EOB with assist at trunk   Transfers   Comment, (Transfers) Not observed due to pain, anticipate A x 1 with FWW   Gait/Stairs (Locomotion)   Comment, (Gait/Stairs) Not observed due to pain, anticipate Min A with FWW   Balance   Balance Comments Pt stable when seated EOB, no deficits identified.   Sensory Examination   Sensory Perception other (describe)   Sensory Perception Comments No sensation from mid thigh to feet bilaterally, pt reports this is her baseline.   Coordination   Coordination no deficits were identified   Muscle Tone   Muscle Tone no deficits were identified   Clinical Impression   Criteria for Skilled Therapeutic Intervention Yes, treatment indicated   PT Diagnosis (PT) Functional mobility deficits   Influenced by the following impairments Pain, decreased activity tolerance, sensory deficits   Functional limitations due to impairments Bed mobility, transfers, ambulation   Clinical Presentation (PT Evaluation Complexity) stable   Clinical Presentation Rationale clinical reasoning, pt presentation   Clinical Decision Making (Complexity) low complexity   Planned Therapy Interventions (PT) balance training;gait training;home exercise program;neuromuscular  re-education;patient/family education;ROM (range of motion);stair training;strengthening;transfer training;progressive activity/exercise   Risk & Benefits of therapy have been explained evaluation/treatment results reviewed;care plan/treatment goals reviewed;risks/benefits reviewed;current/potential barriers reviewed;participants voiced agreement with care plan;participants included;patient   Clinical Impression Comments Pt is a 43 year [facility] who is s/p abdominal surgery. Pt presented to PT today with abdominal pain, decreased activity tolerance, and sensory deficits in BLE. These impairments are limiting the pt's ability to perform bed mobility, transfers and ambulate without assistance. Pt would benefit from skilled PT to address these deficits and improve pt IND prior to d/c.   PT Total Evaluation Time   PT Eval, Low Complexity Minutes (08029) 14   Physical Therapy Goals   PT Frequency 6x/week   PT Predicted Duration/Target Date for Goal Attainment 02/22/24   PT Goals Bed Mobility;Transfers;Gait;Stairs   PT: Bed Mobility Supervision/stand-by assist;Supine to/from sit;Rolling;Bridging;Within precautions   PT: Transfers Supervision/stand-by assist;Sit to/from stand;Bed to/from chair;Assistive device;Within precautions  (With FWW)   PT: Gait Supervision/stand-by assist;Standard walker;50 feet;Within precautions   PT: Stairs Supervision/stand-by assist;3 stairs;Within precautions;Rail on right   PT Discharge Planning   PT Plan Review precautions, bed mobility, STS, ambulation with FWW   PT Discharge Recommendation (DC Rec) Transitional Care Facility;home with assist;home with home care physical therapy   PT Rationale for DC Rec Pt currently presenting below baseline mobility. Pain is currently a major limiting factor for pt mobility. Anticipate that pt would benefit from continued rehab following d/c to improve IND with functional mobility to level of assist  that can be provided at home. Pending LOS and progress with PT, pt may be able to d/c home with assist and home health PT.   PT Brief overview of current status Ax1 bed mobility   Total Session Time   Total Session Time (sum of timed and untimed services) 14

## 2024-02-15 NOTE — ANESTHESIA POSTPROCEDURE EVALUATION
Patient: Eduarda Nogueira    Procedure: Procedure(s):  SMALL BOWEL RESECTION, PARTIAL COLON RESECTION, PARTIAL LIVER RESECTION, PARTIAL GASTRIC RESECTION, GASTROSTOMY TUBE PLACEMENT  EXPLORATORY LAPAROTOMY       Anesthesia Type:  General    Note:  Disposition: Inpatient   Postop Pain Control: Uneventful            Sign Out: Well controlled pain   PONV: No   Neuro/Psych: Uneventful            Sign Out: Acceptable/Baseline neuro status   Airway/Respiratory: Uneventful            Sign Out: Acceptable/Baseline resp. status   CV/Hemodynamics: Uneventful            Sign Out: Acceptable CV status; No obvious hypovolemia; No obvious fluid overload   Other NRE: NONE   DID A NON-ROUTINE EVENT OCCUR? No           Last vitals:  Vitals Value Taken Time   /72 02/14/24 1800   Temp 36.8  C (98.2  F) 02/14/24 1715   Pulse 99 02/14/24 1800   Resp 12 02/14/24 1800   SpO2 97 % 02/14/24 1812   Vitals shown include unfiled device data.    Electronically Signed By: Alejandro Moses MD  February 14, 2024  6:13 PM

## 2024-02-15 NOTE — PLAN OF CARE
Goal Outcome Evaluation:      Plan of Care Reviewed With: patient    Overall Patient Progress: improvingOverall Patient Progress: improving    2484-0979: Pt drowsy but oriented x4. Pt on 2L NC and capno. Pt was lying flat down at one point and woke up with tachypnea and chest pain. Both improved after elevating pt's head of bed. MD paged and monitored throughout the night. No BM. Jones in place with blue urine due to dye. GJ draining into jones bag with blue fluids as well. Pt c/o of nausea and given IV zofran x1. Midline incision CDI. R chest port A cath SL. L and R PIV SL. R PIV running with LR at 100 mL/hr and dilaudid PCA pump. Pain comes from abd incision site.    Pt c/o of abdominal bloating. MD stopped by to assess patient. Abdominal xray ordered.    Plan: Continue to monitor Hgb and assess for any post-op procedure complications.

## 2024-02-15 NOTE — PROGRESS NOTES
Surgical Oncology Progress Note    Interval History:  POD 1  Abdominal pain this morning. AXR unrevealing. Pain better now. Getting zofran for nausea this morning.     Physical Exam:   Temp:  [97.8  F (36.6  C)-99.1  F (37.3  C)] 98.4  F (36.9  C)  Pulse:  [] 84  Resp:  [10-20] 18  BP: (102-147)/(61-98) 117/70  SpO2:  [95 %-100 %] 100 %  General: Alert, oriented, appears comfortable, NAD.  Respiratory: breathing non labored  Abdomen: Abdomen is soft, appropriately tender, non-distended. Incision is clean, dry and intact. Drain with serosanguinous output.     Data:   All laboratory and imaging data in the past 24 hours reviewed  I/O last 3 completed shifts:  In: 3020 [P.O.:20; I.V.:3000]  Out: 3450 [Urine:3150; Blood:300]  Recent Labs   Lab Test 02/15/24  0545 02/14/24  1419 02/14/24  0402 02/11/24  1557 02/11/24  1149 02/09/24  1331   WBC 13.5* 8.6 6.4   < > 11.3* 8.1   HGB 8.0* 8.4* 8.6*   < > 7.2* 10.9*    333 310   < > 283 365   INR  --   --   --   --  1.09 0.99    < > = values in this interval not displayed.      Recent Labs   Lab Test 02/15/24  0628 02/15/24  0545 02/14/24  0803 02/14/24  0516 02/14/24  0402 02/13/24  0718   NA  --  138  --   --  143 141   POTASSIUM  --  3.9  --   --  3.7 3.5   CHLORIDE  --  103  --   --  111* 108*   CO2  --  22  --   --  22 22   BUN  --  6.1  --   --  5.3* 9.1   CR  --  0.72  --   --  0.64 0.71   ANIONGAP  --  13  --   --  10 11   TAVO  --  8.2*  --   --  7.9* 8.5*   GLC 91 88 72   < > 69* 78    < > = values in this interval not displayed.      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     POD 1 s/p exploratory laparotomy, excisional liver biopsy, small bowel resection, partial colon resection,  partial liver resection, partial gastric resection, and gtube placement with Dr. Krishnamurthy 2/14/24.      AXR this morning for pain unremarkable.     - Available for pain is dilaudid PCA. Abdominal binder prn for comfort.  - Wean oxygen as able. Home inhalers resumed. Incentive spirometry. Out of bed and ambulation.   - Strict NPO  - Protonix IV QD  - mIVF 75  - Keep jones catheter until ambulating.   - Hypomagnesemia: monitor and replace.   - Lovenox for DVT prophylaxis.   -PT/OT    Seen with Chief resident who will discuss with Staff.     Gerda Lopez PA-C   Surgical Oncology   [phone]

## 2024-02-15 NOTE — PLAN OF CARE
Goal Outcome Evaluation:      Plan of Care Reviewed With: patient    Overall Patient Progress: improvingOverall Patient Progress: improving    Outcome Evaluation: Pt transferred to . Pt settled into bed and VS obtained. Instructed on call light and placed in reach. Bed alarm is on. Incision c/d/i.

## 2024-02-16 ENCOUNTER — APPOINTMENT (OUTPATIENT)
Dept: OCCUPATIONAL THERAPY | Facility: CLINIC | Age: 44
End: 2024-02-16
Attending: STUDENT IN AN ORGANIZED HEALTH CARE EDUCATION/TRAINING PROGRAM
Payer: COMMERCIAL

## 2024-02-16 ENCOUNTER — APPOINTMENT (OUTPATIENT)
Dept: PHYSICAL THERAPY | Facility: CLINIC | Age: 44
End: 2024-02-16
Payer: COMMERCIAL

## 2024-02-16 LAB
ABO/RH(D): NORMAL
ALBUMIN SERPL BCG-MCNC: 2.5 G/DL (ref 3.5–5.2)
ALP SERPL-CCNC: 50 U/L (ref 40–150)
ALT SERPL W P-5'-P-CCNC: 85 U/L (ref 0–50)
ANION GAP SERPL CALCULATED.3IONS-SCNC: 7 MMOL/L (ref 7–15)
ANTIBODY SCREEN: NEGATIVE
AST SERPL W P-5'-P-CCNC: 149 U/L (ref 0–45)
BASOPHILS # BLD AUTO: 0 10E3/UL (ref 0–0.2)
BASOPHILS NFR BLD AUTO: 0 %
BILIRUB SERPL-MCNC: 0.3 MG/DL
BLD PROD TYP BPU: NORMAL
BLOOD COMPONENT TYPE: NORMAL
BUN SERPL-MCNC: 4.5 MG/DL (ref 6–20)
CALCIUM SERPL-MCNC: 7.7 MG/DL (ref 8.6–10)
CHLORIDE SERPL-SCNC: 104 MMOL/L (ref 98–107)
CODING SYSTEM: NORMAL
CREAT SERPL-MCNC: 0.69 MG/DL (ref 0.51–0.95)
CROSSMATCH: NORMAL
DEPRECATED HCO3 PLAS-SCNC: 27 MMOL/L (ref 22–29)
EGFRCR SERPLBLD CKD-EPI 2021: >90 ML/MIN/1.73M2
EOSINOPHIL # BLD AUTO: 0.5 10E3/UL (ref 0–0.7)
EOSINOPHIL NFR BLD AUTO: 5 %
ERYTHROCYTE [DISTWIDTH] IN BLOOD BY AUTOMATED COUNT: 16.7 % (ref 10–15)
GLUCOSE SERPL-MCNC: 97 MG/DL (ref 70–99)
HCT VFR BLD AUTO: 21.2 % (ref 35–47)
HGB BLD-MCNC: 6.8 G/DL (ref 11.7–15.7)
HGB BLD-MCNC: 8.6 G/DL (ref 11.7–15.7)
IMM GRANULOCYTES # BLD: 0 10E3/UL
IMM GRANULOCYTES NFR BLD: 0 %
LYMPHOCYTES # BLD AUTO: 1.2 10E3/UL (ref 0.8–5.3)
LYMPHOCYTES NFR BLD AUTO: 13 %
MAGNESIUM SERPL-MCNC: 2 MG/DL (ref 1.7–2.3)
MCH RBC QN AUTO: 30.4 PG (ref 26.5–33)
MCHC RBC AUTO-ENTMCNC: 32.1 G/DL (ref 31.5–36.5)
MCV RBC AUTO: 95 FL (ref 78–100)
MONOCYTES # BLD AUTO: 0.8 10E3/UL (ref 0–1.3)
MONOCYTES NFR BLD AUTO: 8 %
NEUTROPHILS # BLD AUTO: 6.9 10E3/UL (ref 1.6–8.3)
NEUTROPHILS NFR BLD AUTO: 74 %
NRBC # BLD AUTO: 0 10E3/UL
NRBC BLD AUTO-RTO: 0 /100
PHOSPHATE SERPL-MCNC: 3.4 MG/DL (ref 2.5–4.5)
PLATELET # BLD AUTO: 272 10E3/UL (ref 150–450)
POTASSIUM SERPL-SCNC: 3.5 MMOL/L (ref 3.4–5.3)
PROT SERPL-MCNC: 4.2 G/DL (ref 6.4–8.3)
RBC # BLD AUTO: 2.24 10E6/UL (ref 3.8–5.2)
SODIUM SERPL-SCNC: 138 MMOL/L (ref 135–145)
SPECIMEN EXPIRATION DATE: NORMAL
UNIT ABO/RH: NORMAL
UNIT NUMBER: NORMAL
UNIT STATUS: NORMAL
UNIT TYPE ISBT: 6200
WBC # BLD AUTO: 9.4 10E3/UL (ref 4–11)

## 2024-02-16 PROCEDURE — 97530 THERAPEUTIC ACTIVITIES: CPT | Mod: GO

## 2024-02-16 PROCEDURE — 97165 OT EVAL LOW COMPLEX 30 MIN: CPT | Mod: GO

## 2024-02-16 PROCEDURE — 36591 DRAW BLOOD OFF VENOUS DEVICE: CPT | Performed by: STUDENT IN AN ORGANIZED HEALTH CARE EDUCATION/TRAINING PROGRAM

## 2024-02-16 PROCEDURE — 120N000002 HC R&B MED SURG/OB UMMC

## 2024-02-16 PROCEDURE — C9113 INJ PANTOPRAZOLE SODIUM, VIA: HCPCS | Performed by: STUDENT IN AN ORGANIZED HEALTH CARE EDUCATION/TRAINING PROGRAM

## 2024-02-16 PROCEDURE — 97530 THERAPEUTIC ACTIVITIES: CPT | Mod: GP

## 2024-02-16 PROCEDURE — 250N000011 HC RX IP 250 OP 636: Performed by: STUDENT IN AN ORGANIZED HEALTH CARE EDUCATION/TRAINING PROGRAM

## 2024-02-16 PROCEDURE — 80053 COMPREHEN METABOLIC PANEL: CPT | Performed by: STUDENT IN AN ORGANIZED HEALTH CARE EDUCATION/TRAINING PROGRAM

## 2024-02-16 PROCEDURE — 83735 ASSAY OF MAGNESIUM: CPT | Performed by: STUDENT IN AN ORGANIZED HEALTH CARE EDUCATION/TRAINING PROGRAM

## 2024-02-16 PROCEDURE — 85018 HEMOGLOBIN: CPT | Performed by: PHYSICIAN ASSISTANT

## 2024-02-16 PROCEDURE — 86900 BLOOD TYPING SEROLOGIC ABO: CPT

## 2024-02-16 PROCEDURE — 36415 COLL VENOUS BLD VENIPUNCTURE: CPT | Performed by: PHYSICIAN ASSISTANT

## 2024-02-16 PROCEDURE — 258N000003 HC RX IP 258 OP 636: Performed by: PHYSICIAN ASSISTANT

## 2024-02-16 PROCEDURE — 250N000013 HC RX MED GY IP 250 OP 250 PS 637: Performed by: STUDENT IN AN ORGANIZED HEALTH CARE EDUCATION/TRAINING PROGRAM

## 2024-02-16 PROCEDURE — 97535 SELF CARE MNGMENT TRAINING: CPT | Mod: GO

## 2024-02-16 PROCEDURE — 84100 ASSAY OF PHOSPHORUS: CPT | Performed by: STUDENT IN AN ORGANIZED HEALTH CARE EDUCATION/TRAINING PROGRAM

## 2024-02-16 PROCEDURE — 86923 COMPATIBILITY TEST ELECTRIC: CPT

## 2024-02-16 PROCEDURE — 999N000128 HC STATISTIC PERIPHERAL IV START W/O US GUIDANCE

## 2024-02-16 PROCEDURE — 97116 GAIT TRAINING THERAPY: CPT | Mod: GP

## 2024-02-16 PROCEDURE — 85025 COMPLETE CBC W/AUTO DIFF WBC: CPT | Performed by: STUDENT IN AN ORGANIZED HEALTH CARE EDUCATION/TRAINING PROGRAM

## 2024-02-16 RX ORDER — SUMATRIPTAN 5 MG/1
10 SPRAY NASAL ONCE
Status: COMPLETED | OUTPATIENT
Start: 2024-02-16 | End: 2024-02-16

## 2024-02-16 RX ORDER — LIDOCAINE 4 G/G
1 PATCH TOPICAL
Status: DISCONTINUED | OUTPATIENT
Start: 2024-02-17 | End: 2024-02-27 | Stop reason: HOSPADM

## 2024-02-16 RX ORDER — SUMATRIPTAN 5 MG/1
5 SPRAY NASAL
Status: DISCONTINUED | OUTPATIENT
Start: 2024-02-16 | End: 2024-02-16

## 2024-02-16 RX ORDER — SUMATRIPTAN 5 MG/1
10 SPRAY NASAL
Status: DISCONTINUED | OUTPATIENT
Start: 2024-02-16 | End: 2024-02-16

## 2024-02-16 RX ADMIN — LORAZEPAM 0.25 MG: 2 INJECTION INTRAMUSCULAR; INTRAVENOUS at 23:56

## 2024-02-16 RX ADMIN — LORAZEPAM 0.25 MG: 2 INJECTION INTRAMUSCULAR; INTRAVENOUS at 02:43

## 2024-02-16 RX ADMIN — ONDANSETRON 4 MG: 4 TABLET, ORALLY DISINTEGRATING ORAL at 10:30

## 2024-02-16 RX ADMIN — PANTOPRAZOLE SODIUM 40 MG: 40 INJECTION, POWDER, FOR SOLUTION INTRAVENOUS at 08:29

## 2024-02-16 RX ADMIN — Medication: at 16:23

## 2024-02-16 RX ADMIN — POTASSIUM CHLORIDE, DEXTROSE MONOHYDRATE AND SODIUM CHLORIDE: 150; 5; 450 INJECTION, SOLUTION INTRAVENOUS at 19:24

## 2024-02-16 RX ADMIN — LORAZEPAM 0.25 MG: 2 INJECTION INTRAMUSCULAR; INTRAVENOUS at 10:30

## 2024-02-16 RX ADMIN — SUMATRIPTAN 2 SPRAY: 5 SPRAY NASAL at 17:37

## 2024-02-16 RX ADMIN — ENOXAPARIN SODIUM 40 MG: 40 INJECTION SUBCUTANEOUS at 10:21

## 2024-02-16 RX ADMIN — ONDANSETRON 4 MG: 2 INJECTION INTRAMUSCULAR; INTRAVENOUS at 05:53

## 2024-02-16 ASSESSMENT — ACTIVITIES OF DAILY LIVING (ADL)
ADLS_ACUITY_SCORE: 48
DEPENDENT_IADLS:: TRANSPORTATION;CLEANING;COOKING;MEAL PREPARATION
ADLS_ACUITY_SCORE: 48
ADLS_ACUITY_SCORE: 48
ADLS_ACUITY_SCORE: 43
ADLS_ACUITY_SCORE: 48
PREVIOUS_RESPONSIBILITIES: MEAL PREP;HOUSEKEEPING;LAUNDRY

## 2024-02-16 NOTE — CONSULTS
Care Management Initial Consult    General Information  Assessment completed with: Patient,    Type of CM/SW Visit: Initial Assessment  Primary Care Provider verified and updated as needed: Yes   Readmission within the last 30 days: no previous admission in last 30 days   Reason for Consult: discharge planning  Advance Care Planning, Reviewed: questions answered, concerns discussed (Advised on completing HCD - [employer] Cho[employer])     Communication Assessment  Patient's communication style: spoken language (English or Bilingual)    Hearing Difficulty or Deaf: yes   Wear Glasses or Blind: yes    Cognitive  Cognitive/Neuro/Behavioral: WDL, .WDL except, mood/behavior    Level of Consciousness: alert    Arousal Level: opens eyes spontaneously    Orientation: oriented x 4    Mood/Behavior: anxious, excitable    Best Language: 0 - No aphasia    Speech: clear    Living Environment:   People in home: parent(s)     Current living Arrangements: house      Able to return to prior arrangements: other (see comments) (TBD)     Family/Social Support:  Care provided by: parent(s)  Provides care for: no one, unable/limited ability to care for self  Marital Status: Single  Support System: Parent(s)          Description of Support System: Supportive, Involved    Support Assessment: Adequate family and caregiver support    Current Resources:   Patient receiving home care services: Yes  Skilled Home Care Services: Skilled Nursing, Physical Therapy, Home Health Aid  Community Resources:    Equipment currently used at home: grab bar, toilet, grab bar, tub/shower, walker, standard, walker, rolling, wheelchair, manual  Supplies currently used at home:      Employment/Financial:  Employment Status: disabled      Financial Concerns: none   Referral to Financial Worker: No  Does the patient's insurance plan have a 3 day qualifying hospital stay waiver?  No    Lifestyle & Psychosocial Needs:  Social Determinants of Health     Food Insecurity: Not  on file   Depression: Not on file   Housing Stability: Not on file   Tobacco Use: High Risk (2/11/2024)    Patient History     Smoking Tobacco Use: Every Day     Smokeless Tobacco Use: Never     Passive Exposure: Not on file   Financial Resource Strain: Not on file   Alcohol Use: Not on file   Transportation Needs: Not on file   Physical Activity: Not on file   Interpersonal Safety: Not on file   Stress: Not on file   Social Connections: Not on file     Functional Status:  Prior to admission patient needed assistance:   Dependent ADLs:: Ambulation-walker  Dependent IADLs:: Transportation, Cleaning, Cooking, Meal Preparation     Mental Health Status:  Mental Health Status: Current Concern    Mental Health Management: Individual Therapy    Chemical Dependency Status:  Chemical Dependency Status: No Current Concerns           Values/Beliefs:  Spiritual, Cultural Beliefs, Pent[employer]ism Practices, Values that affect care: yes             Additional Information:  CMA completed at bedside, as indicated by an elevated readmit risk score and anticipated discharge needs (TCU, Enteral Feeding, DME). Pt confirms listed address, PCP (EHO), and payer source (IMM no); Pt reports being very happy with PCP via [facility] but is highly unsatisfied with her services at [facility], she is planning to transition all specialty services to the nearest [place] to her home. Pt resides at listed home with her father, Zeeshan, who acts as her PCA and primary support; pt endorses prior OP Services of intermittent TPN and IVF infusions (x3/week), via port access. Pt reports home DME of standard walker, shower seat, and bathroom grab-bars, requests order for new walker: 4 wheel + seat. RNCC provided blank HCD form ([employer] Cho[employer]) and advised to complete as able but defer signing until a notary can be present. Pt reports prior BH services but had stopped taking her medications after her therapist/BH provider retired, pt is open to [facility]  psych consult while IP. Dispo plans pending, pt agreeable to either TCU or home.CM team to continue to follow and support safe discharge planning.         Ricky Corona RN BSN  7C RNCC  Phone: [phone]  Pager: [phone]    For Weekend & Holiday on call RN Care Coordinator:  (Tasks: Home care, home infusion, medical equipment, transportation, IMM & DAILEY forms, etc.)  [place] (0800 - 1630) Saturday and Sunday    Units: 5A, 5B, & 5C: [phone]    Units: 6B, 6C, 6D: [phone]    Units: 7A, 7B, 7C, 7D: [phone]    Units: 6A/ICU : [phone]    [facility] ([phone]) Saturday and Sunday    Units: 6 Med/Surg, 8A, 10 ICU, & Pediatric Units: [phone]    Units: 5 Ortho, 5Med/Surg & WB ED: [phone]

## 2024-02-16 NOTE — PROGRESS NOTES
Surgical Oncology Progress Note    Interval History:  POD 2   Hgb this morning 6.8. Migraine headache overnight. She has a history of migraine headaches but it unclear what works for her for treatment. Getting zofran this morning for nausea. Pain better controlled. NoT passing flatus or stool yet.     Physical Exam:   Temp:  [98.8  F (37.1  C)-99.5  F (37.5  C)] 98.8  F (37.1  C)  Pulse:  [85-98] 91  Resp:  [12-20] 12  BP: (119-134)/(68-84) 119/84  SpO2:  [98 %-100 %] 100 %  General: Alert, oriented, appears comfortable, NAD.  Respiratory: breathing non labored  Abdomen: Abdomen is soft, non-tender, non-distended. Incision is clean, dry and intact.     Data:   All laboratory and imaging data in the past 24 hours reviewed  I/O last 3 completed shifts:  In: 629 [I.V.:629]  Out: 2125 [Urine:1950; Emesis/NG output:175]  Recent Labs   Lab Test 02/16/24  0437 02/15/24  0545 02/14/24  1419 02/11/24  1557 02/11/24  1149 02/09/24  1331   WBC 9.4 13.5* 8.6   < > 11.3* 8.1   HGB 6.8* 8.0* 8.4*   < > 7.2* 10.9*    350 333   < > 283 365   INR  --   --   --   --  1.09 0.99    < > = values in this interval not displayed.      Recent Labs   Lab Test 02/16/24  0437 02/15/24  0628 02/15/24  0545 02/14/24  0516 02/14/24  0402     --  138  --  143   POTASSIUM 3.5  --  3.9  --  3.7   CHLORIDE 104  --  103  --  111*   CO2 27  --  22  --  22   BUN 4.5*  --  6.1  --  5.3*   CR 0.69  --  0.72  --  0.64   ANIONGAP 7  --  13  --  10   TAVO 7.7*  --  8.2*  --  7.9*   GLC 97 91 88   < > 69*    < > = values in this interval not displayed.      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      POD 2 s/p exploratory laparotomy, excisional liver biopsy, small bowel  resection, partial colon resection, partial liver resection, partial gastric resection, and gtube placement with Dr. Krishnamurthy 2/14/24.       Hgb this morning 6.8. Recheck HGB 8.6.     - Available for pain is dilaudid PCA. Lidocaine patch added. Abdominal binder prn for comfort.  - Wean oxygen as able. Home inhalers resumed. Incentive spirometry. Out of bed and ambulation.   - Imitrex added for migraine headache.   - NPO, hard candy and 1 popscicle ok  - Protonix IV QD  - mIVF 75  - Keep jones catheter until ambulating.   - Anemia secondary to surgical blood loss and dilution: recheck Hgb ordered.   - Lovenox for DVT prophylaxis.   - planning for upper GI study monday  -PT/OT    Seen with Dr. Raghu Lopez PA-C   Surgical Oncology   [phone]

## 2024-02-16 NOTE — PLAN OF CARE
"Goal Outcome Evaluation:      Plan of Care Reviewed With: patient          Outcome Evaluation: patient complained of migraine.  ativan given with little relief. usig PCA for abdominal pain with pain staying at \"10\" per pt. short naps on and off through night.    A:  patient urine continues green.  Scant dark green output from gtube.  Having nausea this am and zofran given.    R:  continue to monitor and treat per plan of care.  Hemoglobin this AM 6.8 MD notified.  "

## 2024-02-16 NOTE — PROGRESS NOTES
"   Occupational Therapy Evaluation: 02/16/24 1521   Appointment Info   Signing Clinician's Name / Credentials (OT) Kristyn Fox, OTD, OTR/L   Rehab Comments (OT) Abdominal Precautions   Living Environment   People in Home parent(s);other (see comments)  (Father)   Current Living Arrangements mobile home   Home Accessibility stairs to enter home   Number of Stairs, Main Entrance 4   Stair Railings, Main Entrance railing on right side (ascending)   Transportation Anticipated family or friend will provide;other (see comments)  (Pt father does driving.)   Living Environment Comments Pt reports living with father in mobile home. [address] mobile home. Reports that father is around all the time to assist, father serves as pt PCA and she receives 6 hours per day of PCA hours.   Self-Care   Usual Activity Tolerance moderate   Current Activity Tolerance fair   Equipment Currently Used at Home grab bar, toilet;grab bar, tub/shower;walker, standard;walker, rolling;wheelchair, manual;shower chair   Fall history within last six months yes   Number of times patient has fallen within last six months   (Pt reports \"several\" falls in the last 6 months.)   Activity/Exercise/Self-Care Comment Pt reports ambulating in home mostly without 2ww however at times will use if needed (pt reports father knows when she needs to use 2ww). Pt states has reminders from father to complete dressing and other ADLs.   Instrumental Activities of Daily Living (IADL)   Previous Responsibilities meal prep;housekeeping;laundry   IADL Comments Pt completes some light meal prep/housekeeping ind at home, father assist with other IADLs.   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 POD 3 s/p exploratory laparotomy, excisional liver biopsy, small bowel resection, partial colon resection, partial liver resection, partial gastric resection, and gtube placement with Dr. Krishnamurthy 2/14/24.\"   Existing Precautions/Restrictions abdominal;fall   Left Upper Extremity (Weight-bearing Status) partial weight-bearing (PWB)  (<10 lb weight restriction)   Right Upper Extremity (Weight-bearing Status) partial weight-bearing (PWB)  (<10 lb weight restriction)   Left Lower Extremity (Weight-bearing Status) full weight-bearing (FWB)   Right Lower Extremity (Weight-bearing Status) full weight-bearing (FWB)   General Observations and Info Activity: up with assist.   Cognitive Status Examination   Orientation Status orientation to person, place and time   Visual Perception   Visual Impairment/Limitations corrective lenses full-time   Sensory   Sensory Comments Pt reports neuropathy in hands/feet.   Pain Assessment   Patient Currently in Pain Yes, see Vital Sign flowsheet   Posture   Posture forward head position;protracted shoulders   Range of Motion Comprehensive   General Range of Motion no range of motion deficits identified   Strength Comprehensive (MMT)   Comment, General Manual Muscle Testing (MMT) Assessment Not formally assessed due to abdominal precautions.   Coordination   Upper Extremity Coordination No deficits were identified   Transfers   Transfers sit-stand transfer   Transfer Comments SBA   Activities of Daily Living   BADL Assessment/Intervention bathing;lower body dressing;upper body dressing;toileting;grooming   Bathing Assessment/Intervention   [place] Level (Bathing) minimum assist (75% patient effort)   Comment, (Bathing) Per clinical judgement   Upper Body Dressing Assessment/Training   Comment, (Upper Body Dressing) Per clinical judgement   [place] Level (Upper " Body Dressing) minimum assist (75% patient effort)   Lower Body Dressing Assessment/Training   Comment, (Lower Body Dressing) Per clinical judgement   [place] Level (Lower Body Dressing) maximum assist (25% patient effort)   Grooming Assessment/Training   [place] Level (Grooming) contact guard assist   Comment, (Grooming) Per clinical judgement   Toileting   Comment, (Toileting) Per clinical judgement   [place] Level (Toileting) contact guard assist   Clinical Impression   Criteria for Skilled Therapeutic Interventions Met (OT) Yes, treatment indicated   OT Diagnosis Decreased ind with ADL/IADLs and functional mobility/transfers.   OT Problem List-Impairments impacting ADL problems related to;activity tolerance impaired;mobility;post-surgical precautions   Assessment of Occupational Performance 3-5 Performance Deficits   Identified Performance Deficits Bathing, FB dressing, g/h, toileting, decreased activity tolerance   Planned Therapy Interventions (OT) ADL retraining;IADL retraining;home program guidelines;progressive activity/exercise;risk factor education   Clinical Decision Making Complexity (OT) problem focused assessment/low complexity   Risk & Benefits of therapy have been explained evaluation/treatment results reviewed;care plan/treatment goals reviewed;risks/benefits reviewed;current/potential barriers reviewed;participants voiced agreement with care plan;participants included;patient;father   Clinical Impression Comments Pt would benefit from skilled IP OT services to increase ind with ADLs/IADLs and functional mobility prior to discharge from IP.   OT Total Evaluation Time   OT Eval, Low Complexity Minutes (73983) 7   OT Goals   Therapy Frequency (OT) 5 times/week   OT Predicted Duration/Target Date for Goal Attainment 03/15/24   OT Goals Hygiene/Grooming;Upper Body Dressing;Lower Body Dressing;Toilet Transfer/Toileting;OT Goal 1   OT: Hygiene/Grooming supervision/stand-by assist   OT:  Upper Body Dressing Supervision/stand-by assist   OT: Lower Body Dressing Supervision/stand-by assist   OT: Toilet Transfer/Toileting Supervision/stand-by assist   OT: Goal 1 Pt will demo ind lateral tub transfer prior to discharge for increased ind and safety with bathing ADLs.   OT Discharge Planning   OT Plan Tub transfer, toileting, standing g/h in bathroom   OT Discharge Recommendation (DC Rec) home with assist;home with home care occupational therapy   OT Rationale for DC Rec Anticipate when pt medically stable, safe to discharge home with continued assist from father as PCA with some ADLs and IADLs. Pt ambulating SBA this date with 2ww. Pt would benefit from HH OT/PT for safety eval and to continue progression of ind with ADLs/IADLs and mobility.   OT Brief overview of current status SBA 2ww   Total Session Time   Total Session Time (sum of timed and untimed services) 7

## 2024-02-16 NOTE — PLAN OF CARE
Goal Outcome Evaluation:  3-1130pm;  Pt is a/4 times 4. Forgetful and anxious at times. Was going through many personal belongings at the start of the shift. PIV from right forearm came out. She seemed easily excitable/talkative. Pleasant to staff. She c/o of having a migraine and noises from Iv machines and capnography bothering bothering her at times. Vitals stable on 2 liters of oxygen. Says she tried to work with PT but could not. Able to turn easily from side to side in bed. Has a PCA for pain control. Rates her pain a 9 or 10 most of the time but moves around easily. Abd incision looks good. Reinforced dressing around g-tube. Green output from jones and g-tube. Good urine out put. IV fluid is at 75 ML per hour via port. NPO.

## 2024-02-16 NOTE — PLAN OF CARE
Goal Outcome Evaluation:      Plan of Care Reviewed With: patient    Overall Patient Progress: no changeOverall Patient Progress: no change    Outcome Evaluation: Anticipate TCU placement. Care management team to follow for safe discharge planning.  __________________________     ARIEL Marti, HUMPHREY  Clinical   Phone: [phone]  Pager: [phone]  [place]  [address]  Social Work & Care Management Department  ____________________________________    Unit 7C Care Management Weekend Contacts:    Searchable in [employer]COM - search SOCIAL WORK or CARE COORDINATOR  Searchable in [facility] - search  Med Surg 1766-7605 SW,  Med Surg 8445-6971 SW ,  Med Surg 6035-6329 RNCC, or 7C Med Surg 9985-7795    7C Weekend SW Vocera; Pager: [phone] ([phone])    7C Weekend RNCC Vocera; Pager: [phone] ([phone])    After hours  Voc[facility]; Pager: [phone] (Weekends (1630 - 0000); Mon-Fri ([phone]); [facility] Recognized Holidays  ([phone]))

## 2024-02-16 NOTE — PLAN OF CARE
"Goal Outcome Evaluation:  /84 (BP Location: Left arm, Patient Position: Supine, Cuff Size: Adult Regular)   Pulse 91   Temp 98.8  F (37.1  C) (Oral)   Resp 12   Ht 1.6 m (5' 3\")   Wt 72.1 kg (159 lb)   SpO2 100%   BMI 28.17 kg/m      A&Ox4, able to make needs known. VSS on 2L NC and capno. Intermittently anxious, PRN ativan given x1. PRN zofran given x1 for c/o nausea. PCA dilaudid pump in use for pain. Abd incision [facility] no drainage. Continued on NPO diet. Call light within reach, continue with plan of care.                         "

## 2024-02-16 NOTE — PLAN OF CARE
Problem: Adult Inpatient Plan of Care  Goal: Plan of Care Review  Description: The Plan of Care Review/Shift note should be completed every shift.  The Outcome Evaluation is a brief statement about your assessment that the patient is improving, declining, or no change.  This information will be displayed automatically on your shift  note.  Flowsheets (Taken 2/16/2024 6634)  Outcome Evaluation:   Ongoing POC   Upper GI Study planned for 2/19   Pt makes needs known   CMA completed for elevated readmit risk score  Plan of Care Reviewed With: patient  Overall Patient Progress: no change

## 2024-02-16 NOTE — CONSULTS
The importance of an advanced directive was discussed and a HCD packed was provided. Writer explained the form and what was necessary to make it legal.     RNCC provided blank HCD form ([employer]) and advised to complete as able but defer signing until a notary can be present. CM to be available for IP notary service.       Ricky Corona RN BSN  7C RNCC  Phone: [phone]  Pager: [phone]    For Weekend & Holiday on call RN Care Coordinator:  (Tasks: Home care, home infusion, medical equipment, transportation, IMM & DAILEY forms, etc.)  [place] (0800 - 1630) Saturday and Sunday    Units: 5A, 5B, & 5C: [phone]    Units: 6B, 6C, 6D: [phone]    Units: 7A, 7B, 7C, 7D: [phone]    Units: 6A/ICU : [phone]    [facility] ([phone]) Saturday and Sunday    Units: 6 Med/Surg, 8A, 10 ICU, & Pediatric Units: [phone]    Units: 5 Ortho, 5Med/Surg & WB ED: [phone]

## 2024-02-16 NOTE — CONSULTS
"SPIRITUAL HEALTH SERVICES Consult Note  [facility] 7C    Referral Source/Reason for Visit: Routine consult    Summary and Recommendations -  ~Eduarda ele through verbal processing. If increased challenges or emotions arise I recommend putting in a consult for Spiritual Health Services.    Plan:  services remain available upon request. I or another  will follow up next week per request.    Rev. MARCOS Vincent.  Chaplain Resident  Pager [phone]    * [facility] remains available  for emergent requests/referrals, either by having the switchboard page the on-call  or by entering an ASAP/STAT consult in Epic (this will also page the on-call ). Routine Epic consults receive an initial response within 24 hours.*    Assessment      Patient/Family Understanding of Illness and Goals of Care -   ~One week ago Eduarda found out she has cancer after several months of being sick. She had an emergency surgery on Wednesday (2) to remove cancer tumors and ulcers in her intestine/stomach area.      Distress and Loss -  ~Eduarda has distress around the process of how long it took to receive a diagnosis. She was in another healthcare system that kept sending her home. She is thankful to be at the [facility].    ~Eduarda expressed elements of distress around family dynamics and concern for her \"dad\" (not biological) as she doesn't want him to feel overwhelmed with her cancer diagnosis, as her mom  of cancer. He \"dad\" is also her PCA.       Strengths, Coping, and Resources -    ~Eduarda's dad is a significant support in her life.     ~Eduarda turns to prayer and reading the Bible as ways to encourage herself through challenging times.    ~I observed that Eduarda ele through verbally talking and processing through her experiences.       Meaning, Beliefs, and Spirituality -   ~Eduarda is Jew. She speaks of seeing God's hand at work in bringing her to the right places at the right time. " Eduarda's theology helps her to make meaning of suffering.

## 2024-02-16 NOTE — PROGRESS NOTES
Care Management Follow Up    Length of Stay (days): 5  Expected Discharge Date: 02/19/2024  Concerns to be Addressed: discharge planning     Patient plan of care discussed at interdisciplinary rounds: Yes  Anticipated Discharge Disposition: Transitional Care  Anticipated Discharge Services: None  Anticipated Discharge DME: None  Patient/family educated on Medicare website which has current facility and service quality ratings: yes  Education Provided on the Discharge Plan: Yes  Patient/Family in Agreement with the Plan:    Referrals Placed by CM/SW: Internal Clinic Care Coordination, Senior Linkage Line, Post Acute Facilities, Communication hand-offs to next level of Care Providers  Private pay costs discussed: Not applicable  Additional Information: LAMONT met with pt at bedside & provided the patient with the following documents: Medicare Care Compare facility/agency list, Options for Care After Your Hospital Stay handout, & A Bridge from Hospital to Home handout. SW provided education about her disposition recommendations and discussed discharge planning. LAMONT requested 5-7 TCU choices & referrals will be made on Monday once pt is appearing more medically ready for discharge.     LAMONT will follow.  __________________________     ARIEL Marti, HUMPHREY  Clinical   Phone: [phone]  Pager: [phone]  [place]  [address]  Social Work & Care Management Department  ____________________________________    Unit 7C Care Management Weekend Contacts:    Searchable in AMCOM - search SOCIAL WORK or CARE COORDINATOR  Searchable in [facility]ER[facility] - search  Med Surg 2912-3388 SW,  Med Surg 6330-5393 SW ,  Med Surg 5165-4487 RNCC, or 7C Med Surg 2316-6417    7C Weekend SW Vocera; Pager: [phone] ([phone])    7C Weekend RNCC Vocera; Pager: [phone] ([phone])    After hours  [facility]; Pager: [phone] (Weekends (1630 - 0000); Mon-Fri ([phone]); [facility] Recognized Holidays  ([phone]))

## 2024-02-17 ENCOUNTER — APPOINTMENT (OUTPATIENT)
Dept: OCCUPATIONAL THERAPY | Facility: CLINIC | Age: 44
End: 2024-02-17
Payer: COMMERCIAL

## 2024-02-17 ENCOUNTER — APPOINTMENT (OUTPATIENT)
Dept: GENERAL RADIOLOGY | Facility: CLINIC | Age: 44
End: 2024-02-17
Payer: COMMERCIAL

## 2024-02-17 ENCOUNTER — APPOINTMENT (OUTPATIENT)
Dept: PHYSICAL THERAPY | Facility: CLINIC | Age: 44
End: 2024-02-17
Payer: COMMERCIAL

## 2024-02-17 LAB
ALBUMIN SERPL BCG-MCNC: 2.8 G/DL (ref 3.5–5.2)
ALP SERPL-CCNC: 63 U/L (ref 40–150)
ALT SERPL W P-5'-P-CCNC: 80 U/L (ref 0–50)
ANION GAP SERPL CALCULATED.3IONS-SCNC: 8 MMOL/L (ref 7–15)
AST SERPL W P-5'-P-CCNC: 82 U/L (ref 0–45)
BASOPHILS # BLD AUTO: 0 10E3/UL (ref 0–0.2)
BASOPHILS NFR BLD AUTO: 0 %
BILIRUB SERPL-MCNC: 0.4 MG/DL
BUN SERPL-MCNC: 3 MG/DL (ref 6–20)
CALCIUM SERPL-MCNC: 8 MG/DL (ref 8.6–10)
CHLORIDE SERPL-SCNC: 103 MMOL/L (ref 98–107)
CREAT SERPL-MCNC: 0.65 MG/DL (ref 0.51–0.95)
DEPRECATED HCO3 PLAS-SCNC: 27 MMOL/L (ref 22–29)
EGFRCR SERPLBLD CKD-EPI 2021: >90 ML/MIN/1.73M2
EOSINOPHIL # BLD AUTO: 0.5 10E3/UL (ref 0–0.7)
EOSINOPHIL NFR BLD AUTO: 4 %
ERYTHROCYTE [DISTWIDTH] IN BLOOD BY AUTOMATED COUNT: 16.3 % (ref 10–15)
GLUCOSE SERPL-MCNC: 101 MG/DL (ref 70–99)
HCT VFR BLD AUTO: 25.4 % (ref 35–47)
HGB BLD-MCNC: 8.3 G/DL (ref 11.7–15.7)
IMM GRANULOCYTES # BLD: 0.1 10E3/UL
IMM GRANULOCYTES NFR BLD: 1 %
LYMPHOCYTES # BLD AUTO: 1.4 10E3/UL (ref 0.8–5.3)
LYMPHOCYTES NFR BLD AUTO: 12 %
MAGNESIUM SERPL-MCNC: 1.9 MG/DL (ref 1.7–2.3)
MCH RBC QN AUTO: 31.3 PG (ref 26.5–33)
MCHC RBC AUTO-ENTMCNC: 32.7 G/DL (ref 31.5–36.5)
MCV RBC AUTO: 96 FL (ref 78–100)
MONOCYTES # BLD AUTO: 0.8 10E3/UL (ref 0–1.3)
MONOCYTES NFR BLD AUTO: 6 %
NEUTROPHILS # BLD AUTO: 9.1 10E3/UL (ref 1.6–8.3)
NEUTROPHILS NFR BLD AUTO: 77 %
NRBC # BLD AUTO: 0 10E3/UL
NRBC BLD AUTO-RTO: 0 /100
PHOSPHATE SERPL-MCNC: 3.9 MG/DL (ref 2.5–4.5)
PLATELET # BLD AUTO: 354 10E3/UL (ref 150–450)
POTASSIUM SERPL-SCNC: 3.5 MMOL/L (ref 3.4–5.3)
PROT SERPL-MCNC: 5.1 G/DL (ref 6.4–8.3)
RBC # BLD AUTO: 2.65 10E6/UL (ref 3.8–5.2)
SODIUM SERPL-SCNC: 138 MMOL/L (ref 135–145)
WBC # BLD AUTO: 11.8 10E3/UL (ref 4–11)

## 2024-02-17 PROCEDURE — 97110 THERAPEUTIC EXERCISES: CPT | Mod: GP

## 2024-02-17 PROCEDURE — 97530 THERAPEUTIC ACTIVITIES: CPT | Mod: GP

## 2024-02-17 PROCEDURE — 250N000011 HC RX IP 250 OP 636: Performed by: STUDENT IN AN ORGANIZED HEALTH CARE EDUCATION/TRAINING PROGRAM

## 2024-02-17 PROCEDURE — 97116 GAIT TRAINING THERAPY: CPT | Mod: GP

## 2024-02-17 PROCEDURE — 999N000128 HC STATISTIC PERIPHERAL IV START W/O US GUIDANCE

## 2024-02-17 PROCEDURE — 85025 COMPLETE CBC W/AUTO DIFF WBC: CPT | Performed by: STUDENT IN AN ORGANIZED HEALTH CARE EDUCATION/TRAINING PROGRAM

## 2024-02-17 PROCEDURE — 250N000013 HC RX MED GY IP 250 OP 250 PS 637: Performed by: PHYSICIAN ASSISTANT

## 2024-02-17 PROCEDURE — 83735 ASSAY OF MAGNESIUM: CPT | Performed by: STUDENT IN AN ORGANIZED HEALTH CARE EDUCATION/TRAINING PROGRAM

## 2024-02-17 PROCEDURE — C9113 INJ PANTOPRAZOLE SODIUM, VIA: HCPCS | Performed by: STUDENT IN AN ORGANIZED HEALTH CARE EDUCATION/TRAINING PROGRAM

## 2024-02-17 PROCEDURE — 36415 COLL VENOUS BLD VENIPUNCTURE: CPT | Performed by: STUDENT IN AN ORGANIZED HEALTH CARE EDUCATION/TRAINING PROGRAM

## 2024-02-17 PROCEDURE — 84100 ASSAY OF PHOSPHORUS: CPT | Performed by: STUDENT IN AN ORGANIZED HEALTH CARE EDUCATION/TRAINING PROGRAM

## 2024-02-17 PROCEDURE — 97530 THERAPEUTIC ACTIVITIES: CPT | Mod: GO

## 2024-02-17 PROCEDURE — 120N000002 HC R&B MED SURG/OB UMMC

## 2024-02-17 PROCEDURE — 80053 COMPREHEN METABOLIC PANEL: CPT | Performed by: STUDENT IN AN ORGANIZED HEALTH CARE EDUCATION/TRAINING PROGRAM

## 2024-02-17 PROCEDURE — 71045 X-RAY EXAM CHEST 1 VIEW: CPT

## 2024-02-17 PROCEDURE — 71045 X-RAY EXAM CHEST 1 VIEW: CPT | Mod: 26 | Performed by: RADIOLOGY

## 2024-02-17 RX ADMIN — PANTOPRAZOLE SODIUM 40 MG: 40 INJECTION, POWDER, FOR SOLUTION INTRAVENOUS at 09:05

## 2024-02-17 RX ADMIN — ENOXAPARIN SODIUM 40 MG: 40 INJECTION SUBCUTANEOUS at 09:06

## 2024-02-17 RX ADMIN — ONDANSETRON 4 MG: 2 INJECTION INTRAMUSCULAR; INTRAVENOUS at 14:36

## 2024-02-17 RX ADMIN — LORAZEPAM 0.25 MG: 2 INJECTION INTRAMUSCULAR; INTRAVENOUS at 05:27

## 2024-02-17 RX ADMIN — LIDOCAINE 1 PATCH: 4 PATCH TOPICAL at 09:05

## 2024-02-17 RX ADMIN — Medication: at 17:01

## 2024-02-17 RX ADMIN — ONDANSETRON 4 MG: 4 TABLET, ORALLY DISINTEGRATING ORAL at 04:32

## 2024-02-17 ASSESSMENT — ACTIVITIES OF DAILY LIVING (ADL)
ADLS_ACUITY_SCORE: 46
ADLS_ACUITY_SCORE: 48
ADLS_ACUITY_SCORE: 46
ADLS_ACUITY_SCORE: 44
ADLS_ACUITY_SCORE: 46

## 2024-02-17 NOTE — PLAN OF CARE
"Goal Outcome Evaluation:      Plan of Care Reviewed With: patient    Overall Patient Progress: improvingOverall Patient Progress: improving       /80 (BP Location: Left arm)   Pulse 92   Temp 98.6  F (37  C) (Oral)   Resp 16   Ht 1.6 m (5' 3\")   Wt 71.2 kg (156 lb 15.5 oz)   SpO2 91%   BMI 27.81 kg/m      Neuro: A&Ox4. Ativan admin x2 for anxiety  Cardiac: Afebrile, VSS.   Respiratory: RA   GI/: Voiding spontaneously. Uop wdl-urine still green. No BM this shift. + flatus.  Diet/appetite: NPO. PRN zofran admin x1 for nausea. No emesis  Activity: Up independently in room.  Pain: .pain maintained at tolerable level with PCA dilaudid  Skin: midline and gtube insertion site WDL.  Lines: port at tko with PCA dilaudid. PIV with D5 1/2 NS+ 20K+ at 75 ml/hr  Drains: Gtube to gravity. This green output measured and recorded    Rested btwn cares. Able to make needs known. Continue to monitor. Notify MD of changes/concerns       "

## 2024-02-17 NOTE — PLAN OF CARE
Goal Outcome Evaluation:      Plan of Care Reviewed With: patient    Overall Patient Progress: no changeOverall Patient Progress: no change    Outcome Evaluation: Assumed cares 3335-4526. Pt alert and oriented x4. Dilaudid PCA adequate for pain control per pt. Up to BR with walker and assist of 1. Voiding spontaneously since jones removal. urine green tinged. Gtube to gravity drainage. Output thick, green. Tolerated popsicle this evening. Midline abdominal incision WNL. Dermabond in place. No drainage noted. Continue to assess GI status and pain levels.

## 2024-02-17 NOTE — PLAN OF CARE
Patient alert and oriented x4, calm, pleasant and cooperative with cares. Able to verbalize needs and use call light appropriately. She is also able to ambulate short , distances with SBA fair gait and balance, Villalba catheter in place, patent. Dilaudid PCA with the following settings at the start of the shift: Continuous: 0, PCA 0.2 mg, Lockout 10 mins and hourly limit of 1.2 mg. This was however increased to  Continuous: 0, PCA 0.3 mg, Lockout 10 mins and hourly limit of 1.8 mg. Villalba catheter removed  and tolerated procedure well. Surgical incisions clean dry intact, open to air, abdominal binder in place. Requested to shower this evening.Family at bedside attentive to cares.

## 2024-02-17 NOTE — PROGRESS NOTES
Surgical Oncology Progress Note    Interval History:  POD 3  Ambulating, working with PT, sitting in chair throughout the day. Pain controlled. No nausea. No appetite. No flatus nor BM as of yet. Off oxygen. Using IS. Voiding spontaneously without issue.     Physical Exam:   Temp:  [98  F (36.7  C)-99.3  F (37.4  C)] 98.6  F (37  C)  Pulse:  [76-96] 92  Resp:  [16-18] 16  BP: ()/(62-80) 135/80  SpO2:  [91 %-100 %] 91 %  General: Alert, oriented, appears comfortable, NAD.  Respiratory: breathing non labored  Abdomen: Abdomen is soft, non-tender, non-distended. Incision is clean, dry and intact.     Data:   All laboratory and imaging data in the past 24 hours reviewed  I/O last 3 completed shifts:  In: 2276.5 [P.O.:90; I.V.:2156.5; NG/GT:30]  Out: 1400 [Urine:1275; Emesis/NG output:125]  Recent Labs   Lab Test 02/17/24 0446 02/16/24  0753 02/16/24  0437 02/15/24  0545 02/11/24  1557 02/11/24  1149 02/09/24  1331   WBC 11.8*  --  9.4 13.5*   < > 11.3* 8.1   HGB 8.3* 8.6* 6.8* 8.0*   < > 7.2* 10.9*     --  272 350   < > 283 365   INR  --   --   --   --   --  1.09 0.99    < > = values in this interval not displayed.      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      POD 3 s/p exploratory laparotomy, excisional liver biopsy, small bowel resection, partial colon resection, partial liver resection, partial gastric resection, and gtube  placement with Dr. Krishnamurthy 2/14/24.       Hgb this morning 6.8. Recheck HGB 8.6.     - Available for pain is dilaudid PCA. Lidocaine patch added. Abdominal binder prn for comfort.  - Wean oxygen as able. Home inhalers resumed. Incentive spirometry. Out of bed and ambulation.   - Imitrex added for migraine headache.   - NPO, hard candy and 1 popscicle ok  - Protonix IV QD  - mIVF 75  - Anemia secondary to surgical blood loss and dilution: recheck Hgb ordered.   - Lovenox for DVT prophylaxis.   - planning for upper GI study monday  - PT/OT    Seen with Dr. Sharon Rosales MD   Surgical Oncology

## 2024-02-18 ENCOUNTER — APPOINTMENT (OUTPATIENT)
Dept: OCCUPATIONAL THERAPY | Facility: CLINIC | Age: 44
End: 2024-02-18
Payer: COMMERCIAL

## 2024-02-18 ENCOUNTER — APPOINTMENT (OUTPATIENT)
Dept: PHYSICAL THERAPY | Facility: CLINIC | Age: 44
End: 2024-02-18
Payer: COMMERCIAL

## 2024-02-18 LAB
ALBUMIN SERPL BCG-MCNC: 3.2 G/DL (ref 3.5–5.2)
ALP SERPL-CCNC: 67 U/L (ref 40–150)
ALT SERPL W P-5'-P-CCNC: 59 U/L (ref 0–50)
ANION GAP SERPL CALCULATED.3IONS-SCNC: 12 MMOL/L (ref 7–15)
AST SERPL W P-5'-P-CCNC: 36 U/L (ref 0–45)
BASOPHILS # BLD AUTO: 0.1 10E3/UL (ref 0–0.2)
BASOPHILS NFR BLD AUTO: 1 %
BILIRUB SERPL-MCNC: 0.5 MG/DL
BUN SERPL-MCNC: 3.2 MG/DL (ref 6–20)
CALCIUM SERPL-MCNC: 8.3 MG/DL (ref 8.6–10)
CHLORIDE SERPL-SCNC: 102 MMOL/L (ref 98–107)
CREAT SERPL-MCNC: 0.71 MG/DL (ref 0.51–0.95)
DEPRECATED HCO3 PLAS-SCNC: 23 MMOL/L (ref 22–29)
EGFRCR SERPLBLD CKD-EPI 2021: >90 ML/MIN/1.73M2
EOSINOPHIL # BLD AUTO: 0.5 10E3/UL (ref 0–0.7)
EOSINOPHIL NFR BLD AUTO: 5 %
ERYTHROCYTE [DISTWIDTH] IN BLOOD BY AUTOMATED COUNT: 15.8 % (ref 10–15)
GLUCOSE SERPL-MCNC: 110 MG/DL (ref 70–99)
HCT VFR BLD AUTO: 25.2 % (ref 35–47)
HGB BLD-MCNC: 8.2 G/DL (ref 11.7–15.7)
IMM GRANULOCYTES # BLD: 0 10E3/UL
IMM GRANULOCYTES NFR BLD: 0 %
LYMPHOCYTES # BLD AUTO: 1.9 10E3/UL (ref 0.8–5.3)
LYMPHOCYTES NFR BLD AUTO: 18 %
MAGNESIUM SERPL-MCNC: 1.6 MG/DL (ref 1.7–2.3)
MCH RBC QN AUTO: 30.9 PG (ref 26.5–33)
MCHC RBC AUTO-ENTMCNC: 32.5 G/DL (ref 31.5–36.5)
MCV RBC AUTO: 95 FL (ref 78–100)
MONOCYTES # BLD AUTO: 0.9 10E3/UL (ref 0–1.3)
MONOCYTES NFR BLD AUTO: 8 %
NEUTROPHILS # BLD AUTO: 7.5 10E3/UL (ref 1.6–8.3)
NEUTROPHILS NFR BLD AUTO: 68 %
NRBC # BLD AUTO: 0 10E3/UL
NRBC BLD AUTO-RTO: 0 /100
PHOSPHATE SERPL-MCNC: 4 MG/DL (ref 2.5–4.5)
PLATELET # BLD AUTO: 394 10E3/UL (ref 150–450)
POTASSIUM SERPL-SCNC: 3.5 MMOL/L (ref 3.4–5.3)
PROT SERPL-MCNC: 5.6 G/DL (ref 6.4–8.3)
RBC # BLD AUTO: 2.65 10E6/UL (ref 3.8–5.2)
SODIUM SERPL-SCNC: 137 MMOL/L (ref 135–145)
WBC # BLD AUTO: 10.8 10E3/UL (ref 4–11)

## 2024-02-18 PROCEDURE — 80053 COMPREHEN METABOLIC PANEL: CPT | Performed by: STUDENT IN AN ORGANIZED HEALTH CARE EDUCATION/TRAINING PROGRAM

## 2024-02-18 PROCEDURE — 97530 THERAPEUTIC ACTIVITIES: CPT | Mod: GO

## 2024-02-18 PROCEDURE — 36415 COLL VENOUS BLD VENIPUNCTURE: CPT | Performed by: STUDENT IN AN ORGANIZED HEALTH CARE EDUCATION/TRAINING PROGRAM

## 2024-02-18 PROCEDURE — 258N000003 HC RX IP 258 OP 636: Performed by: PHYSICIAN ASSISTANT

## 2024-02-18 PROCEDURE — 258N000003 HC RX IP 258 OP 636: Performed by: STUDENT IN AN ORGANIZED HEALTH CARE EDUCATION/TRAINING PROGRAM

## 2024-02-18 PROCEDURE — 84100 ASSAY OF PHOSPHORUS: CPT | Performed by: STUDENT IN AN ORGANIZED HEALTH CARE EDUCATION/TRAINING PROGRAM

## 2024-02-18 PROCEDURE — 85025 COMPLETE CBC W/AUTO DIFF WBC: CPT | Performed by: STUDENT IN AN ORGANIZED HEALTH CARE EDUCATION/TRAINING PROGRAM

## 2024-02-18 PROCEDURE — 83735 ASSAY OF MAGNESIUM: CPT | Performed by: STUDENT IN AN ORGANIZED HEALTH CARE EDUCATION/TRAINING PROGRAM

## 2024-02-18 PROCEDURE — C9113 INJ PANTOPRAZOLE SODIUM, VIA: HCPCS | Performed by: STUDENT IN AN ORGANIZED HEALTH CARE EDUCATION/TRAINING PROGRAM

## 2024-02-18 PROCEDURE — 999N000128 HC STATISTIC PERIPHERAL IV START W/O US GUIDANCE

## 2024-02-18 PROCEDURE — 97530 THERAPEUTIC ACTIVITIES: CPT | Mod: GP

## 2024-02-18 PROCEDURE — 250N000013 HC RX MED GY IP 250 OP 250 PS 637: Performed by: PHYSICIAN ASSISTANT

## 2024-02-18 PROCEDURE — 120N000002 HC R&B MED SURG/OB UMMC

## 2024-02-18 PROCEDURE — 250N000011 HC RX IP 250 OP 636: Performed by: STUDENT IN AN ORGANIZED HEALTH CARE EDUCATION/TRAINING PROGRAM

## 2024-02-18 PROCEDURE — 250N000013 HC RX MED GY IP 250 OP 250 PS 637: Performed by: STUDENT IN AN ORGANIZED HEALTH CARE EDUCATION/TRAINING PROGRAM

## 2024-02-18 RX ORDER — SUMATRIPTAN 5 MG/1
10 SPRAY NASAL
Status: COMPLETED | OUTPATIENT
Start: 2024-02-18 | End: 2024-02-19

## 2024-02-18 RX ORDER — BISACODYL 10 MG
10 SUPPOSITORY, RECTAL RECTAL ONCE
Status: COMPLETED | OUTPATIENT
Start: 2024-02-18 | End: 2024-02-18

## 2024-02-18 RX ADMIN — ONDANSETRON 4 MG: 4 TABLET, ORALLY DISINTEGRATING ORAL at 08:22

## 2024-02-18 RX ADMIN — LIDOCAINE 1 PATCH: 4 PATCH TOPICAL at 08:22

## 2024-02-18 RX ADMIN — POTASSIUM CHLORIDE, DEXTROSE MONOHYDRATE AND SODIUM CHLORIDE: 150; 5; 450 INJECTION, SOLUTION INTRAVENOUS at 02:39

## 2024-02-18 RX ADMIN — PANTOPRAZOLE SODIUM 40 MG: 40 INJECTION, POWDER, FOR SOLUTION INTRAVENOUS at 08:03

## 2024-02-18 RX ADMIN — SODIUM CHLORIDE, POTASSIUM CHLORIDE, SODIUM LACTATE AND CALCIUM CHLORIDE 1000 ML: 600; 310; 30; 20 INJECTION, SOLUTION INTRAVENOUS at 08:32

## 2024-02-18 RX ADMIN — POTASSIUM CHLORIDE, DEXTROSE MONOHYDRATE AND SODIUM CHLORIDE: 150; 5; 450 INJECTION, SOLUTION INTRAVENOUS at 11:13

## 2024-02-18 RX ADMIN — LORAZEPAM 0.25 MG: 2 INJECTION INTRAMUSCULAR; INTRAVENOUS at 16:21

## 2024-02-18 RX ADMIN — LORAZEPAM 0.25 MG: 2 INJECTION INTRAMUSCULAR; INTRAVENOUS at 08:22

## 2024-02-18 RX ADMIN — BISACODYL 10 MG: 10 SUPPOSITORY RECTAL at 08:03

## 2024-02-18 RX ADMIN — Medication: at 16:27

## 2024-02-18 RX ADMIN — ENOXAPARIN SODIUM 40 MG: 40 INJECTION SUBCUTANEOUS at 11:18

## 2024-02-18 RX ADMIN — MAGNESIUM SULFATE HEPTAHYDRATE 3 G: 500 INJECTION, SOLUTION INTRAMUSCULAR; INTRAVENOUS at 06:27

## 2024-02-18 ASSESSMENT — ACTIVITIES OF DAILY LIVING (ADL)
ADLS_ACUITY_SCORE: 46

## 2024-02-18 NOTE — PLAN OF CARE
Goal Outcome Evaluation:    VSS on RA, pain managed with dilaudid PCA through R chest port with LR. D5 with 1/2 NS and KCL running through PIV at 75mL/hr. Zofran given for nausea this afternoon after PT/OT. Pt intermittently reporting anxiety, but refused PRN ativan. Anxiety managed with 1:1 conversation with RN about POC and frequent affirmations and redirection. Up independently in room and SBA in halls. Walked with NA this AM and with PT/OT. BLE edema +1. Endorses migraine, however does not want med cocktail or Imitrex at this time. Paged MD to discuss migraine management while outpatient during rounds tomorrow AM.

## 2024-02-18 NOTE — PROGRESS NOTES
Surgical Oncology Progress Note    Interval History:  POD 4  Ambulating, working with PT, sitting in chair throughout the day. Pain controlled. No nausea. Some hiccups this AM. No appetite. No flatus nor BM as of yet. Off oxygen. Using IS. Voiding spontaneously without issue. Decreased UOP and feels very thirsty    Physical Exam:   Temp:  [98.5  F (36.9  C)-99.2  F (37.3  C)] 98.5  F (36.9  C)  Pulse:  [] 85  Resp:  [17-18] 18  BP: (102-119)/(49-77) 119/49  SpO2:  [96 %-98 %] 96 %  General: Alert, oriented, appears comfortable, NAD.  Respiratory: breathing non labored  Abdomen: Abdomen is soft, non-tender, non-distended. Incision is clean, dry and intact.     Data:   All laboratory and imaging data in the past 24 hours reviewed  I/O last 3 completed shifts:  In: 914.25 [P.O.:150; I.V.:764.25]  Out: 300 [Urine:300]  Recent Labs   Lab Test 02/18/24 0426 02/17/24 0446 02/16/24  0753 02/16/24  0437 02/11/24  1557 02/11/24  1149 02/09/24  1331   WBC 10.8 11.8*  --  9.4   < > 11.3* 8.1   HGB 8.2* 8.3* 8.6* 6.8*   < > 7.2* 10.9*    354  --  272   < > 283 365   INR  --   --   --   --   --  1.09 0.99    < > = values in this interval not displayed.      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      POD 4 s/p exploratory laparotomy, excisional liver biopsy, small bowel resection, partial colon resection, partial liver resection, partial gastric resection, and gtube placement with Dr. Krishnamurthy  2/14/24.            - Available for pain is dilaudid PCA. Lidocaine patch added. Abdominal binder prn for comfort.  - Wean oxygen as able. Home inhalers resumed. Incentive spirometry. Out of bed and ambulation.   - Imitrex PRN for migraine headache.   - NPO, hard candy and 1 popscicle ok  - Protonix IV QD  - mIVF 100, 1L LR bolus  - Anemia secondary to surgical blood loss and dilution: monitor  - Lovenox for DVT prophylaxis.   - planning for upper GI study monday  - PT/OT    Seen with Dr. Sharon Rosales MD   Surgical Oncology

## 2024-02-18 NOTE — PLAN OF CARE
"Goal Outcome Evaluation:  /49 (BP Location: Left arm)   Pulse 85   Temp 98.5  F (36.9  C) (Oral)   Resp 18   Ht 1.6 m (5' 3\")   Wt 71.2 kg (156 lb 15.5 oz)   SpO2 96%   BMI 27.81 kg/m      A&Ox4, able to make needs known. VSS on RA, afebrile. Dilaudid PCA in use for pain, PRN zofran given x1 for c/o nausea. Ativan PRN given x2 for anxiety. Voiding without difficulties, urine green tinged. G tube set to gravity. Midline incision [facility] and G tube site dressing CDI. Up independently in room. Port needle and dressing changed over shift. PIV running D5 1/2 NaCl + 20KCl at 100 ml/hr. Call light within reach, continue with plan of care.                       "

## 2024-02-18 NOTE — PLAN OF CARE
Goal Outcome Evaluation:      Plan of Care Reviewed With: patient    Overall Patient Progress: improvingOverall Patient Progress: improving    Outcome Evaluation: AOx4, able to make needs known. Calls appropriately. Dilaudid PCA with frequent use and many hassan not given although attempted. Mag replaced this MA and IVF due to increase once complete. Voiding well with light green urine. G tube to gravity w/o issues. Thick green drainage noted. Midline incision WDL. [facility]. Promote bowle motility and pain plan.

## 2024-02-19 ENCOUNTER — APPOINTMENT (OUTPATIENT)
Dept: PHYSICAL THERAPY | Facility: CLINIC | Age: 44
End: 2024-02-19
Payer: COMMERCIAL

## 2024-02-19 ENCOUNTER — APPOINTMENT (OUTPATIENT)
Dept: CT IMAGING | Facility: CLINIC | Age: 44
End: 2024-02-19
Attending: PHYSICIAN ASSISTANT
Payer: COMMERCIAL

## 2024-02-19 PROBLEM — D64.9 ANEMIA: Status: ACTIVE | Noted: 2024-02-19

## 2024-02-19 LAB
ACANTHOCYTES BLD QL SMEAR: NORMAL
ALBUMIN SERPL BCG-MCNC: 2.6 G/DL (ref 3.5–5.2)
ALP SERPL-CCNC: 58 U/L (ref 40–150)
ALT SERPL W P-5'-P-CCNC: 34 U/L (ref 0–50)
ANION GAP SERPL CALCULATED.3IONS-SCNC: 10 MMOL/L (ref 7–15)
AST SERPL W P-5'-P-CCNC: 20 U/L (ref 0–45)
AUER BODIES BLD QL SMEAR: NORMAL
BASO STIPL BLD QL SMEAR: NORMAL
BASOPHILS # BLD AUTO: 0 10E3/UL (ref 0–0.2)
BASOPHILS NFR BLD AUTO: 0 %
BILIRUB SERPL-MCNC: 0.3 MG/DL
BITE CELLS BLD QL SMEAR: NORMAL
BLISTER CELLS BLD QL SMEAR: NORMAL
BUN SERPL-MCNC: 3.4 MG/DL (ref 6–20)
BURR CELLS BLD QL SMEAR: NORMAL
CALCIUM SERPL-MCNC: 7.7 MG/DL (ref 8.6–10)
CHLORIDE SERPL-SCNC: 104 MMOL/L (ref 98–107)
CREAT SERPL-MCNC: 0.71 MG/DL (ref 0.51–0.95)
DACRYOCYTES BLD QL SMEAR: NORMAL
DEPRECATED HCO3 PLAS-SCNC: 23 MMOL/L (ref 22–29)
EGFRCR SERPLBLD CKD-EPI 2021: >90 ML/MIN/1.73M2
ELLIPTOCYTES BLD QL SMEAR: NORMAL
EOSINOPHIL # BLD AUTO: 0.4 10E3/UL (ref 0–0.7)
EOSINOPHIL NFR BLD AUTO: 5 %
ERYTHROCYTE [DISTWIDTH] IN BLOOD BY AUTOMATED COUNT: 15.8 % (ref 10–15)
FRAGMENTS BLD QL SMEAR: NORMAL
GLUCOSE BLDC GLUCOMTR-MCNC: 90 MG/DL (ref 70–99)
GLUCOSE SERPL-MCNC: 83 MG/DL (ref 70–99)
HCT VFR BLD AUTO: 21.8 % (ref 35–47)
HGB BLD-MCNC: 7 G/DL (ref 11.7–15.7)
HGB C CRYSTALS: NORMAL
HOWELL-JOLLY BOD BLD QL SMEAR: NORMAL
IMM GRANULOCYTES # BLD: 0 10E3/UL
IMM GRANULOCYTES NFR BLD: 1 %
LYMPHOCYTES # BLD AUTO: 1.8 10E3/UL (ref 0.8–5.3)
LYMPHOCYTES NFR BLD AUTO: 22 %
MAGNESIUM SERPL-MCNC: 2 MG/DL (ref 1.7–2.3)
MCH RBC QN AUTO: 30.4 PG (ref 26.5–33)
MCHC RBC AUTO-ENTMCNC: 32.1 G/DL (ref 31.5–36.5)
MCV RBC AUTO: 95 FL (ref 78–100)
MONOCYTES # BLD AUTO: 0.6 10E3/UL (ref 0–1.3)
MONOCYTES NFR BLD AUTO: 8 %
NEUTROPHILS # BLD AUTO: 5.4 10E3/UL (ref 1.6–8.3)
NEUTROPHILS NFR BLD AUTO: 64 %
NEUTS HYPERSEG BLD QL SMEAR: NORMAL
NRBC # BLD AUTO: 0 10E3/UL
NRBC BLD AUTO-RTO: 0 /100
PHOSPHATE SERPL-MCNC: 3.6 MG/DL (ref 2.5–4.5)
PLAT MORPH BLD: NORMAL
PLATELET # BLD AUTO: 298 10E3/UL (ref 150–450)
POLYCHROMASIA BLD QL SMEAR: NORMAL
POTASSIUM SERPL-SCNC: 3.6 MMOL/L (ref 3.4–5.3)
PROT SERPL-MCNC: 4.5 G/DL (ref 6.4–8.3)
RBC # BLD AUTO: 2.3 10E6/UL (ref 3.8–5.2)
RBC AGGLUT BLD QL: NORMAL
RBC MORPH BLD: NORMAL
ROULEAUX BLD QL SMEAR: NORMAL
SICKLE CELLS BLD QL SMEAR: NORMAL
SMUDGE CELLS BLD QL SMEAR: NORMAL
SODIUM SERPL-SCNC: 137 MMOL/L (ref 135–145)
SPHEROCYTES BLD QL SMEAR: NORMAL
STOMATOCYTES BLD QL SMEAR: NORMAL
TARGETS BLD QL SMEAR: NORMAL
TOXIC GRANULES BLD QL SMEAR: NORMAL
VARIANT LYMPHS BLD QL SMEAR: NORMAL
WBC # BLD AUTO: 8.4 10E3/UL (ref 4–11)

## 2024-02-19 PROCEDURE — 250N000013 HC RX MED GY IP 250 OP 250 PS 637: Performed by: PHYSICIAN ASSISTANT

## 2024-02-19 PROCEDURE — 97530 THERAPEUTIC ACTIVITIES: CPT | Mod: GP

## 2024-02-19 PROCEDURE — C9113 INJ PANTOPRAZOLE SODIUM, VIA: HCPCS | Performed by: STUDENT IN AN ORGANIZED HEALTH CARE EDUCATION/TRAINING PROGRAM

## 2024-02-19 PROCEDURE — 80053 COMPREHEN METABOLIC PANEL: CPT | Performed by: STUDENT IN AN ORGANIZED HEALTH CARE EDUCATION/TRAINING PROGRAM

## 2024-02-19 PROCEDURE — 74177 CT ABD & PELVIS W/CONTRAST: CPT

## 2024-02-19 PROCEDURE — 84100 ASSAY OF PHOSPHORUS: CPT | Performed by: STUDENT IN AN ORGANIZED HEALTH CARE EDUCATION/TRAINING PROGRAM

## 2024-02-19 PROCEDURE — 258N000003 HC RX IP 258 OP 636: Performed by: STUDENT IN AN ORGANIZED HEALTH CARE EDUCATION/TRAINING PROGRAM

## 2024-02-19 PROCEDURE — 83735 ASSAY OF MAGNESIUM: CPT | Performed by: STUDENT IN AN ORGANIZED HEALTH CARE EDUCATION/TRAINING PROGRAM

## 2024-02-19 PROCEDURE — 74177 CT ABD & PELVIS W/CONTRAST: CPT | Mod: 26 | Performed by: RADIOLOGY

## 2024-02-19 PROCEDURE — 250N000013 HC RX MED GY IP 250 OP 250 PS 637

## 2024-02-19 PROCEDURE — 250N000011 HC RX IP 250 OP 636: Performed by: STUDENT IN AN ORGANIZED HEALTH CARE EDUCATION/TRAINING PROGRAM

## 2024-02-19 PROCEDURE — 36591 DRAW BLOOD OFF VENOUS DEVICE: CPT | Performed by: STUDENT IN AN ORGANIZED HEALTH CARE EDUCATION/TRAINING PROGRAM

## 2024-02-19 PROCEDURE — 85025 COMPLETE CBC W/AUTO DIFF WBC: CPT | Performed by: STUDENT IN AN ORGANIZED HEALTH CARE EDUCATION/TRAINING PROGRAM

## 2024-02-19 PROCEDURE — 250N000011 HC RX IP 250 OP 636: Performed by: SURGERY

## 2024-02-19 PROCEDURE — 250N000013 HC RX MED GY IP 250 OP 250 PS 637: Performed by: STUDENT IN AN ORGANIZED HEALTH CARE EDUCATION/TRAINING PROGRAM

## 2024-02-19 PROCEDURE — 120N000002 HC R&B MED SURG/OB UMMC

## 2024-02-19 RX ORDER — DEXTROSE MONOHYDRATE 100 MG/ML
INJECTION, SOLUTION INTRAVENOUS CONTINUOUS PRN
Status: DISCONTINUED | OUTPATIENT
Start: 2024-02-19 | End: 2024-02-27 | Stop reason: HOSPADM

## 2024-02-19 RX ORDER — METHOCARBAMOL 100 MG/ML
500 INJECTION, SOLUTION INTRAMUSCULAR; INTRAVENOUS ONCE
Status: DISCONTINUED | OUTPATIENT
Start: 2024-02-19 | End: 2024-02-19

## 2024-02-19 RX ORDER — NORTRIPTYLINE HCL 10 MG
20 CAPSULE ORAL AT BEDTIME
Status: DISCONTINUED | OUTPATIENT
Start: 2024-02-19 | End: 2024-02-23

## 2024-02-19 RX ORDER — GUAIFENESIN 600 MG/1
15 TABLET, EXTENDED RELEASE ORAL DAILY
Status: DISCONTINUED | OUTPATIENT
Start: 2024-02-19 | End: 2024-02-27 | Stop reason: HOSPADM

## 2024-02-19 RX ORDER — IOPAMIDOL 755 MG/ML
109 INJECTION, SOLUTION INTRAVASCULAR ONCE
Status: COMPLETED | OUTPATIENT
Start: 2024-02-19 | End: 2024-02-19

## 2024-02-19 RX ADMIN — POTASSIUM CHLORIDE, DEXTROSE MONOHYDRATE AND SODIUM CHLORIDE: 150; 5; 450 INJECTION, SOLUTION INTRAVENOUS at 02:01

## 2024-02-19 RX ADMIN — POTASSIUM CHLORIDE, DEXTROSE MONOHYDRATE AND SODIUM CHLORIDE: 150; 5; 450 INJECTION, SOLUTION INTRAVENOUS at 11:59

## 2024-02-19 RX ADMIN — IOPAMIDOL 109 ML: 755 INJECTION, SOLUTION INTRAVENOUS at 13:33

## 2024-02-19 RX ADMIN — SUMATRIPTAN 2 SPRAY: 5 SPRAY NASAL at 22:40

## 2024-02-19 RX ADMIN — Medication 15 ML: at 18:38

## 2024-02-19 RX ADMIN — LIDOCAINE 1 PATCH: 4 PATCH TOPICAL at 08:36

## 2024-02-19 RX ADMIN — ONDANSETRON 4 MG: 4 TABLET, ORALLY DISINTEGRATING ORAL at 22:00

## 2024-02-19 RX ADMIN — NORTRIPTYLINE HYDROCHLORIDE 20 MG: 10 CAPSULE ORAL at 22:07

## 2024-02-19 RX ADMIN — LORAZEPAM 0.25 MG: 2 INJECTION INTRAMUSCULAR; INTRAVENOUS at 02:00

## 2024-02-19 RX ADMIN — ENOXAPARIN SODIUM 40 MG: 40 INJECTION SUBCUTANEOUS at 11:55

## 2024-02-19 RX ADMIN — ONDANSETRON 4 MG: 4 TABLET, ORALLY DISINTEGRATING ORAL at 11:54

## 2024-02-19 RX ADMIN — PANTOPRAZOLE SODIUM 40 MG: 40 INJECTION, POWDER, FOR SOLUTION INTRAVENOUS at 08:40

## 2024-02-19 ASSESSMENT — ACTIVITIES OF DAILY LIVING (ADL)
ADLS_ACUITY_SCORE: 46

## 2024-02-19 NOTE — PROGRESS NOTES
Surgical Oncology Progress Note    Interval History:  POD 5  Pain overnight, treated with robaxin. Not passing flatus or stool.     Physical Exam:   Temp:  [98.6  F (37  C)-98.8  F (37.1  C)] 98.8  F (37.1  C)  Pulse:  [91-97] 97  Resp:  [10-18] 10  BP: (126-129)/(74-85) 129/85  SpO2:  [96 %-97 %] 96 %  General: Alert, oriented, appears comfortable, NAD.  Respiratory: breathing non labored  Abdomen: Abdomen is soft, appropriately tender, non-distended. Incision is clean, dry and intact. There is swelling at the midportion of the incision with bruising vs erythema. Gtube (jones catheter) in place.     Data:   All laboratory and imaging data in the past 24 hours reviewed  I/O last 3 completed shifts:  In: 86 [I.V.:86]  Out: 415 [Urine:200; Emesis/NG output:215]  Recent Labs   Lab Test 02/18/24 0426 02/17/24 0446 02/16/24  0753 02/16/24  0437 02/11/24  1557 02/11/24  1149 02/09/24  1331   WBC 10.8 11.8*  --  9.4   < > 11.3* 8.1   HGB 8.2* 8.3* 8.6* 6.8*   < > 7.2* 10.9*    354  --  272   < > 283 365   INR  --   --   --   --   --  1.09 0.99    < > = values in this interval not displayed.      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      POD 5 s/p exploratory laparotomy, excisional liver biopsy, small bowel resection, partial colon resection, partial liver resection, partial gastric resection, and gtube placement with Dr. Krishnamurthy 2/14/24.      - Planning for UGI vs CT today to evaluate GJ  anastomosis and gastric remnant.   - Available for pain is dilaudid PCA. Lidocaine patch added. Abdominal binder prn for comfort.  - Wean oxygen as able. Home inhalers resumed. Incentive spirometry. Out of bed and ambulation.   - Imitrex available for migraine headache.   - NPO, hard candy and 1 popscicle ok  - Gtube (jones) to gravity  - Protonix IV QD  - mIVF 100  - Lovenox for DVT prophylaxis.   - PT/OT    Seen with Chief resident who will discuss with Staff.     FAVIO MeraC   Surgical Oncology   [phone]

## 2024-02-19 NOTE — PROGRESS NOTES
Care Management Follow Up    Length of Stay (days): 8    Expected Discharge Date: 02/21/2024     Concerns to be Addressed: discharge planning     Patient plan of care discussed at interdisciplinary rounds: No    Anticipated Discharge Disposition: Home with Home Care     Anticipated Discharge Services: Home Care PT/OT  Anticipated Discharge DME: TBD    Patient/family educated on Medicare website which has current facility and service quality ratings: yes  Education Provided on the Discharge Plan: Yes  Patient/Family in Agreement with the Plan:      Referrals Placed by CM/SW: Internal Clinic Care Coordination, Senior Linkage Line, Post Acute Facilities, Communication hand-offs to next level of Care Providers  Private pay costs discussed: Not applicable    Additional Information:   reviewed patient chart and noted that PT/OT recs are no longer for TCU, rather recommending home with Home Care PT/OT as patient is SBA.       ARIEL Webb, [facility]  [address]   [email or web address]  Office: [phone]   Pager: [phone]

## 2024-02-19 NOTE — PLAN OF CARE
"Goal Outcome Evaluation: 3435-2701      Plan of Care Reviewed With: patient    Overall Patient Progress: improvingOverall Patient Progress: improving    Outcome Evaluation: A&ox4 forgetful at times, cooperative throughout the day. VSS on RA. Up ind in room. CT completed with contrast plan to initiate TF and advanced to CLD. pt calm and cooperative today. Spiritual health visit. IVMF 75ML/HR. Reports generalized pain, PCA in use. Gtube to gravity minimal output. Start new antidepressant at bedtime . GTube dressing changed this AM by surgery, CDI. Midline incision [facility]. BLE edema, lymph consult place. Continue to monitor.     /83 (BP Location: Right arm)   Pulse 90   Temp 98.8  F (37.1  C) (Oral)   Resp 18   Ht 1.6 m (5' 3\")   Wt 80.9 kg (178 lb 6.4 oz)   SpO2 100%   BMI 31.60 kg/m         "

## 2024-02-19 NOTE — PROGRESS NOTES
CLINICAL NUTRITION SERVICES - ASSESSMENT NOTE     Nutrition Prescription    RECOMMENDATIONS FOR MDs/PROVIDERS TO ORDER:  1. Glycemic control with TF support   2. Lyte monitoring with TF start and advancement  3. Fluids and bowel regimen per team     Malnutrition Status:    Unable to determine due to unable to perform all aspects of NFPE    Recommendations already ordered by Registered Dietitian (RD):  1. Enteral Nutrition support recommendation:   - Access: Remnant G-tube ( per MD small remnant size, not recommend bolus feeds in future)  - Dosing wt: 57 kg adjusted wt      - TF: Ordered to begin TF with Pivot 1.5, Initiate @ 10 ml/hr with no advancement at this time.   - Free water flushes: 60 ml Q4 hrs for tube patency.    - Multivitamin/mineral: Certavite 15 ml/day via FT.       Future/Additional Recommendations:  Once able, advance TF Pivot 1.5, Initiate by 10 ml Q 8hr as tolerated to goal @ 40 ml/hr.  Do not start or advance unless K+ >3.0, Mg++ > 1.5,  and Phos > 1.9.     Pivot 1.5 Neville (or equivalent) @ goal of 40 ml/hr  (960ml/day) provides: 1440 kcals ( 25 kcal/kg) 90 g PRO ( 1.6 gm/kg), 720 ml free H20, 165 g CHO, and 7 g fiber daily.      - Free water flushes: 60 ml Q4 hrs for tube patency.    - Multivitamin/mineral: Certavite 15 ml/day via FT.   - K+, Mg, Phos ordered until TF advances to goal rate for evaluation of refeeding          REASON FOR ASSESSMENT  Eduarda Nogueira is a/an 43 year old female assessed by the dietitian for LOS x8 and NPO/Clear Liquids >7    Chart reviewed:  PMH: Asthma, KURTZ and obesity with S/P Gastric bypass and left parietal ischemic embolic stroke  in 2022,  Recently diagnosed with Jejunal ulcer, positive for Adenocarcinoma   Presented with and GI bleed secondary to jejunal ulcer, positive for adenocarcinoma with large volume hematemesis      POD 4 s/p exploratory laparotomy, excisional liver biopsy, small bowel resection, partial colon resection, partial liver resection, partial  "gastric resection, and gtube placement with Dr. Krishnamurthy 2/14/24.       - Planning for UGI vs CT today to evaluate GJ anastomosis and gastric remnant.       NUTRITION HISTORY  Patient busy with other services.   Per chart review, patient has had a very limited appetite. She recently had an EGD completed about 2 weeks ago with a biopsy of a jejunal ulcer found to be positive for adenocarcinoma. She was recently discharged from [facility] on 2/9, after an additional episode of bright red blood hematemesis. Has been struggling with these symptoms since October 2023.   - Confirms abdominal pain, nausea/vomiting, fatigue, lightheadedness, dizziness, and decreased appetite       CURRENT NUTRITION ORDERS  Diet: NPO with G-tube to gravity ( remnant G-tube ?)    Intake/Tolerance: Kept NPO/CL diet status since admit ( 8 days of NPO/CL diet status since admit )   - Patient is POD # 5 today. Not passing stool or gas    - MIVF D5 1/2 NaCl + 20KCl at 100 ml/hr -> 120 gm dextrose per day       LABS  BUN: 3.4 ( acute low)  K+: 3.6, Mg++:L 2.0, Phos: 3.6 -> has been getting 120 gm dextrose per day with Lytes normal range       MEDICATIONS  Medications reviewed    ANTHROPOMETRICS  Height: 160 cm (5' 3\")  Most Recent Weight: 72.1 kg (159 lb) admit wt on 2/11/24  IBW: 52.3 kg ( 138% IbW)  BMI: 28.2 kg /m2 -> Overweight BMI 25-29.9  Weight History:   Wt Readings from Last 10 Encounters:   02/19/24 80.9 kg (178 lb 6.4 oz)   02/09/24 72.4 kg (159 lb 9.6 oz)   11/20/23 68.9 kg (152 lb)   09/08/15 129.5 kg (285 lb 6.4 oz)       Dosing Weight: 57 kg adjusted wt from admit wt of 72.1 kg and IBW of 52.3 kg     ASSESSED NUTRITION NEEDS  Estimated Energy Needs: 1140- 1425 + kcals/day (20 - 25 + kcals/kg)  Justification: Maintenance for TPN lower end, ++ for EN   Estimated Protein Needs: 68- 86  grams protein/day (1.2 - 1.5 grams of pro/kg)  Justification: Post-op  Estimated Fluid Needs: (1 mL/kcal)   Justification: Maintenance      PHYSICAL " FINDINGS  See malnutrition section below.      MALNUTRITION  % Intake: </= 50% for >/= 5 days (severe)  % Weight Loss: None noted  Subcutaneous Fat Loss: Unable to assess  Muscle Loss: Unable to assess  Fluid Accumulation/Edema: Mild  Malnutrition Diagnosis: Unable to determine due to lack of information     NUTRITION DIAGNOSIS  Inadequate oral intake related to post op ileus as evidenced by remains NPO/CL diet x 8 days now since admit       INTERVENTIONS  Implementation  Nutrition Education: No education needs assessed at this time   FEN discussed with the team. Team desires to start TF support tonight via G-tube.   Surgery team to send a consult for TF assess and order      Goals  Diet adv v nutrition support within 24 hours to avoid further nutrition deficit .     Monitoring/Evaluation  Progress toward goals will be monitored and evaluated per protocol.  Lena Zuleta RD/[place]  7C ([phone])/7D RD pager: [phone]  Weekend/Holiday RD pager: [phone]

## 2024-02-19 NOTE — PHARMACY-CONSULT NOTE
Pharmacy Tube Feeding Consult    Medication reviewed for administration by feeding tube and for potential food/drug interactions.    Recommendation: No changes are needed at this time. Nortriptyline capsules may be opened at bedside if necessary for administration.      Pharmacy will continue to follow as new medications are ordered.    Indio Weathers, PharmD, BCPS  [phone]

## 2024-02-19 NOTE — CONSULTS
"SPIRITUAL HEALTH SERVICES Consult Note  [facility] ([place]C    Referral Source/Reason for Visit: Routine consult    Summary and Recommendations -  ~Eduarda is navigating emotions that are overwhelming and finds comfort in being able to process them verbally. I recommend placing consults as need for further support.    Plan:  services remain available upon request. I will continue to follow Eduarda as her stay extends.    Rev. MARCOS Vincent.  Chaplain Resident  Pager [phone]    * [facility] remains available 24/7 for emergent requests/referrals, either by having the switchboard page the on-call  or by entering an ASAP/STAT consult in Epic (this will also page the on-call ). Routine Epic consults receive an initial response within 24 hours.*    Assessment      Patient/Family Understanding of Illness and Goals of Care -    ~Not discussed, see previous  note.      Distress and Loss -  ~Eduarda was feeling distressed around the expression of her  emotions. She stated that \"I'm behaving in ways that aren't me!\" Eduarda feels guilt over blaming people and not trusting them.     ~She also shared some relationship dynamics within her family.       Strengths, Coping, and Resources -   ~Eduarda was open and willing to explore some of her emotions and we discussed the practice of self-compassion. I incorporated elements [facility] 139 for her in practicing self-compassion and the model of Sundar.      Meaning, Beliefs, and Spirituality -   ~Eduarda is Nondenominational and deeply spiritual. She finds comfort in Scripture and prayer.    "

## 2024-02-19 NOTE — PLAN OF CARE
Goal Outcome Evaluation:      Plan of Care Reviewed With: patient    Overall Patient Progress: improvingOverall Patient Progress: improving      Outcome Evaluation: A&Ox4, able to make needs known. VSS on RA, Dilaudid PCA in use for pain. Voiding without difficulties, urine green tinged. G tube set to gravity. Midline incision [facility] and G tube site dressing CDI. Up independently in room. Patient was anxious, sad, and cried this shift. Pt was comforted with words of encouragement, Ativan PRN given for anxiety. PIV running D5 1/2 NaCl + 20KCl at 100 ml/hr. Pt voided this morning, Call light within reach, continue with plan of care.

## 2024-02-20 ENCOUNTER — APPOINTMENT (OUTPATIENT)
Dept: OCCUPATIONAL THERAPY | Facility: CLINIC | Age: 44
End: 2024-02-20
Payer: COMMERCIAL

## 2024-02-20 LAB
ALBUMIN SERPL BCG-MCNC: 2.5 G/DL (ref 3.5–5.2)
ALP SERPL-CCNC: 59 U/L (ref 40–150)
ALT SERPL W P-5'-P-CCNC: 25 U/L (ref 0–50)
ANION GAP SERPL CALCULATED.3IONS-SCNC: 10 MMOL/L (ref 7–15)
AST SERPL W P-5'-P-CCNC: 17 U/L (ref 0–45)
BILIRUB SERPL-MCNC: 0.4 MG/DL
BUN SERPL-MCNC: 2.3 MG/DL (ref 6–20)
CALCIUM SERPL-MCNC: 7.8 MG/DL (ref 8.6–10)
CHLORIDE SERPL-SCNC: 104 MMOL/L (ref 98–107)
CREAT SERPL-MCNC: 0.72 MG/DL (ref 0.51–0.95)
DEPRECATED HCO3 PLAS-SCNC: 24 MMOL/L (ref 22–29)
EGFRCR SERPLBLD CKD-EPI 2021: >90 ML/MIN/1.73M2
ERYTHROCYTE [DISTWIDTH] IN BLOOD BY AUTOMATED COUNT: 15.8 % (ref 10–15)
GLUCOSE SERPL-MCNC: 86 MG/DL (ref 70–99)
HCT VFR BLD AUTO: 22.4 % (ref 35–47)
HGB BLD-MCNC: 7.2 G/DL (ref 11.7–15.7)
MAGNESIUM SERPL-MCNC: 1.8 MG/DL (ref 1.7–2.3)
MCH RBC QN AUTO: 30.3 PG (ref 26.5–33)
MCHC RBC AUTO-ENTMCNC: 32.1 G/DL (ref 31.5–36.5)
MCV RBC AUTO: 94 FL (ref 78–100)
PHOSPHATE SERPL-MCNC: 3.9 MG/DL (ref 2.5–4.5)
PLATELET # BLD AUTO: 309 10E3/UL (ref 150–450)
POTASSIUM SERPL-SCNC: 3.5 MMOL/L (ref 3.4–5.3)
PROT SERPL-MCNC: 4.4 G/DL (ref 6.4–8.3)
RBC # BLD AUTO: 2.38 10E6/UL (ref 3.8–5.2)
SODIUM SERPL-SCNC: 138 MMOL/L (ref 135–145)
WBC # BLD AUTO: 7.8 10E3/UL (ref 4–11)

## 2024-02-20 PROCEDURE — 250N000013 HC RX MED GY IP 250 OP 250 PS 637: Performed by: PHYSICIAN ASSISTANT

## 2024-02-20 PROCEDURE — 120N000002 HC R&B MED SURG/OB UMMC

## 2024-02-20 PROCEDURE — 85027 COMPLETE CBC AUTOMATED: CPT

## 2024-02-20 PROCEDURE — 97140 MANUAL THERAPY 1/> REGIONS: CPT | Mod: GO

## 2024-02-20 PROCEDURE — 83735 ASSAY OF MAGNESIUM: CPT | Performed by: PHYSICIAN ASSISTANT

## 2024-02-20 PROCEDURE — 84100 ASSAY OF PHOSPHORUS: CPT | Performed by: PHYSICIAN ASSISTANT

## 2024-02-20 PROCEDURE — 36415 COLL VENOUS BLD VENIPUNCTURE: CPT

## 2024-02-20 PROCEDURE — 258N000003 HC RX IP 258 OP 636: Performed by: STUDENT IN AN ORGANIZED HEALTH CARE EDUCATION/TRAINING PROGRAM

## 2024-02-20 PROCEDURE — 80053 COMPREHEN METABOLIC PANEL: CPT

## 2024-02-20 PROCEDURE — 250N000011 HC RX IP 250 OP 636

## 2024-02-20 PROCEDURE — 250N000013 HC RX MED GY IP 250 OP 250 PS 637

## 2024-02-20 PROCEDURE — 250N000011 HC RX IP 250 OP 636: Performed by: STUDENT IN AN ORGANIZED HEALTH CARE EDUCATION/TRAINING PROGRAM

## 2024-02-20 RX ORDER — HYDROMORPHONE HCL IN WATER/PF 6 MG/30 ML
.2-.4 PATIENT CONTROLLED ANALGESIA SYRINGE INTRAVENOUS
Status: DISCONTINUED | OUTPATIENT
Start: 2024-02-20 | End: 2024-02-22

## 2024-02-20 RX ORDER — PANTOPRAZOLE SODIUM 40 MG/1
40 TABLET, DELAYED RELEASE ORAL
Status: DISCONTINUED | OUTPATIENT
Start: 2024-02-20 | End: 2024-02-27 | Stop reason: HOSPADM

## 2024-02-20 RX ORDER — HYDROXYZINE HYDROCHLORIDE 25 MG/1
25 TABLET, FILM COATED ORAL EVERY 6 HOURS PRN
Status: DISCONTINUED | OUTPATIENT
Start: 2024-02-20 | End: 2024-02-27 | Stop reason: HOSPADM

## 2024-02-20 RX ORDER — HYDROXYZINE HYDROCHLORIDE 50 MG/1
50 TABLET, FILM COATED ORAL EVERY 6 HOURS PRN
Status: DISCONTINUED | OUTPATIENT
Start: 2024-02-20 | End: 2024-02-27 | Stop reason: HOSPADM

## 2024-02-20 RX ORDER — HYDROMORPHONE HYDROCHLORIDE 2 MG/1
2-4 TABLET ORAL EVERY 4 HOURS PRN
Status: DISCONTINUED | OUTPATIENT
Start: 2024-02-20 | End: 2024-02-27 | Stop reason: HOSPADM

## 2024-02-20 RX ORDER — METHOCARBAMOL 500 MG/1
500 TABLET, FILM COATED ORAL EVERY 6 HOURS PRN
Status: DISCONTINUED | OUTPATIENT
Start: 2024-02-20 | End: 2024-02-22

## 2024-02-20 RX ORDER — POLYETHYLENE GLYCOL 3350 17 G/17G
17 POWDER, FOR SOLUTION ORAL DAILY
Status: DISCONTINUED | OUTPATIENT
Start: 2024-02-20 | End: 2024-02-27 | Stop reason: HOSPADM

## 2024-02-20 RX ADMIN — HYDROXYZINE HYDROCHLORIDE 25 MG: 25 TABLET, FILM COATED ORAL at 19:45

## 2024-02-20 RX ADMIN — ONDANSETRON 4 MG: 4 TABLET, ORALLY DISINTEGRATING ORAL at 13:57

## 2024-02-20 RX ADMIN — PROCHLORPERAZINE EDISYLATE 10 MG: 5 INJECTION INTRAMUSCULAR; INTRAVENOUS at 19:45

## 2024-02-20 RX ADMIN — HYDROMORPHONE HYDROCHLORIDE 2 MG: 2 TABLET ORAL at 23:05

## 2024-02-20 RX ADMIN — ENOXAPARIN SODIUM 40 MG: 40 INJECTION SUBCUTANEOUS at 12:11

## 2024-02-20 RX ADMIN — NORTRIPTYLINE HYDROCHLORIDE 20 MG: 10 CAPSULE ORAL at 23:05

## 2024-02-20 RX ADMIN — POLYETHYLENE GLYCOL 3350 17 G: 17 POWDER, FOR SOLUTION ORAL at 08:31

## 2024-02-20 RX ADMIN — Medication 15 ML: at 08:31

## 2024-02-20 RX ADMIN — POTASSIUM CHLORIDE, DEXTROSE MONOHYDRATE AND SODIUM CHLORIDE: 150; 5; 450 INJECTION, SOLUTION INTRAVENOUS at 02:18

## 2024-02-20 RX ADMIN — HYDROMORPHONE HYDROCHLORIDE 2 MG: 2 TABLET ORAL at 18:32

## 2024-02-20 RX ADMIN — Medication: at 07:53

## 2024-02-20 RX ADMIN — HYDROMORPHONE HYDROCHLORIDE 0.2 MG: 0.2 INJECTION, SOLUTION INTRAMUSCULAR; INTRAVENOUS; SUBCUTANEOUS at 19:44

## 2024-02-20 RX ADMIN — LIDOCAINE 1 PATCH: 4 PATCH TOPICAL at 08:31

## 2024-02-20 RX ADMIN — LORAZEPAM 0.25 MG: 2 INJECTION INTRAMUSCULAR; INTRAVENOUS at 02:33

## 2024-02-20 RX ADMIN — PANTOPRAZOLE SODIUM 40 MG: 40 TABLET, DELAYED RELEASE ORAL at 08:31

## 2024-02-20 ASSESSMENT — ACTIVITIES OF DAILY LIVING (ADL)
ADLS_ACUITY_SCORE: 46
ADLS_ACUITY_SCORE: 50
ADLS_ACUITY_SCORE: 48
ADLS_ACUITY_SCORE: 50
ADLS_ACUITY_SCORE: 46
ADLS_ACUITY_SCORE: 50

## 2024-02-20 NOTE — PROGRESS NOTES
Surgical Oncology Progress Note    Interval History:  POD 6  No acute events overnight. Pain controlled. Tolerating clear liquid diet without nausea. Not passing flatus or stool.    Physical Exam:   Temp:  [98.8  F (37.1  C)-98.9  F (37.2  C)] 98.9  F (37.2  C)  Pulse:  [] 106  Resp:  [18-19] 19  BP: (115-127)/(69-83) 115/69  SpO2:  [99 %-100 %] 99 %  General: Alert, oriented, appears comfortable, NAD.  Respiratory: breathing non labored  Abdomen: Abdomen is soft, non-tender, non-distended. Incision is clean, dry and intact. Bulging at midportion of the incision without erythema. Gastrostomy tube in place.     Data:   All laboratory and imaging data in the past 24 hours reviewed  I/O last 3 completed shifts:  In: 536 [P.O.:450; I.V.:86]  Out: 350 [Urine:200; Emesis/NG output:150]  Recent Labs   Lab Test 02/20/24  0534 02/19/24  0543 02/18/24  0426 02/11/24  1557 02/11/24  1149 02/09/24  1331   WBC 7.8 8.4 10.8   < > 11.3* 8.1   HGB 7.2* 7.0* 8.2*   < > 7.2* 10.9*    298 394   < > 283 365   INR  --   --   --   --  1.09 0.99    < > = values in this interval not displayed.      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      POD 6 s/p exploratory laparotomy, excisional liver biopsy, small bowel resection, partial colon resection, partial liver resection, partial gastric resection, and gtube placement with Dr. Krishnamurthy 2/14/24. CT  2/19 with fluid collection likely related to post surgical changes. No leak at the gastric remnant.     Tolerating clear liquid diet.      - Available for pain is dilaudid PCA. Robaxin prn. Lidocaine patch added. Abdominal binder prn for comfort.  - Home inhalers resumed. Incentive spirometry. Out of bed and ambulation.   - Imitrex available for migraine headache.   - Regular bariatric diet. Miralax.   - Begin tube feedings today via Gtube (jones). Advance tube feedings by 10 ml q8hrs.   - Protonix PO QD  - mIVF decreased to 50 ml/hr  - Lovenox for DVT prophylaxis.   - PT/OT     Seen with Chief resident and discussed with Dr. Krishnamurthy.     AWILDA Mera-C   Surgical Oncology   [phone]

## 2024-02-20 NOTE — PROGRESS NOTES
Nutrition Brief - following with TF tolerance + diet advancement     Chart reviewed:   Diet: advanced to regular Bariatric diet today    EN: trickle feed Ordered last night 10 ml/hr to stimulate GI tract. Diet office delivered 1 carton of Pivot 1.5 last night. However EN support was not started overnight. TF upgraded today to advance by 10 ml every 8 hours as tolerated toward goal at 40 ml/hr per MD request.    BM/GI: per patient, has not had any BM post op ( last BM x1 on 2/14)    Labs noted: BUN:2.3    Edema: BLE moderate / severe edema noted today     Interventions:  Visited with patient this morning. Family at bedside. Patient reports no BM yet. Feels hungry and wanted to order a meal tray this am.    1. Obtained bkf orders and place orders for room service with assist per patient's request  2. Updated snack orders ( sugar free high protein gelatin, ensure max, cottage cheese in between meals to maximize protein intake)  3. TF is infusing at 10 ml/hr this am       MALNUTRITION  % Intake: </= 50% for >/= 5 days (severe)  % Weight Loss: Unable to assess due to volume up statuys  Subcutaneous Fat Loss: None observed  Muscle Loss: Facial & jaw region:  Mild   Fluid Accumulation/Edema: Moderate/severe lymphedema   Malnutrition Diagnosis: Severe malnutrition in the context of acute presentation       Fordevin Zuleta RD/[place]  7C (337-420)/7D RD pager: [phone]  Weekend/Holiday RD pager: [phone]

## 2024-02-20 NOTE — PLAN OF CARE
Assumed cares from 1900 to 0700    Pt A/OX4, able to make her needs known. VSS on RA, denies chest pain, N/V, SOB. Dilaudid PCA in use for pain. Voiding without difficulties, SBA to the restroom. G tube set to gravity. Midline incision CDI and G tube site dressing changed. Patient woke up anxious, PRN Ativan given per request.  PIV running D5 1/2 NaCl + 20KCl at 75 ml/hr. TF not initiated, parenteral nutrition not delivered. Father in the room all night. No acute events through the night. Continue to monitor.    Plan of Care Reviewed With: patient    Overall Patient Progress: no change

## 2024-02-20 NOTE — PLAN OF CARE
Goal Outcome Evaluation:      Plan of Care Reviewed With: other (see comments)    Overall Patient Progress: improvingOverall Patient Progress: improving    Outcome Evaluation: TF started at trickle feeds yestereday, plan to advance by 10 ml every 8 hours to goal today per MD consult 2/20

## 2024-02-20 NOTE — PLAN OF CARE
"Goal Outcome Evaluation: 0700-1900      Plan of Care Reviewed With: patient    Overall Patient Progress: improvingOverall Patient Progress: improving    Outcome Evaluation: A&ox4 forgetful tearful and anxious at times, Coping strategies encouraged and utilized. . VSS on RA. Up ind in room. Advanced to bariatric diet, poor appetite tolerating small portions, Zofran utilized. IV MF discontinued.  Reports generalized pain in abd and BLE, PCA discontinued. Lyph edema wrapped BLE. TF initiated this AM tolerating well last increase at 5:30 to 20ml/hr Q4hr 60ml flush. Small red streak mucus stool MD notified and assessed at bedside no changes to poc. Strict I/o. Continue to monitor.    /77 (BP Location: Right arm)   Pulse 84   Temp 98.4  F (36.9  C) (Oral)   Resp 16   Ht 1.6 m (5' 3\")   Wt 80.9 kg (178 lb 6.4 oz)   SpO2 97%   BMI 31.60 kg/m         "

## 2024-02-21 ENCOUNTER — HOME INFUSION (PRE-WILLOW HOME INFUSION) (OUTPATIENT)
Dept: PHARMACY | Facility: CLINIC | Age: 44
End: 2024-02-21
Payer: COMMERCIAL

## 2024-02-21 ENCOUNTER — APPOINTMENT (OUTPATIENT)
Dept: OCCUPATIONAL THERAPY | Facility: CLINIC | Age: 44
End: 2024-02-21
Payer: COMMERCIAL

## 2024-02-21 LAB
ABO/RH(D): NORMAL
ALBUMIN SERPL BCG-MCNC: 2.4 G/DL (ref 3.5–5.2)
ALP SERPL-CCNC: 59 U/L (ref 40–150)
ALT SERPL W P-5'-P-CCNC: 19 U/L (ref 0–50)
ANION GAP SERPL CALCULATED.3IONS-SCNC: 6 MMOL/L (ref 7–15)
ANTIBODY SCREEN: NEGATIVE
AST SERPL W P-5'-P-CCNC: 14 U/L (ref 0–45)
BILIRUB SERPL-MCNC: 0.3 MG/DL
BLD PROD TYP BPU: NORMAL
BLOOD COMPONENT TYPE: NORMAL
BUN SERPL-MCNC: 3.1 MG/DL (ref 6–20)
CALCIUM SERPL-MCNC: 7.5 MG/DL (ref 8.6–10)
CHLORIDE SERPL-SCNC: 106 MMOL/L (ref 98–107)
CODING SYSTEM: NORMAL
CREAT SERPL-MCNC: 0.71 MG/DL (ref 0.51–0.95)
CROSSMATCH: NORMAL
DEPRECATED HCO3 PLAS-SCNC: 27 MMOL/L (ref 22–29)
EGFRCR SERPLBLD CKD-EPI 2021: >90 ML/MIN/1.73M2
ERYTHROCYTE [DISTWIDTH] IN BLOOD BY AUTOMATED COUNT: 15.9 % (ref 10–15)
GLUCOSE SERPL-MCNC: 98 MG/DL (ref 70–99)
HCT VFR BLD AUTO: 21.3 % (ref 35–47)
HGB BLD-MCNC: 6.8 G/DL (ref 11.7–15.7)
HGB BLD-MCNC: 6.9 G/DL (ref 11.7–15.7)
ISSUE DATE AND TIME: NORMAL
MAGNESIUM SERPL-MCNC: 1.8 MG/DL (ref 1.7–2.3)
MCH RBC QN AUTO: 30.2 PG (ref 26.5–33)
MCHC RBC AUTO-ENTMCNC: 31.9 G/DL (ref 31.5–36.5)
MCV RBC AUTO: 95 FL (ref 78–100)
PHOSPHATE SERPL-MCNC: 4 MG/DL (ref 2.5–4.5)
PLATELET # BLD AUTO: 305 10E3/UL (ref 150–450)
POTASSIUM SERPL-SCNC: 3.5 MMOL/L (ref 3.4–5.3)
PROT SERPL-MCNC: 4.2 G/DL (ref 6.4–8.3)
RBC # BLD AUTO: 2.25 10E6/UL (ref 3.8–5.2)
SODIUM SERPL-SCNC: 139 MMOL/L (ref 135–145)
SPECIMEN EXPIRATION DATE: NORMAL
UNIT ABO/RH: NORMAL
UNIT NUMBER: NORMAL
UNIT STATUS: NORMAL
UNIT TYPE ISBT: 600
WBC # BLD AUTO: 8.2 10E3/UL (ref 4–11)

## 2024-02-21 PROCEDURE — 36591 DRAW BLOOD OFF VENOUS DEVICE: CPT

## 2024-02-21 PROCEDURE — 84100 ASSAY OF PHOSPHORUS: CPT | Performed by: PHYSICIAN ASSISTANT

## 2024-02-21 PROCEDURE — 82374 ASSAY BLOOD CARBON DIOXIDE: CPT

## 2024-02-21 PROCEDURE — 250N000013 HC RX MED GY IP 250 OP 250 PS 637

## 2024-02-21 PROCEDURE — 250N000013 HC RX MED GY IP 250 OP 250 PS 637: Performed by: PHYSICIAN ASSISTANT

## 2024-02-21 PROCEDURE — 83735 ASSAY OF MAGNESIUM: CPT | Performed by: PHYSICIAN ASSISTANT

## 2024-02-21 PROCEDURE — 250N000011 HC RX IP 250 OP 636

## 2024-02-21 PROCEDURE — 85018 HEMOGLOBIN: CPT | Performed by: PHYSICIAN ASSISTANT

## 2024-02-21 PROCEDURE — 250N000011 HC RX IP 250 OP 636: Performed by: STUDENT IN AN ORGANIZED HEALTH CARE EDUCATION/TRAINING PROGRAM

## 2024-02-21 PROCEDURE — 36415 COLL VENOUS BLD VENIPUNCTURE: CPT | Performed by: PHYSICIAN ASSISTANT

## 2024-02-21 PROCEDURE — P9016 RBC LEUKOCYTES REDUCED: HCPCS | Performed by: PHYSICIAN ASSISTANT

## 2024-02-21 PROCEDURE — 85027 COMPLETE CBC AUTOMATED: CPT

## 2024-02-21 PROCEDURE — 86900 BLOOD TYPING SEROLOGIC ABO: CPT | Performed by: PHYSICIAN ASSISTANT

## 2024-02-21 PROCEDURE — 86923 COMPATIBILITY TEST ELECTRIC: CPT | Performed by: PHYSICIAN ASSISTANT

## 2024-02-21 PROCEDURE — 97140 MANUAL THERAPY 1/> REGIONS: CPT | Mod: GO

## 2024-02-21 PROCEDURE — 120N000002 HC R&B MED SURG/OB UMMC

## 2024-02-21 RX ORDER — SUMATRIPTAN 5 MG/1
5-10 SPRAY NASAL
Status: COMPLETED | OUTPATIENT
Start: 2024-02-21 | End: 2024-02-21

## 2024-02-21 RX ORDER — SENNOSIDES 8.6 MG
8.6 TABLET ORAL 2 TIMES DAILY
Status: DISCONTINUED | OUTPATIENT
Start: 2024-02-21 | End: 2024-02-27 | Stop reason: HOSPADM

## 2024-02-21 RX ADMIN — HYDROMORPHONE HYDROCHLORIDE 0.4 MG: 0.2 INJECTION, SOLUTION INTRAMUSCULAR; INTRAVENOUS; SUBCUTANEOUS at 14:01

## 2024-02-21 RX ADMIN — HYDROMORPHONE HYDROCHLORIDE 0.4 MG: 0.2 INJECTION, SOLUTION INTRAMUSCULAR; INTRAVENOUS; SUBCUTANEOUS at 20:01

## 2024-02-21 RX ADMIN — LIDOCAINE 1 PATCH: 4 PATCH TOPICAL at 08:22

## 2024-02-21 RX ADMIN — SENNOSIDES 8.6 MG: 8.6 TABLET, FILM COATED ORAL at 09:07

## 2024-02-21 RX ADMIN — HYDROMORPHONE HYDROCHLORIDE 0.4 MG: 0.2 INJECTION, SOLUTION INTRAMUSCULAR; INTRAVENOUS; SUBCUTANEOUS at 08:22

## 2024-02-21 RX ADMIN — HYDROXYZINE HYDROCHLORIDE 50 MG: 50 TABLET, FILM COATED ORAL at 14:01

## 2024-02-21 RX ADMIN — HYDROXYZINE HYDROCHLORIDE 50 MG: 50 TABLET, FILM COATED ORAL at 20:02

## 2024-02-21 RX ADMIN — POLYETHYLENE GLYCOL 3350 17 G: 17 POWDER, FOR SOLUTION ORAL at 08:21

## 2024-02-21 RX ADMIN — ENOXAPARIN SODIUM 40 MG: 40 INJECTION SUBCUTANEOUS at 09:07

## 2024-02-21 RX ADMIN — NORTRIPTYLINE HYDROCHLORIDE 20 MG: 10 CAPSULE ORAL at 22:06

## 2024-02-21 RX ADMIN — PANTOPRAZOLE SODIUM 40 MG: 40 TABLET, DELAYED RELEASE ORAL at 09:07

## 2024-02-21 RX ADMIN — Medication 15 ML: at 08:21

## 2024-02-21 RX ADMIN — HYDROXYZINE HYDROCHLORIDE 50 MG: 50 TABLET, FILM COATED ORAL at 08:21

## 2024-02-21 RX ADMIN — HYDROMORPHONE HYDROCHLORIDE 2 MG: 2 TABLET ORAL at 05:54

## 2024-02-21 RX ADMIN — SUMATRIPTAN 10 MG: 5 SPRAY NASAL at 17:49

## 2024-02-21 RX ADMIN — HYDROMORPHONE HYDROCHLORIDE 4 MG: 2 TABLET ORAL at 17:50

## 2024-02-21 RX ADMIN — HYDROMORPHONE HYDROCHLORIDE 4 MG: 2 TABLET ORAL at 12:28

## 2024-02-21 RX ADMIN — HYDROMORPHONE HYDROCHLORIDE 0.2 MG: 0.2 INJECTION, SOLUTION INTRAMUSCULAR; INTRAVENOUS; SUBCUTANEOUS at 01:10

## 2024-02-21 RX ADMIN — SENNOSIDES 8.6 MG: 8.6 TABLET, FILM COATED ORAL at 20:02

## 2024-02-21 ASSESSMENT — ACTIVITIES OF DAILY LIVING (ADL)
ADLS_ACUITY_SCORE: 50

## 2024-02-21 NOTE — PROGRESS NOTES
Therapy: Enteral  [facility]     Pt has 100% coverage for enteral via tube with [facility]CP plan.    In reference to admission on 2/11/24 to check enteral coverage      Please contact Intake with any questions, [phone] or In Basket pool, [facility] ([phone]).

## 2024-02-21 NOTE — PROGRESS NOTES
Surgical Oncology Progress Note    Interval History:  POD 7  No acute events overnight. Hgb 6.8 this morning. Pain controlled. Tolerating tube feedings and regular diet without nausea, vomiting or discomfort. Passed a small amount of flatus yesterday but no bowel movement.     Physical Exam:   Temp:  [98.4  F (36.9  C)-98.7  F (37.1  C)] 98.7  F (37.1  C)  Pulse:  [78-84] 82  Resp:  [15-18] 15  BP: (110-121)/(69-82) 110/69  SpO2:  [97 %-98 %] 97 %  General: Alert, oriented, appears comfortable, NAD.  Respiratory: breathing non labored  Abdomen: Abdomen is soft, non-tender, non-distended. Incision is clean, dry and intact. Gtube in place.     Data:   All laboratory and imaging data in the past 24 hours reviewed  I/O last 3 completed shifts:  In: 1026 [P.O.:810; I.V.:51; NG/GT:45]  Out: 900 [Urine:900]  Recent Labs   Lab Test 02/20/24  0534 02/19/24  0543 02/18/24  0426 02/11/24  1557 02/11/24  1149 02/09/24  1331   WBC 7.8 8.4 10.8   < > 11.3* 8.1   HGB 7.2* 7.0* 8.2*   < > 7.2* 10.9*    298 394   < > 283 365   INR  --   --   --   --  1.09 0.99    < > = values in this interval not displayed.      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      POD 7 s/p exploratory laparotomy, excisional liver biopsy, small bowel resection, partial colon resection, partial liver resection, partial gastric resection, and gtube placement with   Raghu 2/14/24. CT 2/19 with fluid collection likely related to post surgical changes. No leak at the gastric remnant.      Tolerating tube feedings and regular diet. Continue tube feeding advancement. Hgb 6.8 this morning and 6.9 on recheck. 1 unit of blood ordered.      - Available for pain is dilaudid PO/IV prn, robaxin prn, Lidocaine patch added. Abdominal binder prn for comfort.  - Home inhalers resumed. Incentive spirometry. Out of bed and ambulation.   - Imitrex available for migraine headache.   - Regular bariatric diet. Miralax.   - Continue tube feeding advancement 10 ml q8hrs to goal rate via Gtube (jones).   - Anemia: 1 unit of blood ordered today.   - Protonix PO QD  - Lovenox for DVT prophylaxis.   - PT/OT  - Discharge planning pending tube feeding tolerance and return of bowel function. Care Coordination for discharge planning with tube feedings appreciated.      Discussed with Dr. Krishnamurthy.     Gerda Lopez PA-C   Surgical Oncology   [phone]

## 2024-02-21 NOTE — PLAN OF CARE
Goal Outcome Evaluation:       VSS on RA, alert, orientated, pleasant and cooperative, up mostly ind in room, showered with set up, hgb 6.8 this a.m., recheck 6.9, 1 unit PRBC infused per order, no issues with blood transfusion, continues to take po and iv dilaudid for abd pain with relief, tube feed to goal at 0930 of 40 ml, atarax for anxiety

## 2024-02-21 NOTE — PROGRESS NOTES
Care Management Follow Up    Length of Stay (days): 10    Expected Discharge Date: 02/22/2024     Concerns to be Addressed: discharge planning     Patient plan of care discussed at interdisciplinary rounds: Yes    Anticipated Discharge Disposition: Transitional Care     Anticipated Discharge Services: None  Anticipated Discharge DME: None    Patient/family educated on Medicare website which has current facility and service quality ratings: yes  Education Provided on the Discharge Plan: Yes  Patient/Family in Agreement with the Plan:      Referrals Placed by CM/SW: Internal Clinic Care Coordination, Senior Linkage Line, Post Acute Facilities, Communication hand-offs to next level of Care Providers  Private pay costs discussed: Not applicable    Additional Information:       Per provider's (Gerda [facility] phone number is [phone]) voicemail patient is closed to discharge and need a tube feeding set up for discharge.     Benefit check referral sent to \[facility]\"".    Savanna Leach RN, PHN, BSN   Float Nurse Care Coordinator  Covering for Unit 7C  Phone [phone]

## 2024-02-22 ENCOUNTER — APPOINTMENT (OUTPATIENT)
Dept: ULTRASOUND IMAGING | Facility: CLINIC | Age: 44
End: 2024-02-22
Payer: COMMERCIAL

## 2024-02-22 ENCOUNTER — APPOINTMENT (OUTPATIENT)
Dept: PHYSICAL THERAPY | Facility: CLINIC | Age: 44
End: 2024-02-22
Payer: COMMERCIAL

## 2024-02-22 ENCOUNTER — APPOINTMENT (OUTPATIENT)
Dept: OCCUPATIONAL THERAPY | Facility: CLINIC | Age: 44
End: 2024-02-22
Payer: COMMERCIAL

## 2024-02-22 LAB
ALBUMIN SERPL BCG-MCNC: 2.4 G/DL (ref 3.5–5.2)
ALP SERPL-CCNC: 63 U/L (ref 40–150)
ALT SERPL W P-5'-P-CCNC: 18 U/L (ref 0–50)
ANION GAP SERPL CALCULATED.3IONS-SCNC: 8 MMOL/L (ref 7–15)
AST SERPL W P-5'-P-CCNC: 16 U/L (ref 0–45)
BILIRUB SERPL-MCNC: 0.3 MG/DL
BUN SERPL-MCNC: 7.4 MG/DL (ref 6–20)
CALCIUM SERPL-MCNC: 7.5 MG/DL (ref 8.6–10)
CHLORIDE SERPL-SCNC: 105 MMOL/L (ref 98–107)
CREAT SERPL-MCNC: 0.63 MG/DL (ref 0.51–0.95)
DEPRECATED HCO3 PLAS-SCNC: 25 MMOL/L (ref 22–29)
EGFRCR SERPLBLD CKD-EPI 2021: >90 ML/MIN/1.73M2
ERYTHROCYTE [DISTWIDTH] IN BLOOD BY AUTOMATED COUNT: 15.9 % (ref 10–15)
GLUCOSE SERPL-MCNC: 113 MG/DL (ref 70–99)
HCT VFR BLD AUTO: 24.7 % (ref 35–47)
HGB BLD-MCNC: 8.2 G/DL (ref 11.7–15.7)
MAGNESIUM SERPL-MCNC: 2 MG/DL (ref 1.7–2.3)
MCH RBC QN AUTO: 31.1 PG (ref 26.5–33)
MCHC RBC AUTO-ENTMCNC: 33.2 G/DL (ref 31.5–36.5)
MCV RBC AUTO: 94 FL (ref 78–100)
PHOSPHATE SERPL-MCNC: 3.4 MG/DL (ref 2.5–4.5)
PLATELET # BLD AUTO: 291 10E3/UL (ref 150–450)
POTASSIUM SERPL-SCNC: 3.7 MMOL/L (ref 3.4–5.3)
PROT SERPL-MCNC: 4.4 G/DL (ref 6.4–8.3)
RBC # BLD AUTO: 2.64 10E6/UL (ref 3.8–5.2)
SODIUM SERPL-SCNC: 138 MMOL/L (ref 135–145)
WBC # BLD AUTO: 8.7 10E3/UL (ref 4–11)

## 2024-02-22 PROCEDURE — 97140 MANUAL THERAPY 1/> REGIONS: CPT | Mod: GO | Performed by: OCCUPATIONAL THERAPIST

## 2024-02-22 PROCEDURE — 93970 EXTREMITY STUDY: CPT

## 2024-02-22 PROCEDURE — 85027 COMPLETE CBC AUTOMATED: CPT

## 2024-02-22 PROCEDURE — 97530 THERAPEUTIC ACTIVITIES: CPT | Mod: GP

## 2024-02-22 PROCEDURE — 250N000013 HC RX MED GY IP 250 OP 250 PS 637: Performed by: PHYSICIAN ASSISTANT

## 2024-02-22 PROCEDURE — 250N000011 HC RX IP 250 OP 636: Performed by: STUDENT IN AN ORGANIZED HEALTH CARE EDUCATION/TRAINING PROGRAM

## 2024-02-22 PROCEDURE — 80053 COMPREHEN METABOLIC PANEL: CPT

## 2024-02-22 PROCEDURE — 250N000013 HC RX MED GY IP 250 OP 250 PS 637

## 2024-02-22 PROCEDURE — 36591 DRAW BLOOD OFF VENOUS DEVICE: CPT

## 2024-02-22 PROCEDURE — 120N000002 HC R&B MED SURG/OB UMMC

## 2024-02-22 PROCEDURE — 93970 EXTREMITY STUDY: CPT | Mod: 26 | Performed by: STUDENT IN AN ORGANIZED HEALTH CARE EDUCATION/TRAINING PROGRAM

## 2024-02-22 PROCEDURE — 97112 NEUROMUSCULAR REEDUCATION: CPT | Mod: GP

## 2024-02-22 PROCEDURE — 83735 ASSAY OF MAGNESIUM: CPT

## 2024-02-22 PROCEDURE — 84100 ASSAY OF PHOSPHORUS: CPT

## 2024-02-22 PROCEDURE — 250N000011 HC RX IP 250 OP 636

## 2024-02-22 RX ORDER — METHOCARBAMOL 750 MG/1
750 TABLET, FILM COATED ORAL 4 TIMES DAILY
Status: DISCONTINUED | OUTPATIENT
Start: 2024-02-22 | End: 2024-02-27 | Stop reason: HOSPADM

## 2024-02-22 RX ORDER — SUMATRIPTAN 5 MG/1
5-10 SPRAY NASAL
Status: COMPLETED | OUTPATIENT
Start: 2024-02-22 | End: 2024-02-22

## 2024-02-22 RX ORDER — FUROSEMIDE 10 MG/ML
10 INJECTION INTRAMUSCULAR; INTRAVENOUS ONCE
Qty: 2 ML | Refills: 0 | Status: COMPLETED | OUTPATIENT
Start: 2024-02-22 | End: 2024-02-22

## 2024-02-22 RX ORDER — HYDROMORPHONE HCL IN WATER/PF 6 MG/30 ML
.2-.4 PATIENT CONTROLLED ANALGESIA SYRINGE INTRAVENOUS EVERY 6 HOURS PRN
Status: DISCONTINUED | OUTPATIENT
Start: 2024-02-22 | End: 2024-02-23

## 2024-02-22 RX ADMIN — HYDROMORPHONE HYDROCHLORIDE 4 MG: 2 TABLET ORAL at 20:54

## 2024-02-22 RX ADMIN — PANTOPRAZOLE SODIUM 40 MG: 40 TABLET, DELAYED RELEASE ORAL at 08:18

## 2024-02-22 RX ADMIN — SENNOSIDES 8.6 MG: 8.6 TABLET, FILM COATED ORAL at 08:18

## 2024-02-22 RX ADMIN — HYDROMORPHONE HYDROCHLORIDE 4 MG: 2 TABLET ORAL at 11:01

## 2024-02-22 RX ADMIN — HYDROMORPHONE HYDROCHLORIDE 0.4 MG: 0.2 INJECTION, SOLUTION INTRAMUSCULAR; INTRAVENOUS; SUBCUTANEOUS at 08:12

## 2024-02-22 RX ADMIN — NORTRIPTYLINE HYDROCHLORIDE 20 MG: 10 CAPSULE ORAL at 21:04

## 2024-02-22 RX ADMIN — Medication 15 ML: at 08:17

## 2024-02-22 RX ADMIN — ENOXAPARIN SODIUM 40 MG: 40 INJECTION SUBCUTANEOUS at 11:01

## 2024-02-22 RX ADMIN — HYDROMORPHONE HYDROCHLORIDE 4 MG: 2 TABLET ORAL at 15:23

## 2024-02-22 RX ADMIN — METHOCARBAMOL TABLETS 750 MG: 750 TABLET, COATED ORAL at 19:46

## 2024-02-22 RX ADMIN — HYDROMORPHONE HYDROCHLORIDE 4 MG: 2 TABLET ORAL at 06:13

## 2024-02-22 RX ADMIN — HYDROMORPHONE HYDROCHLORIDE 0.4 MG: 0.2 INJECTION, SOLUTION INTRAMUSCULAR; INTRAVENOUS; SUBCUTANEOUS at 23:53

## 2024-02-22 RX ADMIN — LIDOCAINE 1 PATCH: 4 PATCH TOPICAL at 08:17

## 2024-02-22 RX ADMIN — HYDROMORPHONE HYDROCHLORIDE 4 MG: 2 TABLET ORAL at 02:23

## 2024-02-22 RX ADMIN — HYDROMORPHONE HYDROCHLORIDE 0.4 MG: 0.2 INJECTION, SOLUTION INTRAMUSCULAR; INTRAVENOUS; SUBCUTANEOUS at 13:49

## 2024-02-22 RX ADMIN — HYDROXYZINE HYDROCHLORIDE 50 MG: 50 TABLET, FILM COATED ORAL at 18:06

## 2024-02-22 RX ADMIN — SUMATRIPTAN 2 SPRAY: 5 SPRAY NASAL at 14:52

## 2024-02-22 RX ADMIN — POLYETHYLENE GLYCOL 3350 17 G: 17 POWDER, FOR SOLUTION ORAL at 08:17

## 2024-02-22 RX ADMIN — PROCHLORPERAZINE EDISYLATE 10 MG: 5 INJECTION INTRAMUSCULAR; INTRAVENOUS at 13:48

## 2024-02-22 RX ADMIN — METHOCARBAMOL TABLETS 750 MG: 750 TABLET, COATED ORAL at 15:23

## 2024-02-22 RX ADMIN — FUROSEMIDE 10 MG: 10 INJECTION, SOLUTION INTRAMUSCULAR; INTRAVENOUS at 08:19

## 2024-02-22 RX ADMIN — HYDROXYZINE HYDROCHLORIDE 50 MG: 50 TABLET, FILM COATED ORAL at 08:18

## 2024-02-22 RX ADMIN — METHOCARBAMOL TABLETS 750 MG: 750 TABLET, COATED ORAL at 11:01

## 2024-02-22 RX ADMIN — METHOCARBAMOL TABLETS 750 MG: 750 TABLET, COATED ORAL at 08:18

## 2024-02-22 RX ADMIN — ONDANSETRON 4 MG: 2 INJECTION INTRAMUSCULAR; INTRAVENOUS at 12:23

## 2024-02-22 RX ADMIN — MAGNESIUM HYDROXIDE 30 ML: 400 SUSPENSION ORAL at 14:52

## 2024-02-22 RX ADMIN — SENNOSIDES 8.6 MG: 8.6 TABLET, FILM COATED ORAL at 19:47

## 2024-02-22 ASSESSMENT — ACTIVITIES OF DAILY LIVING (ADL)
ADLS_ACUITY_SCORE: 50

## 2024-02-22 NOTE — PROGRESS NOTES
Care Management Follow Up    Length of Stay (days): 11  Expected Discharge Date: 02/24/2024  Concerns to be Addressed: discharge planning     Patient plan of care discussed at interdisciplinary rounds: Yes  Anticipated Discharge Disposition: Home with assist  Anticipated Discharge Services: Home Infusion   Anticipated Discharge DME: Enteral Feeding   Patient/family educated on Medicare website which has current facility and service quality ratings: yes  Education Provided on the Discharge Plan: Yes  Patient/Family in Agreement with the Plan:  yes  Referrals Placed by CM/SW:     [facility]  [address] [place] [address]  Phone: [phone]  Fax: [phone]  Dietician: [phone]  100% coverage for enteral via tube with BC[employer] MHCP plan    Private pay costs discussed: Not applicable    Additional Information:  Ongoing POC; Recent benefit check returned full coverage for enteral feeding. TF at goal of 40 ml/hr; discharge planning pending tube feeding tolerance and return of bowel function. [facility] enteral liaison notified of progress. RNCC to continue to follow and support safe discharge planning.       Ricky Corona RN BSN  7C RNCC  Phone: [phone]  Pager: [phone]    For Weekend & Holiday on call RN Care Coordinator:  (Tasks: Home care, home infusion, medical equipment, transportation, IMM & DAILEY forms, etc.)  [facility] (0800 - 1630) Saturday and Sunday    Units: 5A, 5B, & 5C: [phone]    Units: 6B, 6C, 6D: [phone]    Units: 7A, 7B, 7C, 7D: [phone]    Units: 6A/ICU : [phone]    [facility] ([phone]) Saturday and Sunday    Units: 6 Med/Surg, 8A, 10 ICU, & Pediatric Units: [phone]    Units: 5 Ortho, 5Med/Surg & WB ED: [phone]

## 2024-02-22 NOTE — PROGRESS NOTES
Surgical Oncology Progress Note    Interval History:  POD 8  No acute events overnight. Hgb responded appropriately this morning. Pain controlled. Tolerating diet and tube feeds. Passing a lot of flatus but no bm.     Physical Exam:   Temp:  [97.7  F (36.5  C)-98.8  F (37.1  C)] 98.4  F (36.9  C)  Pulse:  [] 85  Resp:  [16-18] 18  BP: (103-121)/(59-72) 112/72  SpO2:  [94 %-100 %] 97 %  General: Alert, oriented, appears comfortable, NAD.  Respiratory: breathing non labored  Abdomen: Abdomen is soft, non-tender, non-distended. Incision is clean, dry and intact. Gtube in place with some bilious drainage around tube.     Data:   All laboratory and imaging data in the past 24 hours reviewed  I/O last 3 completed shifts:  In: 412.5 [I.V.:10]  Out: -   Recent Labs   Lab Test 02/22/24  0530 02/21/24  0912 02/21/24  0531 02/20/24  0534 02/11/24  1557 02/11/24  1149 02/09/24  1331   WBC 8.7  --  8.2 7.8   < > 11.3* 8.1   HGB 8.2* 6.9* 6.8* 7.2*   < > 7.2* 10.9*     --  305 309   < > 283 365   INR  --   --   --   --   --  1.09 0.99    < > = values in this interval not displayed.      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      POD 8 s/p exploratory laparotomy, excisional liver biopsy, small bowel resection, partial colon resection, partial liver resection, partial gastric resection, and gtube placement with Dr. Krishnamurthy 2/14/24. CT 2/19 with fluid collection likely related to post surgical  changes. No leak at the gastric remnant.      Tolerating tube feedings and regular diet.    2/21 - 1prbc      - Available for pain is dilaudid PO/IV prn, robaxin sched, atarax prn, Lidocaine patch added. Abdominal binder prn for comfort.  - Home inhalers resumed. Incentive spirometry. Out of bed and ambulation.   - Regular bariatric diet. miralax, senna, MoMx1   - Continue tube feeding advancement 10 ml q8hrs to goal rate via Gtube (jones).   - DVT US today, continue lymphedema consult if no DVT.  - lasix 10 x1 today   - Anemia: improved, monitor   - Protonix PO QD  - Lovenox for DVT prophylaxis.   - PT/OT  - Discharge planning pending tube feeding tolerance and return of bowel function. Care Coordination for discharge planning with tube feedings appreciated.      Discussed with chief resident who will discuss with staff    Higinio Barrios MD PGY3  General Surgery Resident

## 2024-02-22 NOTE — PLAN OF CARE
Goal Outcome Evaluation:     VSS on RA, alert, orientated, pleasant and cooperative, up mostly ind in room, continues to take po and iv dilaudid for abd pain with relief, iv dilaudid decreased from q 6 hours to q 3 hours, tube feed at goal of 40 ml, atarax for anxiety, miralax and senna and newly milk of mag today for BM still no results, voiding well, c/o dizzyness today, bp low 100/60's and she thinks that's why because its low for her, updated MD, nausea with small undigested food emesis, relief with zofran and compazine IV, c/o headache, 1 time imitrex given with releif

## 2024-02-22 NOTE — PROGRESS NOTES
Home Infusion  Received referral from JERRICA García for Enteral feedings.  Benefits verified.  Patient has Blue Plus and is covered 100%.  Met with pt at bedside to review home infusion services, review benefits and offer choice of providers.  Patient would like to use [facility] for home infusion.  Confirmed discharge address, phone, and emergency contact information.  Pt is expected to discharge as soon as 02/24/24 and will be going home on enteral feeds via pump.  She has never done home enteral therapy before however she is to receive teaching 02/23/24 with Enteral Nurse Liaison.  Provided her with information about I services.  Explained about administration method via the Infinity pump with back pack for mobility. Discussed options of hook up of tube feedings at the hospital prior to discharge vs disconnect for transport home (if feasible per medical team) and restart of feeding at home.   Informed them about supplies and delivery of supplies, storage of formula, plan for SNV and 24/7 availability of [facility] staff while on enteral therapy.    Pt verbalized understanding of all information given.  She is  willing and able to learn and manage home enteral therapy.  Patient expressed a desire to have the tube feedings hooked up at the hospital or off for transport and a nurse to the home to assist with setting it back up if ok with medical team.   Questions answered.  Will continue to follow and revisit pt once discharge plans are confirmed.    Thank you for the referral      Eli Dockery LPN  Enteral Nurse Liaison  [facility]  [address] [place] [address]  [phone]  Main number [phone]

## 2024-02-22 NOTE — CARE PLAN
Occupational Therapy Discharge Summary    Reason for therapy discharge:    All goals and outcomes met, no further needs identified.    Progress towards therapy goal(s). See goals on Care Plan in [facility] electronic health record for goal details.  Goals met. Pt is up IND in room, maintaining neat and tidy space, including making bed, folding towels, keeping all belongings very organized. No ADL needs identified. Pt reports she has supportive family.     Therapy recommendation(s):    No further OT recommended. Defer further IP therapy needs and discharge recommendations to PT.    Lymphedema OT will continue to see patient to manage BLE lymphedema.

## 2024-02-22 NOTE — PLAN OF CARE
Goal Outcome Evaluation:      Plan of Care Reviewed With: patient    Overall Patient Progress: no changeOverall Patient Progress: no change    Outcome Evaluation: Pt AOx4 VSS on RA. Pt c/o of new pain in left calf cap refill less than 3 sec with pedal pulse present MD notified. TF running at goal of 40mL/hr and pt is tolerating. Gtube still leaking greenish fluid x2 dressing change. Will continue with plan of care.

## 2024-02-23 ENCOUNTER — APPOINTMENT (OUTPATIENT)
Dept: OCCUPATIONAL THERAPY | Facility: CLINIC | Age: 44
End: 2024-02-23
Payer: COMMERCIAL

## 2024-02-23 LAB
ALBUMIN SERPL BCG-MCNC: 2.3 G/DL (ref 3.5–5.2)
ALP SERPL-CCNC: 56 U/L (ref 40–150)
ALT SERPL W P-5'-P-CCNC: 13 U/L (ref 0–50)
ANION GAP SERPL CALCULATED.3IONS-SCNC: <1 MMOL/L (ref 7–15)
AST SERPL W P-5'-P-CCNC: 14 U/L (ref 0–45)
BILIRUB SERPL-MCNC: 0.2 MG/DL
BUN SERPL-MCNC: 15.2 MG/DL (ref 6–20)
CALCIUM SERPL-MCNC: 7.3 MG/DL (ref 8.6–10)
CHLORIDE SERPL-SCNC: 112 MMOL/L (ref 98–107)
CREAT SERPL-MCNC: 0.63 MG/DL (ref 0.51–0.95)
DEPRECATED HCO3 PLAS-SCNC: 27 MMOL/L (ref 22–29)
EGFRCR SERPLBLD CKD-EPI 2021: >90 ML/MIN/1.73M2
ERYTHROCYTE [DISTWIDTH] IN BLOOD BY AUTOMATED COUNT: 16 % (ref 10–15)
GLUCOSE SERPL-MCNC: 109 MG/DL (ref 70–99)
HCT VFR BLD AUTO: 27.1 % (ref 35–47)
HGB BLD-MCNC: 8.7 G/DL (ref 11.7–15.7)
MAGNESIUM SERPL-MCNC: 1.9 MG/DL (ref 1.7–2.3)
MCH RBC QN AUTO: 29.7 PG (ref 26.5–33)
MCHC RBC AUTO-ENTMCNC: 32.1 G/DL (ref 31.5–36.5)
MCV RBC AUTO: 93 FL (ref 78–100)
PHOSPHATE SERPL-MCNC: 3.1 MG/DL (ref 2.5–4.5)
PLATELET # BLD AUTO: 312 10E3/UL (ref 150–450)
POTASSIUM SERPL-SCNC: 3.7 MMOL/L (ref 3.4–5.3)
PROT SERPL-MCNC: 4.2 G/DL (ref 6.4–8.3)
RBC # BLD AUTO: 2.93 10E6/UL (ref 3.8–5.2)
SODIUM SERPL-SCNC: 138 MMOL/L (ref 135–145)
WBC # BLD AUTO: 8.9 10E3/UL (ref 4–11)

## 2024-02-23 PROCEDURE — 36415 COLL VENOUS BLD VENIPUNCTURE: CPT

## 2024-02-23 PROCEDURE — 85027 COMPLETE CBC AUTOMATED: CPT

## 2024-02-23 PROCEDURE — 250N000013 HC RX MED GY IP 250 OP 250 PS 637

## 2024-02-23 PROCEDURE — 250N000011 HC RX IP 250 OP 636: Performed by: PSYCHIATRY & NEUROLOGY

## 2024-02-23 PROCEDURE — 120N000002 HC R&B MED SURG/OB UMMC

## 2024-02-23 PROCEDURE — 83735 ASSAY OF MAGNESIUM: CPT

## 2024-02-23 PROCEDURE — 97535 SELF CARE MNGMENT TRAINING: CPT | Mod: GO | Performed by: OCCUPATIONAL THERAPIST

## 2024-02-23 PROCEDURE — 250N000011 HC RX IP 250 OP 636: Performed by: STUDENT IN AN ORGANIZED HEALTH CARE EDUCATION/TRAINING PROGRAM

## 2024-02-23 PROCEDURE — 84100 ASSAY OF PHOSPHORUS: CPT

## 2024-02-23 PROCEDURE — 250N000013 HC RX MED GY IP 250 OP 250 PS 637: Performed by: PHYSICIAN ASSISTANT

## 2024-02-23 PROCEDURE — 36591 DRAW BLOOD OFF VENOUS DEVICE: CPT

## 2024-02-23 PROCEDURE — 250N000011 HC RX IP 250 OP 636

## 2024-02-23 PROCEDURE — 82040 ASSAY OF SERUM ALBUMIN: CPT

## 2024-02-23 PROCEDURE — 250N000013 HC RX MED GY IP 250 OP 250 PS 637: Performed by: PSYCHIATRY & NEUROLOGY

## 2024-02-23 PROCEDURE — 99222 1ST HOSP IP/OBS MODERATE 55: CPT | Mod: GC | Performed by: PSYCHIATRY & NEUROLOGY

## 2024-02-23 PROCEDURE — 258N000003 HC RX IP 258 OP 636: Performed by: PSYCHIATRY & NEUROLOGY

## 2024-02-23 RX ORDER — DIPHENHYDRAMINE HYDROCHLORIDE 50 MG/ML
25 INJECTION INTRAMUSCULAR; INTRAVENOUS ONCE
Status: COMPLETED | OUTPATIENT
Start: 2024-02-23 | End: 2024-02-23

## 2024-02-23 RX ORDER — HYDROMORPHONE HCL IN WATER/PF 6 MG/30 ML
.2-.4 PATIENT CONTROLLED ANALGESIA SYRINGE INTRAVENOUS EVERY 6 HOURS PRN
Status: DISCONTINUED | OUTPATIENT
Start: 2024-02-23 | End: 2024-02-25

## 2024-02-23 RX ORDER — DIPHENHYDRAMINE HYDROCHLORIDE 50 MG/ML
25 INJECTION INTRAMUSCULAR; INTRAVENOUS EVERY 8 HOURS PRN
Status: DISCONTINUED | OUTPATIENT
Start: 2024-02-23 | End: 2024-02-27 | Stop reason: HOSPADM

## 2024-02-23 RX ORDER — NYSTATIN 100000 U/G
CREAM TOPICAL 2 TIMES DAILY
Status: DISCONTINUED | OUTPATIENT
Start: 2024-02-23 | End: 2024-02-27 | Stop reason: HOSPADM

## 2024-02-23 RX ORDER — FUROSEMIDE 10 MG/ML
10 INJECTION INTRAMUSCULAR; INTRAVENOUS ONCE
Status: COMPLETED | OUTPATIENT
Start: 2024-02-23 | End: 2024-02-23

## 2024-02-23 RX ORDER — FUROSEMIDE 10 MG/ML
20 INJECTION INTRAMUSCULAR; INTRAVENOUS ONCE
Qty: 2 ML | Refills: 0 | Status: COMPLETED | OUTPATIENT
Start: 2024-02-23 | End: 2024-02-23

## 2024-02-23 RX ORDER — NORTRIPTYLINE HCL 10 MG
40 CAPSULE ORAL AT BEDTIME
Status: DISCONTINUED | OUTPATIENT
Start: 2024-02-23 | End: 2024-02-27 | Stop reason: HOSPADM

## 2024-02-23 RX ADMIN — ONDANSETRON 4 MG: 2 INJECTION INTRAMUSCULAR; INTRAVENOUS at 19:51

## 2024-02-23 RX ADMIN — HYDROMORPHONE HYDROCHLORIDE 4 MG: 2 TABLET ORAL at 06:27

## 2024-02-23 RX ADMIN — FUROSEMIDE 10 MG: 10 INJECTION, SOLUTION INTRAMUSCULAR; INTRAVENOUS at 19:29

## 2024-02-23 RX ADMIN — DIPHENHYDRAMINE HYDROCHLORIDE 25 MG: 50 INJECTION, SOLUTION INTRAMUSCULAR; INTRAVENOUS at 16:17

## 2024-02-23 RX ADMIN — ONDANSETRON 4 MG: 4 TABLET, ORALLY DISINTEGRATING ORAL at 12:20

## 2024-02-23 RX ADMIN — METHOCARBAMOL TABLETS 750 MG: 750 TABLET, COATED ORAL at 08:42

## 2024-02-23 RX ADMIN — POLYETHYLENE GLYCOL 3350 17 G: 17 POWDER, FOR SOLUTION ORAL at 08:43

## 2024-02-23 RX ADMIN — SODIUM CHLORIDE, POTASSIUM CHLORIDE, SODIUM LACTATE AND CALCIUM CHLORIDE 500 ML: 600; 310; 30; 20 INJECTION, SOLUTION INTRAVENOUS at 16:53

## 2024-02-23 RX ADMIN — PROCHLORPERAZINE EDISYLATE 10 MG: 5 INJECTION INTRAMUSCULAR; INTRAVENOUS at 15:58

## 2024-02-23 RX ADMIN — FUROSEMIDE 20 MG: 10 INJECTION, SOLUTION INTRAMUSCULAR; INTRAVENOUS at 09:04

## 2024-02-23 RX ADMIN — HYDROXYZINE HYDROCHLORIDE 50 MG: 50 TABLET, FILM COATED ORAL at 12:20

## 2024-02-23 RX ADMIN — HYDROMORPHONE HYDROCHLORIDE 4 MG: 2 TABLET ORAL at 11:18

## 2024-02-23 RX ADMIN — NYSTATIN: 100000 CREAM TOPICAL at 11:18

## 2024-02-23 RX ADMIN — HYDROMORPHONE HYDROCHLORIDE 0.4 MG: 0.2 INJECTION, SOLUTION INTRAMUSCULAR; INTRAVENOUS; SUBCUTANEOUS at 19:50

## 2024-02-23 RX ADMIN — PANTOPRAZOLE SODIUM 40 MG: 40 TABLET, DELAYED RELEASE ORAL at 08:42

## 2024-02-23 RX ADMIN — METHOCARBAMOL TABLETS 750 MG: 750 TABLET, COATED ORAL at 15:58

## 2024-02-23 RX ADMIN — HYDROXYZINE HYDROCHLORIDE 50 MG: 50 TABLET, FILM COATED ORAL at 01:07

## 2024-02-23 RX ADMIN — HYDROMORPHONE HYDROCHLORIDE 4 MG: 2 TABLET ORAL at 15:56

## 2024-02-23 RX ADMIN — NORTRIPTYLINE HYDROCHLORIDE 40 MG: 10 CAPSULE ORAL at 21:43

## 2024-02-23 RX ADMIN — METHOCARBAMOL TABLETS 750 MG: 750 TABLET, COATED ORAL at 12:20

## 2024-02-23 RX ADMIN — METHOCARBAMOL TABLETS 750 MG: 750 TABLET, COATED ORAL at 19:32

## 2024-02-23 RX ADMIN — HYDROMORPHONE HYDROCHLORIDE 4 MG: 2 TABLET ORAL at 21:42

## 2024-02-23 RX ADMIN — SENNOSIDES 8.6 MG: 8.6 TABLET, FILM COATED ORAL at 19:32

## 2024-02-23 RX ADMIN — ENOXAPARIN SODIUM 40 MG: 40 INJECTION SUBCUTANEOUS at 10:41

## 2024-02-23 RX ADMIN — LIDOCAINE 1 PATCH: 4 PATCH TOPICAL at 08:43

## 2024-02-23 RX ADMIN — SENNOSIDES 8.6 MG: 8.6 TABLET, FILM COATED ORAL at 08:43

## 2024-02-23 ASSESSMENT — ACTIVITIES OF DAILY LIVING (ADL)
ADLS_ACUITY_SCORE: 50

## 2024-02-23 NOTE — PROGRESS NOTES
Surgical Oncology Progress Note    Interval History:  POD 9  No acute events overnight.   Some nausea/emesis yday.   Passing gas, no bm  Makeshift ostomy bag came off    Physical Exam:   Temp:  [97.9  F (36.6  C)-98.5  F (36.9  C)] 97.9  F (36.6  C)  Pulse:  [] 86  Resp:  [12-18] 16  BP: ()/(60-72) 99/62  SpO2:  [95 %-98 %] 95 %  General: Alert, oriented, appears comfortable, NAD.  Respiratory: breathing non labored  Abdomen: Abdomen is soft, non-tender, non-distended. Incision is clean, dry and intact. Gtube in place with some bilious drainage around tube.     Data:   All laboratory and imaging data in the past 24 hours reviewed  I/O last 3 completed shifts:  In: 10 [I.V.:10]  Out: 600 [Urine:600]  Recent Labs   Lab Test 02/23/24  0502 02/22/24  0530 02/21/24  0912 02/21/24  0531 02/11/24  1557 02/11/24  1149 02/09/24  1331   WBC 8.9 8.7  --  8.2   < > 11.3* 8.1   HGB 8.7* 8.2* 6.9* 6.8*   < > 7.2* 10.9*    291  --  305   < > 283 365   INR  --   --   --   --   --  1.09 0.99    < > = values in this interval not displayed.      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      POD 9 s/p exploratory laparotomy, excisional liver biopsy, small bowel resection, partial colon resection, partial liver resection, partial gastric resection, and gtube placement with Dr. Krishnamurthy 2/14/24. CT 2/19 with fluid collection likely related to post surgical changes. No leak at the gastric remnant.       Tolerating tube feedings and regular diet.    2/21 - 1prbc     - ongoing migraines: using sumatriptan almost daily. Will consult neurology for further recommendations.   - Available for pain is dilaudid PO/IV prn, robaxin sched, atarax prn, Lidocaine patch added. Abdominal binder prn for comfort.  - Home inhalers resumed. Incentive spirometry. Out of bed and ambulation.   - Regular bariatric diet. miralax, senna  - tf at goal 40 cc  - lasix 20 x1 today   - Anemia: improved, monitor   - Protonix PO QD  - Lovenox for DVT prophylaxis.   - PT/OT  - Discharge planning pending tube feeding tolerance and return of bowel function. Care Coordination for discharge planning with tube feedings appreciated.  Teaching 2/23.      Discussed with chief resident who will discuss with staff    Higinio Barrios MD PGY3  General Surgery Resident

## 2024-02-23 NOTE — CONSULTS
Physician Attestation   I, Calvin Salvador DO was present with the medical/MISHA student who participated in the service and in the documentation of the note.  I have verified the history and personally performed the physical exam and medical decision making.  I agree with the assessment and plan of care as documented in the note.      Key findings: 43 year old female with complex medical hsitory admitted for multiple GI surgeries in setting of jejunal adenocarcinoma.  Neuro consulted for headaches.  Headaches sound migrainous with visual aura, nausea, vomiting, photophobia/phonophobia.  STates she has had headaches nearly every day for over a decade and is working on getting into neurology.  Has tried ubrelvy without relief and has contraindications to many typical migraine medicines.      On exam she is pleasant and able to provide clear coherent history.  No neck stiffness or rigidity.  Tender to palpation of occiput especially on right.  Strength is equal and symmetric and reflexes are not pathologic.  Fundoscopic exam is quite difficult due to tolerance but no clear retinal abnormalities and L disc at minimum without high grade papilledema on non dialted exam.      #Probable Migraine with aura, chronic  #Possible superimposed occipital neuralgia    Ms. Nogueira is a 43 year old with complicated history.  Overall suspect this is her chronic migraines maybe slightly worsened in setting of acute illness/surgery.  Not clear how worse this is then baseline but is significantly impacted by symptoms.  Recommend MRI brain with and without contrast with red flag features of waking up from sleep with headache, worsened with cough and new malignancy diagnosis.  She has contraindications per primary team to NSAIDS, steroids, tylenol.  Sumatriptan not ideal in setting of stroke and same with DHE.  Would try migraine cocktail as below.  If effective acn repeat. Would increase nortriptyline which may help with sleep.    Augustinien lack of options if headache still significantly impacting QOL, would consider pain consult to consider occiptial nerve block.  Would limit sumatriptan use as she finds it helpful and using regularly 2x weekly is okay.      63 MINUTES SPENT BY ME on the date of service doing chart review, history, exam, documentation & further activities per the note.    I have personally reviewed the following data over the past 24 hrs:    8.9  \   8.7 (L)   / 312     138 112 (H) 15.2 /  109 (H)   3.7 27 0.63 \     ALT: 13 AST: 14 AP: 56 TBILI: 0.2   ALB: 2.3 (L) TOT PROTEIN: 4.2 (L) LIPASE: N/A         Calvin Salvador MD  Date of Service (when I saw the patient): 24       [facility]  Neurology Consultation    Patient Name:  Eduarda Nogueira  MRN:  [id number]    :  1980  Date of Service:  2024  Primary care provider:  Sussy Myers      Neurology consultation service was asked to see Eduarda Nogueira by Dr. Barrios to evaluate migraine headaches.    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 Neurology is consulted for treatment advisement of daily migraine headaches.     Patient reports experiencing frequent migraine headaches singe age 14, which she attributes to a head injury at age 12. She describes migraines as starting in her R temple and moving back towards her occiput. Length of symptoms varies from several hours to all day. She typically experiences physical and visual aura as well as nausea and vomiting. Migraines are brought on by stress, loud noises, and anxiety. Made worse with bright light, loud noises, and bearing down. No  change in symptoms with positional changes. Also states her headaches often wake her from sleep. Of note, her headaches have not worsened while in the hospital. Nortriptyline as helped somewhat in the past and sumatriptan is an effective abortive treatment but does not provide long-term relief. Previously tried Ubrelvy without relief. Unable to take tylenol or NSAID products due to skin condition and RYGB hx.       ROS  A comprehensive ROS was performed and pertinent findings were included in HPI.     PMH  Past Medical History:   Diagnosis Date    Anemia     No Comments Provided    Anxiety state     No Comments Provided    Arthropathy     No Comments Provided    Asthma     No Comments Provided    Backache     No Comments Provided    Edema     No Comments Provided    Esophageal reflux     No Comments Provided    Female stress incontinence     No Comments Provided    Hypothyroidism     No Comments Provided    Irregular menstrual cycle     No Comments Provided    Irritable colon     No Comments Provided    Migraine     No Comments Provided    Morbid obesity (H)     No Comments Provided    Other chronic nonalcoholic liver disease     No Comments Provided    Other depressive disorder     No Comments Provided    Other malaise and fatigue     No Comments Provided    Pain in joint     hips, knees, ankles, feet, neck    Restless legs syndrome     self-diagnosed    Shortness of breath     No Comments Provided    Synovial cyst of popliteal space     No Comments Provided    Vitamin D deficiency     Rx 3/14, re check 6/14     Past Surgical History:   Procedure Laterality Date    ATTEMPTED ARTHROSCOPY      x3, rt knee    COLONOSCOPY N/A 2/13/2024    Procedure: Colonoscopy;  Surgeon: Cuauhtemoc Denis MD;  [facility]: [facility]    ESOPHAGOSCOPY, GASTROSCOPY, DUODENOSCOPY (EGD), COMBINED N/A 1/30/2024    Procedure: ESOPHAGOGASTRODUODENOSCOPY, WITH BIOPSY;  Surgeon: Precious Albarran MD;  Location: [facility]    EXTRACTION(S) DENTAL       "No Comments Provided    LAPAROSCOPIC CHOLECYSTECTOMY      2010    LAPAROTOMY EXPLORATORY N/A 2/14/2024    Procedure: EXPLORATORY LAPAROTOMY;  Surgeon: Christoph Krishnamurthy MD;  Location: [facility]    OSTEOTOMY FEMUR DISTAL      right    RESECT SMALL BOWEL WITHOUT OSTOMY N/A 2/14/2024    Procedure: SMALL BOWEL RESECTION, PARTIAL COLON RESECTION, PARTIAL LIVER RESECTION, PARTIAL GASTRIC RESECTION, GASTROSTOMY TUBE PLACEMENT;  Surgeon: Christoph Krishnamurthy MD;  Location: [facility]       Medications   I have personally reviewed the patient's medication list.     Allergies  I have personally reviewed the patient's allergy list.     Social History  Denies tobacco, alcohol, and recreational drug use at this time.    Family History    Reviewed      Physical Examination   Vitals: /41   Pulse 86   Temp 97.9  F (36.6  C) (Oral)   Resp 16   Ht 1.6 m (5' 3\")   Wt 80.2 kg (176 lb 12.8 oz)   SpO2 95%   BMI 31.32 kg/m    General: Walking and sitting in chair, NAD  Head: NC/AT  Eyes: no icterus, op pink and moist. No papilledema present in L eye, difficulty observing disc in R eye.   Cardiac: RRR. Extremities warm with bilateral edema.   Respiratory: non-labored on RA  GI: S/NT/ND  Skin: No rash or lesion on exposed skin  Psych: Mood pleasant, affect congruent  Neuro:  Mental status: Awake, alert, attentive, oriented to self, time, place, and circumstance. Language is fluent and coherent with intact comprehension of complex commands, naming and repetition.  Cranial nerves: VFF, PERRL, conjugate gaze, EOMI but painful, facial sensation intact, more intense on L side, face symmetric, shoulder shrug strong, tongue/uvula midline, mild dysarthria at baseline.   Motor: Normal bulk and tone. No abnormal movements. 5/5 strength bilaterally in deltoids, biceps, triceps, hand , hip flexors, hip extensors, knee flexion, knee extension, plantarflexion, dorsiflexion.   Reflexes: Normo-reflexic and symmetric biceps, brachioradialis, patellae, and " achilles.  Sensory: Intact to light touch in proximal and distal aspects of all 4 extremities. Tearful with palpation of R occiput and moderate pain with palpation of R trapezius.   Gait: Normal width, stride length, turn, and arm swing. Station normal.     Investigations   I have personally reviewed pertinent labs, tests, and radiological imaging. Discussion of notable findings is included under Impression.     Was patient transferred from outside hospital?   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 Neurology is consulted for treatment advisement of daily migraine headaches.   Current presentation of symptoms is consistent with her daily headaches making chronic migraine headaches most likely. Given extreme pain and reported shooting pains with palpation of occiput, occipital neuralgia may be co-occurring. Lack of papilledema on ocular exam is reassuring, however to new onset jejunal adenocarcinoma, increased symptoms with bearing down, and pain waking from sleep, MRI with and w/out contrast is recommended to assess for intracranial pathology. Plan to increase nortriptyline but discussed with patient that this will likely take weeks to feel effect. In short term, would recommend IV migraine cocktail of compazine and benadryl with fluid bolus as needed for pain. Okay with limited use of sumatriptan for abortive therapy given embolic nature of prior stroke in 2022.     Recommendations  - Increase nortriptyline to 40 mg daily  - IV compazine 10mg and benadryl 25mg once, if effective can repeat IV compazine and benadryl Q8hrs PRN   - MRI with and without contrast  - Consider consult to PM&R for possible occipital nerve block if no relief with above  interventions  - Outpatient ophthalmology referral referral  - Follow up with outpatient neurology for further management  - Okay with limited sumatriptan use if necessary    Thank you for involving Neurology in the care of Eduarda Nogueira.  Please do not hesitate to call with questions/concerns (consult pager [phone]).      Patient was seen and discussed with Dr. Salvador.    Billie Cruz, MS4

## 2024-02-23 NOTE — PLAN OF CARE
Goal Outcome Evaluation:      Plan of Care Reviewed With: patient    Overall Patient Progress: no changeOverall Patient Progress: no change    Outcome Evaluation: Pt AOx4 VSS on RA except for soft BP. Pain being managed by current regimen. 20mg IV lasix given this morning voiding with great output on both night and day shift. No BM on shift, but passing gas. Appetite is improving pt had two protein drinks from home as well as yogurt on night shift. Good appetite this morning with breakfast. Will continue with current plan of care.

## 2024-02-24 ENCOUNTER — APPOINTMENT (OUTPATIENT)
Dept: GENERAL RADIOLOGY | Facility: CLINIC | Age: 44
End: 2024-02-24
Payer: COMMERCIAL

## 2024-02-24 ENCOUNTER — APPOINTMENT (OUTPATIENT)
Dept: MRI IMAGING | Facility: CLINIC | Age: 44
End: 2024-02-24
Payer: COMMERCIAL

## 2024-02-24 ENCOUNTER — HEALTH MAINTENANCE LETTER (OUTPATIENT)
Age: 44
End: 2024-02-24

## 2024-02-24 LAB
ALBUMIN SERPL BCG-MCNC: 2.6 G/DL (ref 3.5–5.2)
ALP SERPL-CCNC: 59 U/L (ref 40–150)
ALT SERPL W P-5'-P-CCNC: 14 U/L (ref 0–50)
ANION GAP SERPL CALCULATED.3IONS-SCNC: 7 MMOL/L (ref 7–15)
AST SERPL W P-5'-P-CCNC: 18 U/L (ref 0–45)
BILIRUB SERPL-MCNC: 0.2 MG/DL
BUN SERPL-MCNC: 14.4 MG/DL (ref 6–20)
CALCIUM SERPL-MCNC: 7.8 MG/DL (ref 8.6–10)
CHLORIDE SERPL-SCNC: 104 MMOL/L (ref 98–107)
CREAT SERPL-MCNC: 0.69 MG/DL (ref 0.51–0.95)
DEPRECATED HCO3 PLAS-SCNC: 29 MMOL/L (ref 22–29)
EGFRCR SERPLBLD CKD-EPI 2021: >90 ML/MIN/1.73M2
ERYTHROCYTE [DISTWIDTH] IN BLOOD BY AUTOMATED COUNT: 15.9 % (ref 10–15)
GLUCOSE SERPL-MCNC: 87 MG/DL (ref 70–99)
HCT VFR BLD AUTO: 26.9 % (ref 35–47)
HGB BLD-MCNC: 8.7 G/DL (ref 11.7–15.7)
MAGNESIUM SERPL-MCNC: 1.9 MG/DL (ref 1.7–2.3)
MCH RBC QN AUTO: 30.3 PG (ref 26.5–33)
MCHC RBC AUTO-ENTMCNC: 32.3 G/DL (ref 31.5–36.5)
MCV RBC AUTO: 94 FL (ref 78–100)
PHOSPHATE SERPL-MCNC: 3 MG/DL (ref 2.5–4.5)
PLATELET # BLD AUTO: 346 10E3/UL (ref 150–450)
POTASSIUM SERPL-SCNC: 3.9 MMOL/L (ref 3.4–5.3)
PROT SERPL-MCNC: 4.8 G/DL (ref 6.4–8.3)
RBC # BLD AUTO: 2.87 10E6/UL (ref 3.8–5.2)
SODIUM SERPL-SCNC: 140 MMOL/L (ref 135–145)
WBC # BLD AUTO: 8 10E3/UL (ref 4–11)

## 2024-02-24 PROCEDURE — 80053 COMPREHEN METABOLIC PANEL: CPT

## 2024-02-24 PROCEDURE — 250N000013 HC RX MED GY IP 250 OP 250 PS 637: Performed by: PSYCHIATRY & NEUROLOGY

## 2024-02-24 PROCEDURE — 83735 ASSAY OF MAGNESIUM: CPT

## 2024-02-24 PROCEDURE — 74018 RADEX ABDOMEN 1 VIEW: CPT

## 2024-02-24 PROCEDURE — 250N000013 HC RX MED GY IP 250 OP 250 PS 637

## 2024-02-24 PROCEDURE — 250N000013 HC RX MED GY IP 250 OP 250 PS 637: Performed by: PHYSICIAN ASSISTANT

## 2024-02-24 PROCEDURE — 70553 MRI BRAIN STEM W/O & W/DYE: CPT

## 2024-02-24 PROCEDURE — 250N000011 HC RX IP 250 OP 636: Performed by: STUDENT IN AN ORGANIZED HEALTH CARE EDUCATION/TRAINING PROGRAM

## 2024-02-24 PROCEDURE — 84100 ASSAY OF PHOSPHORUS: CPT

## 2024-02-24 PROCEDURE — 250N000011 HC RX IP 250 OP 636

## 2024-02-24 PROCEDURE — 255N000002 HC RX 255 OP 636: Performed by: SURGERY

## 2024-02-24 PROCEDURE — A9585 GADOBUTROL INJECTION: HCPCS | Performed by: SURGERY

## 2024-02-24 PROCEDURE — 74018 RADEX ABDOMEN 1 VIEW: CPT | Mod: 26 | Performed by: RADIOLOGY

## 2024-02-24 PROCEDURE — 70553 MRI BRAIN STEM W/O & W/DYE: CPT | Mod: 26 | Performed by: RADIOLOGY

## 2024-02-24 PROCEDURE — 99232 SBSQ HOSP IP/OBS MODERATE 35: CPT | Mod: GC | Performed by: PSYCHIATRY & NEUROLOGY

## 2024-02-24 PROCEDURE — 250N000011 HC RX IP 250 OP 636: Performed by: PSYCHIATRY & NEUROLOGY

## 2024-02-24 PROCEDURE — 85027 COMPLETE CBC AUTOMATED: CPT

## 2024-02-24 PROCEDURE — 36592 COLLECT BLOOD FROM PICC: CPT

## 2024-02-24 PROCEDURE — 120N000002 HC R&B MED SURG/OB UMMC

## 2024-02-24 RX ORDER — LORAZEPAM 2 MG/ML
0.5 INJECTION INTRAMUSCULAR
Status: COMPLETED | OUTPATIENT
Start: 2024-02-24 | End: 2024-02-24

## 2024-02-24 RX ORDER — FUROSEMIDE 10 MG/ML
20 INJECTION INTRAMUSCULAR; INTRAVENOUS ONCE
Status: COMPLETED | OUTPATIENT
Start: 2024-02-24 | End: 2024-02-24

## 2024-02-24 RX ORDER — SUMATRIPTAN 5 MG/1
10 SPRAY NASAL
Status: DISCONTINUED | OUTPATIENT
Start: 2024-02-24 | End: 2024-02-27 | Stop reason: HOSPADM

## 2024-02-24 RX ORDER — BISACODYL 10 MG
10 SUPPOSITORY, RECTAL RECTAL ONCE
Status: COMPLETED | OUTPATIENT
Start: 2024-02-24 | End: 2024-02-24

## 2024-02-24 RX ORDER — GADOBUTROL 604.72 MG/ML
0.1 INJECTION INTRAVENOUS ONCE
Status: COMPLETED | OUTPATIENT
Start: 2024-02-24 | End: 2024-02-24

## 2024-02-24 RX ORDER — GABAPENTIN 300 MG/1
300 CAPSULE ORAL 3 TIMES DAILY PRN
Status: DISCONTINUED | OUTPATIENT
Start: 2024-02-24 | End: 2024-02-25

## 2024-02-24 RX ADMIN — FUROSEMIDE 20 MG: 10 INJECTION, SOLUTION INTRAMUSCULAR; INTRAVENOUS at 10:06

## 2024-02-24 RX ADMIN — HYDROMORPHONE HYDROCHLORIDE 4 MG: 2 TABLET ORAL at 06:16

## 2024-02-24 RX ADMIN — NORTRIPTYLINE HYDROCHLORIDE 40 MG: 10 CAPSULE ORAL at 23:07

## 2024-02-24 RX ADMIN — DIPHENHYDRAMINE HYDROCHLORIDE 25 MG: 50 INJECTION, SOLUTION INTRAMUSCULAR; INTRAVENOUS at 20:37

## 2024-02-24 RX ADMIN — PANTOPRAZOLE SODIUM 40 MG: 40 TABLET, DELAYED RELEASE ORAL at 08:07

## 2024-02-24 RX ADMIN — GADOBUTROL 8.2 ML: 604.72 INJECTION INTRAVENOUS at 09:08

## 2024-02-24 RX ADMIN — LORAZEPAM 0.5 MG: 2 INJECTION INTRAMUSCULAR; INTRAVENOUS at 08:59

## 2024-02-24 RX ADMIN — Medication 15 ML: at 08:07

## 2024-02-24 RX ADMIN — DIPHENHYDRAMINE HYDROCHLORIDE 25 MG: 50 INJECTION, SOLUTION INTRAMUSCULAR; INTRAVENOUS at 10:06

## 2024-02-24 RX ADMIN — NYSTATIN: 100000 CREAM TOPICAL at 08:08

## 2024-02-24 RX ADMIN — MAGNESIUM HYDROXIDE 30 ML: 400 SUSPENSION ORAL at 10:14

## 2024-02-24 RX ADMIN — HYDROMORPHONE HYDROCHLORIDE 2 MG: 2 TABLET ORAL at 18:03

## 2024-02-24 RX ADMIN — ENOXAPARIN SODIUM 40 MG: 40 INJECTION SUBCUTANEOUS at 10:07

## 2024-02-24 RX ADMIN — METHOCARBAMOL TABLETS 750 MG: 750 TABLET, COATED ORAL at 08:07

## 2024-02-24 RX ADMIN — SENNOSIDES 8.6 MG: 8.6 TABLET, FILM COATED ORAL at 08:07

## 2024-02-24 RX ADMIN — HYDROXYZINE HYDROCHLORIDE 50 MG: 50 TABLET, FILM COATED ORAL at 06:52

## 2024-02-24 RX ADMIN — PROCHLORPERAZINE EDISYLATE 10 MG: 5 INJECTION INTRAMUSCULAR; INTRAVENOUS at 21:15

## 2024-02-24 RX ADMIN — HYDROMORPHONE HYDROCHLORIDE 4 MG: 2 TABLET ORAL at 23:08

## 2024-02-24 RX ADMIN — METHOCARBAMOL TABLETS 750 MG: 750 TABLET, COATED ORAL at 23:07

## 2024-02-24 RX ADMIN — BISACODYL 10 MG: 10 SUPPOSITORY RECTAL at 10:06

## 2024-02-24 RX ADMIN — LIDOCAINE 1 PATCH: 4 PATCH TOPICAL at 08:08

## 2024-02-24 RX ADMIN — METHOCARBAMOL TABLETS 750 MG: 750 TABLET, COATED ORAL at 12:13

## 2024-02-24 RX ADMIN — POLYETHYLENE GLYCOL 3350 17 G: 17 POWDER, FOR SOLUTION ORAL at 08:07

## 2024-02-24 RX ADMIN — HYDROMORPHONE HYDROCHLORIDE 2 MG: 2 TABLET ORAL at 18:32

## 2024-02-24 RX ADMIN — METHOCARBAMOL TABLETS 750 MG: 750 TABLET, COATED ORAL at 18:02

## 2024-02-24 RX ADMIN — SENNOSIDES 8.6 MG: 8.6 TABLET, FILM COATED ORAL at 20:44

## 2024-02-24 ASSESSMENT — ACTIVITIES OF DAILY LIVING (ADL)
ADLS_ACUITY_SCORE: 50

## 2024-02-24 NOTE — PLAN OF CARE
"/69 (BP Location: Left arm)   Pulse 87   Temp 98.1  F (36.7  C) (Oral)   Resp 16   Ht 1.6 m (5' 3\")   Wt 79.6 kg (175 lb 6.4 oz)   SpO2 98%   BMI 31.07 kg/m       Pt A&O x4 VSS on RA.  R chest port WDL SL. Tolerating bariatric diet, denies nausea. up ind in room, showered with set up. Voiding spontaneously to the bathroom, no BM yet miralax, senna, milk of mag and suppository given. Midline incision [facility]. TF off can restart TF later today if feeling okay. Monitor and cont with POC.  "

## 2024-02-24 NOTE — PROGRESS NOTES
Care Management Follow Up    Length of Stay (days): 13    Expected Discharge Date: 02/24/2024     Concerns to be Addressed: discharge planning     Patient plan of care discussed at interdisciplinary rounds: No    Anticipated Discharge Disposition: Transitional Care     Anticipated Discharge Services: None  Anticipated Discharge DME: None    Patient/family educated on Medicare website which has current facility and service quality ratings: yes  Education Provided on the Discharge Plan: Yes  Patient/Family in Agreement with the Plan:      Referrals Placed by CM/SW: Internal Clinic Care Coordination, Senior Linkage Line, Post Acute Facilities, Communication hand-offs to next level of Care Providers  Private pay costs discussed: Not applicable    Additional Information:    Saturday the condition of patient was not indicative of discharge. Care management will continue to monitor and provide support for safe and adequate discharge planning.      Yolette Brown, RN, BSN, PHN  RN Care Coordinator (RNCC) Casual  Social Work and Care Management Department       SEARCHABLE in [facility] - search CARE COORDINATOR     [place] (0824-2308) Saturday & Sunday; (6817-2530) [facility] Recognized Holidays     Units: 5A Onc Vocera & 5C Vocera Pager: [phone]    Units: 6B Vocera & 6C Vocera  Pager: [phone]    Units: 7A SOT RNCC Vocera, 7B Med Surg Vocera, & 7C Med Surg Vocera  Pager: [phone]    Units: 6A Vocera & 4A CVICU Vocera, 4C MICU Vocera, and 4E SICU Vocera   Pager: [phone]    Units: 5 Ortho Vocera & 5 Med Surg Vocera  Pager: [phone]    Units: 6 Med Surg Vocera & 8 Med Surg Vocera  Pager [phone]

## 2024-02-24 NOTE — PLAN OF CARE
Goal Outcome Evaluation:  Nursing 2-1130 pm  Pt is up independently in her room, to the BR and walking in the hallway for a long walk. Still with poor intake. Tube feeding was turned off this afternoon per Dietician suggestion due to bloating leaking around G-tube site. Pt received IV compazine, benadryl and LR bolus per neuro. MRI checklist done and sent to MRI dept but pt will likely need sedation and is having nausea and vomiting this evening. Only had 50 ml emesis that looked like soup from lunch. Abd is soft and has some bowel sounds but no gas or BM. Plan is for suppository tomorrow. Had IV zofran and IV pain medication around 8pm with some relief so she could take here pills. Still wants to keep tube feeding off at the moment. Will reassess in a few hours. She is eating saltines and drinking water now and sitting up in her chair.

## 2024-02-24 NOTE — PROGRESS NOTES
Surgical Oncology Progress Note    Interval History:  POD 10  Some nausea/emsis overnight. Feels bloated and has increasing abdominal pain. Continues to passing gas, no bm. Migraine this morning. Some burning around her legs at skin level    Physical Exam:   Temp:  [97.5  F (36.4  C)-98.4  F (36.9  C)] 97.7  F (36.5  C)  Pulse:  [74-89] 74  Resp:  [16] 16  BP: (107-119)/(66-75) 119/75  SpO2:  [97 %-98 %] 97 %  General: Alert, oriented, appears comfortable, NAD.  Respiratory: breathing non labored  Abdomen: Abdomen is soft, appropriately tender, non-distended. Incision is clean, dry and intact. Some hardness around midline incision. Gtube in place with some bilious drainage around tube.   Extremities: BLE 2+, superficial tenderness.    Data:   All laboratory and imaging data in the past 24 hours reviewed  I/O last 3 completed shifts:  In: 410 [P.O.:400; I.V.:10]  Out: 2925 [Urine:2875; Emesis/NG output:50]  Recent Labs   Lab Test 02/24/24  0535 02/23/24  0502 02/22/24  0530 02/11/24  1557 02/11/24  1149 02/09/24  1331   WBC 8.0 8.9 8.7   < > 11.3* 8.1   HGB 8.7* 8.7* 8.2*   < > 7.2* 10.9*    312 291   < > 283 365   INR  --   --   --   --  1.09 0.99    < > = values in this interval not displayed.      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      POD 10 s/p exploratory laparotomy, excisional liver biopsy, small bowel resection, partial colon resection, partial liver resection, partial  gastric resection, and gtube placement with Dr. Krishnamurthy 2/14/24. CT 2/19 with fluid collection likely related to post surgical changes. No leak at the gastric remnant.      Tolerating TF, on diet. AROBF    2/21 - 1prbc     - ongoing migraines: appreciate neurology recommendations   - MRI today, pre treatment ordered   - sumatriptan, compazine, benadryl  - Available for pain is dilaudid PO prn, robaxin sched, atarax prn, gabapentin prn, Lidocaine patch added. Abdominal binder prn for comfort.   - Home inhalers resumed. Incentive spirometry. Out of bed and ambulation.   - Regular bariatric diet. miralax, senna. MoM + Suppository today  - TF, patient to self regulate, can restart later today if feels okay  - Anemia: improved, monitor   - Protonix PO QD  - Lovenox for DVT prophylaxis.   - PT/OT  - Discharge planning pending tube feeding tolerance and return of bowel function. Care Coordination for discharge planning with tube feedings appreciated    Seen with staff Dr. Radha Barrios MD PGY3  General Surgery Resident

## 2024-02-24 NOTE — PROGRESS NOTES
"[facility]  Neurology Consultation - Progress Note    Patient Name:  Eduarda Nogueira  Date of Service:  February 24, 2024    Subjective:    Patient endorses waking up with her typical migraine aura again this morning. Endorses a right sided temple pain which radiates through the right side of her head to the back of her neck. Also endorses sensitivity to light and sound. States it is slightly worse than prior days because she endorses \"full body chills\" this morning and exacerbation of her anxiety due to upcoming MRI. However, unchanged from typical migraine aura. Has tenderness to palpation of occiput, particularly on right side, which radiates down the neck.     Objective:    Vitals: /75 (BP Location: Left arm)   Pulse 74   Temp 97.7  F (36.5  C) (Oral)   Resp 16   Ht 1.6 m (5' 3\")   Wt 80.2 kg (176 lb 12.8 oz)   SpO2 97%   BMI 31.32 kg/m    General: Lying in bed, NAD  Head: Atraumatic, normocephalic   Cardiac: no lower extremity edema  Neurologic:  Mental Status: Fully alert, attentive and oriented. Speech clear and fluent.   Cranial Nerves: Pupils equal and reactive to light, sensitivity to light on exam. EOM intact, without nystagmus.  Endorses decreased sensation over the left face in V1-3 distribution (patient states this varies from day to day). Facial movements symmetric at rest and with activation. Strong shoulder shrug bilaterally. Tongue protrusion midline with normal lateral movement. Tenderness to palpation of occiput, particularly on right side  Motor: No abnormal movements.  5/5 strength in deltoids, biceps, triceps,  strength, hip flexors, leg extension/flexion, dorsiflexion/plantarflexion.   Sensory: Near absent sensation to light touch in the lower extremities bilaterally until the level of the mid thigh. Absent vibration sensation at the ankle and knee bilaterally. Sensation intact to light touch in upper extremities bilaterally. "   Station/Gait: Deferred    Pertinent Investigations:    I have personally reviewed most recent and pertinent labs, tests, and radiological images.     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 Neurology is consulted for treatment advisement of daily migraine headaches.     Current presentation of symptoms is consistent with her daily headaches making chronic migraine headaches most likely. Given extreme pain and reported shooting pains with palpation of occiput, occipital neuralgia may be co-occurring. Lack of papilledema on ocular exam is reassuring, however to new onset jejunal adenocarcinoma, increased symptoms with bearing down, and pain waking from sleep, MRI with and w/out contrast is recommended to assess for intracranial pathology. Plan to increase nortriptyline but discussed with patient that this will likely take weeks to feel effect. In short term, would recommend IV migraine cocktail of compazine and benadryl with fluid bolus as needed for pain while inpatient. Okay with limited use of sumatriptan for abortive therapy given embolic nature of prior stroke in 2022. She would benefit from outpatient headache neurology follow up for consideration of an occipital nerve block vs botox. If she remains inpatient, an inpatient pain consult could likely perform an occipital nerve block.     Recommendations:   - MRI with and without contrast -we will follow final read  - Increase nortriptyline to 40 mg daily  - Recommend IV compazine and benadryl Q8hrs PRN while inpatient  - Outpatient ophthalmology referral   - Outpatient Headache neurology consult for further management (ordered)  - Would likely benefit from outpatient botox vs occipital nerve block,  particularly given her limited options for medications  - Okay with limited sumatriptan use if necessary (2x per week)       Thank you for involving Neurology in the care of Eduarda Nogueira.  Please do not hesitate to call with questions/concerns (consult pager [phone]).      Patient was seen and discussed with Dr. Salvador.    Juarez Barajas MD

## 2024-02-24 NOTE — PLAN OF CARE
Goal Outcome Evaluation:    Overall Patient Progress: no changeOverall Patient Progress: no change    [id number]: Pt A&Ox4. VSS on RA. Pt c/o abd pain. Pt tearful this AM about frustration of pain and writer had sat down to calm and comfort her. PRN atarax and oxy given. TF turned off for the night until further evaluation. No leakage around G tube site. Pt given peppermint aromatherapy for nausea. No BM.     Abd xray to be done this AM.    Plan: Pt needs to tolerate TF, have a BM, and get MRI prior to discharge.

## 2024-02-25 ENCOUNTER — APPOINTMENT (OUTPATIENT)
Dept: CT IMAGING | Facility: CLINIC | Age: 44
End: 2024-02-25
Payer: COMMERCIAL

## 2024-02-25 LAB
ALBUMIN SERPL BCG-MCNC: 2.6 G/DL (ref 3.5–5.2)
ALP SERPL-CCNC: 64 U/L (ref 40–150)
ALT SERPL W P-5'-P-CCNC: 11 U/L (ref 0–50)
ANION GAP SERPL CALCULATED.3IONS-SCNC: 7 MMOL/L (ref 7–15)
AST SERPL W P-5'-P-CCNC: 17 U/L (ref 0–45)
BILIRUB SERPL-MCNC: 0.2 MG/DL
BUN SERPL-MCNC: 14.2 MG/DL (ref 6–20)
CALCIUM SERPL-MCNC: 7.9 MG/DL (ref 8.6–10)
CHLORIDE SERPL-SCNC: 105 MMOL/L (ref 98–107)
CREAT SERPL-MCNC: 0.8 MG/DL (ref 0.51–0.95)
DEPRECATED HCO3 PLAS-SCNC: 26 MMOL/L (ref 22–29)
EGFRCR SERPLBLD CKD-EPI 2021: >90 ML/MIN/1.73M2
ERYTHROCYTE [DISTWIDTH] IN BLOOD BY AUTOMATED COUNT: 16 % (ref 10–15)
GLUCOSE SERPL-MCNC: 92 MG/DL (ref 70–99)
HCT VFR BLD AUTO: 26.8 % (ref 35–47)
HGB BLD-MCNC: 8.2 G/DL (ref 11.7–15.7)
MAGNESIUM SERPL-MCNC: 2.1 MG/DL (ref 1.7–2.3)
MCH RBC QN AUTO: 28.8 PG (ref 26.5–33)
MCHC RBC AUTO-ENTMCNC: 30.6 G/DL (ref 31.5–36.5)
MCV RBC AUTO: 94 FL (ref 78–100)
PHOSPHATE SERPL-MCNC: 3.4 MG/DL (ref 2.5–4.5)
PLATELET # BLD AUTO: 388 10E3/UL (ref 150–450)
POTASSIUM SERPL-SCNC: 4.2 MMOL/L (ref 3.4–5.3)
PROT SERPL-MCNC: 4.8 G/DL (ref 6.4–8.3)
RBC # BLD AUTO: 2.85 10E6/UL (ref 3.8–5.2)
SODIUM SERPL-SCNC: 138 MMOL/L (ref 135–145)
WBC # BLD AUTO: 8.7 10E3/UL (ref 4–11)

## 2024-02-25 PROCEDURE — 250N000013 HC RX MED GY IP 250 OP 250 PS 637: Performed by: PHYSICIAN ASSISTANT

## 2024-02-25 PROCEDURE — 250N000011 HC RX IP 250 OP 636: Performed by: PSYCHIATRY & NEUROLOGY

## 2024-02-25 PROCEDURE — 250N000013 HC RX MED GY IP 250 OP 250 PS 637

## 2024-02-25 PROCEDURE — 120N000002 HC R&B MED SURG/OB UMMC

## 2024-02-25 PROCEDURE — 80053 COMPREHEN METABOLIC PANEL: CPT

## 2024-02-25 PROCEDURE — 84100 ASSAY OF PHOSPHORUS: CPT

## 2024-02-25 PROCEDURE — 999N000128 HC STATISTIC PERIPHERAL IV START W/O US GUIDANCE

## 2024-02-25 PROCEDURE — 99232 SBSQ HOSP IP/OBS MODERATE 35: CPT | Mod: GC | Performed by: PSYCHIATRY & NEUROLOGY

## 2024-02-25 PROCEDURE — 250N000013 HC RX MED GY IP 250 OP 250 PS 637: Performed by: PSYCHIATRY & NEUROLOGY

## 2024-02-25 PROCEDURE — 74177 CT ABD & PELVIS W/CONTRAST: CPT | Mod: 26 | Performed by: RADIOLOGY

## 2024-02-25 PROCEDURE — 85027 COMPLETE CBC AUTOMATED: CPT

## 2024-02-25 PROCEDURE — 36592 COLLECT BLOOD FROM PICC: CPT

## 2024-02-25 PROCEDURE — 250N000011 HC RX IP 250 OP 636

## 2024-02-25 PROCEDURE — 250N000011 HC RX IP 250 OP 636: Performed by: STUDENT IN AN ORGANIZED HEALTH CARE EDUCATION/TRAINING PROGRAM

## 2024-02-25 PROCEDURE — 250N000011 HC RX IP 250 OP 636: Performed by: SURGERY

## 2024-02-25 PROCEDURE — 74177 CT ABD & PELVIS W/CONTRAST: CPT

## 2024-02-25 PROCEDURE — 83735 ASSAY OF MAGNESIUM: CPT

## 2024-02-25 RX ORDER — IOPAMIDOL 755 MG/ML
105 INJECTION, SOLUTION INTRAVASCULAR ONCE
Status: COMPLETED | OUTPATIENT
Start: 2024-02-25 | End: 2024-02-25

## 2024-02-25 RX ORDER — HYDROMORPHONE HCL IN WATER/PF 6 MG/30 ML
.2-.4 PATIENT CONTROLLED ANALGESIA SYRINGE INTRAVENOUS EVERY 6 HOURS PRN
Status: COMPLETED | OUTPATIENT
Start: 2024-02-25 | End: 2024-02-25

## 2024-02-25 RX ORDER — FUROSEMIDE 10 MG/ML
20 INJECTION INTRAMUSCULAR; INTRAVENOUS ONCE
Status: COMPLETED | OUTPATIENT
Start: 2024-02-25 | End: 2024-02-25

## 2024-02-25 RX ADMIN — ENOXAPARIN SODIUM 40 MG: 40 INJECTION SUBCUTANEOUS at 09:31

## 2024-02-25 RX ADMIN — SENNOSIDES 8.6 MG: 8.6 TABLET, FILM COATED ORAL at 20:48

## 2024-02-25 RX ADMIN — NYSTATIN: 100000 CREAM TOPICAL at 09:35

## 2024-02-25 RX ADMIN — HYDROXYZINE HYDROCHLORIDE 50 MG: 50 TABLET, FILM COATED ORAL at 06:36

## 2024-02-25 RX ADMIN — SENNOSIDES 8.6 MG: 8.6 TABLET, FILM COATED ORAL at 09:31

## 2024-02-25 RX ADMIN — SODIUM PHOSPHATE, DIBASIC AND SODIUM PHOSPHATE, MONOBASIC 1 ENEMA: 7; 19 ENEMA RECTAL at 09:30

## 2024-02-25 RX ADMIN — ONDANSETRON 4 MG: 2 INJECTION INTRAMUSCULAR; INTRAVENOUS at 16:27

## 2024-02-25 RX ADMIN — HYDROMORPHONE HYDROCHLORIDE 0.4 MG: 0.2 INJECTION, SOLUTION INTRAMUSCULAR; INTRAVENOUS; SUBCUTANEOUS at 16:27

## 2024-02-25 RX ADMIN — LIDOCAINE 1 PATCH: 4 PATCH TOPICAL at 09:30

## 2024-02-25 RX ADMIN — HYDROMORPHONE HYDROCHLORIDE 0.4 MG: 0.2 INJECTION, SOLUTION INTRAMUSCULAR; INTRAVENOUS; SUBCUTANEOUS at 23:47

## 2024-02-25 RX ADMIN — DIPHENHYDRAMINE HYDROCHLORIDE 25 MG: 50 INJECTION, SOLUTION INTRAMUSCULAR; INTRAVENOUS at 12:08

## 2024-02-25 RX ADMIN — FUROSEMIDE 20 MG: 10 INJECTION, SOLUTION INTRAMUSCULAR; INTRAVENOUS at 09:31

## 2024-02-25 RX ADMIN — IOPAMIDOL 105 ML: 755 INJECTION, SOLUTION INTRAVENOUS at 17:36

## 2024-02-25 RX ADMIN — METHOCARBAMOL TABLETS 750 MG: 750 TABLET, COATED ORAL at 09:31

## 2024-02-25 RX ADMIN — ONDANSETRON 4 MG: 2 INJECTION INTRAMUSCULAR; INTRAVENOUS at 23:47

## 2024-02-25 RX ADMIN — METHOCARBAMOL TABLETS 750 MG: 750 TABLET, COATED ORAL at 18:11

## 2024-02-25 RX ADMIN — ONDANSETRON 4 MG: 4 TABLET, ORALLY DISINTEGRATING ORAL at 02:39

## 2024-02-25 RX ADMIN — PANTOPRAZOLE SODIUM 40 MG: 40 TABLET, DELAYED RELEASE ORAL at 09:31

## 2024-02-25 RX ADMIN — POLYETHYLENE GLYCOL 3350 17 G: 17 POWDER, FOR SOLUTION ORAL at 09:31

## 2024-02-25 RX ADMIN — METHOCARBAMOL TABLETS 750 MG: 750 TABLET, COATED ORAL at 22:23

## 2024-02-25 RX ADMIN — HYDROXYZINE HYDROCHLORIDE 50 MG: 50 TABLET, FILM COATED ORAL at 13:55

## 2024-02-25 RX ADMIN — Medication 15 ML: at 09:31

## 2024-02-25 RX ADMIN — HYDROMORPHONE HYDROCHLORIDE 4 MG: 2 TABLET ORAL at 12:09

## 2024-02-25 RX ADMIN — HYDROMORPHONE HYDROCHLORIDE 4 MG: 2 TABLET ORAL at 20:48

## 2024-02-25 RX ADMIN — METHOCARBAMOL TABLETS 750 MG: 750 TABLET, COATED ORAL at 12:08

## 2024-02-25 RX ADMIN — NYSTATIN: 100000 CREAM TOPICAL at 20:49

## 2024-02-25 RX ADMIN — PROCHLORPERAZINE EDISYLATE 10 MG: 5 INJECTION INTRAMUSCULAR; INTRAVENOUS at 12:08

## 2024-02-25 RX ADMIN — HYDROMORPHONE HYDROCHLORIDE 4 MG: 2 TABLET ORAL at 06:36

## 2024-02-25 RX ADMIN — NORTRIPTYLINE HYDROCHLORIDE 40 MG: 10 CAPSULE ORAL at 22:23

## 2024-02-25 ASSESSMENT — ACTIVITIES OF DAILY LIVING (ADL)
ADLS_ACUITY_SCORE: 49
ADLS_ACUITY_SCORE: 47
ADLS_ACUITY_SCORE: 49
ADLS_ACUITY_SCORE: 47
ADLS_ACUITY_SCORE: 49
ADLS_ACUITY_SCORE: 47
ADLS_ACUITY_SCORE: 49
ADLS_ACUITY_SCORE: 47
ADLS_ACUITY_SCORE: 47
ADLS_ACUITY_SCORE: 49
ADLS_ACUITY_SCORE: 49
ADLS_ACUITY_SCORE: 47
ADLS_ACUITY_SCORE: 49
ADLS_ACUITY_SCORE: 49

## 2024-02-25 NOTE — PLAN OF CARE
"Goal Outcome Evaluation:       Pt had a normal BM brown in color on the medium/large side, soft about 1430 pm. She had a 2 hour nap after that. Recording all output. Voiding a lot. Sat up in her chair and walked in halls. Dependent edema looks improved from Friday and she has taken the compression wraps off. Asking writer to call doctor for another dose of IV lasix tonight. Writer reassured Pt that she has had a lot of urine output today and that that could be discussed with the primary team in the am. Try to leave calls to doctor after hour for urgent needs. Pt is reassured. She asked for migraine cocktail. IV compazine and benadryl given per neuro note as \"migraine cocktail.\"She is drinking well but still not eating very much. Nauseated after HS medications. [place] provided. Drinking water, milk, flavored water. Discussed plan to restart tube feeding tonight with night RN maybe at 20 ml per hour and see how she tolerates. Advance every 8 hours or so to goal of 40 ml.                "

## 2024-02-25 NOTE — PROGRESS NOTES
"[facility]  Neurology Consultation - Progress Note    Patient Name:  Eduarda Nogueira  Date of Service:  February 24, 2024    Subjective:    No acute events overnight.  Patient continues to have headaches but has alleviation with migraine cocktail.    Objective:    Vitals: /58 (BP Location: Right arm)   Pulse 89   Temp 97.5  F (36.4  C) (Oral)   Resp 17   Ht 1.6 m (5' 3\")   Wt 79.6 kg (175 lb 6.4 oz)   SpO2 98%   BMI 31.07 kg/m    General: Lying in bed, NAD  Head: Atraumatic, normocephalic   Cardiac: no lower extremity edema  Neurologic:  Mental Status: Fully alert, attentive and oriented. Speech clear and fluent.  Unable to remember 3 words given to her on recall test.  Cranial Nerves: Pupils equal and reactive to light, sensitivity to light on exam. EOM intact, without nystagmus.  Endorses decreased sensation over the left face in V1-3 distribution (patient states this varies from day to day). Facial movements symmetric at rest and with activation. Strong shoulder shrug bilaterally. Tongue protrusion midline with normal lateral movement. Tenderness to palpation of occiput, particularly on right side  Motor: No abnormal movements.  5/5 strength in deltoids, biceps, triceps,  strength, hip flexors, leg extension/flexion, dorsiflexion/plantarflexion.   Sensory: Near absent sensation to light touch in the lower extremities bilaterally until the level of the mid thigh. Absent vibration sensation at the ankle and knee bilaterally. Sensation intact to light touch in upper extremities bilaterally.   Station/Gait: Deferred    Pertinent Investigations:    I have personally reviewed most recent and pertinent labs, tests, and radiological images.     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 Neurology is consulted for treatment advisement of daily migraine headaches.     Current presentation of symptoms is consistent with her daily headaches making chronic migraine headaches most likely. Given extreme pain and reported shooting pains with palpation of occiput, occipital neuralgia may be co-occurring. Lack of papilledema on ocular exam is reassuring, however to new onset jejunal adenocarcinoma, increased symptoms with bearing down, and pain waking from sleep, MRI with and w/out contrast i showed hyperintensity in the left mesial temporal lobe which could be nonspecific, we do have concerns for limbic encephalitis when we see this area light up on MRI.  But as of now the patient does not seem to have any symptoms or signs on exam that are concerning for us with regards to limbic encephalitis.  We will plan to get an MRI with and without contrast on an outpatient basis in 1 month unless the patient develops new focal neurological deficits like increasing confusion, worsening or new character of headache, any new autonomic symptoms.  Regarding her headaches we will plan to increase nortriptyline but discussed with patient that this will likely take weeks to feel effect. In short term, would recommend IV migraine cocktail of compazine and benadryl with fluid bolus as needed for pain while inpatient. Okay with limited use of sumatriptan for abortive therapy given embolic nature of prior stroke in 2022. She would benefit from outpatient headache neurology follow up for consideration of an occipital nerve block vs botox. If she remains inpatient, an inpatient pain consult could likely perform an occipital nerve block.     Recommendations:   - MRI with and without contrast -showed hyperintensity in the left mesial temporal lobe.  Could be nonspecific.  Low concerns for limbic encephalitis now.  Follow-up MRI brain with and without contrast  in 1 month on an outpatient basis unless patient has new focal neurological deficits.  - Increase nortriptyline to 40 mg daily  - Recommend IV compazine and benadryl Q8hrs PRN while inpatient  - Outpatient ophthalmology referral   - Outpatient Headache neurology consult for further management (ordered)  - Would likely benefit from outpatient botox vs occipital nerve block, particularly given her limited options for medications  - Okay with limited sumatriptan use if necessary (2x per week).  -While inpatient we would recommend getting an Occupational Therapy [facility] evaluation to get a baseline cognitive assessment which will be helpful going forward.  -No further recommendations from general neurology standpoint.  We will be signing off now kindly call us with any questions/concerns.         Thank you for involving Neurology in the care of Eduarda Nogueira.  Please do not hesitate to call with questions/concerns (consult pager [phone]).      Patient was seen and discussed with Dr. Salvador.      Reynold Duval MD  Neurology Resident PGY 3

## 2024-02-25 NOTE — PROGRESS NOTES
Surgery update:    Increasing abdominal pain later this afternoon. Has one small bm with streaks of blood.   Abd is tender around midline which is more fluctuant then prior.   Remains afribe, nontachycardic. Bp 100s/60s. RA    Will plan on CT to reassess fluid collections w/ oral and G tube contrast    If increasing fluid collections will make NPO at MN for possible drain placement, TF can continue since they are in gastric remnant.     Higinio Barrios MD PGY3  General Surgery Resident

## 2024-02-25 NOTE — PLAN OF CARE
Goal Outcome Evaluation:      Plan of Care Reviewed With: patient          Outcome Evaluation: A&Ox4, VSS on RA. TF restarted at 10 mL/h, due to advance to 20 mL at 8 AM; tolerating well. Nauseous x1 overnight, given zofran & wearing seabands to manage w/ improvement. LBM 2/24. Gtube site w/ slight redness & purulent drainage; dressing saved for evaluation. Reporting spread of known burning rash on thighs; encouraged to speak w/ day team since rash is becoming increasing bothersome. Generalized pain; Given atarax & dilaudid x1 this AM. Continue w/ POC.

## 2024-02-25 NOTE — PLAN OF CARE
Goal Outcome Evaluation:    Temp: 97.5  F (36.4  C) Temp src: Oral BP: 101/64 Pulse: 89   Resp: 16 SpO2: 98 % O2 Device: None (Room air)    NEURO: A&Ox4, calm/cooperative, able to make needs known, PRN atarax given x1 for increased anxiety this afternoon  RESPIRATORY: WNL on RA, denies SOB  CARDIAC: WNL, denies chest pain  GI/: Strict I&O, small BMx1 today after fleet enema, stool loose w/ mucous, increased abdominal pain after BM and CT with oral contrast ordered, PEG infusing TF @20mL/hr, plan to advance to 30mL/hr @1600, denies nausea, bulge middle/lower abdomen  SKIN: +2 dependent edema, midline incision healing and SINAI, PEG dressing changed, redness at site and small amount purulent drainage  DIET: Bariatric  PAIN/NAUSEA: Severe abdominal pain, moderate relief w/ PRN dilaudid x1 and scheduled robaxin, PRN migraine cocktail given x1 for headache and helpful per pt  IV ACCESS: R-port SL  ACTIVITY: Up ad [place]elaide w walker  LAB: Reviewed, plan for CT w/ oral contrast this afternoon  PLAN: Continue w/ POC, discharge pending pt tolerating TF @ goal rate

## 2024-02-25 NOTE — PROGRESS NOTES
Brief RNCC note:    Weekend RNCC received a page from surg/onc to set up TF teaching for pt. Pt will be discharging Monday.   Writer messaged I kike and was informed that Eli stokes would teach her tomorrow prior to discharge. RNCC continue to follow for any needs that arise.     Keenan Burden RN, MSN  Casual RN Care Coordinator  [facility]  Phone: [phone]

## 2024-02-25 NOTE — PROGRESS NOTES
Surgical Oncology Progress Note    Interval History:  POD 11  Continues to have very sporatic and intermittent nausea. 1 BM yday, continues to pas gas. New rash to bilateral legs and abdomen.    Physical Exam:   Temp:  [97.5  F (36.4  C)-98.1  F (36.7  C)] 97.5  F (36.4  C)  Pulse:  [87-89] 89  Resp:  [16-17] 17  BP: (102-118)/(58-72) 102/58  SpO2:  [98 %-99 %] 98 %  General: Alert, oriented, appears comfortable, NAD.  Respiratory: breathing non labored  Abdomen: Abdomen is soft, appropriately tender, non-distended. Incision is clean, dry and intact. Some induration around midline incision. Gtube in place with dressing in place.   Extremities: BLE 2+, superficial tenderness. Spreading rash to b/l thighs and abdomen    Data:   All laboratory and imaging data in the past 24 hours reviewed  I/O last 3 completed shifts:  In: 3435 [P.O.:3360; NG/GT:75]  Out: 2650 [Urine:2650]  Recent Labs   Lab Test 02/25/24  0632 02/24/24  0535 02/23/24  0502 02/11/24  1557 02/11/24  1149 02/09/24  1331   WBC 8.7 8.0 8.9   < > 11.3* 8.1   HGB 8.2* 8.7* 8.7*   < > 7.2* 10.9*    346 312   < > 283 365   INR  --   --   --   --  1.09 0.99    < > = values in this interval not displayed.      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      POD 11 s/p exploratory laparotomy, excisional liver biopsy, small bowel resection, partial colon resection, partial liver resection, partial gastric resection, and gtube placement  with Dr. Krishnamurthy 2/14/24. CT 2/19 with fluid collection likely related to post surgical changes. No leak at the gastric remnant.      2/21 - 1prbc     - lasix 20 again  - ongoing migraines: appreciate neurology recommendations   - MRI 2/24: mild L mesial temporal hyperintensity. Left parietal encephalomalacia.   - sumatriptan, compazine, benadryl  - Available for pain is dilaudid PO prn, robaxin sched, atarax prn, gabapentin prn, Lidocaine patch added. Abdominal binder prn for comfort.   - Home inhalers resumed. Incentive spirometry. Out of bed and ambulation.   - Rash: suspect due to gadolinium, she hasn't really received any new medications other then that. Will monitor. Benadryl prn ordered.   - Regular bariatric diet. miralax, senna. + enema today  - TF, patient to self regulate, 10 this am. Work up to goal today now with BM.  - Anemia: improved, monitor   - Protonix PO QD  - Lovenox for DVT prophylaxis.   - PT/OT  - Discharge planning pending tube feeding tolerance and improvement of rash. Care Coordination for discharge planning with tube feedings appreciated. Will try to arrange teaching today or soon since it was skipped on 2/23.     Will discuss with staff Dr. Radha Barrios MD PGY3  General Surgery Resident

## 2024-02-26 ENCOUNTER — APPOINTMENT (OUTPATIENT)
Dept: INTERVENTIONAL RADIOLOGY/VASCULAR | Facility: CLINIC | Age: 44
End: 2024-02-26
Attending: PHYSICIAN ASSISTANT
Payer: COMMERCIAL

## 2024-02-26 ENCOUNTER — PRE VISIT (OUTPATIENT)
Dept: GASTROENTEROLOGY | Facility: CLINIC | Age: 44
End: 2024-02-26
Payer: COMMERCIAL

## 2024-02-26 LAB
ALBUMIN SERPL BCG-MCNC: 2.7 G/DL (ref 3.5–5.2)
ALP SERPL-CCNC: 59 U/L (ref 40–150)
ALT SERPL W P-5'-P-CCNC: 11 U/L (ref 0–50)
ANION GAP SERPL CALCULATED.3IONS-SCNC: 8 MMOL/L (ref 7–15)
AST SERPL W P-5'-P-CCNC: 18 U/L (ref 0–45)
BILIRUB SERPL-MCNC: 0.2 MG/DL
BUN SERPL-MCNC: 14.3 MG/DL (ref 6–20)
CALCIUM SERPL-MCNC: 7.9 MG/DL (ref 8.6–10)
CHLORIDE SERPL-SCNC: 104 MMOL/L (ref 98–107)
CREAT SERPL-MCNC: 0.81 MG/DL (ref 0.51–0.95)
DEPRECATED HCO3 PLAS-SCNC: 28 MMOL/L (ref 22–29)
EGFRCR SERPLBLD CKD-EPI 2021: >90 ML/MIN/1.73M2
ERYTHROCYTE [DISTWIDTH] IN BLOOD BY AUTOMATED COUNT: 15.9 % (ref 10–15)
GLUCOSE SERPL-MCNC: 87 MG/DL (ref 70–99)
HCT VFR BLD AUTO: 25.8 % (ref 35–47)
HGB BLD-MCNC: 8.3 G/DL (ref 11.7–15.7)
MAGNESIUM SERPL-MCNC: 2.1 MG/DL (ref 1.7–2.3)
MCH RBC QN AUTO: 30.2 PG (ref 26.5–33)
MCHC RBC AUTO-ENTMCNC: 32.2 G/DL (ref 31.5–36.5)
MCV RBC AUTO: 94 FL (ref 78–100)
PHOSPHATE SERPL-MCNC: 4.2 MG/DL (ref 2.5–4.5)
PLATELET # BLD AUTO: 374 10E3/UL (ref 150–450)
POTASSIUM SERPL-SCNC: 3.9 MMOL/L (ref 3.4–5.3)
PROT SERPL-MCNC: 5 G/DL (ref 6.4–8.3)
RBC # BLD AUTO: 2.75 10E6/UL (ref 3.8–5.2)
SODIUM SERPL-SCNC: 140 MMOL/L (ref 135–145)
WBC # BLD AUTO: 7.7 10E3/UL (ref 4–11)

## 2024-02-26 PROCEDURE — 250N000013 HC RX MED GY IP 250 OP 250 PS 637

## 2024-02-26 PROCEDURE — 0W9G3ZZ DRAINAGE OF PERITONEAL CAVITY, PERCUTANEOUS APPROACH: ICD-10-PCS | Performed by: RADIOLOGY

## 2024-02-26 PROCEDURE — 85027 COMPLETE CBC AUTOMATED: CPT

## 2024-02-26 PROCEDURE — 250N000013 HC RX MED GY IP 250 OP 250 PS 637: Performed by: PSYCHIATRY & NEUROLOGY

## 2024-02-26 PROCEDURE — 10160 PNXR ASPIR ABSC HMTMA BULLA: CPT | Performed by: RADIOLOGY

## 2024-02-26 PROCEDURE — 36415 COLL VENOUS BLD VENIPUNCTURE: CPT

## 2024-02-26 PROCEDURE — 250N000013 HC RX MED GY IP 250 OP 250 PS 637: Performed by: PHYSICIAN ASSISTANT

## 2024-02-26 PROCEDURE — 10005 FNA BX W/US GDN 1ST LES: CPT

## 2024-02-26 PROCEDURE — 87205 SMEAR GRAM STAIN: CPT

## 2024-02-26 PROCEDURE — 76942 ECHO GUIDE FOR BIOPSY: CPT | Mod: 26 | Performed by: RADIOLOGY

## 2024-02-26 PROCEDURE — 250N000009 HC RX 250: Performed by: RADIOLOGY

## 2024-02-26 PROCEDURE — 76942 ECHO GUIDE FOR BIOPSY: CPT

## 2024-02-26 PROCEDURE — 120N000002 HC R&B MED SURG/OB UMMC

## 2024-02-26 PROCEDURE — 83735 ASSAY OF MAGNESIUM: CPT

## 2024-02-26 PROCEDURE — 80053 COMPREHEN METABOLIC PANEL: CPT

## 2024-02-26 PROCEDURE — 84100 ASSAY OF PHOSPHORUS: CPT

## 2024-02-26 PROCEDURE — 87075 CULTR BACTERIA EXCEPT BLOOD: CPT

## 2024-02-26 PROCEDURE — 250N000011 HC RX IP 250 OP 636

## 2024-02-26 PROCEDURE — 87102 FUNGUS ISOLATION CULTURE: CPT

## 2024-02-26 PROCEDURE — 250N000011 HC RX IP 250 OP 636: Performed by: STUDENT IN AN ORGANIZED HEALTH CARE EDUCATION/TRAINING PROGRAM

## 2024-02-26 PROCEDURE — 87106 FUNGI IDENTIFICATION YEAST: CPT

## 2024-02-26 RX ORDER — FUROSEMIDE 10 MG/ML
10 INJECTION INTRAMUSCULAR; INTRAVENOUS ONCE
Status: COMPLETED | OUTPATIENT
Start: 2024-02-26 | End: 2024-02-26

## 2024-02-26 RX ADMIN — HYDROXYZINE HYDROCHLORIDE 50 MG: 50 TABLET, FILM COATED ORAL at 17:26

## 2024-02-26 RX ADMIN — ONDANSETRON 4 MG: 2 INJECTION INTRAMUSCULAR; INTRAVENOUS at 08:34

## 2024-02-26 RX ADMIN — HYDROMORPHONE HYDROCHLORIDE 4 MG: 2 TABLET ORAL at 09:37

## 2024-02-26 RX ADMIN — HYDROXYZINE HYDROCHLORIDE 50 MG: 50 TABLET, FILM COATED ORAL at 08:34

## 2024-02-26 RX ADMIN — NORTRIPTYLINE HYDROCHLORIDE 40 MG: 10 CAPSULE ORAL at 22:54

## 2024-02-26 RX ADMIN — METHOCARBAMOL TABLETS 750 MG: 750 TABLET, COATED ORAL at 12:50

## 2024-02-26 RX ADMIN — HYDROMORPHONE HYDROCHLORIDE 4 MG: 2 TABLET ORAL at 22:54

## 2024-02-26 RX ADMIN — NYSTATIN: 100000 CREAM TOPICAL at 12:51

## 2024-02-26 RX ADMIN — POLYETHYLENE GLYCOL 3350 17 G: 17 POWDER, FOR SOLUTION ORAL at 08:33

## 2024-02-26 RX ADMIN — METHOCARBAMOL TABLETS 750 MG: 750 TABLET, COATED ORAL at 17:30

## 2024-02-26 RX ADMIN — PANTOPRAZOLE SODIUM 40 MG: 40 TABLET, DELAYED RELEASE ORAL at 08:34

## 2024-02-26 RX ADMIN — METHOCARBAMOL TABLETS 750 MG: 750 TABLET, COATED ORAL at 08:34

## 2024-02-26 RX ADMIN — ONDANSETRON 4 MG: 2 INJECTION INTRAMUSCULAR; INTRAVENOUS at 23:54

## 2024-02-26 RX ADMIN — HYDROMORPHONE HYDROCHLORIDE 4 MG: 2 TABLET ORAL at 05:23

## 2024-02-26 RX ADMIN — LIDOCAINE HYDROCHLORIDE 5 ML: 10 INJECTION, SOLUTION EPIDURAL; INFILTRATION; INTRACAUDAL; PERINEURAL at 17:02

## 2024-02-26 RX ADMIN — Medication 15 ML: at 08:35

## 2024-02-26 RX ADMIN — SENNOSIDES 8.6 MG: 8.6 TABLET, FILM COATED ORAL at 08:34

## 2024-02-26 RX ADMIN — FUROSEMIDE 10 MG: 10 INJECTION, SOLUTION INTRAMUSCULAR; INTRAVENOUS at 20:33

## 2024-02-26 RX ADMIN — LIDOCAINE 1 PATCH: 4 PATCH TOPICAL at 08:46

## 2024-02-26 ASSESSMENT — ACTIVITIES OF DAILY LIVING (ADL)
ADLS_ACUITY_SCORE: 47

## 2024-02-26 NOTE — PLAN OF CARE
Goal Outcome Evaluation:      Plan of Care Reviewed With: patient    Overall Patient Progress: no changeOverall Patient Progress: no change    4438-1229: Pt A&Ox4. VSS on RA. Pt complains of abdominal pain and given PRN IV dilaudid x1. G tube running at GR of 40 ml/hr since midnight. Last BM 2/25. Pt given IV zofran x1 for nausea. Midline abdominal incision CDI. Writer changed R chest port A catch with RN joyce and is now saline locked.     Pt has been NPO as of midnight for possible drain placement today. Okay for TF to continue.    Plan: Continue with plan of care.

## 2024-02-26 NOTE — CONSULTS
"      [facility] Consult Service Note  Interventional Radiology  02/26/24   9:54 AM    Consult Requested: subcutnaoeus fluid collection / seroma, consult for mamta. drain placement > aspiration     Recommendations/Plan:    Patient is approved for IR aspiration of subcutaneous fluid collection and if fluid is purulent will place drain.    Timing of procedure is TBD based on IR staffing/schedule and triage.  Please contact the IR charge RN at [phone] for estimated time of procedure.     Labs WNL for procedure.    Orders entered for procedure. No pre procedure IV abx needed. No NPO required.    Medications to be held include: no hold on lovenox  Informed consent will be completed prior to procedure.     Case and imaging discussed with IR attending Dr. 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     POD 12 s/p exploratory laparotomy, excisional liver biopsy, small bowel resection, partial colon resection, partial liver resection, partial gastric resection, and gtube placement with Dr. Krishnamurthy 2/14/24.      Had CT on 2/25/2024 with subcutaneous fluid collection, likely seroma.      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     Diagnostic labs to be entered by: Higinio Barrios MD      Pertinent Imaging Reviewed:       Expected date of discharge:  02/27/2024    Vitals:   BP 95/48 (BP Location: Right arm)   Pulse 83   Temp 98.1  F (36.7  C) (Oral)   Resp 17   Ht 1.6 m (5' 3\")   Wt 78.3 kg (172 lb 9.9 oz)   SpO2 98%   BMI 30.58 kg/m      Pertinent Labs Reviewed:  CBC:  Lab Results   Component Value Date    WBC 7.7 02/26/2024    WBC 8.7 02/25/2024    WBC 8.0 02/24/2024     Lab Results "   Component Value Date    HGB 8.3 02/26/2024    HGB 8.2 02/25/2024    HGB 8.7 02/24/2024    HGB 16.4 09/08/2015     Lab Results   Component Value Date     02/26/2024     02/25/2024     02/24/2024     09/08/2015    INR:  Lab Results   Component Value Date    INR 1.09 02/11/2024          COVID Results:  COVID-19 Antibody Results, Testing for Immunity           No data to display              COVID-19 PCR Results          3/14/2022    14:08 5/27/2022    13:25 6/16/2022    00:05 7/7/2022    10:32 8/15/2022    18:20 8/22/2022    13:05 8/25/2022    13:31 9/1/2022    09:03 11/8/2022    14:25   COVID-19 PCR Results   COVID-19 Virus by PCR (External Result) Negative     Negative     Negative     Negative     Negative     Negative     Negative     Negative     Negative           11/28/2022    11:40   COVID-19 PCR Results   COVID-19 Virus by PCR (External Result) Negative          Details          This result is from an external source.            Potassium   Date Value Ref Range Status   02/26/2024 3.9 3.4 - 5.3 mmol/L Final   09/08/2015 3.8 3.5 - 5.1 mmol/L           Ramya Orourke PA-C  Interventional Radiology  Pager: [phone]

## 2024-02-26 NOTE — PROGRESS NOTES
Surgical Oncology Progress Note    Interval History:  POD 12  No acute events overnight. Yesterday she has severe pain at her incisional swelling, now resolved. Pain controlled. Denies nausea. Tolerating tube feedings. Passing flatus and very small amounts of stool.     Physical Exam:   Temp:  [98.1  F (36.7  C)-98.3  F (36.8  C)] 98.1  F (36.7  C)  Pulse:  [] 83  Resp:  [16-17] 17  BP: ()/(48-74) 95/48  SpO2:  [96 %-98 %] 98 %  General: Alert, oriented, appears comfortable, NAD.  Respiratory: breathing non labored  Abdomen: Abdomen is soft. There is a large swelling a the midline incision. Very subtle erythema at the swelling. Swelling is tender to palpation. Incision is clean, dry and intact. Gtube in place.     Data:   All laboratory and imaging data in the past 24 hours reviewed  I/O last 3 completed shifts:  In: 1005 [P.O.:430; NG/GT:345]  Out: 1700 [Urine:1700]  Recent Labs   Lab Test 02/25/24  0632 02/24/24  0535 02/23/24  0502 02/11/24  1557 02/11/24  1149 02/09/24  1331   WBC 8.7 8.0 8.9   < > 11.3* 8.1   HGB 8.2* 8.7* 8.7*   < > 7.2* 10.9*    346 312   < > 283 365   INR  --   --   --   --  1.09 0.99    < > = values in this interval not displayed.      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      POD 12 s/p exploratory laparotomy, excisional liver biopsy, small bowel resection, partial colon resection, partial liver resection, partial gastric resection, and gtube  placement with Dr. Krishnamurthy 2/14/24.     2/15 AXR for pain unrevealing  2/19 CT AP with IV and Gtube contrast demonstrated fluid collection likely related to post surgical changes. No leak at the gastric remnant.   2/21 - 1prbc for Hgb 6.8  2/22 LEUS for bilateral calf swelling demonstrated subcutaneous soft tissue thickening representing edema, s/p diuresis with lasix  2/24 AXR unrevealing. Neurology consult for migraines. MRI with mild L mesial temporal hyperintensity and left parietal encephalomalacia, artifact vs autoimmune process.   2/25 CT AP with PO, IV, and Gtube contrast demonstrated: decreased intrabdominal fluid collection, ventral incision with suspected dehiscence, 6 cm fluid collection in subcutaneous soft tissue likely seroma.      Large swelling at midline incision, no cellulitis. CT with suspected dehiscence and 6 cm fluid collection in subcutaneous soft tissue. Pain as swelling yesterday is now improved.     - Migraines: appreciate neurology recommendations. MRI done 2/24               - compazine, benadryl, sumatriptan   - Available for pain is  robaxin scheduled, dilaudid PO prn, atarax prn, gabapentin prn, Lidocaine patch. Abdominal binder prn for comfort.   - Home inhalers resumed. Incentive spirometry. Out of bed and ambulation.   - NPO for consideration of IR procedure, miralax, senna  - Tube feedings at goal rate, 40 ml/hr  - Rash, improving: suspect due to gadolinium. Benadryl prn ordered.   - Protonix PO QD  - Lovenox for DVT prophylaxis.   - PT/OT  - Care Coordination for discharge planning with tube feedings appreciated. Will try to arrange teaching today.    Seen with Chief resident who will discuss with Staff.     Gerda Lopez PA-C   Surgical Oncology   [phone]

## 2024-02-26 NOTE — PROCEDURES
[facility]    Procedure: Ultrasound guided abdominal subq aspiration    Date/Time: 2/26/2024 4:58 PM    Performed by: Jarad Warren MD  Authorized by: Jarad Warren MD      UNIVERSAL PROTOCOL   Site Marked: NA  Prior Images Obtained and Reviewed:  Yes  Required items: Required blood products, implants, devices and special equipment available    Patient identity confirmed:  Verbally with patient, arm band, provided demographic data and hospital-assigned identification number  Patient was reevaluated immediately before administering moderate or deep sedation or anesthesia  Confirmation Checklist:  Patient's identity using two indicators, relevant allergies, procedure was appropriate and matched the consent or emergent situation and correct equipment/implants were available  Time out: Immediately prior to the procedure a time out was called    Universal Protocol: the Joint Commission Universal Protocol was followed    Preparation: Patient was prepped and draped in usual sterile fashion       ANESTHESIA    Anesthesia:  Local infiltration  Local Anesthetic:  Lidocaine 1% without epinephrine    See dictated procedure note for full details.  Findings: 115 ml of serosanguinous fluid aspirated    Specimens: fluid and/or tissue for gram stain and culture    Complications: None    Condition: Stable      PROCEDURE    Patient Tolerance:  Patient tolerated the procedure well with no immediate complications  Length of time physician/provider present for 1:1 monitoring during sedation: 0

## 2024-02-26 NOTE — PROGRESS NOTES
CLINICAL NUTRITION SERVICES - REASSESSMENT NOTE     Nutrition Prescription    RECOMMENDATIONS FOR MDs/PROVIDERS TO ORDER:  Ongoing TF support at continuous rate to optimize nutrition intake   Once able to tolerate TF support with daily maintenance BM x 4-5 days, than could slowly advance TF toward cycle feeds. Bolus TF in remnant stomach not recommended.      Malnutrition Status:    Severe malnutrition in the context of acute presentation     Recommendations already ordered by Registered Dietitian (RD):  Nutrition Support:   Access:Gastrostomy tube in remnant 2/14/24  Dosing wt:57 kg adjusted wt from dry admit wt of 72.1 kg     EN: Pivot 1.5, increased provision to new goal at 45 ml/hr with Pivot 1.5 to improve kcal/protein intake post op    Provision: Pivot 1.5 (or equivalent) @ goal 45 ml/hr (1080ml/day) provides: 1620 kcals ( 28 kcal/kg), 101 g PRO (1.8 gm/kg), 810 ml free H20, 186 g CHO, and 8 g fiber daily.     - Free water flushes: 120 ml Q4 hrs for lower end hydration needs  - Multivitamin/mineral: Certavite 15 ml/day via FT.       Future/Additional Recommendations:  TF tolerance  No plan to cycle feeds at this time.  If patient is having daily BM consistently, then could start with 18 hour cycle TF at 60 ml/hr.          EVALUATION OF THE PROGRESS TOWARD GOALS   Diet:   NPO today for IR procedure ( drain placement, Okay for TF to continue per nursing note).   Previously on regular bariatric diet + [employer] oral supplements      Nutrition Support:   Access:Gastrostomy tube in remnant 2/14/24  Dosing wt:57 kg adjusted wt from dry admit wt of 72.1 kg   EN: Pivot 1.5, goal 40 ml/hr    Provision: Pivot 1.5 [place] (or equivalent) @ goal of 40 ml/hr (960ml/day) provides: 1440 kcals ( 25 kcal/kg) 90 g PRO ( 1.6 gm/kg), 720 ml free H20, 165 g CHO, and 7 g fiber daily.      - Free water flushes: 60 ml Q4 hrs for tube patency.    - Multivitamin/mineral: Certavite 15 ml/day via FT.       Intake:   Daily EN intake not being  recorded. Patient received 230 ml yesterday.          NEW FINDINGS   Chart reviewed: history of Gastric bypass and left parietal ischemic embolic stroke  in 2022,   - Presented with GI bleed secondary to jejunal ulcer, positive for adenocarcinoma with large volume hematemesis    -POD 12, S/p exploratory laparotomy, excisional liver biopsy, small bowel resection, partial colon resection, partial liver resection, partial remnant gastric resection, and gtube placement 2/14/24.       - Planning for UGI vs CT to evaluate GJ anastomosis and gastric remnant.   - BLE edema: on lasix       Wt trend:  Admit wt: 72.1 kg (159 lb) on 2/11 --> up to 78.3 kg (172 lb 9.9 oz) standing wt on 2/25/24.   Patient with significant BLE edema.     GI/stool:  On bowel regimen   Last BM: x1 yesterday      Labs noted:   BUN: 14.3, Cr: 0.81, GFR: >90  K+:3.9, Mg++:2.1, Phos: 4.2      MALNUTRITION  % Intake: </= 50% for >/= 5 days (severe)  % Weight Loss: Unable to assess due to volume up statuys  Subcutaneous Fat Loss: None observed  Muscle Loss: Facial & jaw region:  Mild   Fluid Accumulation/Edema: Moderate/severe lymphedema   Malnutrition Diagnosis: Severe malnutrition in the context of acute presentation       Previous Goals   Diet adv v nutrition support within 24 hours to avoid further nutrition deficit .   Evaluation: started on TF support on 2/19 with frequent interruptions due to no BM       Previous Nutrition Diagnosis  Inadequate oral intake related to post op ileus as evidenced by remains NPO/CL diet x 8 days now since admit      Evaluation: Improving    CURRENT NUTRITION DIAGNOSIS  Inadequate enteral nutrition infusion related to Ileus as evidenced by minimal TF received over there past week       INTERVENTIONS  Implementation  Enteral Nutrition - increased to new goal at 45 ml/hr for repletion   Feeding tube flush    Goals  Total avg nutritional intake to meet a minimum of 25 kcal/kg and 1.5 g PRO/kg daily (per dosing wt 57 kg  adjusted wt per 72.1 kg dry admit wt).    Monitoring/Evaluation  Progress toward goals will be monitored and evaluated per protocol.      Lena Zuleta RD/RALEIGH  7C ([phone])/7D RD pager: [phone]  Weekend/Holiday RD pager: [phone]

## 2024-02-26 NOTE — PLAN OF CARE
Goal Outcome Evaluation:      Plan of Care Reviewed With: patient    Overall Patient Progress: improving    Outcome Evaluation: IR procedure to drain fluid collection in abdomen complete. Pt reports feeling pain/discomfort improved after procedure.Pain and nausea better controlled today. No reported migraine. TF increased to 45 ml/hr and free water flushes increased to 120 mL/hr Q4. Returned to bariatric diet. Pt changed G tube dressing under RN supervision, demostrated competence. Rash improved.    A&Ox4. C/o acute abdominal pain and chronic generalized body pain, managed with PRN PO dilaudid. VSS on RA. Increased TF and flushes per order, tolerating. Zofran x1. Urinating. Passing gas, bowel meds given. BLE edema. Independent in room, calling appropriately.

## 2024-02-26 NOTE — IR NOTE
Patient Name: Eduarda Nogueira  Medical Record Number: [id number]  Today's Date: 2/26/2024    Procedure: abdominal fluid aspiration  Proceduralist: Dr. Warren    Procedure Start: 1650  Procedure end: 1655  Sedation medications administered: none     Report given to: Michela CAMPBELL RN    Other Notes: Pt arrived to IR room 6 from . Consent reviewed. Pt denies any questions or concerns regarding procedure. Pt positioned supine and monitored per protocol. Pt tolerated procedure without any noted complications. Pt transferred back to . Labs sent.

## 2024-02-26 NOTE — PLAN OF CARE
Goal Outcome Evaluation:    AVSS on room air. On a bariatric diet with tube feeding via Gtube. TF rate increased to 30ml/hr at 4pm, so far tolerated well. Premedicated with IV Zofran and Dilaudid. Patient had an abdominal/pelvic CT with oral/gtube and IV contrast. Results show a fluid collection. Plan to be NPO after midnight but still okay to receive TF. Gtube education handout given to patient and materials given for hands-on practice. G tube site with some redness and purulent drainage, dressing changed. Up ad adelaide in room. Voided spontaneously.

## 2024-02-27 ENCOUNTER — APPOINTMENT (OUTPATIENT)
Dept: OCCUPATIONAL THERAPY | Facility: CLINIC | Age: 44
End: 2024-02-27
Payer: COMMERCIAL

## 2024-02-27 VITALS
WEIGHT: 176 LBS | DIASTOLIC BLOOD PRESSURE: 64 MMHG | OXYGEN SATURATION: 98 % | RESPIRATION RATE: 16 BRPM | HEIGHT: 63 IN | TEMPERATURE: 98.4 F | BODY MASS INDEX: 31.18 KG/M2 | HEART RATE: 83 BPM | SYSTOLIC BLOOD PRESSURE: 104 MMHG

## 2024-02-27 LAB
ANION GAP SERPL CALCULATED.3IONS-SCNC: 7 MMOL/L (ref 7–15)
BUN SERPL-MCNC: 17 MG/DL (ref 6–20)
CALCIUM SERPL-MCNC: 7.8 MG/DL (ref 8.6–10)
CHLORIDE SERPL-SCNC: 103 MMOL/L (ref 98–107)
CREAT SERPL-MCNC: 0.68 MG/DL (ref 0.51–0.95)
DEPRECATED HCO3 PLAS-SCNC: 26 MMOL/L (ref 22–29)
EGFRCR SERPLBLD CKD-EPI 2021: >90 ML/MIN/1.73M2
GLUCOSE SERPL-MCNC: 119 MG/DL (ref 70–99)
HOLD SPECIMEN: NORMAL
POTASSIUM SERPL-SCNC: 4 MMOL/L (ref 3.4–5.3)
SODIUM SERPL-SCNC: 136 MMOL/L (ref 135–145)

## 2024-02-27 PROCEDURE — 250N000011 HC RX IP 250 OP 636: Performed by: PHYSICIAN ASSISTANT

## 2024-02-27 PROCEDURE — 250N000013 HC RX MED GY IP 250 OP 250 PS 637: Performed by: PHYSICIAN ASSISTANT

## 2024-02-27 PROCEDURE — 250N000013 HC RX MED GY IP 250 OP 250 PS 637

## 2024-02-27 PROCEDURE — 80048 BASIC METABOLIC PNL TOTAL CA: CPT | Performed by: PHYSICIAN ASSISTANT

## 2024-02-27 PROCEDURE — 97140 MANUAL THERAPY 1/> REGIONS: CPT | Mod: GO

## 2024-02-27 PROCEDURE — 36591 DRAW BLOOD OFF VENOUS DEVICE: CPT | Performed by: PHYSICIAN ASSISTANT

## 2024-02-27 PROCEDURE — 250N000011 HC RX IP 250 OP 636

## 2024-02-27 RX ORDER — ENOXAPARIN SODIUM 100 MG/ML
40 INJECTION SUBCUTANEOUS EVERY 24 HOURS
Qty: 6 ML | Refills: 0 | Status: SHIPPED | OUTPATIENT
Start: 2024-02-27 | End: 2024-03-13

## 2024-02-27 RX ORDER — METHOCARBAMOL 500 MG/1
500 TABLET, FILM COATED ORAL EVERY 6 HOURS PRN
Qty: 30 TABLET | Refills: 0 | Status: SHIPPED | OUTPATIENT
Start: 2024-02-27 | End: 2024-03-11

## 2024-02-27 RX ORDER — HEPARIN SODIUM,PORCINE 10 UNIT/ML
5-10 VIAL (ML) INTRAVENOUS EVERY 24 HOURS
Status: DISCONTINUED | OUTPATIENT
Start: 2024-02-27 | End: 2024-02-27 | Stop reason: HOSPADM

## 2024-02-27 RX ORDER — TRIAMCINOLONE ACETONIDE 1 MG/G
CREAM TOPICAL 2 TIMES DAILY
Qty: 15 G | Refills: 0 | Status: ON HOLD | OUTPATIENT
Start: 2024-02-27 | End: 2024-05-07

## 2024-02-27 RX ORDER — HEPARIN SODIUM (PORCINE) LOCK FLUSH IV SOLN 100 UNIT/ML 100 UNIT/ML
5-10 SOLUTION INTRAVENOUS
Status: DISCONTINUED | OUTPATIENT
Start: 2024-02-27 | End: 2024-02-27 | Stop reason: HOSPADM

## 2024-02-27 RX ORDER — POLYETHYLENE GLYCOL 3350 17 G/17G
17 POWDER, FOR SOLUTION ORAL DAILY PRN
Status: ON HOLD | COMMUNITY
Start: 2024-02-27 | End: 2024-05-07

## 2024-02-27 RX ORDER — HYDROMORPHONE HYDROCHLORIDE 2 MG/1
2-4 TABLET ORAL EVERY 6 HOURS PRN
Qty: 40 TABLET | Refills: 0 | Status: SHIPPED | OUTPATIENT
Start: 2024-02-27 | End: 2024-03-07

## 2024-02-27 RX ORDER — FUROSEMIDE 10 MG/ML
10 INJECTION INTRAMUSCULAR; INTRAVENOUS ONCE
Status: COMPLETED | OUTPATIENT
Start: 2024-02-27 | End: 2024-02-27

## 2024-02-27 RX ORDER — HEPARIN SODIUM,PORCINE 10 UNIT/ML
5-10 VIAL (ML) INTRAVENOUS
Status: DISCONTINUED | OUTPATIENT
Start: 2024-02-27 | End: 2024-02-27 | Stop reason: HOSPADM

## 2024-02-27 RX ORDER — HEPARIN SODIUM (PORCINE) LOCK FLUSH IV SOLN 100 UNIT/ML 100 UNIT/ML
5-10 SOLUTION INTRAVENOUS
Status: DISCONTINUED | OUTPATIENT
Start: 2024-02-28 | End: 2024-02-27 | Stop reason: HOSPADM

## 2024-02-27 RX ORDER — GUAIFENESIN 600 MG/1
15 TABLET, EXTENDED RELEASE ORAL DAILY
Qty: 450 ML | Refills: 0 | Status: SHIPPED | OUTPATIENT
Start: 2024-02-28 | End: 2024-03-08

## 2024-02-27 RX ORDER — FUROSEMIDE 20 MG
20 TABLET ORAL DAILY
Qty: 7 TABLET | Refills: 0 | Status: SHIPPED | OUTPATIENT
Start: 2024-02-27 | End: 2024-03-11

## 2024-02-27 RX ADMIN — NYSTATIN: 100000 CREAM TOPICAL at 09:26

## 2024-02-27 RX ADMIN — PANTOPRAZOLE SODIUM 40 MG: 40 TABLET, DELAYED RELEASE ORAL at 09:24

## 2024-02-27 RX ADMIN — FUROSEMIDE 10 MG: 10 INJECTION, SOLUTION INTRAMUSCULAR; INTRAVENOUS at 11:44

## 2024-02-27 RX ADMIN — Medication 5 ML: at 09:23

## 2024-02-27 RX ADMIN — ENOXAPARIN SODIUM 40 MG: 40 INJECTION SUBCUTANEOUS at 09:25

## 2024-02-27 RX ADMIN — Medication 15 ML: at 09:23

## 2024-02-27 RX ADMIN — HYDROMORPHONE HYDROCHLORIDE 4 MG: 2 TABLET ORAL at 02:58

## 2024-02-27 RX ADMIN — METHOCARBAMOL TABLETS 750 MG: 750 TABLET, COATED ORAL at 11:45

## 2024-02-27 RX ADMIN — SENNOSIDES 8.6 MG: 8.6 TABLET, FILM COATED ORAL at 09:23

## 2024-02-27 RX ADMIN — LIDOCAINE 1 PATCH: 4 PATCH TOPICAL at 09:19

## 2024-02-27 RX ADMIN — POLYETHYLENE GLYCOL 3350 17 G: 17 POWDER, FOR SOLUTION ORAL at 09:20

## 2024-02-27 RX ADMIN — METHOCARBAMOL TABLETS 750 MG: 750 TABLET, COATED ORAL at 09:24

## 2024-02-27 ASSESSMENT — ACTIVITIES OF DAILY LIVING (ADL)
ADLS_ACUITY_SCORE: 45
ADLS_ACUITY_SCORE: 47
ADLS_ACUITY_SCORE: 45
ADLS_ACUITY_SCORE: 47
ADLS_ACUITY_SCORE: 47
ADLS_ACUITY_SCORE: 45

## 2024-02-27 NOTE — PLAN OF CARE
Goal Outcome Evaluation:      Plan of Care Reviewed With: patient, parent           Pt stable and prepared for discharge. Father was present and participated in discharge education. Pt tolerated oral medications and IV lasix. G-tube feeding completed and flushed with 60mL. R. Central port de-accessed, tolerated by patient. Abdominal dressing changed and reinforced by patient. Discharge instructions and education were provided. Benadryl and steroid cream prescriptions were included by the surgical team for patient's new onset abdominal and thigh rash. Home meds picked up at discharge pharmacy, father provided ride home

## 2024-02-27 NOTE — PROGRESS NOTES
Surgical Oncology Progress Note    Interval History:  POD 13  No acute events overnight. Sore but pain controlled.Tolerating regular diet and tube feedings. Passing flatus and stool. Walking frequently.     Physical Exam:   Temp:  [98  F (36.7  C)-99.1  F (37.3  C)] 98.4  F (36.9  C)  Pulse:  [83-97] 83  Resp:  [16-20] 16  BP: ()/(51-68) 104/64  SpO2:  [97 %-99 %] 98 %  General: Alert, oriented, appears comfortable, NAD.  Respiratory: breathing non labored  Abdomen: Abdomen is soft, non-tender, non-distended. Incision is clean, dry and intact. Swelling at midline incision without erythema. Gtube in place. Rash in distribution of cleaning prep.     Data:   All laboratory and imaging data in the past 24 hours reviewed  I/O last 3 completed shifts:  In: 435 [P.O.:165; NG/GT:90]  Out: 4300 [Urine:3900; Stool:400]  Recent Labs   Lab Test 02/26/24 0411 02/25/24  0632 02/24/24  0535 02/11/24  1557 02/11/24  1149 02/09/24  1331   WBC 7.7 8.7 8.0   < > 11.3* 8.1   HGB 8.3* 8.2* 8.7*   < > 7.2* 10.9*    388 346   < > 283 365   INR  --   --   --   --  1.09 0.99    < > = values in this interval not displayed.      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      POD 12 s/p exploratory laparotomy, excisional liver biopsy, small bowel resection, partial colon resection, partial liver resection, partial gastric resection, and gtube placement with Dr. Krishnamurthy 2/14/24.      2/15 AXR for pain  "unrevealing  2/19 CT AP with IV and Gtube contrast demonstrated fluid collection likely related to post surgical changes. No leak at the gastric remnant.   2/21 - 1prbc for Hgb 6.8  2/22 LEUS for bilateral calf swelling demonstrated subcutaneous soft tissue thickening representing edema, s/p diuresis with lasix  2/24 AXR unrevealing. Neurology consult for migraines. MRI with mild L mesial temporal hyperintensity and left parietal encephalomalacia, artifact vs autoimmune process.   2/25 CT AP with PO, IV, and Gtube contrast demonstrated: decreased intrabdominal fluid collection, ventral incision with suspected dehiscence, 6 cm fluid collection in subcutaneous soft tissue likely seroma.   2/26 IR guided aspiration of incisional seroma. Cultures with no growth to date.      - Migraines: appreciate neurology recommendations. MRI done 2/24               - compazine, benadryl, sumatriptan   - Available for pain is  robaxin scheduled, dilaudid PO prn, atarax prn, Lidocaine patch. Abdominal binder prn for comfort.   - Home inhalers resumed. Incentive spirometry. Out of bed and ambulation.   -  Regular bariatric diet, miralax, senna  - Tube feedings at goal rate, 45 ml/hr. Free water flush 120 ml q4 hrs. Multivitamin.   - Rash, improving: suspect due to gadolinium and cleaning prep. Benadryl prn available.   - Protonix PO QD  - Lovenox for DVT prophylaxis.   - PT/OT  - Care Coordination for discharge planning with tube feedings appreciated. Needs tube feeding and Lovenox teaching. Discharge to home today with tube feeding.     Clinically Significant Risk Factors              # Hypoalbuminemia: Lowest albumin = 2.3 g/dL at 2/23/2024  4:30 AM, will monitor as appropriate            # Obesity: Estimated body mass index is 31.18 kg/m  as calculated from the following:    Height as of this encounter: 1.6 m (5' 3\").    Weight as of this encounter: 79.8 kg (176 lb).   # Severe Malnutrition: based on nutrition assessment      Seen " with Chief resident who will discuss with Staff.     Gerda Lopez PA-C   Surgical Oncology   [phone]

## 2024-02-27 NOTE — PLAN OF CARE
Lymphedema Discharge Summary    Reason for therapy discharge:    Discharged to home with outpatient therapy.    Progress towards therapy goal(s). See goals on Care Plan in [facility] electronic health record for goal details.  Goals partially met.  Barriers to achieving goals:   discharge from facility.    Therapy recommendation(s):    Continued therapy is recommended.  Rationale/Recommendations:  Pt to benefit from further OP lymphedema therapy to manage continued BLE swelling causing increased pain and discomfort.

## 2024-02-27 NOTE — PROGRESS NOTES
Discharge date: 02/27/24 (SOC)  Discharge therapies to be provided by [facility]: Enteral  Formula: Pivot 1.5  Rate/Dose: 45ml/hr continuously. Change to cycled by RD after discharge.  Provider to manage enteral at home: Dr. Sussy Myers  Estimated length of need: 90 days or more  Education completed: Pt requesting enteral teaching be done at home due to anxiety.  Delivery/supplies: Delivery to pt's home today. Supplies: 2X2 gauze, back pack, Infinity pump, Pivot 1.5 formula, feeding bags, adult scale, 60ml syringes, medication syringes, med cups, flexi trak, y connector,   Agency: NA  SNV: NA  Notes: Spoke with pt over the phone regarding discharge home today.  Due to her severe anxiety she would like the teaching done in the home with [facility] field nurse after the delivery arrives to her home today.  Informed her that this writer set up a SNV and she was relieved.  Informed [facility] staffing of the need for a SNV.  Went over the discharge orders of enteral feeding and water flushes.  Informed her of the supplies that will be delivered to the home and informed her of the welcome folder that will be in with supplies.      Thank you for the referral.    Eli Dockery LPN  Enteral Nurse Liaison  [facility]  [address] [place] [id number]  [phone]

## 2024-02-27 NOTE — DISCHARGE INSTRUCTIONS
[facility]  [address] [place] [address]  Phone: [phone]  Fax: [phone]  Dietician: [phone]

## 2024-02-27 NOTE — PLAN OF CARE
Physical Therapy Discharge Summary    Reason for therapy discharge:    Discharged to home with outpatient therapy.    Progress towards therapy goal(s). See goals on Care Plan in [facility] electronic health record for goal details.  Goals met    Therapy recommendation(s):    Continued therapy is recommended.  Rationale/Recommendations:  to improve safety with high level balance and IND mobility.

## 2024-02-27 NOTE — PLAN OF CARE
Goal Outcome Evaluation:      Plan of Care Reviewed With: patient    Overall Patient Progress: improving    Outcome Evaluation: A&O x4. Slept well between cares. On room air. BP 88/51 at bedtime, recheck 99/55, asymptomatic. C/o pain in abdomen and legs, managed by PRN dilaudid q4h. Pt tolerating TF at goal rate of 45 ml/hr with 120 ml/hr flushes q4 via PEG. Bariatric diet but minimal PO intake/appetite overnight. Nausea not completely relieved by PRN zofran, but no vomiting. Passing flatus. 2 soft BMs evening of 2/26, no blood noted. Dependent edema. Urinating frequently post Lasix. Right chest port saline locked. Up ad adelaide.

## 2024-02-27 NOTE — DISCHARGE SUMMARY
[facility] Discharge Summary    Eduarda Nogueira MRN# [id number]   Age: 43 year old YOB: 1980     Date of Admission:  2/11/2024  Date of Discharge::  2/27/2024  Admitting Physician:  Darya Zheng MD  Discharge Physician:  Darya Zheng MD     PCP:  Sussy Myers    Disposition: Patient discharged to home with home infusion and home nursing in stable condition.    Admission Diagnosis:  Adenocarcinoma of the jejunum  GI bleed   RY gastric bypass  Migraines  Asthma  Partial ischemic embolic stroke  Anxiety, depression  GERD  Obesity    Discharge Diagnosis:  Adenocarcinoma of the jejunum   Seroma  Anemia  GI bleed  RY gastric bypass  Migraines  Asthma  Partial ischemic embolic stroke  Anxiety, depression  GERD  Obesity    Discharge medications  Current Discharge Medication List      START taking these medications    Details   diphenhydrAMINE-zinc acetate (BENADRYL ITCH STOPPING) 1-0.1 % external cream Apply topically 3 times daily as needed for itching  Qty: 28 g, Refills: 0    Associated Diagnoses: H/O gastric bypass      enoxaparin ANTICOAGULANT (LOVENOX) 40 MG/0.4ML syringe Inject 0.4 mLs (40 mg) Subcutaneous every 24 hours for 15 days  Qty: 6 mL, Refills: 0    Associated Diagnoses: Deep vein thrombosis (DVT) prophylaxis prescribed at discharge      furosemide (LASIX) 20 MG tablet Take 1 tablet (20 mg) by mouth daily for 7 days  Qty: 7 tablet, Refills: 0    Associated Diagnoses: H/O gastric bypass      HYDROmorphone (DILAUDID) 2 MG tablet Take 1-2 tablets (2-4 mg) by mouth every 6 hours as needed for moderate to severe pain  Qty: 40 tablet, Refills: 0    Associated Diagnoses: Postoperative pain      methocarbamol (ROBAXIN) 500 MG tablet Take 1 tablet (500 mg) by mouth every 6 hours as needed for muscle spasms  Qty: 30 tablet, Refills: 0    Associated Diagnoses: Postoperative pain      multivitamins w/minerals liquid 15 mLs by Per Feeding Tube route daily  Qty: 450 mL,  Refills: 0    Associated Diagnoses: H/O gastric bypass      triamcinolone (KENALOG) 0.1 % external cream Apply topically 2 times daily Apply to rash 2 times daily. Do not use longer than 2 weeks.  Qty: 15 g, Refills: 0    Associated Diagnoses: H/O gastric bypass         CONTINUE these medications which have CHANGED    Details   polyethylene glycol (MIRALAX) 17 GM/Dose powder Take 17 g by mouth daily as needed for constipation         CONTINUE these medications which have NOT CHANGED    Details   albuterol (PROAIR HFA/PROVENTIL HFA/VENTOLIN HFA) 108 (90 Base) MCG/ACT inhaler Inhale 1-2 puffs into the lungs 2 - 4 times daily      ammonium lactate (AMLACTIN) 12 % external cream Twice daily for body      bisacodyl (DULCOLAX) 5 MG EC tablet Take 2 tablets at 3 pm the day before your procedure. If your procedure is before 11 am, take 2 additional tablets at 11 pm. If your procedure is after 11 am, take 2 additional tablets at 6 am. For additional instructions refer to your colonoscopy prep instructions.  Qty: 4 tablet, Refills: 0    Associated Diagnoses: Ulcer jejunum      clobetasol propionate (CLOBEX) 0.05 % external shampoo Apply a thin layer onto dry, affected area of scalp once daily. Leave for 15 minutes before lathering and rinsing.      Cranberry 400 MG CAPS Take 400 mg by mouth daily as needed      ipratropium - albuterol 0.5 mg/2.5 mg/3 mL (DUONEB) 0.5-2.5 (3) MG/3ML neb solution Inhale 1 Neb via a nebulizer 4 times daily if needed for Shortness Of Breath or Wheezing.      Lidocaine (LIDOCARE) 4 % Patch Apply 1-3 patches to intact skin to cover most painful area of back pain for max 12hr per 24hr period.      nortriptyline (PAMELOR) 10 MG capsule 20 mg      nystatin (MYCOSTATIN) 573162 UNIT/GM external powder 1 strip      ondansetron (ZOFRAN ODT) 4 MG ODT tab Take 1 tablet (4 mg) by mouth every 8 hours as needed for nausea  Qty: 20 tablet, Refills: 1    Associated Diagnoses: Ulcer jejunum      ondansetron  (ZOFRAN) 4 MG tablet Take 1 tablet by mouth      pantoprazole (PROTONIX) 40 MG EC tablet 40 mg      sodium chloride, PF, (NORMAL SALINE FLUSH) 0.9% PF flush Inject 5 mLs into the vein      Sulfacetamide Sodium-Sulfur 10-5 % SUSP Apply topically daily as needed      tretinoin (RETIN-A) 0.05 % external cream Apply topically daily as needed         STOP taking these medications       docusate sodium (DSS) 100 MG capsule Comments:   Reason for Stopping:         famotidine (PEPCID) 40 MG tablet Comments:   Reason for Stopping:         omeprazole (PRILOSEC) 40 MG DR capsule Comments:   Reason for Stopping:         polyethylene glycol (GOLYTELY) 236 g suspension Comments:   Reason for Stopping:         promethazine (PHENERGAN) 12.5 MG tablet Comments:   Reason for Stopping:         sucralfate (CARAFATE) 1 GM tablet Comments:   Reason for Stopping:         sucralfate (CARAFATE) 1 GM/10ML suspension Comments:   Reason for Stopping:             Follow up, Special Instructions:  After Care     Future Labs/Procedures    Activity     Comments:    Your activity upon discharge: Frequent out of bed and ambulation. No lifting over 10 lbs for 6 weeks.    Diet     Comments:    Follow this diet upon discharge: Regular bariatric diet. Protein supplements.    Tube feedings. Ok to cycle tube feedings as an outpatient.   Access:Gastrostomy tube in remnant 2/14/24  Dosing wt:57 kg adjusted wt from dry admit wt of 72.1 kg     EN: Pivot 1.5, increased provision to goal at 45 ml/hr with Pivot 1.5 to improve kcal/protein intake post op     Provision: Pivot 1.5 (or equivalent) @ goal 45 ml/hr (1080ml/day) provides: 1620 kcals ( 2    Discharge Instructions     Comments:    If your travel plans upon discharge include prolonged periods of sitting (a lengthy car or plane ride), it is highly beneficial to get up and walk at least once per hour to help prevent swelling and blood clots.     You may shower after operation, let warm soapy water run over  "incision and pat dry. Do not submerge, soak, or scrub incision.    Take incentive spirometer home for continued frequent use    If you are discharged with narcotics (oxycodone) please take them only as needed and do not operate a car or heavy machinery for 24 hours after taking them.  Narcotic pain medications like oxycodone are constipating. Please ensure that you are drinking adequate amounts of fluids (64 ounces). Take Miralax as needed for constipation.     Do not drive until you can with stand pressing the brake pedal quickly and fully without pain and you are not distracted by pain.     Call clinic [phone] for fever greater than 101.5, chills, red skin around incision, drainage from incision, increased swelling from the incision, bleeding from the incision that is not controlled with light pressure, inability to tolerate diet,new nausea/vomiting or other new/worsening symptoms. You can reach the nurse care coordinator  Meghana Mazariegos at [phone].   For after hours questions or concerns you can contact the on-call surgical oncology resident (nights and weekends [phone] and ask \"I would like to page the Surgical Oncology Resident on call.\"). In emergencies, call [phone]. If your problem is necessitating an ER visit our first preference is that you return to the [facility] ER if able.     Follow Up:  -Follow up in clinic with Dr Krishnamurthy 3/11/24.   - Needs labs within 1 week.   You should be called to make an appointment within 3 business days. If you are not contacted, call [phone] to make an appointment.    Tubes and drains     Comments:    You are going home with the following tubes or drains: gastrostomy tube in gastric remnant.        Procedures:  2/14/24 Dr. Krishnamurthy  Exploratory laparotomy excisional liver biopsy, small bowel resection, partial colon resection, partial liver resection, partial gastric resection, and gastrostomy tube placement     2/26/24 Interventional " Radiology  Aspiration of incisional seroma    Consultations:  GI LUMINAL ADULT IP CONSULT  SURGICAL ONCOLOGY ADULT IP CONSULT  NURSING TO CONSULT FOR VASCULAR ACCESS CARE IP CONSULT  NURSING TO CONSULT FOR VASCULAR ACCESS CARE IP CONSULT  NURSING TO CONSULT FOR VASCULAR ACCESS CARE IP CONSULT  ADVANCE DIRECTIVE IP CONSULT  SPIRITUAL HEALTH SERVICES IP CONSULT  NURSING TO CONSULT FOR VASCULAR ACCESS CARE IP CONSULT  SPIRITUAL HEALTH SERVICES IP CONSULT  OCCUPATIONAL THERAPY ADULT IP CONSULT  PHYSICAL THERAPY ADULT IP CONSULT  CARE MANAGEMENT / SOCIAL WORK IP CONSULT  NURSING TO CONSULT FOR VASCULAR ACCESS CARE IP CONSULT  CARE MANAGEMENT / SOCIAL WORK IP CONSULT  SPIRITUAL HEALTH SERVICES IP CONSULT  NURSING TO CONSULT FOR VASCULAR ACCESS CARE IP CONSULT  NURSING TO CONSULT FOR VASCULAR ACCESS CARE IP CONSULT  SPIRITUAL HEALTH SERVICES IP CONSULT  NURSING TO CONSULT FOR VASCULAR ACCESS CARE IP CONSULT  NUTRITION SERVICES ADULT IP CONSULT  PHARMACY IP CONSULT  LYMPHEDEMA THERAPY IP CONSULT  NUTRITION SERVICES ADULT IP CONSULT  CARE MANAGEMENT / SOCIAL WORK IP CONSULT  NEUROLOGY GENERAL ADULT IP CONSULT  NURSING TO CONSULT FOR VASCULAR ACCESS CARE IP CONSULT  INTERVENTIONAL RADIOLOGY ADULT/PEDS IP CONSULT    Brief HPI:  Eduarda Nogueira is a 43 year old year old female with history of asthma, left parietal ischemic embolic stroke  in 2022, RYGB, and 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     Hospital Course:  The patient was admitted and underwent exploratory laparotomy, excisional liver biopsy, small bowel resection, partial colon resection, partial liver resection, partial gastric resection, and gtube placement with Dr. Krishnamurthy 2/14/24.     CT AP 2/19 and 2/25 without evident of leak or intraabdominal abscess. CT did demonstrate a suspected dehiscence of the ventral incision with seroma. IR guided aspiration of incisional seroma 2/26. Cultures with no growth to date. Neurology consult for migraines. MRI with mild L  mesial temporal hyperintensity and left parietal encephalomalacia, artifact vs autoimmune process. received diuresis for edema. Received 1 unit of blood 2/21 for anemia with hgb 6.8. Hgb responded appropriately and remained stable. She was tolerating a regular diet and tube feedings at goal rate. Prior to discharge pain was controlled with oral pain medication and the patient was able to ambulate and void without difficulty. The patient received appropriate education post operatively including lovenox and tube feeding teaching. On POD #13 the patient was discharged to home with home infusion and home nursing.    - Migraines: follow up with Neurology  - Available for pain is  robaxin, dilaudid PO prn, Lidocaine patch. Abdominal binder prn for comfort.   -  Regular bariatric diet  - Miralax  - Tube feedings at goal rate, 45 ml/hr. Free water flush 120 ml q4 hrs. Multivitamin.   - Rash, improving: suspect due to gadolinium and cleaning prep. Topical benadryl and triamcinolone available.   - Lasix x1 week  - Protonix PO QD  - Lovenox for DVT prophylaxis.   - Needs CBC and CMP next week  - Follow up with Dr. Krishnamurthy 3/11/24.    Surgical pathology  Pending     Gerda Lopez PA-C

## 2024-02-27 NOTE — PROGRESS NOTES
Care Management Discharge Note    Discharge Date: 02/27/2024  Discharge Disposition: Home  Discharge Services: Home Infusion  Discharge DME: Enteral feeding supplies  Discharge Transportation: family or friend will provide,   Private pay costs discussed: Not applicable  Does the patient's insurance plan have a 3 day qualifying hospital stay waiver?  No  PAS Confirmation Code:  n/a  Patient/family educated on Medicare website which has current facility and service quality ratings: yes  Education Provided on the Discharge Plan: Yes  Persons Notified of Discharge Plans: Primary team provider; pt; bedside RN; Charge RN; [facility] Liaison   Patient/Family in Agreement with the Plan:  yes     Handoff Referral Completed: Yes EHO    [facility]  [address] [place] [address]  Phone: [phone]  Fax: [phone]  Dietician: [phone]    Additional Information:  Pt medically ready for discharge, as updated by Primary Team call; orders placed. Family to available for transportation, Father's ETA @ 1100. [facility] Liaison will call pt prior to discharge to review any questions and then a field nurse will provide hands on education with pt and Zeeshan mueller. Port service to be handled via PCP clinic; bedside nurse will de-access as prior OP infusion services (TPN/IV) will not be required. RNCC to conclude following upon safe departure from floor and facility.       Ricky Corona RN BSN  7C RNCC  Phone: [phone]  Pager: [phone]    For Weekend & Holiday on call RN Care Coordinator:  (Tasks: Home care, home infusion, medical equipment, transportation, IMM & DAILEY forms, etc.)  [place] (0800 - 1630) Saturday and Sunday    Units: 5A, 5B, & 5C: [phone]    Units: 6B, 6C, 6D: [phone]    Units: 7A, 7B, 7C, 7D: [phone]    Units: 6A/ICU : [phone]    [facility] (2050-8758) Saturday and Sunday    Units: 6 Med/Surg, 8A, 10 ICU, & Pediatric Units: [phone]    Units: 5 Ortho,  5Med/Surg & WB ED: [phone]

## 2024-02-28 ENCOUNTER — NURSE TRIAGE (OUTPATIENT)
Dept: NURSING | Facility: CLINIC | Age: 44
End: 2024-02-28

## 2024-02-28 ENCOUNTER — PATIENT OUTREACH (OUTPATIENT)
Dept: ONCOLOGY | Facility: CLINIC | Age: 44
End: 2024-02-28

## 2024-02-28 LAB
PATH REPORT.COMMENTS IMP SPEC: NORMAL
PATH REPORT.FINAL DX SPEC: NORMAL
PATH REPORT.FINAL DX SPEC: NORMAL
PATH REPORT.GROSS SPEC: NORMAL
PATH REPORT.GROSS SPEC: NORMAL
PATH REPORT.INTRAOP OBS SPEC DOC: NORMAL
PATH REPORT.MICROSCOPIC SPEC OTHER STN: NORMAL
PATH REPORT.MICROSCOPIC SPEC OTHER STN: NORMAL
PATH REPORT.RELEVANT HX SPEC: NORMAL
PATH REPORT.RELEVANT HX SPEC: NORMAL
PHOTO IMAGE: NORMAL
PHOTO IMAGE: NORMAL

## 2024-02-28 PROCEDURE — 88341 IMHCHEM/IMCYTCHM EA ADD ANTB: CPT | Mod: 26 | Performed by: PATHOLOGY

## 2024-02-28 PROCEDURE — 88342 IMHCHEM/IMCYTCHM 1ST ANTB: CPT | Mod: 26 | Performed by: PATHOLOGY

## 2024-02-28 NOTE — PROGRESS NOTES
New Patient Oncology Nurse Navigator Note     Referring provider: Gerda Lopez PA-C, Surg Onc    Referring Clinic/Organization: [facility]     Referred to: Medical Oncology    Requested provider (if applicable): First available - did not specify     Referral Received: 02/28/24       Evaluation for : colon cancer     Clinical History (per Nurse review of records provided):      * 1/30/2024 EGD ([facility])    Impression:    - Normal esophagus.                          - Gastric bypass with a medium-sized pouch.                          Gastrojejunal anastomosis characterized by healthy                          appearing mucosa. Jejunum severely ulcerated.   Biopsied.                          - Normal examined jejunum.         Final Diagnosis   A. JEJUNUM, BIOPSIES:  - Moderately-differentiated adenocarcinoma; see comment      Electronically signed by Olaf Fierro DO on 2/2/2024 at  2:08 PM   Comment  [facility]   Sections of jejunum show adenocarcinoma in a background of fibroadipose tissue with desmoplastic reaction and ulcer debris.  The tumor is positive for CK 7 and negative for CK 20, CDX-2, and INSM1. There is no intact small intestinal mucosa present.         * 2/11/2024 - 2/27/2024 ED [facility] for GI bleed.      * 2/12/2024 CT chest/abd/pelvis ([facility])    Impression:     1. Postsurgical changes of gastric bypass; irregular thickening of  segment of jejunum adjacent to the gastrojejunostomy, compatible with  neoplasm; loss of fat plane with possible tethering of adjacent  transverse colon, concerning for involvement. Adjacent prominent  mesenteric lymph node. Findings annotated on coronal series 7  2. Focal hyperdensity adjacent to the gastrojejunostomy possibly blood  products, limited evaluation for ongoing bleed on single phase study.  3. Multiple fluid attenuation variable sized focal liver lesions  likely cyst with few indeterminate due to small to characterize  hypodensities including 7 mm hypodensity  in the peripheral segment 6,  consider attention on follow-up or contrast-enhanced MRI for further  characterization as clinically desired.  4. 2 mm right lung nodule, relatively unchanged compared to prior CT  chest from 6/17/2023. Recommend attention on follow-up.  5. Stable to slight increase in size of left supraclavicular, left  lower cervical/prevascular mediastinal lymph nodes compared to prior  CT from 6/17/2023. Recommend attention on follow-up  6. Plaque-like subcutaneous thickening along the right inguinal  region, indeterminate. Recommend evaluation with dedicated  examination.  7. Multiple renal cysts compatible with polycystic kidney disease.  8. Trace ascites and anasarca.  9. Additional findings are similar to prior including mild sclerosis  with lucency along the lower lumbar and upper sacral vertebral body,  possibly degenerative. Consider attention on follow-up.         * 2/14/2024 s/p exploratory lap by Dr Krishnamurthy  Procedure Laterality Anesthesia   SMALL BOWEL RESECTION, PARTIAL COLON RESECTION, PARTIAL LIVER RESECTION, PARTIAL GASTRIC RESECTION, GASTROSTOMY TUBE PLACEMENT               Intraoperative Consultation P    A(1). Liver, Liver Biopsy, Frozen:  AFS1:  Negative for malignancy, consistent with hemangioma     Dean Montero MD on 2/14/2024 at 11:39 AM  Intra op performed at:[facility], [facility], [address] [place] [address], Room 3-580, [facility] [id number]  Intra-op Dx verbally delivered to Dr. Krishnamurthy              Clinical Assessment / Barriers to Care (Per Nurse):    Surg Onc referring pt to Med Onc to establish care. Pt has post op visit w/ Dr Krishnamurthy 3/11/24. Will offer next available GI Med Onc at [facility] or schedule per pt preference.        Records Location: [facility]     Records Needed:     N/A    Additional testing needed prior to consult:     N/A          Dave Miller, RN, BSN, OCN  Oncology New Patient Nurse Navigator   [facility]  Care  [phone]

## 2024-02-28 NOTE — TELEPHONE ENCOUNTER
Patient is calling regarding the medications she was sent home with after a procedure.   FNA advised to contact pharmacy and patient agrees.        Reason for Disposition   Health information question, no triage required and triager able to answer question    Additional Information   Negative: Nursing judgment   Negative: Nursing judgment   Negative: Nursing judgment   Negative: Requesting lab results and adult stable (no new symptoms, not worsening)   Negative: Requesting referral to a specialist   Negative: Questions about durable medical equipment ordered and triager unable to answer   Negative: Requesting regular office appointment and adult stable (no new symptoms, not worsening)    Protocols used: Information Only Call - No Triage-A-OH

## 2024-02-29 ENCOUNTER — PATIENT OUTREACH (OUTPATIENT)
Dept: CARE COORDINATION | Facility: CLINIC | Age: 44
End: 2024-02-29
Payer: COMMERCIAL

## 2024-02-29 ENCOUNTER — PATIENT OUTREACH (OUTPATIENT)
Dept: ONCOLOGY | Facility: CLINIC | Age: 44
End: 2024-02-29
Payer: COMMERCIAL

## 2024-02-29 LAB
BACTERIA FLD CULT: ABNORMAL
GRAM STAIN RESULT: ABNORMAL
GRAM STAIN RESULT: ABNORMAL

## 2024-02-29 NOTE — PROGRESS NOTES
[facility]: Surgical Oncology Cancer Care Short Note                                     Discussion with Patient:                                                          OUTBOUND CALL:     Spoke with Eduarda. She was unable to take the call due to having the home care nurse on visiting to complete teaching for her tube feeds. Encouraged Eduarda to reach out with any questions, Direct contact number provided.     Eduarda inquired about her pathology. Informed her it is not within my scope of practice to review her pathology with her. Encouraged her to check MyChart for the results and assured her Dr. Krishnamurthy will review at the time of her post-op visit on 3/11/2024.       Meghana Mazariegos, RNCC  [facility]   Surgical Oncology     Approximately 5 minutes was spent in conversation with the patient/caregiver.

## 2024-02-29 NOTE — PROGRESS NOTES
[facility]    Background: Transitional Care Management program identified per system criteria and reviewed by [facility] Center team for possible outreach.    Assessment: Upon chart review, CCR Team member will not proceed with patient outreach related to this episode of Transitional Care Management program due to reason below:    Patient had a communication with a nurse, provider or care team for reason of post-hospital follow up plan.  Outreach call by CCRC team not indicated to minimize duplicative efforts.     Plan: Transitional Care Management episode addressed appropriately per reason noted above.      KATHERYN Eubanks  [facility] Resource [facility]    *Connected Care Resource Team does NOT follow patient ongoing. Referrals are identified based on internal discharge reports and the outreach is to ensure patient has an understanding of their discharge instructions.

## 2024-03-01 ENCOUNTER — NURSE TRIAGE (OUTPATIENT)
Dept: ONCOLOGY | Facility: CLINIC | Age: 44
End: 2024-03-01
Payer: COMMERCIAL

## 2024-03-01 DIAGNOSIS — G89.18 POSTOPERATIVE PAIN: Primary | ICD-10-CM

## 2024-03-01 RX ORDER — OXYCODONE HYDROCHLORIDE 5 MG/1
5 TABLET ORAL EVERY 6 HOURS PRN
Qty: 20 TABLET | Refills: 0 | Status: SHIPPED | OUTPATIENT
Start: 2024-03-01 | End: 2024-03-04

## 2024-03-01 NOTE — TELEPHONE ENCOUNTER
Call placed to Eduarda.   Advised to go in to ER to be seen.   Has appt with PCP on Monday still advised to go to ER at [address]

## 2024-03-01 NOTE — TELEPHONE ENCOUNTER
Patient calls back [place] nurse triage line around 1626 today, patient is not sure about what next steps are.  Patient stated she talked to a [facility] nurse about her pain around 30 minutes ago.     I discussed with patient that [facility] has paged a provider around [phone] today from the note I saw.  I discussed with patient that if patient has not heard back from [facility] by approximately 5 p.m. today, to call back nurse triage line for further assistance.  Patient agrees with plan.    Higinio Moreno RN on 3/1/2024 at 4:33 PM

## 2024-03-01 NOTE — TELEPHONE ENCOUNTER
Oncology Nurse Triage - Pain    Situation: Eduarda reporting the following symptoms: continued post surgical Pain    Background:   Treating Provider:   Dr. Krishnamurthy    Assessment:     Location: left side of abdomen where feeding tube is and right middle part of stomach where fluid drained  Onset ongoing since surgery  Duration/Frequency:constant  still able to do stuff but still painful  Rates: 7/10  Quality: If moves forward feels knife inside  Current med(s) used:  Hydromorphone 4mg every 6hours (maxing out)  Not able to take Tylenol or IBUprofen due to allergy    [facility] pharmacy[facility] refill is [facility] in [place]    Diarrhea 3xdaily,     Drinking enough, continues to struggle with nutrition but had a nutritionist came to visit today.     Denies fevers, chest pain, SOB, bladder issues.     Pt wondering if another alternative to pain medication if hydromorphone does not seem to be as effective.     Recommendations:     [phone] Paged provider Gerda Lopez PA-C

## 2024-03-02 NOTE — TELEPHONE ENCOUNTER
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      S/p exploratory laparotomy, excisional liver biopsy, small bowel resection, partial colon resection, partial liver resection, partial gastric resection, and gtube placement with Dr. Krishnamurthy 2/14/24.     She called with increased swelling and pain at her seroma site at her surgical incision. She denies fever, erythema, change in bowel movements, drainage, nausea vomiting or other change. She will proceed to the ER if she develops worsening pain, fever, nausea, vomiting, drainage, erythema or other concern.     Gerda Lopez PA-C

## 2024-03-04 ENCOUNTER — TELEPHONE (OUTPATIENT)
Dept: SURGERY | Facility: CLINIC | Age: 44
End: 2024-03-04
Payer: COMMERCIAL

## 2024-03-04 ENCOUNTER — ONCOLOGY VISIT (OUTPATIENT)
Dept: SURGERY | Facility: CLINIC | Age: 44
End: 2024-03-04
Attending: PHYSICIAN ASSISTANT
Payer: COMMERCIAL

## 2024-03-04 VITALS
SYSTOLIC BLOOD PRESSURE: 110 MMHG | WEIGHT: 157 LBS | HEART RATE: 68 BPM | OXYGEN SATURATION: 100 % | RESPIRATION RATE: 16 BRPM | DIASTOLIC BLOOD PRESSURE: 71 MMHG | BODY MASS INDEX: 27.81 KG/M2 | TEMPERATURE: 98.7 F

## 2024-03-04 DIAGNOSIS — G89.18 POST-OPERATIVE PAIN: ICD-10-CM

## 2024-03-04 DIAGNOSIS — T81.49XA SURGICAL SITE INFECTION: Primary | ICD-10-CM

## 2024-03-04 LAB — BACTERIA FLD CULT: NORMAL

## 2024-03-04 PROCEDURE — G0463 HOSPITAL OUTPT CLINIC VISIT: HCPCS | Performed by: PHYSICIAN ASSISTANT

## 2024-03-04 PROCEDURE — 99213 OFFICE O/P EST LOW 20 MIN: CPT | Mod: 24 | Performed by: PHYSICIAN ASSISTANT

## 2024-03-04 RX ORDER — METHOCARBAMOL 500 MG/1
500 TABLET, FILM COATED ORAL 4 TIMES DAILY PRN
Qty: 20 TABLET | Refills: 0 | Status: ON HOLD | OUTPATIENT
Start: 2024-03-04 | End: 2024-05-07

## 2024-03-04 RX ORDER — CEPHALEXIN 500 MG/1
500 CAPSULE ORAL 4 TIMES DAILY
Qty: 20 CAPSULE | Refills: 0 | Status: SHIPPED | OUTPATIENT
Start: 2024-03-04 | End: 2024-03-09

## 2024-03-04 ASSESSMENT — PAIN SCALES - GENERAL: PAINLEVEL: SEVERE PAIN (7)

## 2024-03-04 NOTE — LETTER
3/4/2024         RE: Eduarda oNgueira  [address] [place] [address]        Dear Colleague,    Thank you for referring your patient, Eduarda Nogueira, to the [facility]. Please see a copy of my visit note below.    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      She underwent exploratory laparotomy, excisional liver biopsy, small bowel resection, partial colon resection, partial liver resection, partial gastric resection, and gtube placement with Dr. Krishnamurthy 2/14/24.     CT 2/19 demonstrated a suspected dehiscence of the ventral incision with seroma. IR guided aspiration of incisional seroma 2/26. She was discharged to home with continuous tube feedings and regular diet. She is on prophylactic Lovenox. She is 2.5 weeks post op.     She presents today with pain, swelling and drainage at the incision. No fever. She has had several episode of vomiting since discharge. She denies nausea or vomiting today. She is tolerating continuous tube feedings. She has some drainage around the tubing. She is having bowel movements 3 times daily. She is taking oxycodone and robaxin for pain.     Resp 16   Wt 71.2 kg (157 lb)   BMI 27.81 kg/m     Physical Exam  Abdomen is soft, nontender, nondistended. She does have swelling at her incision with erythema. No active drainage.     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 She underwent exploratory laparotomy, excisional liver biopsy, small bowel resection, partial colon resection, partial liver resection, partial gastric resection, and gtube placement with Dr. Krishnamurthy 2/14/24. She was seen today for a draining seroma at her incision with erythema.  She was started on 5 days of Keflex. She will continue dressing changes and follow up with Dr. Krishnamurthy in 1 week. She was given a prescription for Robaxin for pain.     Gerda Lopez PA-C

## 2024-03-04 NOTE — CONFIDENTIAL NOTE
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      She reports leakage around the Gtube. The leakage is yellowish. She changes the dressing 2 times per day. There is pain at the drain site. She is doing continuous tube feedings.     She is eating but it is causing gas, vomiting.  Yesterday she vomited after eating yogurt. She reports she is having 3 bowel movements per day that are watery.     The incision is swollen with redness and pain.     No fever.     We will see her today in clinic.     Gerda Lopez PA-C

## 2024-03-04 NOTE — PROGRESS NOTES
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      She underwent exploratory laparotomy, excisional liver biopsy, small bowel resection, partial colon resection, partial liver resection, partial gastric resection, and gtube placement with Dr. Krishnamurthy 2/14/24.     CT 2/19 demonstrated a suspected dehiscence of the ventral incision with seroma. IR guided aspiration of incisional seroma 2/26. She was discharged to home with continuous tube feedings and regular diet. She is on prophylactic Lovenox. She is 2.5 weeks post op.     She presents today with pain, swelling and drainage at the incision. No fever. She has had several episode of vomiting since discharge. She denies nausea or vomiting today. She is tolerating continuous tube feedings. She has some drainage around the tubing. She is having bowel movements 3 times daily. She is taking oxycodone and robaxin for pain.     Resp 16   Wt 71.2 kg (157 lb)   BMI 27.81 kg/m     Physical Exam  Abdomen is soft, nontender, nondistended. She does have swelling at her incision with erythema. No active drainage.     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 She underwent exploratory laparotomy, excisional liver biopsy, small bowel resection, partial colon resection, partial liver resection, partial gastric resection, and gtube placement with Dr. Krishnamurthy 2/14/24. She was seen today for a draining seroma at her incision with erythema. She was started on 5 days of Keflex. She will continue dressing changes and follow up with Dr. Krishnamurthy in 1 week. She was given a prescription for Robaxin for pain.     Gerda Lopez PA-C

## 2024-03-05 ENCOUNTER — THERAPY VISIT (OUTPATIENT)
Dept: PHYSICAL THERAPY | Facility: CLINIC | Age: 44
End: 2024-03-05
Attending: PHYSICIAN ASSISTANT
Payer: COMMERCIAL

## 2024-03-05 DIAGNOSIS — G89.18 POSTOPERATIVE PAIN: ICD-10-CM

## 2024-03-05 PROCEDURE — 97161 PT EVAL LOW COMPLEX 20 MIN: CPT | Mod: GP | Performed by: PHYSICAL THERAPIST

## 2024-03-05 PROCEDURE — 97530 THERAPEUTIC ACTIVITIES: CPT | Mod: GP | Performed by: PHYSICAL THERAPIST

## 2024-03-05 NOTE — PROGRESS NOTES
PHYSICAL THERAPY EVALUATION  Type of Visit: Evaluation    See electronic medical record for Abuse and Falls Screening details.    Subjective       Presenting condition or subjective complaint: Lymphedema    42 y/o female who presents for evaluation of B LE swelling.  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  She underwent exploratory laparotomy, excisional liver biopsy, small bowel resection, partial colon resection, partial liver resection, partial gastric resection, and g tube placement with Dr. Krishnamurthy on 2/14/24.  She reports swelling started the night before her surgery.  States she noticed her legs were huge and they felt sore.  They reduced in size overnight, but the day after her surgery, the legs ballooned in size again.  She reports her calves were the same size as her thighs and her feet and ankles were bulbous.  She was seen by OT while in the hospital, reports compression bandaging was applied but it was very painful and difficult to tolerate, she did not like wearing them.  She was discharged on 2/27/24 and has been without compression since then, also states she started Lasix two days ago and now feels her symptoms have resolved.  She feels her legs look normal again, no longer any swelling, but she's not sure if that's going to last or if the swelling is going to return.      Date of onset: 02/27/24 (Date of order)    Relevant medical history: Anemia; Arthritis; Asthma; Bladder or bowel problems; Cancer; Chest pain; Concussions; Depression; Dizziness; DVT (blood clot); Fibromyalgia; Hearing problems; Incontinence; Kidney disease; Mental Illness; Migraines or headaches; Osteoarthritis; Pain at night or rest; Severe dizziness; Severe headaches; Significant weakness; Stroke   Dates & types of surgery: In the early 2000s gallbladder, knee x 3, gastro surgery - 3/17/22, other sugery 2023, 2024 multiples    Prior diagnostic  imaging/testing results:       Prior therapy history for the same diagnosis, illness or injury: Yes Wrap therapies and other wrap can't remember name at [facility]    Prior Level of Function  Transfers: Independent  Ambulation: Independent  ADL: Independent    Living Environment  Social support: With family members   Type of home: 1 level   Stairs to enter the home: Yes 4 Is there a railing: Yes   Ramp: No   Stairs inside the home: No       Help at home: Self Cares (home health aide/personal care attendant, family, etc); Home and Yard maintenance tasks; Home management tasks (cooking, cleaning); Medication and/or finances; Assist for driving and community activities  Equipment owned: Standard wheelchair; Raised toilet seat; Walker; Crutches; Bath bench; Commode; Grab bars     Employment: No    Hobbies/Interests:      Patient goals for therapy:      Pain assessment: Pain present  Location: L calf - all the time; R calf tends to be a little more shooting/throbbing pain /Ratin/10 for all over body pain all the time     Objective       EDEMA EVALUATION  Additional history:  Body part affected by edema: Hands, feet, legs  If cancer related, treatment: N/A  If not cancer related, problems with veins or cause of swelling: Unknown cause of swelling  Distance able to walk: Maybe about 1 block max  Time able to stand: 2-5 mins depending on the day  Sensation problems in hands/feet: Yes Constant pins and needles feeling, all day every day  Edema etiology: Unknown      EDEMA  Skin Condition: Intact, Non-pitting, No swelling noted B LEs.  G tube present, abdominal surgical incision present - not assessed today.    Scar: No  Capillary Refill: Symmetrical  Dorsal Pedal Pulse: Symmetrical  Stemmer Sign: -  Ulceration: No    GIRTH MEASUREMENTS: Refer to separate girth measurement flowsheet.     VOLUME LE  Right LE (mL) 3896.01   Left LE (mL) 3949.99   LE Volume Comparison LLE volume greater than RLE volume   % Difference  1.38%     RANGE OF MOTION: LE ROM WFL  UE ROM WFL  STRENGTH: UE Strength WFL, LE Strength WFL  POSTURE: WFL  PALPATION: Minor tenderness with assessment of pitting edema in B Les, but not painful   ACTIVITIES OF DAILY LIVING: Independent  BED MOBILITY: Independent  TRANSFERS: Independent  GAIT/LOCOMOTION: Independent  BALANCE: WFL  SENSATION:  Patient reports constant numbness/tingling B fingers and toes   VASCULAR:  No concerns noted at this time, patient reports no history of vascular issues   COORDINATION: WFL  MUSCLE TONE: WFL    Assessment & Plan   CLINICAL IMPRESSIONS  Medical Diagnosis: Postoperative pain (G89.18)    Treatment Diagnosis: Postoperative pain (G89.18)   Impression/Assessment: Patient is a 43 year old female with B LE swelling complaints.  The following significant findings have been identified: Edema. These impairments interfere with their ability to perform self care tasks, recreational activities, household chores, household mobility, and community mobility as compared to previous level of function.  At time of evaluation, edema presentation is stable without compression and while on Lasix.  Plan to follow up again next week to re-assess symptoms, discuss bandaging that was given to her in the hospital, and further determine plan of care based on presentation and if there are any changes in symptoms.     Clinical Decision Making (Complexity):  Clinical Presentation: Stable/Uncomplicated  Clinical Presentation Rationale: based on medical and personal factors listed in PT evaluation  Clinical Decision Making (Complexity): Low complexity    PLAN OF CARE  Treatment Interventions:  Interventions: Gait Training, Manual Therapy, Neuromuscular Re-education, Therapeutic Activity, Therapeutic Exercise    Long Term Goals     PT Goal 1  Goal Identifier: 1  Goal Description: Patient will demonstrate understanding of lymphedema and swelling symptoms, as well as how to manage those symptoms, in order to  continue with cares independently.  Target Date: 03/26/24  PT Goal 2  Goal Identifier: 2  Goal Description: Patient will demonstrate no increase in bilateral limb volume in order to demonstrate maintenance of edema symptoms.  Target Date: 03/26/24      Frequency of Treatment: 1x/week  Duration of Treatment: 3 weeks    Recommended Referrals to Other Professionals:  N/A  Education Assessment:   Learner/Method: Patient;Listening;Reading;Demonstration;Pictures/Video;No Barriers to Learning    Risks and benefits of evaluation/treatment have been explained.   Patient/Family/caregiver agrees with Plan of Care.     Evaluation Time:     PT Eval, Low Complexity Minutes (43454): 20     Signing Clinician: KIRTI Card [facility]                                                                                   OUTPATIENT PHYSICAL THERAPY      PLAN OF TREATMENT FOR OUTPATIENT REHABILITATION   Patient's Last Name, First Name, MICHAELFRANSICO NogueiraEduarda RODRIGUEZ YOB: 1980   Provider's Name   [facility]   Medical Record No.  [id number]     Onset Date: 02/27/24 (Date of order)  Start of Care Date: 03/05/24     Medical Diagnosis:  Postoperative pain (G89.18)      PT Treatment Diagnosis:  Postoperative pain (G89.18) Plan of Treatment  Frequency/Duration: 1x/week/ 3 weeks    Certification date from 03/05/24 to 03/26/24         See note for plan of treatment details and functional goals     Nataly Dominguez, PT                         I CERTIFY THE NEED FOR THESE SERVICES FURNISHED UNDER        THIS PLAN OF TREATMENT AND WHILE UNDER MY CARE     (Physician attestation of this document indicates review and certification of the therapy plan).              Referring Provider:  Darya Zheng    Initial Assessment  See Epic Evaluation- Start of Care Date: 03/05/24

## 2024-03-06 ENCOUNTER — OFFICE VISIT (OUTPATIENT)
Dept: FAMILY MEDICINE | Facility: CLINIC | Age: 44
End: 2024-03-06
Payer: COMMERCIAL

## 2024-03-06 VITALS
RESPIRATION RATE: 18 BRPM | WEIGHT: 156 LBS | HEART RATE: 97 BPM | BODY MASS INDEX: 28.71 KG/M2 | DIASTOLIC BLOOD PRESSURE: 68 MMHG | OXYGEN SATURATION: 100 % | SYSTOLIC BLOOD PRESSURE: 109 MMHG | TEMPERATURE: 98.6 F | HEIGHT: 62 IN

## 2024-03-06 DIAGNOSIS — K92.2 UPPER GI BLEED: ICD-10-CM

## 2024-03-06 DIAGNOSIS — C17.1 JEJUNAL ADENOCARCINOMA (H): Primary | ICD-10-CM

## 2024-03-06 DIAGNOSIS — Z97.8 USES FEEDING TUBE: ICD-10-CM

## 2024-03-06 DIAGNOSIS — I63.9 ISCHEMIC EMBOLIC STROKE (H): ICD-10-CM

## 2024-03-06 PROCEDURE — 99203 OFFICE O/P NEW LOW 30 MIN: CPT | Mod: 24 | Performed by: PHYSICIAN ASSISTANT

## 2024-03-06 ASSESSMENT — PATIENT HEALTH QUESTIONNAIRE - PHQ9
SUM OF ALL RESPONSES TO PHQ QUESTIONS 1-9: 17
10. IF YOU CHECKED OFF ANY PROBLEMS, HOW DIFFICULT HAVE THESE PROBLEMS MADE IT FOR YOU TO DO YOUR WORK, TAKE CARE OF THINGS AT HOME, OR GET ALONG WITH OTHER PEOPLE: VERY DIFFICULT
SUM OF ALL RESPONSES TO PHQ QUESTIONS 1-9: 17

## 2024-03-06 ASSESSMENT — PAIN SCALES - GENERAL: PAINLEVEL: SEVERE PAIN (7)

## 2024-03-06 NOTE — PROGRESS NOTES
"  Assessment & Plan       ICD-10-CM    1. Jejunal adenocarcinoma (H)  C17.1       2. Ischemic embolic stroke (H)  I63.9     2022      3. Upper GI bleed  K92.2       4. Uses feeding tube  Z97.8       Cont' care with specialist.   Recommend establish care with internal medication due to complex medical history.   warning signs discussed.     Js Lerner is a 43 year old, presenting for the following health issues:  Hospital F/U        3/6/2024    11:20 AM   Additional Questions   Roomed by Vy   Accompanied by Father         3/6/2024    11:20 AM   Patient Reported Additional Medications   Patient reports taking the following new medications yes     HPI       Hospital Follow-up Visit:    Hospital/Nursing Home/IP Rehab Facility:  [facility]  Date of Admission: 02/11/2024  Date of Discharge: 02/27/2024  Reason(s) for Admission: vomiting blood- GI bleed.    Was your hospitalization related to COVID-19? No   Problems taking medications regularly:  None  Medication changes since discharge: None  Problems adhering to non-medication therapy:  None    Summary of hospitalization:  [facility] discharge summary reviewed  Diagnostic Tests/Treatments reviewed.  Follow up needed: oncology on 3/4/24  Other Healthcare Providers Involved in Patient s Care:         None  Update since discharge: stable.       Plan of care communicated with patient and family           Review of Systems  Constitutional, HEENT, cardiovascular, pulmonary, gi and gu systems are negative, except as otherwise noted.      Objective    /68   Pulse 97   Temp 98.6  F (37  C) (Tympanic)   Resp 18   Ht 1.575 m (5' 2\")   Wt 70.8 kg (156 lb)   SpO2 100%   BMI 28.53 kg/m    Body mass index is 28.53 kg/m .  Physical Exam   GENERAL: alert and no distress  RESP: lungs clear to auscultation - no rales, rhonchi or wheezes  CV: regular rate and rhythm, normal S1 S2, no S3 or S4, no murmur, click or rub, no peripheral edema  MS: " no gross musculoskeletal defects noted, no edema    Labs reviewed.       Signed Electronically by: Juarez López PA-C

## 2024-03-07 DIAGNOSIS — G89.18 POSTOPERATIVE PAIN: ICD-10-CM

## 2024-03-07 DIAGNOSIS — Z98.84 H/O GASTRIC BYPASS: ICD-10-CM

## 2024-03-08 ENCOUNTER — TELEPHONE (OUTPATIENT)
Dept: ONCOLOGY | Facility: CLINIC | Age: 44
End: 2024-03-08
Payer: COMMERCIAL

## 2024-03-08 ENCOUNTER — NURSE TRIAGE (OUTPATIENT)
Dept: ONCOLOGY | Facility: CLINIC | Age: 44
End: 2024-03-08
Payer: COMMERCIAL

## 2024-03-08 ENCOUNTER — PATIENT OUTREACH (OUTPATIENT)
Dept: ONCOLOGY | Facility: CLINIC | Age: 44
End: 2024-03-08
Payer: COMMERCIAL

## 2024-03-08 DIAGNOSIS — Z98.84 H/O GASTRIC BYPASS: ICD-10-CM

## 2024-03-08 RX ORDER — GUAIFENESIN 600 MG/1
15 TABLET, EXTENDED RELEASE ORAL DAILY
Qty: 450 ML | Refills: 0 | Status: SHIPPED | OUTPATIENT
Start: 2024-03-08 | End: 2024-04-12

## 2024-03-08 RX ORDER — FUROSEMIDE 20 MG
20 TABLET ORAL DAILY
Qty: 7 TABLET | Refills: 0 | OUTPATIENT
Start: 2024-03-08

## 2024-03-08 RX ORDER — HYDROMORPHONE HYDROCHLORIDE 2 MG/1
2 TABLET ORAL EVERY 8 HOURS PRN
Qty: 40 TABLET | Refills: 0 | Status: ON HOLD | OUTPATIENT
Start: 2024-03-08 | End: 2024-05-07

## 2024-03-08 NOTE — TELEPHONE ENCOUNTER
Imaging Received  2024 11:19 AM ABT   Action: Images from [facility] received and resolved to PACS.     RECORDS STATUS - ALL OTHER DIAGNOSIS      [facility]   DATE RECEIVED:    NOTES STATUS DETAILS   OFFICE NOTE from referring provider Epic 24: Gerda Lopez PA-C   OFFICE NOTE from other specialist Epic Gastro:  24: Anisha Hill PA-C   DISCHARGE SUMMARY from hospital [facility] 24: [facility]    24: [facility]   DISCHARGE REPORT from the ER [facility] 24, 24: [facility] ED   OPERATIVE REPORT [facility] 24: SMALL BOWEL RESECTION, PARTIAL COLON RESECTION, PARTIAL LIVER RESECTION, PARTIAL GASTRIC RESECTION, GASTROSTOMY TUBE PLACEMENT     24: Colonoscopy    24: ESOPHAGOGASTRODUODENOSCOPY, WITH HEMORRHAGE CONTROL    MEDICATION LIST [facility]    LABS     PATHOLOGY REPORTS Report in EPIC 24: [id number]  24: [id number]   ANYTHING RELATED TO DIAGNOSIS Epic Most recent 24   IMAGING (NEED IMAGES & REPORT)     CT SCANS PACS PACS:  24, 24: CT AP  24: CT CAP  23, 22: CT Chest    [facility]:  02/10/24-22: CT AP    Sub Im22: CT AP   XRAYS PACS PACS:  24-09/09/15: XR Abd  24-09/08/15: XR Chest  24: XR Esophagram    [facility]:  10/08/22-20: XR Chest  22: XR Upper GI

## 2024-03-08 NOTE — TELEPHONE ENCOUNTER
Oncology Nurse Triage - Reporting Symptoms    Situation:   Eduarda reporting the following symptoms: problems with G tube       Background:   Treating Provider:   DR Krishnamurthy and Gerda michel     Date of last office visit: 3/4/24 Gerda Michel     Recent treatments: No      Assessment  Onset of symptoms: having discharge from g tube itself, was getting ready to place extension on it and was having black and green discharge come from it.   Having extra discharge coming from 3 different spots that have opened up,   The spot she did have are still red. Has gone through a roll of toilet paper trying to control the drainage.   No fever. T98.5  Knows that she has fluid in by her g tube.   It hurts to sit forward. Not taking anything for pain besides medical marijuana which is not helpful.   Using primapore 15cm x 8cm bandages she has used 6 of them today on the discharge that is coming from 3 open holes.            Paged provider: 12:30 Epic secure chat sent to Gerda Michel and Meghana Mazariegos     Recommendations:

## 2024-03-08 NOTE — PROGRESS NOTES
"[facility]: Surgical Oncology Cancer Care Short Note                                     Discussion with Patient:                                                       INBOUND CALL:     Spoke with Eduarda following her call to Triage. She stated when she sent to replace the extension her G tube after getting a supply delivery, she noted some back flow from the G tube when she removed the extension. The G tube doesn't have a clamp, while the extension does. The drainage was \"green stuff.\" Reviewed that some drainage from G tube is normal. Recommended she replace the extension and then flush as instructed. If she feels any resistance she should stop and call Triage team. She verbalized understanding.     Eduarda is otherwise doing well. No new concerns with G tube site. Reviewed that Dilaudid was refilled for her by Gerda Lopez PA-C but she should start weaning off this medication. She stated she only uses it PRN but she is low and wanted to have a refill sent before the weekend. She does not take Tylenol or Advil to it giving her a rash. She voiced concern with Dilaudid causing her itching, but she knows to use Benadryl PRN for itching.     Eduarda requested refill of Tropical Liquid Nutrition Oral Liquid. Script sent to [facility] after conversation with Pharmacist. They are able to mail Eduarda's refill. Eduarda's local pharmacy does not carry this supplement.     Confirmed with Eduarda she does not need to continue on Lasix, per Gerda Lopez PA-C. Encouraged her to continue with her lymphedema treatments.     Encouraged Eduarda to call with any further questions or concerns. Direct contact number provided.     Meghana Mazariegos RNCC  [facility]   Surgical Oncology     Approximately 10 minutes was spent in conversation with the patient/caregiver.    "

## 2024-03-08 NOTE — TELEPHONE ENCOUNTER
Retail Pharmacy Prior Authorization Team   Phone: [phone]    PA Initiation    Medication: HYDROMORPHONE HCL 2 MG PO TABS  Insurance Company: [employer] - Phone [phone] Fax [phone]  Pharmacy Filling the Rx: [facility] - [place], [place] - [address] [address]  Filling Pharmacy Phone: [phone]  Filling Pharmacy Fax:    Start Date: 3/8/2024

## 2024-03-11 ENCOUNTER — ONCOLOGY VISIT (OUTPATIENT)
Dept: ONCOLOGY | Facility: CLINIC | Age: 44
End: 2024-03-11
Attending: SURGERY
Payer: COMMERCIAL

## 2024-03-11 VITALS
SYSTOLIC BLOOD PRESSURE: 105 MMHG | OXYGEN SATURATION: 100 % | DIASTOLIC BLOOD PRESSURE: 71 MMHG | WEIGHT: 152 LBS | TEMPERATURE: 99 F | RESPIRATION RATE: 16 BRPM | HEART RATE: 89 BPM | BODY MASS INDEX: 27.8 KG/M2

## 2024-03-11 DIAGNOSIS — Z98.84 H/O GASTRIC BYPASS: Primary | ICD-10-CM

## 2024-03-11 RX ORDER — PROPRANOLOL HYDROCHLORIDE 40 MG/1
40 TABLET ORAL 4 TIMES DAILY
Status: ON HOLD | COMMUNITY
Start: 2024-01-04 | End: 2024-05-07

## 2024-03-11 RX ORDER — NYSTATIN 100000 U/G
CREAM TOPICAL 2 TIMES DAILY
Qty: 30 G | Refills: 2 | Status: ON HOLD | OUTPATIENT
Start: 2024-03-11 | End: 2024-05-07

## 2024-03-11 ASSESSMENT — PAIN SCALES - GENERAL: PAINLEVEL: EXTREME PAIN (8)

## 2024-03-11 NOTE — TELEPHONE ENCOUNTER
Prior Authorization Not Needed per Insurance-I spoke to Saurabh at [facility]. He states this is covered without a PA.     Medication: HYDROMORPHONE HCL 2 MG PO TABS  Insurance Company: [employer] - Phone [phone] Fax [phone]  Expected CoPay: $    Pharmacy Filling the Rx: [facility] - [place] - [address] [address]  Pharmacy Notified: Yes-Pharmacy has a paid claim and patient picked up on 3/8  Patient Notified:

## 2024-03-11 NOTE — PROGRESS NOTES
Eduarda is here for a postop visit after undergoing an exploratory laparotomy resection of her gastrojejunostomy anastomosis and partial liver resection for what was presumed to be an adenocarcinoma in her jejunum.  However the pathologist looked at the slides again and concluded that the original and diascopy biopsy was incorrect.  The pathologist actually did normal liver.  So she has no evidence of cancer.  She is done relatively well since her surgery she is tolerating tube feeds through her gastric remnant.  She is not eating or drinking much orally.  She says that she gets nausea and spit the back up afterwards.    Impression: Postop check    Plan: Number to obtain a barium swallow and upper GI to determine whether or not she has an obstruction at anastomosis.  If she does not I may have GI see her for possible dilatation.

## 2024-03-11 NOTE — LETTER
3/11/2024         RE: Eduarda Nogueira  [address] [place] [address]        Dear Colleague,    Thank you for referring your patient, Eduarda Nogueira, to the [facility]. Please see a copy of my visit note below.    Eduarda is here for a postop visit after undergoing an exploratory laparotomy resection of her gastrojejunostomy anastomosis and partial liver resection for what was presumed to be an adenocarcinoma in her jejunum.  However the pathologist looked at the slides again and concluded that the original and diascopy biopsy was incorrect.  The pathologist actually did normal liver.  So she has no evidence of cancer.  She is done relatively well since her surgery she is tolerating tube feeds through her gastric remnant.  She is not eating or drinking much orally.  She says that she gets nausea and spit the back up afterwards.    Impression: Postop check    Plan: Number to obtain a barium swallow and upper GI to determine whether or not she has an obstruction at anastomosis.  If she does not I may have GI see her for possible dilatation.    Sincerely,        Christoph Krishnamurthy MD

## 2024-03-11 NOTE — NURSING NOTE
"Oncology Rooming Note    March 11, 2024 4:09 PM   Eduarda Nogueira is a 43 year old female who presents for:    Chief Complaint   Patient presents with    Oncology Clinic Visit     Post op small bowel resection     Initial Vitals: /71 (BP Location: Right arm, Patient Position: Sitting, Cuff Size: Adult Regular)   Pulse 89   Temp 99  F (37.2  C) (Tympanic)   Resp 16   Wt 68.9 kg (152 lb)   SpO2 100%   BMI 27.80 kg/m   Estimated body mass index is 27.8 kg/m  as calculated from the following:    Height as of 3/6/24: 1.575 m (5' 2\").    Weight as of this encounter: 68.9 kg (152 lb). Body surface area is 1.74 meters squared.  Extreme Pain (8) Comment: Data Unavailable   No LMP recorded. (Menstrual status: Irregular Periods).  Allergies reviewed: Yes  Medications reviewed: Yes    Medications: MEDICATION REFILLS NEEDED TODAY. Provider was notified.  Pharmacy name entered into [facility]: [facility] - [place], [place] - [address]    Frailty Screening:   Is the patient here for a new oncology consult visit in cancer care? 2. No      Clinical concerns: Post op Pain which she rates at 8/10.       Yaneth Maxwell LPN  3/11/2024              "

## 2024-03-14 ENCOUNTER — PRE VISIT (OUTPATIENT)
Dept: ONCOLOGY | Facility: CLINIC | Age: 44
End: 2024-03-14
Payer: COMMERCIAL

## 2024-03-15 DIAGNOSIS — R11.0 NAUSEA: ICD-10-CM

## 2024-03-15 DIAGNOSIS — Z98.84 H/O GASTRIC BYPASS: Primary | ICD-10-CM

## 2024-03-15 DIAGNOSIS — R13.10 DYSPHAGIA: ICD-10-CM

## 2024-03-15 DIAGNOSIS — Z90.3 H/O RESECTION OF STOMACH: ICD-10-CM

## 2024-03-20 ENCOUNTER — PATIENT OUTREACH (OUTPATIENT)
Dept: ONCOLOGY | Facility: CLINIC | Age: 44
End: 2024-03-20
Payer: COMMERCIAL

## 2024-03-20 NOTE — PROGRESS NOTES
[facility]: Surgical Oncology Cancer Care Short Note                                     Discussion with Patient:                                                       INBOUND CALL:     Received call from Joanne Heayl Amanda's  at [facility]. Se stated she spoke with Eduarda on 3/18 and wanted to confirm Eduarda does not have an cancer diagnosis. I confirmed with Joanne Lerner does not have cancer, per her path report from 2/14/2024.     Joanne stated Eduarda told her she is having issues with her incisions and they are not healing. Reviewed that Eduarda had her post-op with Dr. Krishnamurthy on 3/11 and Dr. Krishnamurthy did not have any concerns at this time but I would place a call to Eduarda to discuss further and try to get a photos sent on [employer].     Joanne stated Eduarda is currently drinking three protein shakes a day, per Dr. Krishnamurthy's recommendation. We discussed that Eduarda would benefit from a nutrition consult. She is currently on tube feeds so she may having nutrition consults through [facility]. I will plan to verify.     Joanne shared that Eduarda has issues with green/black drainage coming from he G-tube. She stated she feels it may be stool since Eduarda tends to only have one BM a week and she has no drainage the day she has a BM. She recommended Eduarda increase her Miralax and I will plan to place a referral for Senna.     Plan made to call Eduarda on 3/21 to review the above and discuss interventions.     Meghana Mazariegos, RNCC  [facility]   Surgical Oncology     Approximately 15 minutes was spent in conversation with .

## 2024-03-21 ENCOUNTER — PATIENT OUTREACH (OUTPATIENT)
Dept: ONCOLOGY | Facility: CLINIC | Age: 44
End: 2024-03-21
Payer: COMMERCIAL

## 2024-03-21 NOTE — PROGRESS NOTES
[facility]: Surgical Oncology Cancer Care Short Note                                     Discussion with Patient:                                                          OUTBOUND CALL:     Spoke with Eduarda following my conversation with her  at [facility], Joanne Healy. Eduarda stated she has concerns regarding her incisions and around her G Tube site. She stated it is red and tender around her G Tube site and her surgical incision had some drainage last week. She stated her father feels the incision is healing. Assured Eduarda that some drainage while healing can be normal and reviewed that skin around the G tube site can often become tender. She denies fever/chills although she stated she is often hot then cold. Asked her to take her temperature if she is concerned for a fever and report a fever over 100.5.     Encour[facility] should she continue to have concerns regarding her incision site.     Eduarda stated she sometimes has backflow from her G tube. Discussed with her some TF coming from her G tube site can be normal, especially if she is bloated. She stated she is often bloated and suffers from constipation. Reviewed importance of scheduled bowel meds and making sure she is getting enough fluid through her G Tube. She stated her Home Care Team has recently increased her flush intake.     Eduarda confirmed she is attempting to drink three protein shakes a day. She does feel bloated fairly quickly and often does not finish the full shake. She stated she has been doing this since her discharge from the hospital. She denies vomiting and is able to tolerate until feeling full.     Eduarda continues to have pain at her incision site and at her G tube site. Discussed some pain is to be anticipated. Recommended she can use ice to help with pain since she is allergic to most pain medications.      Reviewed upcoming appointment dates and times. Encouraged Eduarda to call with any  further questions or concerns. Direct contact number provided.     Meghana Mazariegos RNCC  [facility]   Surgical Oncology     Approximately 20 minutes was spent in conversation with the patient/caregiver.

## 2024-03-25 LAB — BACTERIA FLD CULT: ABNORMAL

## 2024-04-03 ENCOUNTER — PATIENT OUTREACH (OUTPATIENT)
Dept: ONCOLOGY | Facility: CLINIC | Age: 44
End: 2024-04-03
Payer: COMMERCIAL

## 2024-04-03 NOTE — PROGRESS NOTES
[facility]: Surgical Oncology Cancer Care Short Note                                     Discussion with Patient:                                                         OUTBOUND CALL:     Left message for Eduarda regarding her not showing for the video sawllow and esophogram on 4/2 which were recommended by Dr. Krishnamurthy. Requested call back to discuss next steps. Direct contact number provided.       Meghana Mazariegos, RNCC  [facility]   Surgical Oncology

## 2024-04-05 ENCOUNTER — PATIENT OUTREACH (OUTPATIENT)
Dept: ONCOLOGY | Facility: CLINIC | Age: 44
End: 2024-04-05
Payer: COMMERCIAL

## 2024-04-05 NOTE — PROGRESS NOTES
[facility]: Surgical Oncology Cancer Care Short Note                                     Discussion with Patient:                                                         OUTBOUND CALL:     Left message for Eduarda regarding missed appointment for Video Swallow on 4/2/2024. Reviewed that we will keep her phone visit with Dr. Krishnamurthy on 4/8 so she can discuss next steps with him.     Encouraged Eduarda to call with any questions or concerns. Contact info provided.         Meghana Mazariegos, RNCC  [facility]   Surgical Oncology

## 2024-04-08 ENCOUNTER — PATIENT OUTREACH (OUTPATIENT)
Dept: ONCOLOGY | Facility: CLINIC | Age: 44
End: 2024-04-08
Payer: COMMERCIAL

## 2024-04-08 NOTE — PROGRESS NOTES
"[facility]: Surgical Oncology Cancer Care Short Note                                     Discussion with Patient:                                                       INBOUND CALL:     Received call from Eduarda following her missed appointment with Dr. Krishnamurthy on 4/8. She stated her phone is not working and she had \"called someone\" to give them her father's number as an alternate way to contact her, but this information never provided to Surgical Oncology.     Eduarda stated she missed her Video Swallow on 4/2 due to being sick.     Eduarda stated she has not been doing well physically or emotionally. She has multiple health concerns. She was upset and tearful during this conversation. She stated she has black string coming from her G Tube. She is able to use her G-Tube but she will vomit after her tube feeds so she has not been giving herself the full amounts for the past two weeks.     Eduarda stated when she attempts to eat even the smallest amount of food by mouth she will vomit. Eduarda was audibly gagging during this phone call. She is no longer drinking protein shakes.     Eduarda stated her weight is 145 lbs. She was 152 lbs on 3/11.     Eduarda has pain around her G tube and at her incision site.      Eduarda stated she is passing gas. However, she has not had a bowel movement in three days. She stated she \"feels full,\" but is not able to pass stool. She has \"gotten off-track\" with her Miralax due to being unable to tolertate tube feeds or PO intake.     Eduarda's symptoms were reviewed with Dr. Krishnamurthy and the recommendation is for her to go to the [facility] Emergency Department. This recommendation was relayed to Eduarda, who is in agreement with the plan. She will have her father bring her on 4/9 since he is currently out of town. She knows if her symptoms worsen, she should call an ambulance.     Meghana Mazariegos RNCC  [facility]   Surgical Oncology     Approximately 20 minutes was spent in conversation " with the patient/caregiver.

## 2024-04-10 ENCOUNTER — PATIENT OUTREACH (OUTPATIENT)
Dept: ONCOLOGY | Facility: CLINIC | Age: 44
End: 2024-04-10
Payer: COMMERCIAL

## 2024-04-10 NOTE — PROGRESS NOTES
[facility]: Surgical Oncology Cancer Care Short Note                                     Discussion with Patient:                                                         OUTBOUND CALL:     Left message for Eduarda to check-in after our conversation on 4/9. Encouraged Eduarda to call back to discuss next steps in her care. Direct contact number provided.     Meghana Mazariegos, RNCC  [facility]   Surgical Oncology

## 2024-04-11 ENCOUNTER — PATIENT OUTREACH (OUTPATIENT)
Dept: ONCOLOGY | Facility: CLINIC | Age: 44
End: 2024-04-11
Payer: COMMERCIAL

## 2024-04-11 NOTE — PROGRESS NOTES
[facility]: Surgical Oncology Cancer Care Short Note                                     Discussion with Patient:                                                         OUTBOUND CALL:     Spoke with Joanne regarding inability to reach Eduarda following our conversation on 4/8 when it was recommended she go to the ED at [facility].     Joanne she has been unable to get in touch with her. Her last contact was on 3/22/2024. Discussed Eduarda's physical and mental health concerns. Joanne stated she is scheduled to reach out to Eduarda on 4/12. She will provide an update if she is able to reach her. Informed Joanne she will need to call Eduarda's father's number since Eduarda's phone is out of order.     Also attempted to reach out to Eduarda's home care team. Spoke with [facility] who confirmed Eduarda is not an active patient with their agency.       Meghana Mazariegos, RNCC  [facility]   Surgical Oncology

## 2024-04-12 ENCOUNTER — PATIENT OUTREACH (OUTPATIENT)
Dept: ONCOLOGY | Facility: CLINIC | Age: 44
End: 2024-04-12
Payer: COMMERCIAL

## 2024-04-12 DIAGNOSIS — Z98.84 H/O GASTRIC BYPASS: ICD-10-CM

## 2024-04-12 RX ORDER — GUAIFENESIN 600 MG/1
15 TABLET, EXTENDED RELEASE ORAL DAILY
Qty: 450 ML | Refills: 3 | Status: SHIPPED | OUTPATIENT
Start: 2024-04-12

## 2024-04-12 NOTE — PROGRESS NOTES
[facility]: Surgical Oncology Cancer Care Short Note                                     Discussion with Patient:                                                          OUTBOUND CALL:     Spoke with Eduarda following phone call from her  at [facility], Joanne.     Eduarda stated she is doing much better both physically and mentally now than when we spoke on 4/8. She is in touch with her psychologist and psychiatrist. She stated she never felt she needed to go the ED.     Reviewed plan for rescheduling video swallow study followed by a visit with Dr. Krishnamurthy to discuss the results. Message sent to scheduling team.     A refill of Eduarda's liquid multivitamin was sent to [facility] with request to mail to her home.     Eduarda is complaining of significant bloating following her tube feeds. She would like to discuss reducing her tube feed rate. Message sent to  [facility] requesting they call Eduarda. Eduarda stated she is wearing her abdominal binder, which is helping with pain and bloating.     Eduarda stated she has a port in place that has not been flushed in several weeks. Se confirmed it was placed by he oncology team at [facility]. I recommended Eduarda call there clinic to set up plan for port flushes. Eduarda verbalized understanding.     Encouraged Eduarda to call with any further questions or concerns. Direct contact number provided.     Meghana Mazariegos RNCC  [facility]   Surgical Oncology     Approximately 10 minutes was spent in conversation with the patient/caregiver.

## 2024-04-24 ENCOUNTER — PATIENT OUTREACH (OUTPATIENT)
Dept: ONCOLOGY | Facility: CLINIC | Age: 44
End: 2024-04-24
Payer: COMMERCIAL

## 2024-04-24 NOTE — PROGRESS NOTES
[facility]: Surgical Oncology Cancer Care Short Note                                     Discussion with Patient:                                                          OUTBOUND CALL:     Spoke with Eduarda. Confirmed she is aware of the change in date for the video swallow and esophogram at [facility]. She plans to attend. Reviewed follow-up visit with  to discuss results is on 5/13/2024.    Eduarda stated she is low on tube feed supplies. Message sent to  [facility].     Encouraged Anmanda to call with any further questions or concerns. Direct contact number provided.     Meghana Mazariegos RNCC  [facility]   Surgical Oncology     Approximately 5 minutes was spent in conversation with the patient/caregiver.

## 2024-04-26 ENCOUNTER — THERAPY VISIT (OUTPATIENT)
Dept: SPEECH THERAPY | Facility: CLINIC | Age: 44
End: 2024-04-26
Attending: SURGERY
Payer: COMMERCIAL

## 2024-04-26 ENCOUNTER — HOSPITAL ENCOUNTER (OUTPATIENT)
Dept: GENERAL RADIOLOGY | Facility: CLINIC | Age: 44
Discharge: HOME | End: 2024-04-26
Attending: SURGERY
Payer: COMMERCIAL

## 2024-04-26 DIAGNOSIS — R13.10 DYSPHAGIA: ICD-10-CM

## 2024-04-26 DIAGNOSIS — Z98.84 H/O GASTRIC BYPASS: ICD-10-CM

## 2024-04-26 DIAGNOSIS — R11.0 NAUSEA: ICD-10-CM

## 2024-04-26 DIAGNOSIS — R11.10 REGURGITATION OF FOOD: Primary | ICD-10-CM

## 2024-04-26 PROCEDURE — 74230 X-RAY XM SWLNG FUNCJ C+: CPT

## 2024-04-26 PROCEDURE — 92611 MOTION FLUOROSCOPY/SWALLOW: CPT | Mod: GN

## 2024-04-26 PROCEDURE — 74220 X-RAY XM ESOPHAGUS 1CNTRST: CPT

## 2024-04-26 RX ORDER — BARIUM SULFATE 400 MG/ML
SUSPENSION ORAL ONCE
Status: COMPLETED | OUTPATIENT
Start: 2024-04-26 | End: 2024-04-26

## 2024-04-26 RX ORDER — BARIUM SULFATE 400 MG/ML
SUSPENSION ORAL ONCE
Status: DISCONTINUED | OUTPATIENT
Start: 2024-04-26 | End: 2024-04-26

## 2024-04-26 RX ADMIN — BARIUM SULFATE: 400 SUSPENSION ORAL at 14:10

## 2024-04-30 NOTE — PROGRESS NOTES
SPEECH LANGUAGE PATHOLOGY EVALUATION    See electronic medical record for Abuse and Falls Screening details.         Objective     SWALLOW EVALUTION  Dysphagia history: Pt had a VFFS completed 1/5/2024 with normal results. Pt has a history of chronic issues with regurgitation.  Current Diet/Method of Nutritional Intake: oral diet, thin liquids (level 0), easy to chew (level 7)        VIDEOFLUOROSCOPIC SWALLOW STUDY  Radiologist:   Views Taken: left lateral, A/P   Physical location of procedure: [facility]  Patient sitting upright on chair/stool     VFSS textures trialed:   VFSS Eval: Thin Liquids  Mode of Presentation: cup, self-fed   Order of Presentation: 1,2,3,4  Preparatory Phase: WFL  Oral Phase: WFL  Bolus Location When Swallow Initiated: posterior angle of ramus  Pharyngeal Phase: WFL, back flow noted on trail 4  Rosenbeck's Penetration Aspiration Scale: 1 - no aspiration, contrast does not enter airway  Response to Aspiration:  N/a  Strategies and Compensations: not applicable  Diagnostic Statement: Pt swallow WFL. No penetration or aspiration noted. On trial 4 with consecutive swallow pt experienced gagging and back flow was noted at the level of the pyriform sinus. Pt successfully managed back flow and no penetration or aspiration occurred.     VFSS Eval: Mildly Thick Liquids  Mode of Presentation: cup, self-fed   Order of Presentation: 5  Preparatory Phase: WFL  Oral Phase: WFL  Bolus Location When Swallow Initiated: posterior angle of ramus  Pharyngeal Phase: WFL  Rosenbeck's Penetration Aspiration Scale: 1 - no aspiration, contrast does not enter airway  Response to Aspiration: N/a  Strategies and Compensations: not applicable  Diagnostic Statement: Pt swallow WFL. No penetration or aspiration noted.     VFSS Eval: Purees  Mode of Presentation: spoon, self-fed   Order of Presentation: 6,7  Preparatory Phase: WFL  Oral Phase: WFL, impaired AP movement  Bolus Location When  Swallow Initiated: posterior angle of ramus  Pharyngeal Phase: WFL, backflow  Rosenbeck s Penetration Aspiration Scale: 1 - no aspiration, contrast does not enter airway  Response to Aspiration:  N/a  Strategies and Compensations: not applicable  Diagnostic Statement: Pt swallow WFL. No penetration or aspiration noted. On trial 7, which was a larger bolus size, pt experienced gagging and back flow was noted at the level of the valleculae. Pt successfully managed back flow and no penetration or aspiration occurred.    VFSS Eval: Soft & Bite Sized  Mode of Presentation: spoon, self-fed   Order of Presentation: 8,9  Preparatory Phase: WFL  Oral Phase: WFL  Bolus Location When Swallow Initiated: valleculae  Pharyngeal Phase: WFL  Rosenbeck s Penetration Aspiration Scale: 1 - no aspiration, contrast does not enter airway  Response to Aspiration:  N/a  Strategies and Compensations: not applicable  Diagnostic Statement: Pt swallow WFL. No penetration or aspiration. Bolus head was at the level of the valleculae and considered WFL since there was no aspiration or penetration.    VFSS Eval: Solids  Mode of Presentation: spoon, self-fed   Order of Presentation: 10,11  Preparatory Phase: WFL  Oral Phase: WFL  Bolus Location When Swallow Initiated: posterior angle of ramus, valleculae  Pharyngeal Phase: WFL   Rosenbeck s Penetration Aspiration Scale: 1 - no aspiration, contrast does not enter airway  Response to Aspiration:  N/a  Strategies and Compensations: not applicable  Diagnostic Statement: Pt swallow WFL. No penetration or aspiration noted. Pt swallow initiated at the level of the valleculae with a larger bite, WFL.    VFSS Eval: Barium Tablet  Mode of Presentation: cup, self-fed   Order of Presentation: 12  Preparatory Phase: WFL  Oral Phase: WFL  Bolus Location When Swallow Initiated: posterior angle of ramus  Pharyngeal Phase: WFL  Rosenbeck s Penetration Aspiration Scale: 1 - no aspiration, contrast does not enter  airway  Response to Aspiration:  N/a  Strategies and Compensations: not applicable  Diagnostic Statement: Pt swallow WFL. No      ESOPHAGEAL PHASE OF SWALLOW  esophageal sweep performed during today's videofluoroscopic exam  please refer to radiologist's report for details     SWALLOW ASSESSMENT CLINICAL IMPRESSIONS AND RATIONALE  Diet Consistency Recommendations: oral diet, thin liquids (level 0), regular diet    Recommended Feeding/Eating Techniques: small bolus size, no straws   Medication Administration Recommendations: Liquid or food carrier  Instrumental Assessment Recommendations:      Assessment & Plan   CLINICAL IMPRESSIONS   Medical Diagnosis: Dysphagia    Treatment Diagnosis: Functional swallow   Impression/Assessment: Completed VFSS with patient in upright position and both lateral views and AP completed. Patient fed herself with cup, spoon, and followed directions.  Patient followed directions without any difficulties. Trials included thin and mildly thick thickened liquids, and pureed, soft, and regular food trials and a 1.3 cm barium tablet. Swallow is within functional limits. No aspiration or penetration was noted. Overall, very minimal swallowing difficulties were noted in the oral, transit, and pharyngeal stages of the swallow. AP propulsion WNLs and effective. With larger bites pt often initiated swallow at the level of the valleculae, is considered WFL. Pt edentulous, pt reported that she has upper and lower dentures, but they don't fit well so she does not wear them while she eats. Dentures were not present during evaluation. Bolus was effectively masticated and held in center of oral cavity until swallow was initiated and then swallow was triggered in timely manner with pharyngeal area clearing well following all trials.  Backflow was noted from gagging with consecutive sips of thin liquid and a large bite of puree. Pt managed backflow without aspiration or penetration occurring.  Across all  consistencies, there was very minimal oral or pharyngeal residual noted and this is to be considered WFLs. AP view showed no difficulties swallowing barium tablet with consecutive sips of water. No cricopharyngeal bar noted. Esophageal sweep completed and see radiologist's report for details.  Recommend a diet consistency of regular foods and thin liquids, avoiding known choking hazards.  Swallowing strategies recommended include small sips and bites and using food to help clear pills if they feel like they are sticking. Thank you for this appropriate referral.      PLAN OF CARE  Treatment Interventions:  Eval only        Long Term Goals:          Frequency of Treatment: Eval only  Duration of Treatment:       Recommended Referrals to Other Professionals:   Education Assessment:   Learner/Method: Patient  Education Comments: Education pt on results    Risks and benefits of evaluation/treatment have been explained.   Patient/Family/caregiver agrees with Plan of Care.     Evaluation Time:    SLP Eval: VideoFluoroscopic Swallow function Minutes (65546): 50         Signing Clinician: VAN HERNANDEZ [facility]                                                                                   OUTPATIENT SPEECH LANGUAGE PATHOLOGY      PLAN OF TREATMENT FOR OUTPATIENT REHABILITATION   Patient's Last Name, First Name, Eduarda Theodore YOB: 1980   Provider's Name   [facility]   Medical Record No.  [id number]     Onset Date: 04/26/24 Start of Care Date:       Medical Diagnosis:  Dysphagia      SLP Treatment Diagnosis: Functional swallow  Plan of Treatment  Frequency/Duration: Eval only  /       Certification date from     To            See note for plan of treatment details and functional goals     VAN HERNANDEZ                         I CERTIFY THE NEED FOR THESE SERVICES FURNISHED UNDER        THIS PLAN OF TREATMENT AND WHILE  UNDER MY CARE     (Physician attestation of this document indicates review and certification of the therapy plan).              Referring Provider:  Christoph Krishnamurthy    Initial Assessment  See Epic Evaluation-

## 2024-05-01 ENCOUNTER — PATIENT OUTREACH (OUTPATIENT)
Dept: ONCOLOGY | Facility: CLINIC | Age: 44
End: 2024-05-01
Payer: COMMERCIAL

## 2024-05-01 ENCOUNTER — HOSPITAL ENCOUNTER (OUTPATIENT)
Facility: CLINIC | Age: 44
Setting detail: OBSERVATION
Discharge: HOME | End: 2024-05-07
Attending: STUDENT IN AN ORGANIZED HEALTH CARE EDUCATION/TRAINING PROGRAM | Admitting: STUDENT IN AN ORGANIZED HEALTH CARE EDUCATION/TRAINING PROGRAM
Payer: COMMERCIAL

## 2024-05-01 ENCOUNTER — APPOINTMENT (OUTPATIENT)
Dept: CT IMAGING | Facility: CLINIC | Age: 44
End: 2024-05-01
Attending: STUDENT IN AN ORGANIZED HEALTH CARE EDUCATION/TRAINING PROGRAM
Payer: COMMERCIAL

## 2024-05-01 DIAGNOSIS — L03.311 CELLULITIS OF ABDOMINAL WALL: ICD-10-CM

## 2024-05-01 DIAGNOSIS — G89.18 POST-OPERATIVE PAIN: ICD-10-CM

## 2024-05-01 DIAGNOSIS — K59.03 DRUG-INDUCED CONSTIPATION: ICD-10-CM

## 2024-05-01 DIAGNOSIS — K92.2 UPPER GI BLEED: ICD-10-CM

## 2024-05-01 DIAGNOSIS — R10.9 ABDOMINAL PAIN, UNSPECIFIED ABDOMINAL LOCATION: ICD-10-CM

## 2024-05-01 DIAGNOSIS — Z98.84 H/O GASTRIC BYPASS: ICD-10-CM

## 2024-05-01 DIAGNOSIS — R11.10 REGURGITATION OF FOOD: ICD-10-CM

## 2024-05-01 DIAGNOSIS — J45.909 UNCOMPLICATED ASTHMA, UNSPECIFIED ASTHMA SEVERITY, UNSPECIFIED WHETHER PERSISTENT: ICD-10-CM

## 2024-05-01 DIAGNOSIS — R53.81 PHYSICAL DECONDITIONING: ICD-10-CM

## 2024-05-01 DIAGNOSIS — R79.89 LACTATE BLOOD INCREASE: ICD-10-CM

## 2024-05-01 DIAGNOSIS — G89.18 POSTOPERATIVE PAIN: ICD-10-CM

## 2024-05-01 DIAGNOSIS — Z93.1 G TUBE FEEDINGS (H): ICD-10-CM

## 2024-05-01 DIAGNOSIS — R10.12 LUQ ABDOMINAL PAIN: Primary | ICD-10-CM

## 2024-05-01 DIAGNOSIS — Z11.52 ENCOUNTER FOR SCREENING FOR COVID-19: ICD-10-CM

## 2024-05-01 LAB
ALBUMIN SERPL BCG-MCNC: 4.3 G/DL (ref 3.5–5.2)
ALP SERPL-CCNC: 103 U/L (ref 40–150)
ALT SERPL W P-5'-P-CCNC: 7 U/L (ref 0–50)
ANION GAP SERPL CALCULATED.3IONS-SCNC: 18 MMOL/L (ref 7–15)
AST SERPL W P-5'-P-CCNC: 22 U/L (ref 0–45)
BASOPHILS # BLD AUTO: 0.1 10E3/UL (ref 0–0.2)
BASOPHILS NFR BLD AUTO: 1 %
BILIRUB SERPL-MCNC: 0.3 MG/DL
BUN SERPL-MCNC: 10.6 MG/DL (ref 6–20)
CALCIUM SERPL-MCNC: 8.6 MG/DL (ref 8.6–10)
CHLORIDE SERPL-SCNC: 97 MMOL/L (ref 98–107)
CREAT SERPL-MCNC: 0.74 MG/DL (ref 0.51–0.95)
DEPRECATED HCO3 PLAS-SCNC: 22 MMOL/L (ref 22–29)
EGFRCR SERPLBLD CKD-EPI 2021: >90 ML/MIN/1.73M2
EOSINOPHIL # BLD AUTO: 0.1 10E3/UL (ref 0–0.7)
EOSINOPHIL NFR BLD AUTO: 1 %
ERYTHROCYTE [DISTWIDTH] IN BLOOD BY AUTOMATED COUNT: 18.6 % (ref 10–15)
GLUCOSE SERPL-MCNC: 75 MG/DL (ref 70–99)
HCT VFR BLD AUTO: 35.2 % (ref 35–47)
HGB BLD-MCNC: 11.3 G/DL (ref 11.7–15.7)
IMM GRANULOCYTES # BLD: 0 10E3/UL
IMM GRANULOCYTES NFR BLD: 0 %
LACTATE SERPL-SCNC: 3.5 MMOL/L (ref 0.7–2)
LACTATE SERPL-SCNC: 4.1 MMOL/L (ref 0.7–2)
LIPASE SERPL-CCNC: 17 U/L (ref 13–60)
LYMPHOCYTES # BLD AUTO: 2.6 10E3/UL (ref 0.8–5.3)
LYMPHOCYTES NFR BLD AUTO: 28 %
MCH RBC QN AUTO: 26 PG (ref 26.5–33)
MCHC RBC AUTO-ENTMCNC: 32.1 G/DL (ref 31.5–36.5)
MCV RBC AUTO: 81 FL (ref 78–100)
MONOCYTES # BLD AUTO: 0.6 10E3/UL (ref 0–1.3)
MONOCYTES NFR BLD AUTO: 7 %
NEUTROPHILS # BLD AUTO: 6 10E3/UL (ref 1.6–8.3)
NEUTROPHILS NFR BLD AUTO: 63 %
NRBC # BLD AUTO: 0 10E3/UL
NRBC BLD AUTO-RTO: 0 /100
PLATELET # BLD AUTO: 320 10E3/UL (ref 150–450)
POTASSIUM SERPL-SCNC: 4.3 MMOL/L (ref 3.4–5.3)
PROT SERPL-MCNC: 7.1 G/DL (ref 6.4–8.3)
RBC # BLD AUTO: 4.34 10E6/UL (ref 3.8–5.2)
SODIUM SERPL-SCNC: 137 MMOL/L (ref 135–145)
WBC # BLD AUTO: 9.5 10E3/UL (ref 4–11)

## 2024-05-01 PROCEDURE — 96366 THER/PROPH/DIAG IV INF ADDON: CPT | Performed by: STUDENT IN AN ORGANIZED HEALTH CARE EDUCATION/TRAINING PROGRAM

## 2024-05-01 PROCEDURE — 96367 TX/PROPH/DG ADDL SEQ IV INF: CPT

## 2024-05-01 PROCEDURE — 258N000003 HC RX IP 258 OP 636: Performed by: STUDENT IN AN ORGANIZED HEALTH CARE EDUCATION/TRAINING PROGRAM

## 2024-05-01 PROCEDURE — 82728 ASSAY OF FERRITIN: CPT | Performed by: STUDENT IN AN ORGANIZED HEALTH CARE EDUCATION/TRAINING PROGRAM

## 2024-05-01 PROCEDURE — G0378 HOSPITAL OBSERVATION PER HR: HCPCS

## 2024-05-01 PROCEDURE — 250N000011 HC RX IP 250 OP 636: Performed by: STUDENT IN AN ORGANIZED HEALTH CARE EDUCATION/TRAINING PROGRAM

## 2024-05-01 PROCEDURE — 96365 THER/PROPH/DIAG IV INF INIT: CPT | Mod: 59 | Performed by: STUDENT IN AN ORGANIZED HEALTH CARE EDUCATION/TRAINING PROGRAM

## 2024-05-01 PROCEDURE — 99285 EMERGENCY DEPT VISIT HI MDM: CPT | Mod: 25 | Performed by: STUDENT IN AN ORGANIZED HEALTH CARE EDUCATION/TRAINING PROGRAM

## 2024-05-01 PROCEDURE — 99285 EMERGENCY DEPT VISIT HI MDM: CPT | Performed by: STUDENT IN AN ORGANIZED HEALTH CARE EDUCATION/TRAINING PROGRAM

## 2024-05-01 PROCEDURE — 85049 AUTOMATED PLATELET COUNT: CPT | Performed by: STUDENT IN AN ORGANIZED HEALTH CARE EDUCATION/TRAINING PROGRAM

## 2024-05-01 PROCEDURE — 36415 COLL VENOUS BLD VENIPUNCTURE: CPT | Performed by: STUDENT IN AN ORGANIZED HEALTH CARE EDUCATION/TRAINING PROGRAM

## 2024-05-01 PROCEDURE — 74177 CT ABD & PELVIS W/CONTRAST: CPT | Mod: 26 | Performed by: RADIOLOGY

## 2024-05-01 PROCEDURE — 84100 ASSAY OF PHOSPHORUS: CPT | Performed by: PHYSICIAN ASSISTANT

## 2024-05-01 PROCEDURE — 83735 ASSAY OF MAGNESIUM: CPT | Performed by: PHYSICIAN ASSISTANT

## 2024-05-01 PROCEDURE — 83550 IRON BINDING TEST: CPT | Performed by: STUDENT IN AN ORGANIZED HEALTH CARE EDUCATION/TRAINING PROGRAM

## 2024-05-01 PROCEDURE — 84443 ASSAY THYROID STIM HORMONE: CPT | Performed by: STUDENT IN AN ORGANIZED HEALTH CARE EDUCATION/TRAINING PROGRAM

## 2024-05-01 PROCEDURE — 82607 VITAMIN B-12: CPT | Performed by: STUDENT IN AN ORGANIZED HEALTH CARE EDUCATION/TRAINING PROGRAM

## 2024-05-01 PROCEDURE — 83605 ASSAY OF LACTIC ACID: CPT | Performed by: STUDENT IN AN ORGANIZED HEALTH CARE EDUCATION/TRAINING PROGRAM

## 2024-05-01 PROCEDURE — 80053 COMPREHEN METABOLIC PANEL: CPT | Performed by: STUDENT IN AN ORGANIZED HEALTH CARE EDUCATION/TRAINING PROGRAM

## 2024-05-01 PROCEDURE — 96376 TX/PRO/DX INJ SAME DRUG ADON: CPT | Performed by: STUDENT IN AN ORGANIZED HEALTH CARE EDUCATION/TRAINING PROGRAM

## 2024-05-01 PROCEDURE — 86140 C-REACTIVE PROTEIN: CPT | Performed by: STUDENT IN AN ORGANIZED HEALTH CARE EDUCATION/TRAINING PROGRAM

## 2024-05-01 PROCEDURE — 83690 ASSAY OF LIPASE: CPT | Performed by: STUDENT IN AN ORGANIZED HEALTH CARE EDUCATION/TRAINING PROGRAM

## 2024-05-01 PROCEDURE — 87040 BLOOD CULTURE FOR BACTERIA: CPT | Performed by: STUDENT IN AN ORGANIZED HEALTH CARE EDUCATION/TRAINING PROGRAM

## 2024-05-01 PROCEDURE — 82306 VITAMIN D 25 HYDROXY: CPT | Performed by: STUDENT IN AN ORGANIZED HEALTH CARE EDUCATION/TRAINING PROGRAM

## 2024-05-01 PROCEDURE — 96375 TX/PRO/DX INJ NEW DRUG ADDON: CPT | Performed by: STUDENT IN AN ORGANIZED HEALTH CARE EDUCATION/TRAINING PROGRAM

## 2024-05-01 PROCEDURE — 74177 CT ABD & PELVIS W/CONTRAST: CPT

## 2024-05-01 RX ORDER — ONDANSETRON 2 MG/ML
4 INJECTION INTRAMUSCULAR; INTRAVENOUS EVERY 30 MIN PRN
Status: COMPLETED | OUTPATIENT
Start: 2024-05-01 | End: 2024-05-02

## 2024-05-01 RX ORDER — HYDROMORPHONE HYDROCHLORIDE 1 MG/ML
0.5 INJECTION, SOLUTION INTRAMUSCULAR; INTRAVENOUS; SUBCUTANEOUS
Status: COMPLETED | OUTPATIENT
Start: 2024-05-01 | End: 2024-05-01

## 2024-05-01 RX ORDER — VANCOMYCIN HYDROCHLORIDE 1 G/200ML
1000 INJECTION, SOLUTION INTRAVENOUS EVERY 12 HOURS
Status: DISCONTINUED | OUTPATIENT
Start: 2024-05-02 | End: 2024-05-02

## 2024-05-01 RX ORDER — IOPAMIDOL 755 MG/ML
92 INJECTION, SOLUTION INTRAVASCULAR ONCE
Status: COMPLETED | OUTPATIENT
Start: 2024-05-01 | End: 2024-05-01

## 2024-05-01 RX ORDER — HYDROMORPHONE HYDROCHLORIDE 1 MG/ML
0.5 INJECTION, SOLUTION INTRAMUSCULAR; INTRAVENOUS; SUBCUTANEOUS EVERY 30 MIN PRN
Status: COMPLETED | OUTPATIENT
Start: 2024-05-01 | End: 2024-05-01

## 2024-05-01 RX ORDER — HYDROMORPHONE HYDROCHLORIDE 1 MG/ML
0.5 INJECTION, SOLUTION INTRAMUSCULAR; INTRAVENOUS; SUBCUTANEOUS ONCE
Status: COMPLETED | OUTPATIENT
Start: 2024-05-01 | End: 2024-05-01

## 2024-05-01 RX ORDER — CEFTRIAXONE 2 G/1
2 INJECTION, POWDER, FOR SOLUTION INTRAMUSCULAR; INTRAVENOUS ONCE
Status: COMPLETED | OUTPATIENT
Start: 2024-05-01 | End: 2024-05-01

## 2024-05-01 RX ADMIN — VANCOMYCIN HYDROCHLORIDE 1750 MG: 1 INJECTION, POWDER, LYOPHILIZED, FOR SOLUTION INTRAVENOUS at 18:28

## 2024-05-01 RX ADMIN — HYDROMORPHONE HYDROCHLORIDE 0.5 MG: 1 INJECTION, SOLUTION INTRAMUSCULAR; INTRAVENOUS; SUBCUTANEOUS at 23:23

## 2024-05-01 RX ADMIN — SODIUM CHLORIDE, POTASSIUM CHLORIDE, SODIUM LACTATE AND CALCIUM CHLORIDE 1000 ML: 600; 310; 30; 20 INJECTION, SOLUTION INTRAVENOUS at 17:00

## 2024-05-01 RX ADMIN — HYDROMORPHONE HYDROCHLORIDE 0.5 MG: 1 INJECTION, SOLUTION INTRAMUSCULAR; INTRAVENOUS; SUBCUTANEOUS at 19:46

## 2024-05-01 RX ADMIN — HYDROMORPHONE HYDROCHLORIDE 0.5 MG: 1 INJECTION, SOLUTION INTRAMUSCULAR; INTRAVENOUS; SUBCUTANEOUS at 20:22

## 2024-05-01 RX ADMIN — ONDANSETRON 4 MG: 2 INJECTION INTRAMUSCULAR; INTRAVENOUS at 17:00

## 2024-05-01 RX ADMIN — HYDROMORPHONE HYDROCHLORIDE 0.5 MG: 1 INJECTION, SOLUTION INTRAMUSCULAR; INTRAVENOUS; SUBCUTANEOUS at 22:26

## 2024-05-01 RX ADMIN — HYDROMORPHONE HYDROCHLORIDE 0.5 MG: 1 INJECTION, SOLUTION INTRAMUSCULAR; INTRAVENOUS; SUBCUTANEOUS at 18:01

## 2024-05-01 RX ADMIN — CEFTRIAXONE SODIUM 2 G: 2 INJECTION, POWDER, FOR SOLUTION INTRAMUSCULAR; INTRAVENOUS at 17:21

## 2024-05-01 RX ADMIN — IOPAMIDOL 92 ML: 755 INJECTION, SOLUTION INTRAVENOUS at 18:14

## 2024-05-01 ASSESSMENT — ACTIVITIES OF DAILY LIVING (ADL)
ADLS_ACUITY_SCORE: 37

## 2024-05-01 ASSESSMENT — COLUMBIA-SUICIDE SEVERITY RATING SCALE - C-SSRS
6. HAVE YOU EVER DONE ANYTHING, STARTED TO DO ANYTHING, OR PREPARED TO DO ANYTHING TO END YOUR LIFE?: NO
1. IN THE PAST MONTH, HAVE YOU WISHED YOU WERE DEAD OR WISHED YOU COULD GO TO SLEEP AND NOT WAKE UP?: NO
2. HAVE YOU ACTUALLY HAD ANY THOUGHTS OF KILLING YOURSELF IN THE PAST MONTH?: NO

## 2024-05-01 NOTE — ED TRIAGE NOTES
"Chief Complaint: Patient presents to the ED for evaluation of abdominal pain r/t foul smelling g-tube site drainage and swelling.     Onset of Symptoms: Ongoing, few days of site drainage    Home Remedies: Regular prescribed medications.    Triage Vital Signs: /84   Pulse 76   Temp 98.1  F (36.7  C) (Oral)   Resp 18   Ht 1.626 m (5' 4\")   Wt 68.9 kg (152 lb)   SpO2 100%   BMI 26.09 kg/m      Daren Espinal RN  05/01/2024     Triage Assessment (Adult)       Row Name 05/01/24 1600          Triage Assessment    Airway WDL WDL        Respiratory WDL    Respiratory WDL WDL        Skin Circulation/Temperature WDL    Skin Circulation/Temperature WDL WDL        Cardiac WDL    Cardiac WDL WDL     Cardiac Rhythm NSR        Peripheral/Neurovascular WDL    Peripheral Neurovascular WDL WDL        Cognitive/Neuro/Behavioral WDL    Cognitive/Neuro/Behavioral WDL WDL        Fercho Coma Scale    Best Eye Response 4-->(E4) spontaneous     Best Motor Response 6-->(M6) obeys commands     Best Verbal Response 5-->(V5) oriented     Fercho Coma Scale Score 15                     "

## 2024-05-01 NOTE — PROGRESS NOTES
"[facility]: Surgical Oncology Cancer Care Short Note                                     Discussion with Patient:                                                       INBOUND CALL:     Received call from Eduarda. She states she has a foul smelling, yellow discharge  from around her G-tube insertion site. She stated this started several days ago. She stated it is \"leaking badly and soaking through her clothing several times a day.\"     Eduarda is also experiencing an increase in pain in her left abdomen, around her G-tube site. She stated she is out of dilaudid. It shared it has been \"really bad\" yesterday and today.     Eduarda is also experiencing significant bloating.She stated her stomach is very distended. The bloating is constant for her and has become worse over the past several days. She is no longer giving herself tube feeds. Her last TF was approximately 4/24/2024. She confirmed receiving the [employer] delivery from [facility]. This was sent to her as a replacement to her previous TF in the hopes it caused less bloating. However, Eduarda stated it caused her to vomit. She stated she cannot tolerate [employer].     Per Eduarda, she is taking very little in by mouth. Eating solid food causes her to have vomiting and diarrhea within 10-20 minutes. Eduarda stated her weight is currently 138 lbs. This is a 14 lb weight loss since 3/11/2024.     Reviewed with Eduarda our recommendation would be to go to the [facility] ED. Discussed Eduarda's concerns with Gerda London PA-C. She agreed with the recommendation for the ED. Eduarda verbalized agreement to the plan and will come to the [facility] ED.       Meghana Mazariegos RNCC  [facility]   Surgical Oncology     Approximately 15 minutes was spent in conversation with the patient/caregiver.    "

## 2024-05-01 NOTE — PROGRESS NOTES
"[facility]: Surgical Oncology Cancer Care Short Note                                     Discussion with Patient:                                                       INBOUND CALL:     Received call from Eduarda. She stated she is on her way to the [facility] ED and \"the pain is getting worse.\" Assured Eduarda she has made the right decision going to the ED. When asked about wait time, I let Eduarda know I was not able to say long she would have to wait in the ED to be seen, but they would address her pain, as well as her other concerns. Informed Eduarda the Surgical Oncology team would likely be notified of her admission and would possible round on her later today or tomorrow morning.      Message sent to Dr. Krishnamurthy informing him Eduarda was going to the [facility] ED for pain, drainage at the G-Tube site and poor PO intake.        Meghana Mazariegos RNCC  [facility]   Surgical Oncology     Approximately 5 minutes was spent in conversation with the patient/caregiver.    "

## 2024-05-01 NOTE — ED PROVIDER NOTES
[place] EMERGENCY DEPARTMENT ([facility])    5/01/24       ED PROVIDER NOTE    History     Chief Complaint   Patient presents with    Abdominal Pain    Gtube Problem     HPI  Eduarda Nogueira is a 43 year old female with PMH notable for asthma, left parietal ischemic embolic stroke (2022), jejunal adenocarcinoma s/p partial liver resection, partial colon/small bowel/gastric resection (02/14/24) now with G-tube in place who presents to the Emergency Department with abdominal pain and foul-smelling discharge from G-tube site.    Patient notes that over the last week or so she is been having increasing swelling around her G-tube site.  It has been tender especially whenever she touches the area, and she has pain anytime that she tries to use it for feeds.  Because of this she has been avoiding feeding although she does note that she has been able to flush it at times from meds, and does not have any problems with actually flushing.  Today however noticed that there was increasing discharge from it, and although it has been red and she been evaluated for this recently and started on antifungals, the redness has not significantly improved and she is noting significant worsening pain around the area as well as drainage.    No fevers, but patient does note that she has had chills at home.    Past Medical History  Past Medical History:   Diagnosis Date    Anemia     No Comments Provided    Anxiety state     No Comments Provided    Arthropathy     No Comments Provided    Asthma     No Comments Provided    Backache     No Comments Provided    Edema     No Comments Provided    Esophageal reflux     No Comments Provided    Female stress incontinence     No Comments Provided    Hypothyroidism     No Comments Provided    Irregular menstrual cycle     No Comments Provided    Irritable colon     No Comments Provided    Migraine     No Comments Provided    Morbid obesity (H)     No Comments Provided    Other chronic  nonalcoholic liver disease     No Comments Provided    Other depressive disorder     No Comments Provided    Other malaise and fatigue     No Comments Provided    Pain in joint     hips, knees, ankles, feet, neck    Restless legs syndrome     self-diagnosed    Shortness of breath     No Comments Provided    Synovial cyst of popliteal space     No Comments Provided    Vitamin D deficiency     Rx 3/14, re check 6/14     Past Surgical History:   Procedure Laterality Date    ATTEMPTED ARTHROSCOPY      x3, rt knee    COLONOSCOPY N/A 2/13/2024    Procedure: Colonoscopy;  Surgeon: Cuauhtemoc Denis MD;  Location: [facility]    ESOPHAGOSCOPY, GASTROSCOPY, DUODENOSCOPY (EGD), COMBINED N/A 1/30/2024    Procedure: ESOPHAGOGASTRODUODENOSCOPY, WITH BIOPSY;  Surgeon: Precious Albarran MD;  Location: [facility]    EXTRACTION(S) DENTAL      No Comments Provided    IR FINE NEEDLE ASPIRATION W ULTRASOUND  2/26/2024    LAPAROSCOPIC CHOLECYSTECTOMY      2010    LAPAROTOMY EXPLORATORY N/A 2/14/2024    Procedure: EXPLORATORY LAPAROTOMY;  Surgeon: Christoph Krishnamurthy MD;  Location: [facility] OR    OSTEOTOMY FEMUR DISTAL      right    RESECT SMALL BOWEL WITHOUT OSTOMY N/A 2/14/2024    Procedure: SMALL BOWEL RESECTION, PARTIAL COLON RESECTION, PARTIAL LIVER RESECTION, PARTIAL GASTRIC RESECTION, GASTROSTOMY TUBE PLACEMENT;  Surgeon: Christoph Krishnamurthy MD;  Location: [facility] OR     No current outpatient medications on file.    Allergies   Allergen Reactions    Acetaminophen Hives, Itching, Rash and Shortness Of Breath     Several Rx's for Norco in 2021 & 22    No Clinical Screening - See Comments Anaphylaxis     steroids    Gabapentin Unknown and Itching    Hydrocodone-Acetaminophen Itching     Several Rx's for Norco in 2021 & 22    Iodinated Contrast Media Unknown and Nausea and Vomiting     Extremely anxious, vomited once after CT. No wheezing or SOB.    Latex Itching    Morphine And Related Itching     Several Rx's for Norco in 2021 & 22    Nsaids Other (See  "Comments)     H/o german-n-y gastric bypass\". AVOID NSAIDs and aspirin due to risk of gastric and/or G-J anastomotic ulcers. If Eduarda must be on short course of NSAIDs or aspirin, use enteric coated if possible and use PPI // Ira Hung RN, Bariatric Nurse Clinician, [facility] 3/23/2022    Oxycodone-Acetaminophen Itching     Several Rx's for Norco in 2021 & 22     Family History  Family History   Problem Relation Age of Onset    Diabetes Mother         Diabetes    Diabetes Father         Diabetes    Other - See Comments Father         colon clot then DVT inpt    Other - See Comments Paternal Grandfather         PE     Social History   Social History     Tobacco Use    Smoking status: Every Day     Current packs/day: 0.30     Average packs/day: 0.3 packs/day for 11.1 years (3.3 ttl pk-yrs)     Types: Cigarettes     Start date: 3/18/2013     Passive exposure: Current    Smokeless tobacco: Never    Tobacco comments:     3 cigarettes per day    Substance Use Topics    Alcohol use: Yes     Comment: Alcoholic Drinks/day: Quit 3/2013    Drug use: Never     Types: Other     Comment: Drug use: No         A complete review of systems was performed with pertinent positives and negatives noted in the HPI, and all other systems negative.    Physical Exam   BP: 123/84  Pulse: 76  Temp: 98.1  F (36.7  C)  Resp: 18  Height: 162.6 cm (5' 4\")  Weight: 68.9 kg (152 lb)  SpO2: 100 %  Physical Exam  GEN: Well appearing, non toxic, cooperative  HEENT: normocephalic and atraumatic, PERRLA, EOMI  CV: well-perfused, normal skin color for ethnicity, regular rate and rhythm  PULM: breathing comfortably, in no respiratory distress, clear to auscultation upper and lower lung fields  ABD: nondistended, G-tube site in the left upper quadrant with surrounding erythema, some satellite lesions as well, but some increasingly purulent discharge around the stoma of her G-tube site with tenderness to palpation in this area as " well as some underlying fluctuance  EXT: Full range of motion.  No edema.  NEURO: awake, conversant, grossly normal bilateral upper and lower extremity strength & ROM   SKIN: No rashes, ecchymosis, or lacerations  PSYCH: Calm and cooperative, interactive        ED Course, Procedures, & Data      Procedures          Results for orders placed or performed during the hospital encounter of 05/01/24   CT Abdomen Pelvis w Contrast     Status: None    Narrative    EXAMINATION: CT ABDOMEN PELVIS W CONTRAST, 5/1/2024 6:28 PM    INDICATION: pain, swelling, drainage from g tube site    COMPARISON STUDY: CT 2/25/2024    TECHNIQUE: CT scan of the abdomen and pelvis was performed on  multidetector CT scanner using volumetric acquisition technique and  images were reconstructed in multiple planes with variable thickness  and reviewed on dedicated workstations.     CONTRAST: iopamidol (ISOVUE-370) solution 92 mL injected IV without  oral contrast    CT scan radiation dose is optimized to minimum requisite dose using  automated dose modulation techniques.    FINDINGS:    Lower thorax: The visualized lung bases are clear.    Liver: Diffuse hypoattenuation of the liver. Multiple simple cysts  throughout the liver are not significantly changed from prior. Focal  hepatic steatosis along the falciform ligament. Decreased size of the  cystic collection on the medial aspect of the left hepatic lobe  containing a punctate calcification, with resolved gaseous component.  Stable flash filling hemangioma in hepatic segment 2. Stable  postsurgical changes of partial left hepatic lobe resection. No  intrahepatic biliary ductal dilation.    Biliary System: Status post cholecystectomy. No extrahepatic biliary  ductal dilation.    Pancreas: No mass or pancreatic ductal dilation.    Adrenal glands: No mass or nodules    Spleen: Normal.    Kidneys: Polycystic kidneys with cysts throughout the kidneys. No  suspicious mass, obstructing calculus or  hydronephrosis.    Gastrointestinal tract: Percutaneous gastrostomy tube terminates in  the stomach. Postsurgical changes of a small bowel resection, partial  colonic resection, partial gastrectomy with Addis-en-Y gastric bypass.  No abnormally dilated loops of bowel.    Mesentery/peritoneum/retroperitoneum: No mass. No free fluid or air.    Lymph nodes: No significant lymphadenopathy.    Vasculature: Patent major abdominal vasculature.    Pelvis: Urinary bladder is normal.   Uterus and adnexa within normal  limits.    Osseous structures: No aggressive or acute osseous lesion.      Soft tissues: Left ventral abdominal wall skin thickening with  subcutaneous fat stranding along the course of percutaneous  gastrostomy tube. No focal fluid collection. No soft tissue gas.   Annual ventral abdominal wall incision with resolved subcutaneous  tissue collection deep to the incision.      Impression    IMPRESSION:   1. Subcutaneous soft tissue fat stranding along the course of the left  upper quadrant percutaneous gastrostomy tube with surrounding skin  thickening, suspicious for infection/inflammation. No focal fluid  collection. No soft tissue gas.  2. No acute intra-abdominal pathology. Stable postoperative changes of  partial gastrectomy, partial hepatectomy, small bowel resection,  cholecystectomy.  3. Additional chronic and incidental findings as detailed in the body  of the report.    I have personally reviewed the examination and initial interpretation  and I agree with the findings.    DAJA JOSE MD         SYSTEM ID:  [id number]   XR Abdomen Port 1 View     Status: None    Narrative    EXAMINATION:  XR ABDOMEN PORT 1 VIEW 5/2/2024 11:11 AM     COMPARISON: CT 5/1/2024.    HISTORY: abdominal pain, c/f ileus.    TECHNIQUE: Frontal view of the abdomen.    FINDINGS: Scattered surgical clips about the upper abdomen and right  lower quadrant. No abnormally dilated loops of bowel.No pneumatosis or  portal venous gas.  The  lung bases are unremarkable. Metallic  appearing opacities projecting over the central abdomen and right  upper quadrant, possibly external to the patient, consider  correlation. Contrast opacification of the urinary bladder, possibly  from previously administered IV contrast.      Impression    IMPRESSION: No significantly dilated air-filled bowel loops.    I have personally reviewed the examination and initial interpretation  and I agree with the findings.    EMILY ALEGRIA MD         SYSTEM ID:  [id number]   Comprehensive metabolic panel     Status: Abnormal   Result Value Ref Range    Sodium 137 135 - 145 mmol/L    Potassium 4.3 3.4 - 5.3 mmol/L    Carbon Dioxide (CO2) 22 22 - 29 mmol/L    Anion Gap 18 (H) 7 - 15 mmol/L    Urea Nitrogen 10.6 6.0 - 20.0 mg/dL    Creatinine 0.74 0.51 - 0.95 mg/dL    GFR Estimate >90 >60 mL/min/1.73m2    Calcium 8.6 8.6 - 10.0 mg/dL    Chloride 97 (L) 98 - 107 mmol/L    Glucose 75 70 - 99 mg/dL    Alkaline Phosphatase 103 40 - 150 U/L    AST 22 0 - 45 U/L    ALT 7 0 - 50 U/L    Protein Total 7.1 6.4 - 8.3 g/dL    Albumin 4.3 3.5 - 5.2 g/dL    Bilirubin Total 0.3 <=1.2 mg/dL   Lipase     Status: Normal   Result Value Ref Range    Lipase 17 13 - 60 U/L   Lactic acid whole blood     Status: Abnormal   Result Value Ref Range    Lactic Acid 4.1 (HH) 0.7 - 2.0 mmol/L   UA with Microscopic reflex to Culture     Status: Abnormal    Specimen: Urine, Clean Catch   Result Value Ref Range    Color Urine Yellow Colorless, Straw, Light Yellow, Yellow    Appearance Urine Clear Clear    Glucose Urine Negative Negative mg/dL    Bilirubin Urine Negative Negative    Ketones Urine 40 (A) Negative mg/dL    Specific Gravity Urine 1.010 1.003 - 1.035    Blood Urine Negative Negative    pH Urine 6.0 5.0 - 7.0    Protein Albumin Urine 30 (A) Negative mg/dL    Urobilinogen Urine Normal Normal, 2.0 mg/dL    Nitrite Urine Negative Negative    Leukocyte Esterase Urine Negative Negative    Mucus Urine Present  (A) None Seen /LPF    RBC Urine 9 (H) <=2 /HPF    WBC Urine 1 <=5 /HPF    Squamous Epithelials Urine 8 (H) <=1 /HPF    Narrative    Urine Culture not indicated   CBC with platelets and differential     Status: Abnormal   Result Value Ref Range    WBC Count 9.5 4.0 - 11.0 10e3/uL    RBC Count 4.34 3.80 - 5.20 10e6/uL    Hemoglobin 11.3 (L) 11.7 - 15.7 g/dL    Hematocrit 35.2 35.0 - 47.0 %    MCV 81 78 - 100 fL    MCH 26.0 (L) 26.5 - 33.0 pg    MCHC 32.1 31.5 - 36.5 g/dL    RDW 18.6 (H) 10.0 - 15.0 %    Platelet Count 320 150 - 450 10e3/uL    % Neutrophils 63 %    % Lymphocytes 28 %    % Monocytes 7 %    % Eosinophils 1 %    % Basophils 1 %    % Immature Granulocytes 0 %    NRBCs per 100 WBC 0 <1 /100    Absolute Neutrophils 6.0 1.6 - 8.3 10e3/uL    Absolute Lymphocytes 2.6 0.8 - 5.3 10e3/uL    Absolute Monocytes 0.6 0.0 - 1.3 10e3/uL    Absolute Eosinophils 0.1 0.0 - 0.7 10e3/uL    Absolute Basophils 0.1 0.0 - 0.2 10e3/uL    Absolute Immature Granulocytes 0.0 <=0.4 10e3/uL    Absolute NRBCs 0.0 10e3/uL   Lactic acid whole blood     Status: Abnormal   Result Value Ref Range    Lactic Acid 3.5 (H) 0.7 - 2.0 mmol/L   CRP inflammation     Status: Normal   Result Value Ref Range    CRP Inflammation <3.00 <5.00 mg/L   Asymptomatic Influenza A/B, RSV, & SARS-CoV2 PCR (COVID-19) Nasopharyngeal     Status: Normal    Specimen: Nasopharyngeal; Swab   Result Value Ref Range    Influenza A PCR Negative Negative    Influenza B PCR Negative Negative    RSV PCR Negative Negative    SARS CoV2 PCR Negative Negative    Narrative    Testing was performed using the Xpert Xpress CoV2/Flu/RSV Assay on the Cepheid GeneXpert Instrument. This test should be ordered for the detection of SARS-CoV-2, influenza, and RSV viruses in individuals who meet clinical and/or epidemiological criteria. Test performance is unknown in asymptomatic patients. This test is for in vitro diagnostic use under the FDA EUA for laboratories certified under CLIA to  perform high or moderate complexity testing. This test has not been FDA cleared or approved. A negative result does not rule out the presence of PCR inhibitors in the specimen or target RNA in concentration below the limit of detection for the assay. If only one viral target is positive but coinfection with multiple targets is suspected, the sample should be re-tested with another FDA cleared, approved, or authorized test, if coinfection would change clinical management. This test was validated by the [facility]. These laboratories are certified under the Clinical Laboratory Improvement Amendments of 1988 (CLIA-88) as qualified to perform high complexity laboratory testing.   Vitamin D Deficiency     Status: Normal   Result Value Ref Range    Vitamin D, Total (25-Hydroxy) 32 20 - 50 ng/mL    Narrative    Season, race, dietary intake, and treatment affect the concentration of 25-hydroxy-Vitamin D. Values may decrease during winter months and increase during summer months.    Vitamin D determination is routinely performed by an immunoassay specific for 25 hydroxyvitamin D3.  If an individual is on vitamin D2(ergocalciferol) supplementation, please specify 25 OH vitamin D2 and D3 level determination by LCMSMS test [id number].     Iron and iron binding capacity     Status: Abnormal   Result Value Ref Range    Iron 18 (L) 37 - 145 ug/dL    Iron Binding Capacity 322 240 - 430 ug/dL    Iron Sat Index 6 (L) 15 - 46 %   Ferritin     Status: Normal   Result Value Ref Range    Ferritin 43 6 - 175 ng/mL   Vitamin B12     Status: Normal   Result Value Ref Range    Vitamin B12 397 232 - 1,245 pg/mL   TSH with free T4 reflex     Status: Normal   Result Value Ref Range    TSH 1.38 0.30 - 4.20 uIU/mL   Comprehensive metabolic panel     Status: Abnormal   Result Value Ref Range    Sodium 134 (L) 135 - 145 mmol/L    Potassium 4.4 3.4 - 5.3 mmol/L    Carbon Dioxide (CO2) 25 22 - 29 mmol/L    Anion Gap 14 7 - 15 mmol/L     Urea Nitrogen 10.6 6.0 - 20.0 mg/dL    Creatinine 0.77 0.51 - 0.95 mg/dL    GFR Estimate >90 >60 mL/min/1.73m2    Calcium 8.4 (L) 8.6 - 10.0 mg/dL    Chloride 95 (L) 98 - 107 mmol/L    Glucose 106 (H) 70 - 99 mg/dL    Alkaline Phosphatase 155 (H) 40 - 150 U/L    AST 46 (H) 0 - 45 U/L    ALT 14 0 - 50 U/L    Protein Total 6.3 (L) 6.4 - 8.3 g/dL    Albumin 3.9 3.5 - 5.2 g/dL    Bilirubin Total 0.5 <=1.2 mg/dL   Folate     Status: Normal   Result Value Ref Range    Folic Acid 19.1 4.6 - 34.8 ng/mL   Homocysteine     Status: Abnormal   Result Value Ref Range    Homocysteine 17.0 (H) 0.0 - 15.0 umol/L   Lactic acid whole blood     Status: Normal   Result Value Ref Range    Lactic Acid 1.2 0.7 - 2.0 mmol/L   CBC with platelets and differential     Status: Abnormal   Result Value Ref Range    WBC Count 8.8 4.0 - 11.0 10e3/uL    RBC Count 3.76 (L) 3.80 - 5.20 10e6/uL    Hemoglobin 9.6 (L) 11.7 - 15.7 g/dL    Hematocrit 30.3 (L) 35.0 - 47.0 %    MCV 81 78 - 100 fL    MCH 25.5 (L) 26.5 - 33.0 pg    MCHC 31.7 31.5 - 36.5 g/dL    RDW 18.3 (H) 10.0 - 15.0 %    Platelet Count 244 150 - 450 10e3/uL    % Neutrophils 82 %    % Lymphocytes 9 %    % Monocytes 7 %    % Eosinophils 0 %    % Basophils 1 %    % Immature Granulocytes 1 %    NRBCs per 100 WBC 0 <1 /100    Absolute Neutrophils 7.2 1.6 - 8.3 10e3/uL    Absolute Lymphocytes 0.8 0.8 - 5.3 10e3/uL    Absolute Monocytes 0.6 0.0 - 1.3 10e3/uL    Absolute Eosinophils 0.0 0.0 - 0.7 10e3/uL    Absolute Basophils 0.1 0.0 - 0.2 10e3/uL    Absolute Immature Granulocytes 0.0 <=0.4 10e3/uL    Absolute NRBCs 0.0 10e3/uL   Phosphorus     Status: Abnormal   Result Value Ref Range    Phosphorus 4.7 (H) 2.5 - 4.5 mg/dL   Magnesium     Status: Normal   Result Value Ref Range    Magnesium 1.8 1.7 - 2.3 mg/dL   Magnesium     Status: Normal   Result Value Ref Range    Magnesium 2.2 1.7 - 2.3 mg/dL   Phosphorus     Status: Abnormal   Result Value Ref Range    Phosphorus 4.8 (H) 2.5 - 4.5 mg/dL    Glucose by meter     Status: Normal   Result Value Ref Range    GLUCOSE BY METER POCT 84 70 - 99 mg/dL   Blood Culture Peripheral Blood     Status: Normal (Preliminary result)    Specimen: Peripheral Blood   Result Value Ref Range    Culture No growth after 1 day    Blood Culture Peripheral Blood     Status: Normal (Preliminary result)    Specimen: Peripheral Blood   Result Value Ref Range    Culture No growth after 1 day    Wound Aerobic Bacterial Culture Routine With Gram Stain     Status: Abnormal (Preliminary result)    Specimen: Abdomen; Wound   Result Value Ref Range    Gram Stain Result 1+ Gram positive cocci (A)     Gram Stain Result 1+ WBC seen (A)    MRSA MSSA PCR, Nasal Swab     Status: None    Specimen: Nares, Bilateral; Swab   Result Value Ref Range    MRSA Target DNA Negative Negative    SA Target DNA Negative     Narrative    The [employer]  Xpert SA Nasal Complete assay performed in the [employer] is a qualitative in vitro diagnostic test designed for rapid detection of Staphylococcus aureus (SA) and methicillin-resistant Staphylococcus aureus (MRSA) from nasal swabs in patients at risk for nasal colonization. The test utilizes automated real-time polymerase chain reaction (PCR) to detect MRSA/SA DNA. The Xpert SA Nasal Complete assay is intended to aid in the prevention and control of MRSA/SA infections in healthcare settings. The assay is not intended to diagnose, guide or monitor treatment for MRSA/SA infections, or provide results of susceptibility to methicillin. A negative result does not preclude MRSA/SA nasal colonization.    CBC with platelets differential     Status: Abnormal    Narrative    The following orders were created for panel order CBC with platelets differential.  Procedure                               Abnormality         Status                     ---------                               -----------         ------                     CBC with platelets and  d...[[id number]]  Abnormal            Final result                 Please view results for these tests on the individual orders.   CBC with platelets differential     Status: Abnormal    Narrative    The following orders were created for panel order CBC with platelets differential.  Procedure                               Abnormality         Status                     ---------                               -----------         ------                     CBC with platelets and d...[[id number]]  Abnormal            Final result                 Please view results for these tests on the individual orders.     Medications   lidocaine 1 % 0.1-1 mL (has no administration in time range)   sodium chloride (PF) 0.9% PF flush 3 mL (3 mLs Intracatheter Not Given 5/2/24 1934)   sodium chloride (PF) 0.9% PF flush 3 mL (has no administration in time range)   senna-docusate (SENOKOT-S/PERICOLACE) 8.6-50 MG per tablet 1 tablet (has no administration in time range)     Or   senna-docusate (SENOKOT-S/PERICOLACE) 8.6-50 MG per tablet 2 tablet (has no administration in time range)   calcium carbonate (TUMS) chewable tablet 1,000 mg (has no administration in time range)   ondansetron (ZOFRAN ODT) ODT tab 4 mg ( Oral See Alternative 5/2/24 1951)     Or   ondansetron (ZOFRAN) injection 4 mg (4 mg Intravenous $Given 5/2/24 1951)   HYDROmorphone (PF) (DILAUDID) injection 0.5-1 mg (1 mg Intravenous $Given 5/2/24 1951)   miconazole (MICATIN) 2 % cream ( Topical Not Given 5/2/24 2006)   ipratropium - albuterol 0.5 mg/2.5 mg/3 mL (DUONEB) neb solution 3 mL (has no administration in time range)   nortriptyline (PAMELOR) capsule 20 mg (20 mg Oral $Given 5/2/24 2143)   sodium chloride (PF) 0.9% PF flush 10-40 mL ( Intracatheter Not Given 5/2/24 1934)   sodium chloride (PF) 0.9% PF flush 10-20 mL (10 mLs Intracatheter $Given 5/2/24 0919)   heparin lock flush 10 UNIT/ML injection 5-20 mL ( Intracatheter Not Given 5/2/24 4019)   heparin lock flush 10  UNIT/ML injection 5-20 mL (5 mLs Intracatheter $Given 5/2/24 0919)   doxycycline hyclate (VIBRAMYCIN) capsule 100 mg (100 mg Oral $Given 5/2/24 1624)   Lidocaine (LIDOCARE) 4 % Patch 1-2 patch ( Transdermal Not Given 5/2/24 1142)   methocarbamol (ROBAXIN) tablet 500 mg (has no administration in time range)   polyethylene glycol (MIRALAX) Packet 17 g (17 g Oral $Given 5/2/24 1952)   sennosides (SENOKOT) tablet 8.6 mg (8.6 mg Oral $Given 5/2/24 1755)   simethicone (MYLICON) chewable tablet 80 mg (80 mg Oral Not Given 5/2/24 2200)   prochlorperazine (COMPAZINE) injection 5 mg (5 mg Intravenous $Given 5/2/24 1448)     Or   prochlorperazine (COMPAZINE) tablet 5 mg ( Oral See Alternative 5/2/24 1448)   metoclopramide (REGLAN) tablet 10 mg (10 mg Oral $Given 5/2/24 2143)   pantoprazole (PROTONIX) EC tablet 40 mg (40 mg Oral $Given 5/2/24 1755)   dextrose 10% infusion (has no administration in time range)   thiamine (B-1) tablet 100 mg (100 mg Per G Tube $Given 5/2/24 2002)   multivitamin w/minerals (THERA-VIT-M) tablet 1 tablet (1 tablet Per Feeding Tube $Given 5/2/24 2002)   cyanocobalamin (VITAMIN B-12) sublingual tablet 500 mcg (500 mcg Sublingual $Given 5/2/24 2002)   folic acid (FOLVITE) tablet 1 mg (has no administration in time range)   folic acid-vit B6-vit B12 (FOLGARD) per tablet 1 tablet (1 tablet Per G Tube $Given 5/2/24 2002)   melatonin tablet 5 mg (5 mg Oral $Given 5/2/24 2149)   ondansetron (ZOFRAN) injection 4 mg (4 mg Intravenous $Given 5/2/24 4360)   lactated ringers BOLUS 1,000 mL (0 mLs Intravenous Stopped 5/1/24 1836)   cefTRIAXone (ROCEPHIN) 2 g vial to attach to  ml bag for ADULTS or NS 50 ml bag for PEDS (0 g Intravenous Stopped 5/1/24 1758)   vancomycin (VANCOCIN) 1,750 mg in sodium chloride 0.9 % 500 mL intermittent infusion (0 mg Intravenous Stopped 5/1/24 2049)   iopamidol (ISOVUE-370) solution 92 mL (92 mLs Intravenous $Given 5/1/24 1814)   sodium chloride (PF) 0.9% PF flush 73 mL (73  mLs Intravenous $Given 5/1/24 1815)   HYDROmorphone (PF) (DILAUDID) injection 0.5 mg (0.5 mg Intravenous $Given 5/1/24 2022)   HYDROmorphone (PF) (DILAUDID) injection 0.5 mg (0.5 mg Intravenous $Given 5/1/24 2226)   HYDROmorphone (PF) (DILAUDID) injection 0.5 mg (0.5 mg Intravenous $Given 5/1/24 2323)   lactated ringers BOLUS 1,000 mL (0 mLs Intravenous Stopped 5/2/24 0912)     Labs Ordered and Resulted from Time of ED Arrival to Time of ED Departure   COMPREHENSIVE METABOLIC PANEL - Abnormal       Result Value    Sodium 137      Potassium 4.3      Carbon Dioxide (CO2) 22      Anion Gap 18 (*)     Urea Nitrogen 10.6      Creatinine 0.74      GFR Estimate >90      Calcium 8.6      Chloride 97 (*)     Glucose 75      Alkaline Phosphatase 103      AST 22      ALT 7      Protein Total 7.1      Albumin 4.3      Bilirubin Total 0.3     LACTIC ACID WHOLE BLOOD - Abnormal    Lactic Acid 4.1 (*)    ROUTINE UA WITH MICROSCOPIC REFLEX TO CULTURE - Abnormal    Color Urine Yellow      Appearance Urine Clear      Glucose Urine Negative      Bilirubin Urine Negative      Ketones Urine 40 (*)     Specific Gravity Urine 1.010      Blood Urine Negative      pH Urine 6.0      Protein Albumin Urine 30 (*)     Urobilinogen Urine Normal      Nitrite Urine Negative      Leukocyte Esterase Urine Negative      Mucus Urine Present (*)     RBC Urine 9 (*)     WBC Urine 1      Squamous Epithelials Urine 8 (*)    CBC WITH PLATELETS AND DIFFERENTIAL - Abnormal    WBC Count 9.5      RBC Count 4.34      Hemoglobin 11.3 (*)     Hematocrit 35.2      MCV 81      MCH 26.0 (*)     MCHC 32.1      RDW 18.6 (*)     Platelet Count 320      % Neutrophils 63      % Lymphocytes 28      % Monocytes 7      % Eosinophils 1      % Basophils 1      % Immature Granulocytes 0      NRBCs per 100 WBC 0      Absolute Neutrophils 6.0      Absolute Lymphocytes 2.6      Absolute Monocytes 0.6      Absolute Eosinophils 0.1      Absolute Basophils 0.1      Absolute Immature  Granulocytes 0.0      Absolute NRBCs 0.0     LACTIC ACID WHOLE BLOOD - Abnormal    Lactic Acid 3.5 (*)    IRON AND IRON BINDING CAPACITY - Abnormal    Iron 18 (*)     Iron Binding Capacity 322      Iron Sat Index 6 (*)    COMPREHENSIVE METABOLIC PANEL - Abnormal    Sodium 134 (*)     Potassium 4.4      Carbon Dioxide (CO2) 25      Anion Gap 14      Urea Nitrogen 10.6      Creatinine 0.77      GFR Estimate >90      Calcium 8.4 (*)     Chloride 95 (*)     Glucose 106 (*)     Alkaline Phosphatase 155 (*)     AST 46 (*)     ALT 14      Protein Total 6.3 (*)     Albumin 3.9      Bilirubin Total 0.5     HOMOCYSTEINE - Abnormal    Homocysteine 17.0 (*)    CBC WITH PLATELETS AND DIFFERENTIAL - Abnormal    WBC Count 8.8      RBC Count 3.76 (*)     Hemoglobin 9.6 (*)     Hematocrit 30.3 (*)     MCV 81      MCH 25.5 (*)     MCHC 31.7      RDW 18.3 (*)     Platelet Count 244      % Neutrophils 82      % Lymphocytes 9      % Monocytes 7      % Eosinophils 0      % Basophils 1      % Immature Granulocytes 1      NRBCs per 100 WBC 0      Absolute Neutrophils 7.2      Absolute Lymphocytes 0.8      Absolute Monocytes 0.6      Absolute Eosinophils 0.0      Absolute Basophils 0.1      Absolute Immature Granulocytes 0.0      Absolute NRBCs 0.0     AEROBIC BACTERIAL CULTURE ROUTINE - Abnormal    Gram Stain Result 1+ Gram positive cocci (*)     Gram Stain Result 1+ WBC seen (*)    LIPASE - Normal    Lipase 17     CRP INFLAMMATION - Normal    CRP Inflammation <3.00     INFLUENZA A/B, RSV, & SARS-COV2 PCR - Normal    Influenza A PCR Negative      Influenza B PCR Negative      RSV PCR Negative      SARS CoV2 PCR Negative     VITAMIN D DEFICIENCY SCREENING - Normal    Vitamin D, Total (25-Hydroxy) 32     FERRITIN - Normal    Ferritin 43     VITAMIN B12 - Normal    Vitamin B12 397     TSH WITH FREE T4 REFLEX - Normal    TSH 1.38     FOLATE - Normal    Folic Acid 19.1     LACTIC ACID WHOLE BLOOD - Normal    Lactic Acid 1.2     VITAMIN A    VITAMIN E   METHYLMALONIC ACID   ZINC   COPPER LEVEL   MRSA MSSA PCR, NASAL SWAB    MRSA Target DNA Negative      SA Target DNA Negative       XR Abdomen Port 1 View   Final Result   IMPRESSION: No significantly dilated air-filled bowel loops.      I have personally reviewed the examination and initial interpretation   and I agree with the findings.      EMILY ALEGRIA MD            SYSTEM ID:  [id number]      CT Abdomen Pelvis w Contrast   Final Result   IMPRESSION:    1. Subcutaneous soft tissue fat stranding along the course of the left   upper quadrant percutaneous gastrostomy tube with surrounding skin   thickening, suspicious for infection/inflammation. No focal fluid   collection. No soft tissue gas.   2. No acute intra-abdominal pathology. Stable postoperative changes of   partial gastrectomy, partial hepatectomy, small bowel resection,   cholecystectomy.   3. Additional chronic and incidental findings as detailed in the body   of the report.      I have personally reviewed the examination and initial interpretation   and I agree with the findings.      DAJA JOSE MD            SYSTEM ID:  [id number]             Critical care was not performed.     Medical Decision Making  The patient's presentation was of high complexity (a chronic illness severe exacerbation, progression, or side effect of treatment).    The patient's evaluation involved:  review of external note(s) from 3+ sources (see separate area of note for details)  review of 3+ test result(s) ordered prior to this encounter (see separate area of note for details)  ordering and/or review of 3+ test(s) in this encounter (see separate area of note for details)  discussion of management or test interpretation with another health professional (see separate area of note for details)    The patient's management necessitated high risk (a decision regarding hospitalization).    Assessment & Plan    43-year-old female with a past medical history of bowel  adenocarcinoma status postresection, liver resection, with G-tube in place since February of this year presents emergency department due to increasing pain, swelling and redness around left upper quadrant noted to have redness, and drainage from the tres-G-tube site    Primary concern at this point would be for cellulitis with possible extension into her previously noted seroma versus isolated abscess.  With previous cultures, will plan for empiric vancomycin and ceftriaxone.  Lab work with initially elevated lactate.  Patient is getting empiric fluids, will recheck lactate and blood cultures.  Other than lactate does not meet sepsis criteria.    7:34 PM pending repeat lactate.  Patient has gotten doses of antibiotics.  CT scan demonstrates some underlying fat stranding but no fluid collection.  Surgery was consulted and will evaluate the patient.     Signed out to the oncoming team pending surgery consult and recs    I have reviewed the nursing notes. I have reviewed the findings, diagnosis, plan and need for follow up with the patient.    Current Discharge Medication List          Final diagnoses:   Abdominal pain, unspecified abdominal location   G tube feedings (H)   Cellulitis of abdominal wall   Lactate blood increase       Kristan Sims MD  [facility] EMERGENCY DEPARTMENT  5/1/2024        Kristan Sims MD  05/03/24 0152

## 2024-05-01 NOTE — PHARMACY-VANCOMYCIN DOSING SERVICE
Pharmacy Vancomycin Initial Note  Date of Service May 1, 2024  Patient's  1980  43 year old, female    Indication: Abscess and Skin and Soft Tissue Infection    Current estimated CrCl = Estimated Creatinine Clearance: 94.4 mL/min (based on SCr of 0.74 mg/dL).    Creatinine for last 3 days  2024:  4:54 PM Creatinine 0.74 mg/dL    Recent Vancomycin Level(s) for last 3 days  No results found for requested labs within last 3 days.      Vancomycin IV Administrations (past 72 hours)                     vancomycin (VANCOCIN) 1,750 mg in sodium chloride 0.9 % 500 mL intermittent infusion (mg) 1,750 mg New Bag 24 1828                    Nephrotoxins and other renal medications (From now, onward)      Start     Dose/Rate Route Frequency Ordered Stop    24 0600  vancomycin (VANCOCIN) 1,000 mg in 200 mL dextrose intermittent infusion         1,000 mg  200 mL/hr over 1 Hours Intravenous EVERY 12 HOURS 24 1852      24 1800  vancomycin (VANCOCIN) 1,750 mg in sodium chloride 0.9 % 500 mL intermittent infusion         1,750 mg  over 120 Minutes Intravenous ONCE 24 1700              Contrast Orders - past 72 hours (72h ago, onward)      Start     Dose/Rate Route Frequency Stop    24 1800  iopamidol (ISOVUE-370) solution 92 mL         92 mL Intravenous ONCE 24 1814            [employer] Prediction of Planned Initial Vancomycin Regimen    Loading dose: N/A  Regimen: 1000 mg IV every 12 hours.  Start time: 06:28 on 2024  Exposure target: AUC24 (range)400-600 mg/L.hr   AUC24,ss: 484 mg/L.hr  Probability of AUC24 > 400: 69 % -- note this is < 70% but 1250mg IV q12h was pAUC > 600  Ctrough,ss: 14.5 mg/L  Probability of Ctrough,ss > 20: 26 %  Probability of nephrotoxicity (Lodise YANELI ): 10 %        Plan:  Start vancomycin with a loading dose of 1750mg IV once followed by 1000 mg IV q12h.   Vancomycin monitoring method: AUC  Vancomycin therapeutic monitoring goal: 400-600  mg*h/L  Pharmacy will check vancomycin levels as appropriate in 1-3 Days.    Serum creatinine levels will be ordered daily for the first week of therapy and at least twice weekly for subsequent weeks.      Meg Espinosa RPH

## 2024-05-02 ENCOUNTER — APPOINTMENT (OUTPATIENT)
Dept: GENERAL RADIOLOGY | Facility: CLINIC | Age: 44
End: 2024-05-02
Attending: PHYSICIAN ASSISTANT
Payer: COMMERCIAL

## 2024-05-02 LAB
ALBUMIN SERPL BCG-MCNC: 3.9 G/DL (ref 3.5–5.2)
ALBUMIN UR-MCNC: 30 MG/DL
ALP SERPL-CCNC: 155 U/L (ref 40–150)
ALT SERPL W P-5'-P-CCNC: 14 U/L (ref 0–50)
ANION GAP SERPL CALCULATED.3IONS-SCNC: 14 MMOL/L (ref 7–15)
APPEARANCE UR: CLEAR
AST SERPL W P-5'-P-CCNC: 46 U/L (ref 0–45)
BASOPHILS # BLD AUTO: 0.1 10E3/UL (ref 0–0.2)
BASOPHILS NFR BLD AUTO: 1 %
BILIRUB SERPL-MCNC: 0.5 MG/DL
BILIRUB UR QL STRIP: NEGATIVE
BUN SERPL-MCNC: 10.6 MG/DL (ref 6–20)
CALCIUM SERPL-MCNC: 8.4 MG/DL (ref 8.6–10)
CHLORIDE SERPL-SCNC: 95 MMOL/L (ref 98–107)
COLOR UR AUTO: YELLOW
CREAT SERPL-MCNC: 0.77 MG/DL (ref 0.51–0.95)
CRP SERPL-MCNC: <3 MG/L
DEPRECATED HCO3 PLAS-SCNC: 25 MMOL/L (ref 22–29)
EGFRCR SERPLBLD CKD-EPI 2021: >90 ML/MIN/1.73M2
EOSINOPHIL # BLD AUTO: 0 10E3/UL (ref 0–0.7)
EOSINOPHIL NFR BLD AUTO: 0 %
ERYTHROCYTE [DISTWIDTH] IN BLOOD BY AUTOMATED COUNT: 18.3 % (ref 10–15)
FERRITIN SERPL-MCNC: 43 NG/ML (ref 6–175)
FLUAV RNA SPEC QL NAA+PROBE: NEGATIVE
FLUBV RNA RESP QL NAA+PROBE: NEGATIVE
FOLATE SERPL-MCNC: 19.1 NG/ML (ref 4.6–34.8)
GLUCOSE BLDC GLUCOMTR-MCNC: 84 MG/DL (ref 70–99)
GLUCOSE SERPL-MCNC: 106 MG/DL (ref 70–99)
GLUCOSE UR STRIP-MCNC: NEGATIVE MG/DL
HCT VFR BLD AUTO: 30.3 % (ref 35–47)
HCYS SERPL-SCNC: 17 UMOL/L (ref 0–15)
HGB BLD-MCNC: 9.6 G/DL (ref 11.7–15.7)
HGB UR QL STRIP: NEGATIVE
IMM GRANULOCYTES # BLD: 0 10E3/UL
IMM GRANULOCYTES NFR BLD: 1 %
IRON BINDING CAPACITY (ROCHE): 322 UG/DL (ref 240–430)
IRON SATN MFR SERPL: 6 % (ref 15–46)
IRON SERPL-MCNC: 18 UG/DL (ref 37–145)
KETONES UR STRIP-MCNC: 40 MG/DL
LACTATE SERPL-SCNC: 1.2 MMOL/L (ref 0.7–2)
LEUKOCYTE ESTERASE UR QL STRIP: NEGATIVE
LYMPHOCYTES # BLD AUTO: 0.8 10E3/UL (ref 0.8–5.3)
LYMPHOCYTES NFR BLD AUTO: 9 %
MAGNESIUM SERPL-MCNC: 1.8 MG/DL (ref 1.7–2.3)
MAGNESIUM SERPL-MCNC: 2.2 MG/DL (ref 1.7–2.3)
MCH RBC QN AUTO: 25.5 PG (ref 26.5–33)
MCHC RBC AUTO-ENTMCNC: 31.7 G/DL (ref 31.5–36.5)
MCV RBC AUTO: 81 FL (ref 78–100)
MONOCYTES # BLD AUTO: 0.6 10E3/UL (ref 0–1.3)
MONOCYTES NFR BLD AUTO: 7 %
MRSA DNA SPEC QL NAA+PROBE: NEGATIVE
MUCOUS THREADS #/AREA URNS LPF: PRESENT /LPF
NEUTROPHILS # BLD AUTO: 7.2 10E3/UL (ref 1.6–8.3)
NEUTROPHILS NFR BLD AUTO: 82 %
NITRATE UR QL: NEGATIVE
NRBC # BLD AUTO: 0 10E3/UL
NRBC BLD AUTO-RTO: 0 /100
PH UR STRIP: 6 [PH] (ref 5–7)
PHOSPHATE SERPL-MCNC: 4.7 MG/DL (ref 2.5–4.5)
PHOSPHATE SERPL-MCNC: 4.8 MG/DL (ref 2.5–4.5)
PLATELET # BLD AUTO: 244 10E3/UL (ref 150–450)
POTASSIUM SERPL-SCNC: 4.4 MMOL/L (ref 3.4–5.3)
PROT SERPL-MCNC: 6.3 G/DL (ref 6.4–8.3)
RBC # BLD AUTO: 3.76 10E6/UL (ref 3.8–5.2)
RBC URINE: 9 /HPF
RSV RNA SPEC NAA+PROBE: NEGATIVE
SA TARGET DNA: NEGATIVE
SARS-COV-2 RNA RESP QL NAA+PROBE: NEGATIVE
SODIUM SERPL-SCNC: 134 MMOL/L (ref 135–145)
SP GR UR STRIP: 1.01 (ref 1–1.03)
SQUAMOUS EPITHELIAL: 8 /HPF
TSH SERPL DL<=0.005 MIU/L-ACNC: 1.38 UIU/ML (ref 0.3–4.2)
UROBILINOGEN UR STRIP-MCNC: NORMAL MG/DL
VIT B12 SERPL-MCNC: 397 PG/ML (ref 232–1245)
VIT D+METAB SERPL-MCNC: 32 NG/ML (ref 20–50)
WBC # BLD AUTO: 8.8 10E3/UL (ref 4–11)
WBC URINE: 1 /HPF

## 2024-05-02 PROCEDURE — 83605 ASSAY OF LACTIC ACID: CPT | Performed by: STUDENT IN AN ORGANIZED HEALTH CARE EDUCATION/TRAINING PROGRAM

## 2024-05-02 PROCEDURE — 250N000011 HC RX IP 250 OP 636: Performed by: STUDENT IN AN ORGANIZED HEALTH CARE EDUCATION/TRAINING PROGRAM

## 2024-05-02 PROCEDURE — G0378 HOSPITAL OBSERVATION PER HR: HCPCS

## 2024-05-02 PROCEDURE — 84590 ASSAY OF VITAMIN A: CPT | Performed by: STUDENT IN AN ORGANIZED HEALTH CARE EDUCATION/TRAINING PROGRAM

## 2024-05-02 PROCEDURE — 96366 THER/PROPH/DIAG IV INF ADDON: CPT

## 2024-05-02 PROCEDURE — 87641 MR-STAPH DNA AMP PROBE: CPT | Performed by: STUDENT IN AN ORGANIZED HEALTH CARE EDUCATION/TRAINING PROGRAM

## 2024-05-02 PROCEDURE — 83090 ASSAY OF HOMOCYSTEINE: CPT | Performed by: STUDENT IN AN ORGANIZED HEALTH CARE EDUCATION/TRAINING PROGRAM

## 2024-05-02 PROCEDURE — 82525 ASSAY OF COPPER: CPT | Performed by: STUDENT IN AN ORGANIZED HEALTH CARE EDUCATION/TRAINING PROGRAM

## 2024-05-02 PROCEDURE — 74018 RADEX ABDOMEN 1 VIEW: CPT

## 2024-05-02 PROCEDURE — 96361 HYDRATE IV INFUSION ADD-ON: CPT

## 2024-05-02 PROCEDURE — 87640 STAPH A DNA AMP PROBE: CPT | Mod: XU | Performed by: STUDENT IN AN ORGANIZED HEALTH CARE EDUCATION/TRAINING PROGRAM

## 2024-05-02 PROCEDURE — 36415 COLL VENOUS BLD VENIPUNCTURE: CPT | Performed by: STUDENT IN AN ORGANIZED HEALTH CARE EDUCATION/TRAINING PROGRAM

## 2024-05-02 PROCEDURE — 250N000013 HC RX MED GY IP 250 OP 250 PS 637: Performed by: PHYSICIAN ASSISTANT

## 2024-05-02 PROCEDURE — 84100 ASSAY OF PHOSPHORUS: CPT | Performed by: PHYSICIAN ASSISTANT

## 2024-05-02 PROCEDURE — 99207 PR NO BILLABLE SERVICE THIS VISIT: CPT | Performed by: PHYSICIAN ASSISTANT

## 2024-05-02 PROCEDURE — 82040 ASSAY OF SERUM ALBUMIN: CPT | Performed by: STUDENT IN AN ORGANIZED HEALTH CARE EDUCATION/TRAINING PROGRAM

## 2024-05-02 PROCEDURE — 84446 ASSAY OF VITAMIN E: CPT | Performed by: STUDENT IN AN ORGANIZED HEALTH CARE EDUCATION/TRAINING PROGRAM

## 2024-05-02 PROCEDURE — 85025 COMPLETE CBC W/AUTO DIFF WBC: CPT | Performed by: STUDENT IN AN ORGANIZED HEALTH CARE EDUCATION/TRAINING PROGRAM

## 2024-05-02 PROCEDURE — 258N000003 HC RX IP 258 OP 636: Performed by: STUDENT IN AN ORGANIZED HEALTH CARE EDUCATION/TRAINING PROGRAM

## 2024-05-02 PROCEDURE — 93005 ELECTROCARDIOGRAM TRACING: CPT

## 2024-05-02 PROCEDURE — 84630 ASSAY OF ZINC: CPT | Performed by: STUDENT IN AN ORGANIZED HEALTH CARE EDUCATION/TRAINING PROGRAM

## 2024-05-02 PROCEDURE — 96375 TX/PRO/DX INJ NEW DRUG ADDON: CPT

## 2024-05-02 PROCEDURE — 74018 RADEX ABDOMEN 1 VIEW: CPT | Mod: 26 | Performed by: STUDENT IN AN ORGANIZED HEALTH CARE EDUCATION/TRAINING PROGRAM

## 2024-05-02 PROCEDURE — 82962 GLUCOSE BLOOD TEST: CPT

## 2024-05-02 PROCEDURE — 87637 SARSCOV2&INF A&B&RSV AMP PRB: CPT | Performed by: STUDENT IN AN ORGANIZED HEALTH CARE EDUCATION/TRAINING PROGRAM

## 2024-05-02 PROCEDURE — 81001 URINALYSIS AUTO W/SCOPE: CPT | Performed by: STUDENT IN AN ORGANIZED HEALTH CARE EDUCATION/TRAINING PROGRAM

## 2024-05-02 PROCEDURE — 87106 FUNGI IDENTIFICATION YEAST: CPT | Performed by: STUDENT IN AN ORGANIZED HEALTH CARE EDUCATION/TRAINING PROGRAM

## 2024-05-02 PROCEDURE — G0463 HOSPITAL OUTPT CLINIC VISIT: HCPCS

## 2024-05-02 PROCEDURE — 83735 ASSAY OF MAGNESIUM: CPT | Performed by: PHYSICIAN ASSISTANT

## 2024-05-02 PROCEDURE — 82746 ASSAY OF FOLIC ACID SERUM: CPT | Performed by: STUDENT IN AN ORGANIZED HEALTH CARE EDUCATION/TRAINING PROGRAM

## 2024-05-02 PROCEDURE — 250N000011 HC RX IP 250 OP 636: Performed by: PHYSICIAN ASSISTANT

## 2024-05-02 PROCEDURE — 250N000013 HC RX MED GY IP 250 OP 250 PS 637: Performed by: STUDENT IN AN ORGANIZED HEALTH CARE EDUCATION/TRAINING PROGRAM

## 2024-05-02 PROCEDURE — 96376 TX/PRO/DX INJ SAME DRUG ADON: CPT

## 2024-05-02 PROCEDURE — 83921 ORGANIC ACID SINGLE QUANT: CPT | Performed by: STUDENT IN AN ORGANIZED HEALTH CARE EDUCATION/TRAINING PROGRAM

## 2024-05-02 RX ORDER — HYDROMORPHONE HYDROCHLORIDE 1 MG/ML
.5-1 INJECTION, SOLUTION INTRAMUSCULAR; INTRAVENOUS; SUBCUTANEOUS EVERY 4 HOURS PRN
Status: DISCONTINUED | OUTPATIENT
Start: 2024-05-02 | End: 2024-05-04

## 2024-05-02 RX ORDER — HEPARIN SODIUM,PORCINE 10 UNIT/ML
5-20 VIAL (ML) INTRAVENOUS
Status: DISCONTINUED | OUTPATIENT
Start: 2024-05-02 | End: 2024-05-07 | Stop reason: HOSPADM

## 2024-05-02 RX ORDER — MICONAZOLE NITRATE 20 MG/G
CREAM TOPICAL 2 TIMES DAILY
Status: DISCONTINUED | OUTPATIENT
Start: 2024-05-02 | End: 2024-05-07 | Stop reason: HOSPADM

## 2024-05-02 RX ORDER — POLYETHYLENE GLYCOL 3350 17 G/17G
17 POWDER, FOR SOLUTION ORAL 2 TIMES DAILY
Status: DISCONTINUED | OUTPATIENT
Start: 2024-05-02 | End: 2024-05-03

## 2024-05-02 RX ORDER — SIMETHICONE 80 MG
80 TABLET,CHEWABLE ORAL EVERY 6 HOURS PRN
Status: DISCONTINUED | OUTPATIENT
Start: 2024-05-02 | End: 2024-05-02

## 2024-05-02 RX ORDER — CLOBETASOL PROPIONATE 0.5 MG/G
OINTMENT TOPICAL
Status: ON HOLD | COMMUNITY
Start: 2024-03-14 | End: 2024-05-07

## 2024-05-02 RX ORDER — LIDOCAINE 40 MG/G
CREAM TOPICAL
Status: DISCONTINUED | OUTPATIENT
Start: 2024-05-02 | End: 2024-05-02

## 2024-05-02 RX ORDER — SENNOSIDES 8.6 MG
8.6 TABLET ORAL 2 TIMES DAILY
Status: DISCONTINUED | OUTPATIENT
Start: 2024-05-02 | End: 2024-05-03

## 2024-05-02 RX ORDER — HEPARIN SODIUM,PORCINE 10 UNIT/ML
5-20 VIAL (ML) INTRAVENOUS EVERY 24 HOURS
Status: DISCONTINUED | OUTPATIENT
Start: 2024-05-02 | End: 2024-05-07 | Stop reason: HOSPADM

## 2024-05-02 RX ORDER — CALCIUM CARBONATE 500 MG/1
1000 TABLET, CHEWABLE ORAL 4 TIMES DAILY PRN
Status: DISCONTINUED | OUTPATIENT
Start: 2024-05-02 | End: 2024-05-07 | Stop reason: HOSPADM

## 2024-05-02 RX ORDER — ONDANSETRON 4 MG/1
4 TABLET, ORALLY DISINTEGRATING ORAL EVERY 6 HOURS PRN
Status: DISCONTINUED | OUTPATIENT
Start: 2024-05-02 | End: 2024-05-07 | Stop reason: HOSPADM

## 2024-05-02 RX ORDER — LIDOCAINE 4 G/G
1-2 PATCH TOPICAL
Status: DISCONTINUED | OUTPATIENT
Start: 2024-05-02 | End: 2024-05-07 | Stop reason: HOSPADM

## 2024-05-02 RX ORDER — DEXTROSE MONOHYDRATE 100 MG/ML
INJECTION, SOLUTION INTRAVENOUS CONTINUOUS PRN
Status: DISCONTINUED | OUTPATIENT
Start: 2024-05-02 | End: 2024-05-07 | Stop reason: HOSPADM

## 2024-05-02 RX ORDER — METOCLOPRAMIDE 5 MG/1
10 TABLET ORAL
Status: DISCONTINUED | OUTPATIENT
Start: 2024-05-02 | End: 2024-05-07 | Stop reason: HOSPADM

## 2024-05-02 RX ORDER — NORTRIPTYLINE HCL 10 MG
20 CAPSULE ORAL AT BEDTIME
Status: DISCONTINUED | OUTPATIENT
Start: 2024-05-02 | End: 2024-05-07 | Stop reason: HOSPADM

## 2024-05-02 RX ORDER — MULTIPLE VITAMINS W/ MINERALS TAB 9MG-400MCG
1 TAB ORAL DAILY
Status: DISCONTINUED | OUTPATIENT
Start: 2024-05-02 | End: 2024-05-07 | Stop reason: HOSPADM

## 2024-05-02 RX ORDER — PANTOPRAZOLE SODIUM 40 MG/1
40 TABLET, DELAYED RELEASE ORAL
Status: DISCONTINUED | OUTPATIENT
Start: 2024-05-02 | End: 2024-05-07 | Stop reason: HOSPADM

## 2024-05-02 RX ORDER — IPRATROPIUM BROMIDE AND ALBUTEROL SULFATE 2.5; .5 MG/3ML; MG/3ML
3 SOLUTION RESPIRATORY (INHALATION) EVERY 4 HOURS PRN
Status: DISCONTINUED | OUTPATIENT
Start: 2024-05-02 | End: 2024-05-07 | Stop reason: HOSPADM

## 2024-05-02 RX ORDER — SIMETHICONE 80 MG
80 TABLET,CHEWABLE ORAL 4 TIMES DAILY
Status: DISCONTINUED | OUTPATIENT
Start: 2024-05-02 | End: 2024-05-07 | Stop reason: HOSPADM

## 2024-05-02 RX ORDER — FOLIC ACID 1 MG/1
1 TABLET ORAL DAILY
Status: DISCONTINUED | OUTPATIENT
Start: 2024-05-03 | End: 2024-05-07 | Stop reason: HOSPADM

## 2024-05-02 RX ORDER — METHOCARBAMOL 500 MG/1
500 TABLET, FILM COATED ORAL 4 TIMES DAILY PRN
Status: DISCONTINUED | OUTPATIENT
Start: 2024-05-02 | End: 2024-05-07 | Stop reason: HOSPADM

## 2024-05-02 RX ORDER — AMOXICILLIN 250 MG
1 CAPSULE ORAL 2 TIMES DAILY PRN
Status: DISCONTINUED | OUTPATIENT
Start: 2024-05-02 | End: 2024-05-07 | Stop reason: HOSPADM

## 2024-05-02 RX ORDER — DOXYCYCLINE 100 MG/1
100 CAPSULE ORAL EVERY 12 HOURS SCHEDULED
Status: DISCONTINUED | OUTPATIENT
Start: 2024-05-02 | End: 2024-05-05

## 2024-05-02 RX ORDER — LIDOCAINE 4 G/G
1 PATCH TOPICAL
Status: DISCONTINUED | OUTPATIENT
Start: 2024-05-02 | End: 2024-05-02

## 2024-05-02 RX ORDER — ONDANSETRON 2 MG/ML
4 INJECTION INTRAMUSCULAR; INTRAVENOUS EVERY 6 HOURS PRN
Status: DISCONTINUED | OUTPATIENT
Start: 2024-05-02 | End: 2024-05-07 | Stop reason: HOSPADM

## 2024-05-02 RX ORDER — PROCHLORPERAZINE MALEATE 5 MG
5 TABLET ORAL EVERY 6 HOURS PRN
Status: DISCONTINUED | OUTPATIENT
Start: 2024-05-02 | End: 2024-05-07 | Stop reason: HOSPADM

## 2024-05-02 RX ORDER — MULTIVITAMIN,THERAPEUTIC
1 TABLET ORAL DAILY
Status: DISCONTINUED | OUTPATIENT
Start: 2024-05-03 | End: 2024-05-02

## 2024-05-02 RX ORDER — AMOXICILLIN 250 MG
2 CAPSULE ORAL 2 TIMES DAILY PRN
Status: DISCONTINUED | OUTPATIENT
Start: 2024-05-02 | End: 2024-05-07 | Stop reason: HOSPADM

## 2024-05-02 RX ADMIN — METOCLOPRAMIDE 10 MG: 5 TABLET ORAL at 14:52

## 2024-05-02 RX ADMIN — PANTOPRAZOLE SODIUM 40 MG: 40 TABLET, DELAYED RELEASE ORAL at 17:55

## 2024-05-02 RX ADMIN — SIMETHICONE 80 MG: 80 TABLET, CHEWABLE ORAL at 19:52

## 2024-05-02 RX ADMIN — NORTRIPTYLINE HYDROCHLORIDE 20 MG: 10 CAPSULE ORAL at 05:18

## 2024-05-02 RX ADMIN — DOXYCYCLINE HYCLATE 100 MG: 100 CAPSULE ORAL at 16:24

## 2024-05-02 RX ADMIN — HYDROMORPHONE HYDROCHLORIDE 1 MG: 1 INJECTION, SOLUTION INTRAMUSCULAR; INTRAVENOUS; SUBCUTANEOUS at 06:53

## 2024-05-02 RX ADMIN — NORTRIPTYLINE HYDROCHLORIDE 20 MG: 10 CAPSULE ORAL at 21:43

## 2024-05-02 RX ADMIN — Medication 5 ML: at 09:19

## 2024-05-02 RX ADMIN — ONDANSETRON 4 MG: 2 INJECTION INTRAMUSCULAR; INTRAVENOUS at 00:12

## 2024-05-02 RX ADMIN — MICONAZOLE NITRATE: 20 CREAM TOPICAL at 10:15

## 2024-05-02 RX ADMIN — HYDROMORPHONE HYDROCHLORIDE 1 MG: 1 INJECTION, SOLUTION INTRAMUSCULAR; INTRAVENOUS; SUBCUTANEOUS at 02:51

## 2024-05-02 RX ADMIN — Medication 1 TABLET: at 20:02

## 2024-05-02 RX ADMIN — SENNOSIDES 8.6 MG: 8.6 TABLET, FILM COATED ORAL at 17:55

## 2024-05-02 RX ADMIN — PROCHLORPERAZINE EDISYLATE 5 MG: 5 INJECTION INTRAMUSCULAR; INTRAVENOUS at 14:48

## 2024-05-02 RX ADMIN — Medication 5 MG: at 21:49

## 2024-05-02 RX ADMIN — THIAMINE HCL TAB 100 MG 100 MG: 100 TAB at 20:02

## 2024-05-02 RX ADMIN — HYDROMORPHONE HYDROCHLORIDE 1 MG: 1 INJECTION, SOLUTION INTRAMUSCULAR; INTRAVENOUS; SUBCUTANEOUS at 11:41

## 2024-05-02 RX ADMIN — METOCLOPRAMIDE 10 MG: 5 TABLET ORAL at 21:43

## 2024-05-02 RX ADMIN — POLYETHYLENE GLYCOL 3350 17 G: 17 POWDER, FOR SOLUTION ORAL at 19:52

## 2024-05-02 RX ADMIN — ONDANSETRON 4 MG: 2 INJECTION INTRAMUSCULAR; INTRAVENOUS at 19:51

## 2024-05-02 RX ADMIN — HYDROMORPHONE HYDROCHLORIDE 1 MG: 1 INJECTION, SOLUTION INTRAMUSCULAR; INTRAVENOUS; SUBCUTANEOUS at 19:51

## 2024-05-02 RX ADMIN — SIMETHICONE 80 MG: 80 TABLET, CHEWABLE ORAL at 11:41

## 2024-05-02 RX ADMIN — VANCOMYCIN HYDROCHLORIDE 1000 MG: 1 INJECTION, SOLUTION INTRAVENOUS at 06:28

## 2024-05-02 RX ADMIN — Medication 500 MCG: at 20:02

## 2024-05-02 RX ADMIN — SODIUM CHLORIDE, POTASSIUM CHLORIDE, SODIUM LACTATE AND CALCIUM CHLORIDE 1000 ML: 600; 310; 30; 20 INJECTION, SOLUTION INTRAVENOUS at 03:21

## 2024-05-02 RX ADMIN — ONDANSETRON 4 MG: 2 INJECTION INTRAMUSCULAR; INTRAVENOUS at 04:59

## 2024-05-02 ASSESSMENT — ACTIVITIES OF DAILY LIVING (ADL)
ADLS_ACUITY_SCORE: 37
ADLS_ACUITY_SCORE: 32
ADLS_ACUITY_SCORE: 32
ADLS_ACUITY_SCORE: 30
FALL_HISTORY_WITHIN_LAST_SIX_MONTHS: NO
DRESSING/BATHING_DIFFICULTY: NO
HEARING_DIFFICULTY_OR_DEAF: NO
ADLS_ACUITY_SCORE: 39
ADLS_ACUITY_SCORE: 37
DIFFICULTY_COMMUNICATING: NO
EATING/SWALLOWING: OTHER (SEE COMMENTS)
ADLS_ACUITY_SCORE: 30
DOING_ERRANDS_INDEPENDENTLY_DIFFICULTY: NO
ADLS_ACUITY_SCORE: 37
ADLS_ACUITY_SCORE: 30
DIFFICULTY_EATING/SWALLOWING: YES
ADLS_ACUITY_SCORE: 37
ADLS_ACUITY_SCORE: 37
ADLS_ACUITY_SCORE: 30
ADLS_ACUITY_SCORE: 37
ADLS_ACUITY_SCORE: 30
CONCENTRATING,_REMEMBERING_OR_MAKING_DECISIONS_DIFFICULTY: NO
CHANGE_IN_FUNCTIONAL_STATUS_SINCE_ONSET_OF_CURRENT_ILLNESS/INJURY: NO
ADLS_ACUITY_SCORE: 37
ADLS_ACUITY_SCORE: 30
ADLS_ACUITY_SCORE: 32
WEAR_GLASSES_OR_BLIND: YES
WALKING_OR_CLIMBING_STAIRS_DIFFICULTY: NO

## 2024-05-02 NOTE — ED PROVIDER NOTES
Patient received in signout from Dr. Sims.  See her note for further documentation.  In short, patient with history of jejunal adenocarcinoma, presents to the ED for abdominal pain and concern for infection around her G-tube.  On exam, she has erythema and significant tenderness around the area, concerning for developing cellulitis.  CT scan shows some fat stranding this area as well.  Has required multiple doses of pain medicines in the ED.  Started on vancomycin and ceftriaxone.  General surgery was consulted.  Plan at signout is to follow-up on general surgery recommendations.  Will need admission to the medicine service if not admitted to surgery.    Case discussed with general surgery.  No operative intervention from their standpoint.  They will be available to follow on consult.  Patient admitted to the medicine service.  Signout given to the triage team.  Pt updated on plan of care.     Dez Calvillo DO  05/02/24 0044

## 2024-05-02 NOTE — CONSULTS
Care Management Initial Consult    General Information  Assessment completed with: Patient,    Type of CM/SW Visit: Initial Assessment    Primary Care Provider verified and updated as needed: Yes   Readmission within the last 30 days: no previous admission in last 30 days      Reason for Consult: discharge planning  Advance Care Planning: Advance Care Planning Reviewed: no concerns identified          Communication Assessment  Patient's communication style: spoken language (English or Bilingual)             Cognitive  Cognitive/Neuro/Behavioral: WDL                      Living Environment:   People in home: parent(s)     Current living Arrangements: house      Able to return to prior arrangements: yes       Family/Social Support:  Care provided by: parent(s)  Provides care for: no one, unable/limited ability to care for self  Marital Status: Single  Parent(s)          Description of Support System: Supportive, Involved    Support Assessment: Adequate family and caregiver support, Adequate social supports, Patient communicates needs well met    Current Resources:   Patient receiving home care services: Yes ([facility])     Community Resources: Home Infusion  Equipment currently used at home: grab bar, toilet, grab bar, tub/shower, walker, standard, walker, rolling, wheelchair, manual, shower chair  Supplies currently used at home: Enteral Nutrition & Supplies    Employment/Financial:  Employment Status: disabled        Financial Concerns: none   Referral to Financial Worker: No       Does the patient's insurance plan have a 3 day qualifying hospital stay waiver?  No    Lifestyle & Psychosocial Needs:  Social Determinants of Health     Food Insecurity: Low Risk  (3/6/2024)    Food Insecurity     Within the past 12 months, did you worry that your food would run out before you got money to buy more?: No     Within the past 12 months, did the food you bought just not last and you didn t have money to get more?: No   Depression:  At risk (3/13/2024)    Received from [facility], [facility]    PHQ-2     PHQ-2 TOTAL SCORE: 5   Housing Stability: Low Risk  (3/6/2024)    Housing Stability     Do you have housing? : Yes     Are you worried about losing your housing?: No   Tobacco Use: High Risk (5/1/2024)    Patient History     Smoking Tobacco Use: Every Day     Smokeless Tobacco Use: Never     Passive Exposure: Current   Financial Resource Strain: Low Risk  (3/6/2024)    Financial Resource Strain     Within the past 12 months, have you or your family members you live with been unable to get utilities (heat, electricity) when it was really needed?: No   Alcohol Use: Not on file   Transportation Needs: Low Risk  (3/6/2024)    Transportation Needs     Within the past 12 months, has lack of transportation kept you from medical appointments, getting your medicines, non-medical meetings or appointments, work, or from getting things that you need?: No   Physical Activity: Not on file   Interpersonal Safety: Not on file   Stress: Not on file   Social Connections: Socially Integrated (5/30/2023)    Received from [facility], [facility]    Social Connections     Frequency of Communication with Friends and Family: 0   Health Literacy: Not on file       Functional Status:  Prior to admission patient needed assistance:   Dependent ADLs:: Ambulation-walker, Transfers, Positioning, Toileting, Bathing  Dependent IADLs:: Transportation, Cleaning, Cooking, Meal Preparation, Laundry, Shopping  Assesssment of Functional Status: At functional baseline    Mental Health Status:  Mental Health Status: No Current Concerns  Mental Health Management: Medication, Individual Therapy, Psychiatrist    Chemical Dependency Status:  Chemical Dependency Status: No Current Concerns             Values/Beliefs:  Spiritual, Cultural Beliefs, Buddhist  Practices, Values that affect care: no               Additional Information:  Eduarda Nogueira is a 43 year old female with history of Addis-n-y bypass (2022), 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     Chart reviewed. Case discussed with bedside RN and medical provider.     Writer met with pt in her room in the ED. Writer introduced self, discussed role and went over writer's availability. Pt lives with her father in his home. Father acts as pt's PCA and assists with most of pt's ADLs/IADLs. Pt expresses her needs are being met at home and hopes to return at discharge. Pt reports being open to [facility] services. Pt denies any financial, safety, or abuse concerns at this time.    Social work will continue to follow and provide assistance to ensure a safe and timely discharge.         ________________    ARIEL Polanco, [facility]  ED/Observation   [facility]  Phone: [phone]  Pager: [phone]  Fax: [phone]    On-call pager, [phone], 4:00pm to midnight     After hours [employer] and After Hours [facility]

## 2024-05-02 NOTE — PROGRESS NOTES
Care Management Follow Up    Length of Stay (days): 0    Expected Discharge Date: 05/02/2024     Concerns to be Addressed: discharge planning     Patient plan of care discussed at interdisciplinary rounds: Yes    Anticipated Discharge Disposition: [facility], Home     Anticipated Discharge Services: None  Anticipated Discharge DME: None    Patient/family educated on Medicare website which has current facility and service quality ratings:  (N/A)  Education Provided on the Discharge Plan: Yes  Patient/Family in Agreement with the Plan: yes    Referrals Placed by CM/SW:    Private pay costs discussed: Not applicable    Additional Information:  RNCC was updated by unit [facility] that patient is on service with [facility] for TF, RNCC notified hospital I liaison of patient observation stay, then placed HI resumption orders, will need to update if change to TF to include TF specifics. RNCC continues to follow.      [facility]  Phone # [phone]  Fax # [phone]   Home TF    TIM SolorzanoN, BA, RN, CMSRN, RNCC  [facility]  Covering Units [address] Beds 9049-3187/OBS  Phone: [phone]  Pager: [phone]  After Hours [phone]    [address] Beds 2937-2889  Ph: [phone]  6C Beds 5071-6717  pager: [phone]    6B/C/D RNCC Weekend/holiday pager: [phone]    6A/ICU RNCC Weekend/holiday pager: [phone]    7A/7B/7C/7D RNCC Weekend/holiday pager: [phone]    5A/B/C RNCC Weekend/holiday pager: [phone]    [facility] RNCC ED/5 Ortho/5 Med/Surg [phone]    [facility] RNCC 6 Med/Surg [phone], [phone]    Observation SW and weekend/after hours phone: [phone]

## 2024-05-02 NOTE — PROGRESS NOTES
CLINICAL NUTRITION SERVICES - ASSESSMENT NOTE     Nutrition Prescription    RECOMMENDATIONS FOR MDs/PROVIDERS TO ORDER:  High risk for refeeding syndrome. Monitor K/Mg/Phos levels and replete as indicated  Consider IV thiamine/Wernicke's protocol   Consider checking for essential fatty acid deficiency, as pt is at high risk with intolerance to PO, non-compliance with EN, and dry/scaling, itchy skin observed during visit which is a sx of EFA deficiency.  Would not recommend discontinuing enteral nutrition at this time, based on nutrition hx/presentation below (surgery team hopeful to remove G-tube but pt severely malnourished and likely only maintaining her body weight currently due to high Kcals in Etoh)  Discussed with primary team that pt is open to discussing Etoh use with a professional this admission. Pt expressed fear of being judged for this (so is fearful) but expressed a desire to stop drinking.    Micronutrient recs with Addis-en-y Gastric Bypass in long-term/post-discharge or per bariatrics:  -- Multivitamin/minerals: adult dose 1-2 times daily (ordered once daily currently)  -- Vitamin B12: sublingual form of at least 500 mcg daily or injection of 1000 mcg monthly (ordered)  -- B-50 Complex daily (ordered)  -- Iron: 45-60 mg elemental daily (18-36 mg daily if low risk) - may partly or fully be covered in multivitamin -Defer to provider  -- Calcium Citrate containing vitamin D: 500 mg 3 times daily or 600 mg 2 times daily -defer to provider    Malnutrition Status:    Severe malnutrition in the setting of chronic illness     Recommendations already ordered by Registered Dietitian (RD):  [place] cts ordered by MD 5/2-5/4    Begin daily multivitamin with minerals, 100 mg B1 daily (per refeeding risk + Etoh), B-complex, and 500 mcg SL B12 daily. See additional vitamin/mineral recs per RNY GB hx as above, defer to MD to order.    Allow snacks/supplements PRN if requested (not scheduled at this time but pt aware she  can order if desired)    Enteral feeds to initiate as below:   Use dosing weight 58 kg (adj BW)  EN access: G-tube to remnant stomach   Formula: Gissell ETHERA Peptide 1.5 cl (or equivalent)   Goal Regimen: Gissell [place] Peptide 1.5 cl (or equivalent) at rate of 45 mL/hr to provide: 1080 mL formula, 1620 Kcals, 80 gm protein, 149 gm CHO, 16 gm fiber, and 756 mL free water daily  - Initiate at 10 ml/hr and advance by 10 ml q12hr pending pt's tolerance.  - Do not advance TF rate unless Mg++ >1.5, K+ is >3, and phos >1.9  - Recommend 60 ml q4hr fluid flushes for tube patency. Additional fluids and/or adjustments per MD.      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     NUTRITION HISTORY  -Per \[facility] [place] [facility]\"" dietitian: Current EN regimen consists of Gissell ETHERA Peptide 1.5 cl at rate of 45 mL/hr (continuous) with free water flushes of 120 mL Q4hrs.   - This regimen would provide: 1080 mL formula, 1620 Kcals, 80 gm protein, 149 gm CHO, 16 gm fiber, and 756 mL free water daily (from TF) + additional 720 mL from flushes for total daily free water volume of  1476 mL daily.   - Additionally/of note, enteral formula had been modified from Impact Peptide 1.5 cl on 4/15/24 due to  "inability to tolerate rate >20 mL/hr. [facility] dietitian has not heard from patient over past 2-weeks so was not able to comment on if pt tolerating new formula better or not.     - Per surgeon note 5/2: \"Over the last 2 days, her pain has worsened to the point where she could no longer tolerate it prompting her presentation this evening. Her pain is constantly present, but worsened with TF and meds through her G-tube. She describes is like \"the g-tube is pulling from the inside\". She does tolerate some PO intake, mainly liquids, but endorses nausea and regurgitation of almost any foods she ingests by mouth. She attributes that in part to her ill-fitted dentures and to her hiatal hernia. She continues to pass gas and have BMs which have been looser in consistency over the past couple of days but otherwise are not concerning to her. \"     - Per pt interview:   - Spent ~60 minutes with patient at bedside. Very complex nutrition history. Reports no TF intake for 2 weeks PTA. Reports ongoing struggle with tolerance to PO. Takes a few bites of crackers, and sips orange juice when she has \"the munchies\" after using Medical Marijuana. Reports ongoing struggle with intolerance to most food/liquids including water; reports food will regurgitate back up often. Reports being told to take 3 protein shakes per day but reports rarely taking them because of poor tolerance. Has not been taking any vitamin/mineral supplements s/p bariatric surgery and reports being unaware that she was supposed to be taking vitamin supplements.     - Admitted to struggling with \"self medication\" of both medical marijuana and alcohol recently. Reports that she will go to the garage and will have a few shots of alcohol mixed with orange juice every other to every few days, in order to make her pain go away. Patient reports that she hasn't disclosed this before so writer thanked her for sharing this. Writer asked pt if she would like to talk about this with " "someone, and pt states she would. Pt reports never being told she shouldn't drink and states \"I don't want to do this to myself if it is causing harm; please tell me, should I stop drinking?\" Writer advised patient that she should stop drinking and discussed various nutrition-related concerns regarding this during visit. Highly suspect that Etoh is likely playing a role in pt nausea, bloating, regurgitation reported. Additionally, with hx of RNY GB, discussed high risk for B1/Thiamine deficiency as well as risk for other nutrient deficiencies (especially since not taking daily MVI). Pt was very appreciative of RD visit, education, and support.     CURRENT NUTRITION ORDERS  Diet: Full Liquid  Supplements: None at present   Intake/Tolerance: 0% per I/O; monitor for changes and I/O trends.     GI  18 Fr gastrostomy to remnant stomach  Per MD note: \"abdomen soft, non-distended, G tube site with 4cm area of excoriations/erythema likely from gastric juice; silk suture noted embedded at entry site, removed (not connected to tube or skin). Abdomen otherwise soft and nontender.\"  No BMs documented to I/O yet since admit     LABS:  Na 134 (L)  K+ 4.4 (WNL); No recent PO4 levels to assess; no recent Mg levels to assess   Elevated homocysteine levels per dabs today (17)  which may be indicative of B12, B6, or folate deficiency.   B12 397 (WNL); Vit D 32 (WNL); Folate 19.1 (WNL)  Consider checking B6 levels   Copper in process; Zn in process; Vit E in process; Vit A in process; methylmalonic acid in process.     MEDICATIONS  Doxycycline Q12 hours (0800, 2000)   Miralax BID; Senokot BID; Simethicone QID    SKIN  WOCN not currently following pt   RN note 5/2: \"GJ tube site is rashy and red 4in diameter [place] surrounding GJ site. Appears to be yeasty. Anti-fungal cream applied.\"     ANTHROPOMETRICS  Height: 162.6 cm (5' 4\")  Most Recent Weight: 68.9 kg (152 lb)  IBW: 54.5 kg  126%IBW   BMI: Overweight BMI 25-29.9    Weight " History:   - Loss of 4.8% body weight over past ~3 months; not a significant loss for this time frame.   Wt Readings from Last 15 Encounters:   05/01/24 68.9 kg (152 lb)   03/11/24 68.9 kg (152 lb)   03/06/24 70.8 kg (156 lb)   03/04/24 71.2 kg (157 lb)   02/26/24 79.8 kg (176 lb) --OUTLIER   02/09/24 72.4 kg (159 lb 9.6 oz)   11/20/23 68.9 kg (152 lb)   09/08/15 129.5 kg (285 lb 6.4 oz)       Dosing Weight: 58 kg (adj BW based on admit wt and IBW of 54.5 kg)    ASSESSED NUTRITION NEEDS  Estimated Energy Needs: 7253-8907 kcals/day (25 - 30 kcals/kg)  Justification: Maintenance  Estimated Protein Needs: 70-85+ grams protein/day (1.2-1.5+ gm/kg)  Justification: Repletion/lean body mass preservation  Estimated Fluid Needs: (1 mL/kcal)   Justification: Maintenance    PHYSICAL FINDINGS  See malnutrition section below.  - Poor dentition     MALNUTRITION  % Intake: </= 50% for >/= 5 days (severe)  % Weight Loss: Weight loss does not meet criteria -Likely maintaining weight due to high Kcal from Etoh based on assessment  Subcutaneous Fat Loss: Facial region:  severe, Upper arm:  severe, and Thoracic/intercostal:  severe  Muscle Loss: Temporal:  severe, Facial & jaw region:  severe, Thoracic region (clavicle, acromium bone, deltoid, trapezius, pectoral):  severe, Upper arm (bicep, tricep):  severe, Dorsal hand:  severe, Upper leg (quadricep, hamstring):  severe, and Posterior calf:  severe  Fluid Accumulation/Edema: Does not meet criteria  Malnutrition Diagnosis: Severe malnutrition in the context of chronic illness    NUTRITION DIAGNOSIS  Malnutrition related to inability to tolerate food/liquids PO c/b RNY GB, Etoh use as evidenced by pt diet hx, severe muscle depletion, severe fat depletion     INTERVENTIONS  Implementation  Nutrition education for nutrition relationship to health/disease   Collaboration with other providers - Primary team MISHA, [facility] dietitian   Enteral Nutrition - Initiate, high refeed risk   Feeding tube  flush - Initiate   Multivitamin/mineral supplement therapy - MVI/M, Thiamine, B12    Goals  Total avg nutritional intake to meet a minimum of 25 kcal/kg and 1.2 g PRO/kg daily (per dosing wt 58 kg).     Monitoring/Evaluation  Progress toward goals will be monitored and evaluated per protocol.  João Garner, MS, RDN, LD, CNSC  Available by [employer] or phone   Vocera: M-F (7:00-3:30)  6B Clinical Dietitian  or 2 Obs Clinical Dietitian  Weekend/Holiday (7:00-3:30) - Weekend Clinical Dietitian   6B RD desk: [phone]       **Clinical Dietitians are no longer available by pager.

## 2024-05-02 NOTE — PROGRESS NOTES
"/76   Pulse 101   Temp 98.2  F (36.8  C) (Oral)   Resp 16   Ht 1.626 m (5' 4\")   Wt 68.9 kg (152 lb)   SpO2 100%   BMI 26.09 kg/m      Goal Outcome Evaluation:     Plan of Care Reviewed With: patient   Overall Patient Progress:     VS: VSS  Neuros: A&OX4  Cardiac: WNL  Respiratory: WNL  GI/: Voiding without difficulty, no BM   Diet/Nausea: Pt took Zofran for nausea   Skin: WNL, except for redness around G-tube side   LDA: port and PIV   Labs:   Pain: Pain treated with PRN IV dilaudid   Activity: SBA  New changes this shift: Pt had no new health concerns at this time.   Plan: CPC  "

## 2024-05-02 NOTE — CONSULTS
[facility]  WOC Nurse Inpatient Assessment     Consulted for: G-tube site    Summary: WOC attempted to see ptx2 but unavailable. Chart and photo reviewed from today and POC placed accordingly. Spoke with RN, updated. Surgery has also seen the pt and adjusted tube.    Patient History (according to provider note(s):      Eduarda Nogueira is a 43 year old female with history of Addis-n-y bypass (2022), 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     Assessment:      Areas visualized during today's visit: Focused: picture and RN discussion          Minimal drainage. Area around the tube is consistent with MASD.     Treatment Plan:     G-tube site wound(s): BIDand PRN  Cleanse the area with NS and pat dry.  Apply thin layer of critic aid paste.  Cover with 1 split gauze under the tube.  With repeat application, do not scrub the paste, only remove soiled paste and reapply.  If complete removal of paste is necessary use baby oil/mineral oil (#[id number]) and soft wash cloth.      Orders: Written    RECOMMEND PRIMARY TEAM ORDER: None, at this time  Education provided: plan of care and wound progress  Discussed plan of care with: Nurse  WOC nurse follow-up plan:  early next week to ensure effective POC  Notify WOC if wound(s) deteriorate.  Nursing to notify the Provider(s) and re-consult the WOC Nurse if new skin concern.    DATA:       BMI: Body mass index is 26.09 kg/m .   Active diet order: Orders Placed This Encounter      Advance Diet as Tolerated: Full Liquid Diet     Output: I/O last 3 completed shifts:  In: -   Out: 200 [Urine:200]     Labs:   Recent Labs   Lab 05/02/24  0642   ALBUMIN 3.9   HGB 9.6*   WBC 8.8      Pressure injury risk assessment:   Sensory Perception: 3-->slightly limited  Moisture: 4-->rarely moist  Activity: 4-->walks frequently  Mobility: 4-->no limitation  Nutrition: 1-->very poor  Friction and Shear: 3-->no apparent problem  Meng Score: 19    Yary Pattno, RN   Pager no longer is use, please contact through [employer]: [facility] Nurse [place]  Dept. Office Number: [phone]

## 2024-05-02 NOTE — PROGRESS NOTES
Brief Surgery Update  12:13 PM     G tube appeared backed out by nearly 15 cm, skin stitch no longer securing device to patient. CT reviewed, G tube within the stomach above GJ anastomosis.    Tube cleaned with sterile solution and advanced to prior marker and secured with adhesive feeding tube fastener to skin. Will obtain AXR to verify not coiled within stomach.     OK to continue to use G tube in the meantime.     Briana Trujillo MD  PGY-7, General Surgery  [phone]

## 2024-05-02 NOTE — UTILIZATION REVIEW
Concurrent stay review; Secondary Review Determination    Under the authority of the Utilization Management Committee, the utilization review process indicated a secondary review on the above patient. The review outcome is based on review of the medical records, discussions with staff, and applying clinical experience noted on the date of the review.    (x) Observation Status Appropriate - Concurrent stay review        RATIONALE FOR DETERMINATION: 43-year-old female with history of Addis-n-y bypass (2022), 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  Patient afebrile with normal white blood count thus observation care appropriate to initiate treatment of skin infection changes.    Patient is clinically improving and there is no clear indication to change patient's status to inpatient. The severity of illness, intensity of service provided, expected LOS and risk for adverse outcome make the care appropriate for observation.    This document was produced using voice recognition software    The information on this document is developed by the utilization review team in order for the business office to ensure compliance. This only denotes the appropriateness of proper admission status and does not reflect the quality of care rendered.    The definitions of Inpatient Status and Observation Status used in making the determination above are those provided in the CMS Coverage Manual, Chapter 1 and Chapter 6, section 70.4.    Sincerely,    James Patel MD  Utilization Review  Physician Advisor  [facility].

## 2024-05-02 NOTE — PROGRESS NOTES
Observation goals    PRIOR TO DISCHARGE        Comments:   - adequate pain control on oral analgesics - Not met  - tolerating oral antibiotics or has plans for home infusion setup - met  - WOCN consult completed -met  - safe disposition plan has been identified -met    Nurse to notify provider when observation goals have been met and patient is ready for discharge.

## 2024-05-02 NOTE — CONSULTS
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 She presented to [facility] on 5/1/2024 with left-sided abdominal pain, nausea, poor PO intake, and lactic acid elevated to 4.1 > 3.5. Physical exam and imaging findings are reassuring at this time with no evidence of intra-abdominal pathological process. Her elevated lactic acid likely reflects dehydration in the context of poor PO intake despite the patient tolerating TF.      No acute surgical concerns  Lactic acidosis likely 2/2 dehydration - recommend appropriate fluid resuscitation  Ok to discharge from ED from surgery perspective after recheck LA and pain management  Should patient remain in hospital, Surgical oncology team will follow    Clinically Significant Risk Factors Present on Admission                       # Overweight: Estimated body mass index is 26.09 kg/m  as calculated from the following:    Height as of this encounter: 1.626 m (5' 4\").    Weight as of this encounter: 68.9 kg (152 lb).       # Financial/Environmental Concerns:         The patient's care was discussed with the Attending Physician, Dr. Krishnamurthy .    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 Fortunately, surgical pathology from surgery revealed no adenocarcinoma. Patient presented to ED this evening with a 2-week history of left-sided, sharp abdominal pain associated with nausea. She has had abdominal pain since her surgery in February but endorses worsening of pain in the last 2 weeks. She previously called into the surgery clinic and was recommended to come in at that time, however she suddenly felt better and decided against presentation at that time.  However, over the last 2 days, her pain has worsened to the point where she could no longer tolerate it prompting her presentation this evening. Her pain is constantly present, but worsened with TF and meds through her G-tube. She describes is like \"the g-tube is pulling from the inside\". She does tolerate some PO intake, mainly liquids, but endorses nausea and regurgitation of almost any foods she ingests by mouth. She attributes that in part to her ill-fitted dentures and to her hiatal hernia. She continues to pass gas and have BMs which have been looser in consistency over the past couple of days but otherwise are not concerning to her. She denies any haematemesis or haematochezia. Eduarda further endorses some fever and chills over the last 2 weeks. She reports a fever to 103F 2 weeks ago but decided against presenting to the ED at that time.    Workup revealed normal vitals, afebrile, largely normal BMP, with lactic acid up to 4.1 on arrival (3.5 on repeat), largely normal CBC, and CT imaging without significant findings other than known simply hepatic cysts and some skin thickening " with some subcutaneous fat stranding around her G-tube site.       Past Medical History    Past Medical History:   Diagnosis Date    Anemia     No Comments Provided    Anxiety state     No Comments Provided    Arthropathy     No Comments Provided    Asthma     No Comments Provided    Backache     No Comments Provided    Edema     No Comments Provided    Esophageal reflux     No Comments Provided    Female stress incontinence     No Comments Provided    Hypothyroidism     No Comments Provided    Irregular menstrual cycle     No Comments Provided    Irritable colon     No Comments Provided    Migraine     No Comments Provided    Morbid obesity (H)     No Comments Provided    Other chronic nonalcoholic liver disease     No Comments Provided    Other depressive disorder     No Comments Provided    Other malaise and fatigue     No Comments Provided    Pain in joint     hips, knees, ankles, feet, neck    Restless legs syndrome     self-diagnosed    Shortness of breath     No Comments Provided    Synovial cyst of popliteal space     No Comments Provided    Vitamin D deficiency     Rx 3/14, re check 6/14       Past Surgical History   Past Surgical History:   Procedure Laterality Date    ATTEMPTED ARTHROSCOPY      x3, rt knee    COLONOSCOPY N/A 2/13/2024    Procedure: Colonoscopy;  Surgeon: Cuauhtemoc Denis MD;  Location:  GI    ESOPHAGOSCOPY, GASTROSCOPY, DUODENOSCOPY (EGD), COMBINED N/A 1/30/2024    Procedure: ESOPHAGOGASTRODUODENOSCOPY, WITH BIOPSY;  Surgeon: Precious Albarran MD;  Location: [facility] GI    EXTRACTION(S) DENTAL      No Comments Provided    IR FINE NEEDLE ASPIRATION W ULTRASOUND  2/26/2024    LAPAROSCOPIC CHOLECYSTECTOMY      2010    LAPAROTOMY EXPLORATORY N/A 2/14/2024    Procedure: EXPLORATORY LAPAROTOMY;  Surgeon: Christoph Krishnamurthy MD;  Location:  OR    OSTEOTOMY FEMUR DISTAL      right    RESECT SMALL BOWEL WITHOUT OSTOMY N/A 2/14/2024    Procedure: SMALL BOWEL RESECTION, PARTIAL COLON RESECTION,  "PARTIAL LIVER RESECTION, PARTIAL GASTRIC RESECTION, GASTROSTOMY TUBE PLACEMENT;  Surgeon: Christoph Krishnamurthy MD;  Location: [facility] OR       Prior to Admission Medications   I have reviewed this patient's current medications  (Not in a hospital admission)        Review of Systems    The 10 point Review of Systems is negative other than noted in the HPI or here.     Social History   I have reviewed this patient's social history and updated it with pertinent information if needed.  Social History     Tobacco Use    Smoking status: Every Day     Current packs/day: 0.30     Average packs/day: 0.3 packs/day for 11.1 years (3.3 ttl pk-yrs)     Types: Cigarettes     Start date: 3/18/2013     Passive exposure: Current    Smokeless tobacco: Never    Tobacco comments:     3 cigarettes per day    Substance Use Topics    Alcohol use: Yes     Comment: Alcoholic Drinks/day: Quit 3/2013    Drug use: Never     Types: Other     Comment: Drug use: No         Allergies   Allergies   Allergen Reactions    Acetaminophen Hives, Itching, Rash and Shortness Of Breath     Several Rx's for Norco in 2021 & 22    No Clinical Screening - See Comments Anaphylaxis     steroids    Gabapentin Unknown and Itching    Hydrocodone-Acetaminophen Itching     Several Rx's for Norco in 2021 & 22    Iodinated Contrast Media Unknown and Nausea and Vomiting     Extremely anxious, vomited once after CT. No wheezing or SOB.    Latex Itching    Morphine And Related Itching     Several Rx's for Norco in 2021 & 22    Nsaids Other (See Comments)     H/o german-n-y gastric bypass\". AVOID NSAIDs and aspirin due to risk of gastric and/or G-J anastomotic ulcers. If Eduarda must be on short course of NSAIDs or aspirin, use enteric coated if possible and use PPI // Ira Hung RN, Bariatric Nurse Clinician, [facility] 3/23/2022    Oxycodone-Acetaminophen Itching     Several Rx's for Norco in 2021 & 22        Physical Exam   Vital Signs: Temp: 98.8  F " (37.1  C) Temp src: Oral BP: 108/74 Pulse: 77   Resp: 16 SpO2: 98 % O2 Device: None (Room air)    Weight: 152 lbs 0 ozNo intake or output data in the 24 hours ending 05/02/24 0011    General Appearance: Appears older than stated age, NAD, sitting up in bed, awake, alert  Respiratory: NLB on RA  Cardiovascular: RRR on radial palpation  GI: soft, non-distended, tender in left abdomen, G-tube in LUQ with excoriated, irritated, red skin surrounding her G-tube about 3cm in diameter each side, no purulence, no fluctuance, upper midline incision healing well   Skin: warm, well perfused            Data     I have personally reviewed the following data over the past 24 hrs:    9.5  \   11.3 (L)   / 320     137 97 (L) 10.6 /  75   4.3 22 0.74 \     ALT: 7 AST: 22 AP: 103 TBILI: 0.3   ALB: 4.3 TOT PROTEIN: 7.1 LIPASE: 17     Procal: N/A CRP: <3.00 Lactic Acid: 3.5 (H)         Imaging results reviewed over the past 24 hrs:   Recent Results (from the past 24 hour(s))   CT Abdomen Pelvis w Contrast    Narrative    EXAMINATION: CT ABDOMEN PELVIS W CONTRAST, 5/1/2024 6:28 PM    INDICATION: pain, swelling, drainage from g tube site    COMPARISON STUDY: CT 2/25/2024    TECHNIQUE: CT scan of the abdomen and pelvis was performed on  multidetector CT scanner using volumetric acquisition technique and  images were reconstructed in multiple planes with variable thickness  and reviewed on dedicated workstations.     CONTRAST: iopamidol (ISOVUE-370) solution 92 mL injected IV without  oral contrast    CT scan radiation dose is optimized to minimum requisite dose using  automated dose modulation techniques.    FINDINGS:    Lower thorax: The visualized lung bases are clear.    Liver: Diffuse hypoattenuation of the liver. Multiple simple cysts  throughout the liver are not significantly changed from prior. Focal  hepatic steatosis along the falciform ligament. Decreased size of the  cystic collection on the medial aspect of the left hepatic  lobe  containing a punctate calcification, with resolved gaseous component.  Stable flash filling hemangioma in hepatic segment 2. Stable  postsurgical changes of partial left hepatic lobe resection. No  intrahepatic biliary ductal dilation.    Biliary System: Status post cholecystectomy. No extrahepatic biliary  ductal dilation.    Pancreas: No mass or pancreatic ductal dilation.    Adrenal glands: No mass or nodules    Spleen: Normal.    Kidneys: Polycystic kidneys with cysts throughout the kidneys. No  suspicious mass, obstructing calculus or hydronephrosis.    Gastrointestinal tract: Percutaneous gastrostomy tube terminates in  the stomach. Postsurgical changes of a small bowel resection, partial  colonic resection, partial gastrectomy with Addis-en-Y gastric bypass.  No abnormally dilated loops of bowel.    Mesentery/peritoneum/retroperitoneum: No mass. No free fluid or air.    Lymph nodes: No significant lymphadenopathy.    Vasculature: Patent major abdominal vasculature.    Pelvis: Urinary bladder is normal.   Uterus and adnexa within normal  limits.    Osseous structures: No aggressive or acute osseous lesion.      Soft tissues: Left ventral abdominal wall skin thickening with  subcutaneous fat stranding along the course of percutaneous  gastrostomy tube. No focal fluid collection. No soft tissue gas.   Annual ventral abdominal wall incision with resolved subcutaneous  tissue collection deep to the incision.      Impression    IMPRESSION:   1. Subcutaneous soft tissue fat stranding along the course of the left  upper quadrant percutaneous gastrostomy tube with surrounding skin  thickening, suspicious for infection/inflammation. No focal fluid  collection. No soft tissue gas.  2. No acute intra-abdominal pathology. Stable postoperative changes of  partial gastrectomy, partial hepatectomy, small bowel resection,  cholecystectomy.  3. Additional chronic and incidental findings as detailed in the body  of the  report.    I have personally reviewed the examination and initial interpretation  and I agree with the findings.    DAJA JOSE MD         SYSTEM ID:  [id number]

## 2024-05-02 NOTE — PROGRESS NOTES
[facility]    Medicine Progress Note - Hospitalist Service    Date of Admission:  5/1/2024    Assessment & Plan   Eduarda Nogueira is a 43 year old woman with a history of Addis-en-Y bypass (2022), bleeding GJ anastomotic ulcer initially c/f adenocarcinoma s/p ex lap with small bowel resection and partial colon resection (2/2024), GJ-tube dependence, CVA, hypothyroidism. She was admitted to Medicine Observation with nausea, abdominal pain, and concern for G tube site infection.     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 Presented in February with recurrent GI bleeding and underwent above surgical procedures with Dr. Krishnamurthy, repeat pathology at that time was ultimately negative for adenocarcinoma. Since surgery has been struggling with nausea and intolerance of oral intake. Primarily depends on tube feeds for her nutrition. Now has been off tube feedings with minimal oral intake for ~2 weeks in setting of increased alcohol use and worsening nausea. CT a/p and AXR both reassuring without acute pathology. Suspect nausea related to poor gut motility, constipation, alcohol use. It is unclear why she is unable to tolerate oral nutrition. G-tube site erythematous with ongoing leakage - exam most c/w skin breakdown but given severity reasonable to empirically treat for cutaneous fungal infection and cellulitis.    - Surgical Oncology following   - WOCN consult   - RD consult   - Empirically treat for cutaneous candidiasis with topical miconazole   - Discontinue IV vancomycin. Start PO doxycycline 100 mg BID for possible  "cellulitis of G tube site.    - Start Reglan 10 mg QID   - Start pantoprazole 40 mg daily   - Continue IV Dilaudid 0.5 - 1 mg q4h PRN, anticipate transitioning to PO tomorrow   - Bowel regimen: Senna BID, Miralax BID   - Slowly start tube feedings. At high risk for refeeding syndrome.   - Oral diet as tolerated   - Calorie counts    Alcohol use disorder: Drinking more heavily recently, at least a few drinks per day, notes she has been self medicating with alcohol to some degree. Not taking care of herself recently.    - Currently no evidence of withdrawal. Closely monitor on CIWA scale.   - [facility] consult in AM   - Start folate, MVI, thiamine supplements    Mild transaminitis: AST 46, alk phos 155. ALT and T bili wnl. 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    - Trend labs    Lactic acidosis - resolved: Lactate 4.1 in ED. Most likely secondary to dehydration. Normalized with IVF.     Migraine headache: Continue PTA nortriptyline.            Observation Goals: -adequate pain control on oral analgesics, -tolerating oral antibiotics or has plans for home infusion setup, - WOCN consult completed, -safe disposition plan has been identified, Nurse to notify provider when observation goals have been met and patient is ready for discharge.  Diet: Advance Diet as Tolerated: Full Liquid Diet    DVT Prophylaxis: Low Risk/Ambulatory with no VTE prophylaxis indicated  Villalba Catheter: Not present  Lines: PRESENT      Port a Cath 05/01/24 Single Lumen Right Chest wall-Site Assessment: WDL      Cardiac Monitoring: None  Code Status: Full Code      Clinically Significant Risk Factors Present on Admission          # Hypocalcemia: Lowest Ca = 8.4 mg/dL in last 2 days, will monitor and replace as appropriate              # Overweight: Estimated body mass index is 26.09 kg/m  as calculated from the following:    Height as of this encounter: 1.626 m (5' 4\").    Weight as of this encounter: 68.9 kg (152 " lb).       # Financial/Environmental Concerns: none         Disposition Plan     Medically Ready for Discharge: Anticipated Tomorrow           The patient's care was discussed with the Attending Physician, Dr. Nance, Bedside Nurse, and Patient.    Johnna Chandler PA-C  Hospitalist Service  [facility]  Securely message with [employer] (more info)  Text page via [facility] Paging/Directory   ______________________________________________________________________    Interval History   Ongoing nausea overnight. No vomiting. No bowel movement in ~4 days. Feeling bloated. Having abdominal pain primarily around the G tube site. Ongoing G tube site leakage requiring frequent dressing changes.     Physical Exam   Vital Signs: Temp: 98.7  F (37.1  C) Temp src: Oral BP: 132/82 Pulse: 101   Resp: 18 SpO2: 98 % O2 Device: None (Room air)    Weight: 152 lbs 0 oz    Constitutional: Awake and alert, in no apparent distress.   Eyes: Sclera clear, anicteric   Respiratory: Breathing non-labored. CTAB  Cardiovascular:  RRR, normal S1/S2. No rubs or murmurs. No peripheral edema.   GI: Tender throughout, no rebound or guarding. Midline abdominal incision well-healed. G-tube site with surrounding erythema, clear yellowish leakage on dressing. Hypoactive bowel sounds.   Skin:  Good color. No jaundice. No visible rashes, lesions, or bruising of concern.   Neurologic: Alert and oriented.      Medical Decision Making       50 MINUTES SPENT BY ME on the date of service doing chart review, history, exam, documentation & further activities per the note.      Data   ------------------------- PAST 24 HR DATA REVIEWED -----------------------------------------------    I have personally reviewed the following data over the past 24 hrs:    8.8  \   9.6 (L)   / 244     134 (L) 95 (L) 10.6 /  106 (H)   4.4 25 0.77 \     ALT: 14 AST: 46 (H) AP: 155 (H) TBILI: 0.5   ALB: 3.9 TOT PROTEIN: 6.3 (L) LIPASE: 17     TSH:  1.38 T4: N/A A1C: N/A     Procal: N/A CRP: <3.00 Lactic Acid: 1.2       Ferritin:  43 % Retic:  N/A LDH:  N/A       Imaging results reviewed over the past 24 hrs:   Recent Results (from the past 24 hour(s))   CT Abdomen Pelvis w Contrast    Narrative    EXAMINATION: CT ABDOMEN PELVIS W CONTRAST, 5/1/2024 6:28 PM    INDICATION: pain, swelling, drainage from g tube site    COMPARISON STUDY: CT 2/25/2024    TECHNIQUE: CT scan of the abdomen and pelvis was performed on  multidetector CT scanner using volumetric acquisition technique and  images were reconstructed in multiple planes with variable thickness  and reviewed on dedicated workstations.     CONTRAST: iopamidol (ISOVUE-370) solution 92 mL injected IV without  oral contrast    CT scan radiation dose is optimized to minimum requisite dose using  automated dose modulation techniques.    FINDINGS:    Lower thorax: The visualized lung bases are clear.    Liver: Diffuse hypoattenuation of the liver. Multiple simple cysts  throughout the liver are not significantly changed from prior. Focal  hepatic steatosis along the falciform ligament. Decreased size of the  cystic collection on the medial aspect of the left hepatic lobe  containing a punctate calcification, with resolved gaseous component.  Stable flash filling hemangioma in hepatic segment 2. Stable  postsurgical changes of partial left hepatic lobe resection. No  intrahepatic biliary ductal dilation.    Biliary System: Status post cholecystectomy. No extrahepatic biliary  ductal dilation.    Pancreas: No mass or pancreatic ductal dilation.    Adrenal glands: No mass or nodules    Spleen: Normal.    Kidneys: Polycystic kidneys with cysts throughout the kidneys. No  suspicious mass, obstructing calculus or hydronephrosis.    Gastrointestinal tract: Percutaneous gastrostomy tube terminates in  the stomach. Postsurgical changes of a small bowel resection, partial  colonic resection, partial gastrectomy with Addis-en-Y  gastric bypass.  No abnormally dilated loops of bowel.    Mesentery/peritoneum/retroperitoneum: No mass. No free fluid or air.    Lymph nodes: No significant lymphadenopathy.    Vasculature: Patent major abdominal vasculature.    Pelvis: Urinary bladder is normal.   Uterus and adnexa within normal  limits.    Osseous structures: No aggressive or acute osseous lesion.      Soft tissues: Left ventral abdominal wall skin thickening with  subcutaneous fat stranding along the course of percutaneous  gastrostomy tube. No focal fluid collection. No soft tissue gas.   Annual ventral abdominal wall incision with resolved subcutaneous  tissue collection deep to the incision.      Impression    IMPRESSION:   1. Subcutaneous soft tissue fat stranding along the course of the left  upper quadrant percutaneous gastrostomy tube with surrounding skin  thickening, suspicious for infection/inflammation. No focal fluid  collection. No soft tissue gas.  2. No acute intra-abdominal pathology. Stable postoperative changes of  partial gastrectomy, partial hepatectomy, small bowel resection,  cholecystectomy.  3. Additional chronic and incidental findings as detailed in the body  of the report.    I have personally reviewed the examination and initial interpretation  and I agree with the findings.    DAJA JOSE MD         SYSTEM ID:  [id number]   XR Abdomen Port 1 View    Narrative    EXAMINATION:  XR ABDOMEN PORT 1 VIEW 5/2/2024 11:11 AM     COMPARISON: CT 5/1/2024.    HISTORY: abdominal pain, c/f ileus.    TECHNIQUE: Frontal view of the abdomen.    FINDINGS: Scattered surgical clips about the upper abdomen and right  lower quadrant. No abnormally dilated loops of bowel.No pneumatosis or  portal venous gas.  The lung bases are unremarkable. Metallic  appearing opacities projecting over the central abdomen and right  upper quadrant, possibly external to the patient, consider  correlation. Contrast opacification of the urinary bladder,  possibly  from previously administered IV contrast.      Impression    IMPRESSION: No significantly dilated air-filled bowel loops.    I have personally reviewed the examination and initial interpretation  and I agree with the findings.    EMILY ALEGRIA MD         SYSTEM ID:  [id number]

## 2024-05-02 NOTE — PHARMACY-CONSULT NOTE
Pharmacy Tube Feeding Consult    Medication reviewed for administration by feeding tube and for potential food/drug interactions.    Recommendation: No changes are needed at this time. Patient does take some medications orally. If pantoprazole requiring administration via feeding tube, will need to change to pantoprazole suspension.    Pharmacy will continue to follow as new medications are ordered.    Jason Conway, SamsonD, BCPS

## 2024-05-02 NOTE — PROGRESS NOTES
Patient admitted from ED to Obs room 8.    Oriented to unit. Alert and oriented. VSS.   Standby assist due to dizziness in ED. Dizziness has resolved post IV fluids.    Educated about current enteric isolation order and using commode in room.  Will send stool for C-diff when she has BM. She has not had a BM in 4 days.     Pt reports that she gets nauseous and vomits with any oral or GJ tube intake.   She normally does tube feeds at home (since Feb 2024). She has Not been able to tolerate any tube feedings or liquids for 2 weeks.   IV fluids given in ED.   Consulting nutrition/dietician.     Skin on back and shoulders is bumpy and scaling, flaking off. 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 Skin is at her baseline, Except GJ tube site.     GJ tube site is rashy and red 4in diameter [place] surrounding GJ site. Appears to be yeasty. Anti-fungal cream applied.   New dressing applied.

## 2024-05-02 NOTE — MEDICATION SCRIBE - ADMISSION MEDICATION HISTORY
Medication Scribe Admission Medication History    Admission medication history is complete. The information provided in this note is only as accurate as the sources available at the time of the update.    Information Source(s): Patient via in-person    Pertinent Information: Spoke with patient in person and completed medication hx. Patient states that due to her sx she stated that the last time she took her medications were 2 weeks ago. Many of her medications are PRN per patient.     Changes made to PTA medication list:  Added: Clobetasol 0.05% External ointment  Deleted: None  Changed: None    Allergies reviewed with patient and updates made in EHR: no    Medication History Completed By: Taylor Best 5/2/2024 9:39 AM    PTA Med List   Medication Sig Last Dose    albuterol (PROAIR HFA/PROVENTIL HFA/VENTOLIN HFA) 108 (90 Base) MCG/ACT inhaler Inhale 1-2 puffs into the lungs 2 - 4 times daily Past Month    ammonium lactate (AMLACTIN) 12 % external cream Apply topically daily as needed for dry skin Past Month    clobetasol (TEMOVATE) 0.05 % external ointment Apply a thin layer twice daily as needed to the palms and soles, as applicable Past Month    clobetasol propionate (CLOBEX) 0.05 % external shampoo Apply a thin layer onto dry, affected area of scalp once daily. Leave for 15 minutes before lathering and rinsing. Past Month    Cranberry 400 MG CAPS Take 400 mg by mouth daily as needed More than a month    diphenhydrAMINE-zinc acetate (BENADRYL ITCH STOPPING) 1-0.1 % external cream Apply topically 3 times daily as needed for itching Past Month    HYDROmorphone (DILAUDID) 2 MG tablet Take 1 tablet (2 mg) by mouth every 8 hours as needed for moderate to severe pain Past Month    HYOSCYAMINE SL 0.125 MG sublingual tablet Place 0.125 mg under the tongue every 4 hours as needed Past Month    ipratropium - albuterol 0.5 mg/2.5 mg/3 mL (DUONEB) 0.5-2.5 (3) MG/3ML neb solution Inhale 1 Neb via a nebulizer 4 times daily if  needed for Shortness Of Breath or Wheezing. Past Month    Lidocaine (LIDOCARE) 4 % Patch Apply 1-3 patches to intact skin to cover most painful area of back pain for max 12hr per 24hr period. Past Month    methocarbamol (ROBAXIN) 500 MG tablet Take 1 tablet (500 mg) by mouth 4 times daily as needed for muscle spasms Past Month    multivitamins w/minerals liquid 15 mLs by Per Feeding Tube route daily Past Month    nortriptyline (PAMELOR) 10 MG capsule Take 20 mg by mouth at bedtime 5/1/2024 at AM    nystatin (MYCOSTATIN) 654733 UNIT/GM external cream Apply topically 2 times daily Past Month    ondansetron (ZOFRAN) 4 MG tablet Take 1 tablet by mouth every 6 hours as needed Past Month    pantoprazole (PROTONIX) 40 MG EC tablet Take 40 mg by mouth daily Past Month    polyethylene glycol (MIRALAX) 17 GM/Dose powder Take 17 g by mouth daily as needed for constipation Past Month    propranolol (INDERAL) 40 MG tablet Take 40 mg by mouth 4 times daily Past Month    Sulfacetamide Sodium-Sulfur 10-5 % SUSP Apply topically daily as needed Past Month    tretinoin (RETIN-A) 0.05 % external cream Apply topically daily as needed Past Month    triamcinolone (KENALOG) 0.1 % external cream Apply topically 2 times daily Apply to rash 2 times daily. Do not use longer than 2 weeks. Past Month

## 2024-05-02 NOTE — PROGRESS NOTES
Observation Goals    -adequate pain control on oral analgesics- NOT MET   -tolerating oral antibiotics or has plans for home infusion setup- NOT MET  - WOCN consult completed- NOT MET  -safe disposition plan has been identified- NOT MET    4x A/O. VSS. Port hep locked, PIV SL.

## 2024-05-02 NOTE — H&P
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    Purulent Cellulitis  Intertrigo  Cutaneous Fungal Infection  Cutaneous skin changes around G-site appear consistent with intertrigo and cutaneous fungal infection. Patient also had previous post operative abdominal fluid aspirate growing candida as well as streptococcus and enterococcus. Given that rash is at skin folds with notable moisture, suspect that this is intertrigo with cutaneous fungal infection versus purulent gram positive cellulitis. Patient is s/p Ceftriaxone and vancomycin in ED. Of note, vitamin deficiency such as zinc deficiency may also contribute to skin changes found. Patient does not meet sepsis criteria though purulent drainage from G-tube site warrants empiric MRSA coverage.   - Wound culture ordered  - Continue vancomycin for gram positive cellulitis and empiric MRSA coverage  - Transition to 14 days oral antibiotic with MRSA and SIBO coverage if blood cultures negative (e.g Bactrim DS BID or Doxycycline 100 mg BID)   - Topical miconazole ordered  - WOC consulted  - General surgery consulted regarding G-site management    C/f Small Intestinal Bacterial Overgrowth  Chronic Diarrhea  Patient is higher risk of SIBO given history of chronic diarrhea and abdominal bloating/discomfort in setting of gastric bypass, PPI use as well as further gastric remnant and small bowel resection.   - Enteric panel and C.diff panel ordered for rule  out  - Antibiotics as above  - Held home dilaudid for abdominal pain given nausea, trial IV dilaudid for pain control    Addis-n-y Bypass  C/f Malnutrition  C/f Vitamin Deficiency  Patient on TF but not tolerating at present. 4/2024 swallow study and esophogram without notable abnormalities. She is able to tolerate some liquids. G tube placement appears appropriate on admission CT imaging. Suspect that patient may be able to restart tube feeds once suspected SIBO is managed. Otherwise she may need to transition to TPN so will plan to treat first with patient on clear liquids and IV fluids and then discuss with nutrition services.   - Vitamin A, D, E ordered  - Copper, Zinc and iron ordered  - Nutrition to be consulted     Elevated Anion Gap  Lactic Acidosis  Peak of 4.1 on admission. Likely from dehydration with noted improvement after fluids.   - s/p 2L LR  - Repeat AM lactate    Chronic normocytic anemia  No previous iron, B12 or folate testing. In setting of extensive gastrointestinal resection, suspect that anemia could be from multiple vitamin deficiencies. No reports of hemoptysis, hematemesis, melena or hematochezia. No other evidence of bleeding.  - Vitamin and iron testing as above    Migraines   - Continue home nortriptyline    Hypothyroidism  Not on therapy  - Repeat TSH ordered    Diet: Clear Liquid Diet    DVT Prophylaxis: Mechanical  Villalba Catheter: Not present  Fluids: 2L bolus   Lines: Peripheral IV and port    Cardiac Monitoring: None  Code Status: Full Code      Patient to be staffed in the morning.     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    Patient reports having several weeks of abdominal bloating with subsequent development of opaque discharge from G-tube site as well as painful rash 2 weeks ago. Patient abdominal bloating as been associated with episodes of increased gas production and retching without emesis. G-tube discharge is described as foul smelling, yellow discharge soaking through clothing several times a day with associated \"constant heavy pain inside\" abdomen. Due to abdominal pain and discharge, patient stopped her routine tube feeds on 4/24/24 and has not been tolerating food for the past several days due to bloating and retching. Abdominal pain has not improved with dilaudid. Regarding retching, patient reports that this worsens with food intake. She specifically has the sensation of solid food and sometimes liquids getting caught in the bottom of her throat though denies aspirations.     Recent outpatient esophogram and swallow study revealed no aspiration, only transient hiatal hernia - otherwise normal esophogram, as well as pharyngeal dysphagia.     Of note, patient did not have adenocarcinoma. She underwent extensive GI surgical resection for suspected adenocarcinoma; however, per pathology review and patient discussion, adenocarcinoma diagnosis was not correct.     Also, of note, patient completed Addis-n-y in 2022 but, per patient, she was lost to follow-up and did not have any routine bariatric nutritional counseling.    Past Medical History:   Diagnosis Date    Anemia     No Comments Provided    " Anxiety state     No Comments Provided    Arthropathy     No Comments Provided    Asthma     No Comments Provided    Backache     No Comments Provided    Edema     No Comments Provided    Esophageal reflux     No Comments Provided    Female stress incontinence     No Comments Provided    Hypothyroidism     No Comments Provided    Irregular menstrual cycle     No Comments Provided    Irritable colon     No Comments Provided    Migraine     No Comments Provided    Morbid obesity (H)     No Comments Provided    Other chronic nonalcoholic liver disease     No Comments Provided    Other depressive disorder     No Comments Provided    Other malaise and fatigue     No Comments Provided    Pain in joint     hips, knees, ankles, feet, neck    Restless legs syndrome     self-diagnosed    Shortness of breath     No Comments Provided    Synovial cyst of popliteal space     No Comments Provided    Vitamin D deficiency     Rx 3/14, re check 6/14         Past Medical History    Reviewed    Past Surgical History   Past Surgical History:   Procedure Laterality Date    ATTEMPTED ARTHROSCOPY      x3, rt knee    COLONOSCOPY N/A 2/13/2024    Procedure: Colonoscopy;  Surgeon: Cuauhtemoc Denis MD;  [facility]: [facility]    ESOPHAGOSCOPY, GASTROSCOPY, DUODENOSCOPY (EGD), COMBINED N/A 1/30/2024    Procedure: ESOPHAGOGASTRODUODENOSCOPY, WITH BIOPSY;  Surgeon: Precious Albarran MD;  Location: [facility]    EXTRACTION(S) DENTAL      No Comments Provided    IR FINE NEEDLE ASPIRATION W ULTRASOUND  2/26/2024    LAPAROSCOPIC CHOLECYSTECTOMY      2010    LAPAROTOMY EXPLORATORY N/A 2/14/2024    Procedure: EXPLORATORY LAPAROTOMY;  Surgeon: Christoph Krishnamurthy MD;  Location: [facility]    OSTEOTOMY FEMUR DISTAL      right    RESECT SMALL BOWEL WITHOUT OSTOMY N/A 2/14/2024    Procedure: SMALL BOWEL RESECTION, PARTIAL COLON RESECTION, PARTIAL LIVER RESECTION, PARTIAL GASTRIC RESECTION, GASTROSTOMY TUBE PLACEMENT;  Surgeon: Christoph Krishnamurthy MD;  [facility]: [facility]        Prior to Admission Medications   Prior to Admission Medications   Prescriptions Last Dose Informant Patient Reported? Taking?   Cranberry 400 MG CAPS  Self Yes No   Sig: Take 400 mg by mouth daily as needed   HYDROmorphone (DILAUDID) 2 MG tablet   No No   Sig: Take 1 tablet (2 mg) by mouth every 8 hours as needed for moderate to severe pain   HYOSCYAMINE SL 0.125 MG sublingual tablet   Yes No   Sig: Place 0.125 mg under the tongue every 4 hours as needed   Lidocaine (LIDOCARE) 4 % Patch  Self Yes No   Sig: Apply 1-3 patches to intact skin to cover most painful area of back pain for max 12hr per 24hr period.   Sulfacetamide Sodium-Sulfur 10-5 % SUSP  Self Yes No   Sig: Apply topically daily as needed   albuterol (PROAIR HFA/PROVENTIL HFA/VENTOLIN HFA) 108 (90 Base) MCG/ACT inhaler  Self Yes No   Sig: Inhale 1-2 puffs into the lungs 2 - 4 times daily   ammonium lactate (AMLACTIN) 12 % external cream  Self Yes No   Sig: Apply topically daily as needed for dry skin   clobetasol propionate (CLOBEX) 0.05 % external shampoo  Self Yes No   Sig: Apply a thin layer onto dry, affected area of scalp once daily. Leave for 15 minutes before lathering and rinsing.   diphenhydrAMINE-zinc acetate (BENADRYL ITCH STOPPING) 1-0.1 % external cream   No No   Sig: Apply topically 3 times daily as needed for itching   ipratropium - albuterol 0.5 mg/2.5 mg/3 mL (DUONEB) 0.5-2.5 (3) MG/3ML neb solution  Self Yes No   Sig: Inhale 1 Neb via a nebulizer 4 times daily if needed for Shortness Of Breath or Wheezing.   methocarbamol (ROBAXIN) 500 MG tablet   No No   Sig: Take 1 tablet (500 mg) by mouth 4 times daily as needed for muscle spasms   multivitamins w/minerals liquid   No No   Sig: 15 mLs by Per Feeding Tube route daily   nortriptyline (PAMELOR) 10 MG capsule  Self Yes No   Sig: Take 20 mg by mouth at bedtime   nystatin (MYCOSTATIN) 271918 UNIT/GM external cream   No No   Sig: Apply topically 2 times daily   ondansetron (ZOFRAN) 4  MG tablet  Self Yes No   Sig: Take 1 tablet by mouth every 6 hours as needed   pantoprazole (PROTONIX) 40 MG EC tablet  Self Yes No   Sig: Take 40 mg by mouth daily   polyethylene glycol (MIRALAX) 17 GM/Dose powder   Yes No   Sig: Take 17 g by mouth daily as needed for constipation   propranolol (INDERAL) 40 MG tablet   Yes No   Sig: Take 40 mg by mouth 4 times daily   tretinoin (RETIN-A) 0.05 % external cream  Self Yes No   Sig: Apply topically daily as needed   triamcinolone (KENALOG) 0.1 % external cream   No No   Sig: Apply topically 2 times daily Apply to rash 2 times daily. Do not use longer than 2 weeks.      Facility-Administered Medications: None        Physical Exam   Vital Signs: Temp: 98.8  F (37.1  C) Temp src: Oral BP: 108/74 Pulse: 109   Resp: 16 SpO2: 98 % O2 Device: None (Room air)    Weight: 152 lbs 0 oz    General: Pleasant, interactive but notably miserable from increased nausea   Lymph: No cervical lymphadenopathy  Cardiac: RRR, no murmurs  Pulmonary: Clear bilaterally, normal chest expansion, no increased work of breathing on room air  Abdominal: Erythema noted around G-site, opaque discharge from G-site though no bleeding, tender in all quadrants/epigastric area/suprapubic area, soft   MSK: Extremities warm and without edema. No CVA tenderness  Skin: Tender, beefy red, homogenous erythema noted around G-site without clean border and dry to touch, no overlying scaling, no other areas of rash noted at the extremities/face/chest/back  Neuro: Fluent speech, no gross focal deficits noted, strength testing limited by patient pain    Vitals reviewed and within normal limits    Labs reviewed and notable for lactic acidosis to 4.1, anemia to 11.3 from baseline of 8    Imaging reviewed     CT Abdomen and Pelvis, impression per radiology     1. Subcutaneous soft tissue fat stranding along the course of the left  upper quadrant percutaneous gastrostomy tube with surrounding skin  thickening, suspicious  for infection/inflammation. No focal fluid  collection. No soft tissue gas.  2. No acute intra-abdominal pathology. Stable postoperative changes of  partial gastrectomy, partial hepatectomy, small bowel resection,  cholecystectomy.  3. Additional chronic and incidental findings as detailed in the body  of the report.    Micro reviewed and notable for

## 2024-05-03 LAB
ALBUMIN SERPL BCG-MCNC: 4.3 G/DL (ref 3.5–5.2)
ALP SERPL-CCNC: 146 U/L (ref 40–150)
ALT SERPL W P-5'-P-CCNC: 19 U/L (ref 0–50)
ANION GAP SERPL CALCULATED.3IONS-SCNC: 12 MMOL/L (ref 7–15)
AST SERPL W P-5'-P-CCNC: 37 U/L (ref 0–45)
BILIRUB SERPL-MCNC: 0.4 MG/DL
BUN SERPL-MCNC: 5.7 MG/DL (ref 6–20)
CALCIUM SERPL-MCNC: 8.7 MG/DL (ref 8.6–10)
CHLORIDE SERPL-SCNC: 99 MMOL/L (ref 98–107)
CREAT SERPL-MCNC: 0.75 MG/DL (ref 0.51–0.95)
DEPRECATED HCO3 PLAS-SCNC: 26 MMOL/L (ref 22–29)
EGFRCR SERPLBLD CKD-EPI 2021: >90 ML/MIN/1.73M2
ERYTHROCYTE [DISTWIDTH] IN BLOOD BY AUTOMATED COUNT: 18.2 % (ref 10–15)
GLUCOSE BLDC GLUCOMTR-MCNC: 101 MG/DL (ref 70–99)
GLUCOSE BLDC GLUCOMTR-MCNC: 118 MG/DL (ref 70–99)
GLUCOSE SERPL-MCNC: 87 MG/DL (ref 70–99)
HCT VFR BLD AUTO: 33 % (ref 35–47)
HGB BLD-MCNC: 10 G/DL (ref 11.7–15.7)
MAGNESIUM SERPL-MCNC: 1.9 MG/DL (ref 1.7–2.3)
MCH RBC QN AUTO: 24.9 PG (ref 26.5–33)
MCHC RBC AUTO-ENTMCNC: 30.3 G/DL (ref 31.5–36.5)
MCV RBC AUTO: 82 FL (ref 78–100)
PHOSPHATE SERPL-MCNC: 4 MG/DL (ref 2.5–4.5)
PLATELET # BLD AUTO: 224 10E3/UL (ref 150–450)
POTASSIUM SERPL-SCNC: 3.3 MMOL/L (ref 3.4–5.3)
POTASSIUM SERPL-SCNC: 4.2 MMOL/L (ref 3.4–5.3)
PROT SERPL-MCNC: 6.8 G/DL (ref 6.4–8.3)
RBC # BLD AUTO: 4.02 10E6/UL (ref 3.8–5.2)
SODIUM SERPL-SCNC: 137 MMOL/L (ref 135–145)
WBC # BLD AUTO: 4.8 10E3/UL (ref 4–11)

## 2024-05-03 PROCEDURE — 36592 COLLECT BLOOD FROM PICC: CPT | Performed by: INTERNAL MEDICINE

## 2024-05-03 PROCEDURE — 82962 GLUCOSE BLOOD TEST: CPT

## 2024-05-03 PROCEDURE — 84132 ASSAY OF SERUM POTASSIUM: CPT | Mod: 91 | Performed by: INTERNAL MEDICINE

## 2024-05-03 PROCEDURE — 250N000013 HC RX MED GY IP 250 OP 250 PS 637: Performed by: PHYSICIAN ASSISTANT

## 2024-05-03 PROCEDURE — 250N000013 HC RX MED GY IP 250 OP 250 PS 637: Performed by: STUDENT IN AN ORGANIZED HEALTH CARE EDUCATION/TRAINING PROGRAM

## 2024-05-03 PROCEDURE — 96376 TX/PRO/DX INJ SAME DRUG ADON: CPT

## 2024-05-03 PROCEDURE — 99207 PR APP CREDIT; MD BILLING SHARED VISIT: CPT | Mod: FS | Performed by: INTERNAL MEDICINE

## 2024-05-03 PROCEDURE — 84460 ALANINE AMINO (ALT) (SGPT): CPT | Performed by: PHYSICIAN ASSISTANT

## 2024-05-03 PROCEDURE — 85027 COMPLETE CBC AUTOMATED: CPT | Performed by: PHYSICIAN ASSISTANT

## 2024-05-03 PROCEDURE — 250N000013 HC RX MED GY IP 250 OP 250 PS 637: Performed by: INTERNAL MEDICINE

## 2024-05-03 PROCEDURE — 84100 ASSAY OF PHOSPHORUS: CPT | Performed by: PHYSICIAN ASSISTANT

## 2024-05-03 PROCEDURE — 84450 TRANSFERASE (AST) (SGOT): CPT | Performed by: PHYSICIAN ASSISTANT

## 2024-05-03 PROCEDURE — 83735 ASSAY OF MAGNESIUM: CPT | Performed by: PHYSICIAN ASSISTANT

## 2024-05-03 PROCEDURE — 250N000011 HC RX IP 250 OP 636: Performed by: STUDENT IN AN ORGANIZED HEALTH CARE EDUCATION/TRAINING PROGRAM

## 2024-05-03 PROCEDURE — G0378 HOSPITAL OBSERVATION PER HR: HCPCS

## 2024-05-03 PROCEDURE — 36415 COLL VENOUS BLD VENIPUNCTURE: CPT | Performed by: PHYSICIAN ASSISTANT

## 2024-05-03 PROCEDURE — 99233 SBSQ HOSP IP/OBS HIGH 50: CPT | Mod: FS | Performed by: PHYSICIAN ASSISTANT

## 2024-05-03 RX ORDER — SENNOSIDES 8.6 MG
8.6 TABLET ORAL 2 TIMES DAILY PRN
Status: DISCONTINUED | OUTPATIENT
Start: 2024-05-03 | End: 2024-05-07

## 2024-05-03 RX ORDER — POLYETHYLENE GLYCOL 3350 17 G/17G
17 POWDER, FOR SOLUTION ORAL 2 TIMES DAILY PRN
Status: DISCONTINUED | OUTPATIENT
Start: 2024-05-03 | End: 2024-05-07 | Stop reason: HOSPADM

## 2024-05-03 RX ORDER — POTASSIUM CHLORIDE 1.5 G/1.58G
40 POWDER, FOR SOLUTION ORAL ONCE
Status: COMPLETED | OUTPATIENT
Start: 2024-05-03 | End: 2024-05-03

## 2024-05-03 RX ADMIN — Medication 1 TABLET: at 11:02

## 2024-05-03 RX ADMIN — DOXYCYCLINE HYCLATE 100 MG: 100 CAPSULE ORAL at 16:09

## 2024-05-03 RX ADMIN — METOCLOPRAMIDE 10 MG: 5 TABLET ORAL at 06:36

## 2024-05-03 RX ADMIN — METOCLOPRAMIDE 10 MG: 5 TABLET ORAL at 11:01

## 2024-05-03 RX ADMIN — SIMETHICONE 80 MG: 80 TABLET, CHEWABLE ORAL at 20:42

## 2024-05-03 RX ADMIN — HYDROMORPHONE HYDROCHLORIDE 1 MG: 1 INJECTION, SOLUTION INTRAMUSCULAR; INTRAVENOUS; SUBCUTANEOUS at 11:01

## 2024-05-03 RX ADMIN — Medication 5 ML: at 20:42

## 2024-05-03 RX ADMIN — SIMETHICONE 80 MG: 80 TABLET, CHEWABLE ORAL at 11:02

## 2024-05-03 RX ADMIN — HYDROMORPHONE HYDROCHLORIDE 0.5 MG: 1 INJECTION, SOLUTION INTRAMUSCULAR; INTRAVENOUS; SUBCUTANEOUS at 20:43

## 2024-05-03 RX ADMIN — METHOCARBAMOL 500 MG: 500 TABLET ORAL at 20:42

## 2024-05-03 RX ADMIN — MICONAZOLE NITRATE: 20 CREAM TOPICAL at 08:30

## 2024-05-03 RX ADMIN — METOCLOPRAMIDE 10 MG: 5 TABLET ORAL at 16:20

## 2024-05-03 RX ADMIN — Medication 500 MCG: at 08:30

## 2024-05-03 RX ADMIN — SIMETHICONE 80 MG: 80 TABLET, CHEWABLE ORAL at 22:34

## 2024-05-03 RX ADMIN — PANTOPRAZOLE SODIUM 40 MG: 40 TABLET, DELAYED RELEASE ORAL at 06:35

## 2024-05-03 RX ADMIN — METOCLOPRAMIDE 10 MG: 5 TABLET ORAL at 22:34

## 2024-05-03 RX ADMIN — FOLIC ACID 1 MG: 1 TABLET ORAL at 08:30

## 2024-05-03 RX ADMIN — MICONAZOLE NITRATE: 20 CREAM TOPICAL at 20:42

## 2024-05-03 RX ADMIN — POTASSIUM CHLORIDE 40 MEQ: 1.5 POWDER, FOR SOLUTION ORAL at 08:30

## 2024-05-03 RX ADMIN — HYDROMORPHONE HYDROCHLORIDE 1 MG: 1 INJECTION, SOLUTION INTRAMUSCULAR; INTRAVENOUS; SUBCUTANEOUS at 06:36

## 2024-05-03 RX ADMIN — DOXYCYCLINE HYCLATE 100 MG: 100 CAPSULE ORAL at 04:16

## 2024-05-03 RX ADMIN — HYDROMORPHONE HYDROCHLORIDE 1 MG: 1 INJECTION, SOLUTION INTRAMUSCULAR; INTRAVENOUS; SUBCUTANEOUS at 16:20

## 2024-05-03 RX ADMIN — SIMETHICONE 80 MG: 80 TABLET, CHEWABLE ORAL at 06:35

## 2024-05-03 RX ADMIN — NORTRIPTYLINE HYDROCHLORIDE 20 MG: 10 CAPSULE ORAL at 22:33

## 2024-05-03 RX ADMIN — THIAMINE HCL TAB 100 MG 100 MG: 100 TAB at 08:30

## 2024-05-03 ASSESSMENT — ACTIVITIES OF DAILY LIVING (ADL)
ADLS_ACUITY_SCORE: 31
ADLS_ACUITY_SCORE: 32
ADLS_ACUITY_SCORE: 31
ADLS_ACUITY_SCORE: 32
ADLS_ACUITY_SCORE: 31
ADLS_ACUITY_SCORE: 32
ADLS_ACUITY_SCORE: 31
ADLS_ACUITY_SCORE: 32
ADLS_ACUITY_SCORE: 31
ADLS_ACUITY_SCORE: 32
ADLS_ACUITY_SCORE: 31
ADLS_ACUITY_SCORE: 32
ADLS_ACUITY_SCORE: 31

## 2024-05-03 NOTE — PROGRESS NOTES
"Observation goals    -adequate pain control on oral analgesics: Met  -tolerating oral antibiotics or has plans for home infusion setup: Met  - WOCN consult completed: MET  -safe disposition plan has been identified: Progressing.      /78 (BP Location: Left arm)   Pulse 78   Temp 98.7  F (37.1  C) (Oral)   Resp 18   Ht 1.626 m (5' 4\")   Wt 66 kg (145 lb 8.1 oz)   SpO2 95%   BMI 24.98 kg/m     "

## 2024-05-03 NOTE — PROGRESS NOTES
Surgical Oncology Progress Note    Interval History:  Tolerating tube feedings, some broth and water. Denies nausea.     Physical Exam:   Temp:  [98  F (36.7  C)-99.1  F (37.3  C)] 98.7  F (37.1  C)  Pulse:  [68-78] 78  Resp:  [16-18] 18  BP: (102-125)/(55-80) 105/78  SpO2:  [95 %-100 %] 95 %  General: Alert, oriented, appears comfortable, NAD.  Respiratory: breathing non labored  Abdomen: Feeding tube in place with erythema and breakdown of the skin around the feeding tube.      Data:   All laboratory and imaging data in the past 24 hours reviewed  I/O last 3 completed shifts:  In: 1040 [P.O.:900]  Out: 850 [Urine:850]  Recent Labs   Lab Test 05/03/24  0651 05/02/24  0642 05/01/24  1654 02/11/24  1557 02/11/24  1149 02/09/24  1331   WBC 4.8 8.8 9.5   < > 11.3* 8.1   HGB 10.0* 9.6* 11.3*   < > 7.2* 10.9*    244 320   < > 283 365   INR  --   --   --   --  1.09 0.99    < > = values in this interval not displayed.      Recent Labs   Lab Test 05/03/24  0651 05/02/24  2131 05/02/24  0642 05/01/24  1654     --  134* 137   POTASSIUM 3.3*  --  4.4 4.3   CHLORIDE 99  --  95* 97*   CO2 26  --  25 22   BUN 5.7*  --  10.6 10.6   CR 0.75  --  0.77 0.74   ANIONGAP 12  --  14 18*   TAVO 8.7  --  8.4* 8.6   GLC 87 84 106* 75     Recent Labs   Lab Test 05/03/24  0651   PROTTOTAL 6.8   ALBUMIN 4.3   BILITOTAL 0.4   ALKPHOS 146   AST 37   ALT 19       Assessment and Plan:     Eduarda Nogueira is a 43 year old year old female with history of RNYBG (2022) complicated by GJ ulcer concerning for adenocarcinoma. She underwent on 2/14/24 a small bowel resection, partial colon resection, partial liver resection, partial gastrectomy and gtube placement with Dr. Krishnamurthy. Surgical pathology was benign.     Presented to the ER 5/1/24 with left abdominal pain, poor oral intake and leakage around her Gtube. LA was elevated but normalized with fluid resuscitation. [facility] consulted for management of skin around the gtube. Gtube was advanced to  appropriate placement and secured. Tube feedings were resumed.     - Post gastrectomy diet as tolerated.   - Protein supplements  - Continue tube feedings until tolerating diet.     Seen with Dr. Krishnamurthy.      FAVIO MeraC   Surgical Oncology   [phone]

## 2024-05-03 NOTE — PROGRESS NOTES
"Observation goals    -adequate pain control on oral analgesics: Met  -tolerating oral antibiotics or has plans for home infusion setup: Met  - WOCN consult completed: MET  -safe disposition plan has been identified: Progressing.      /82 (BP Location: Left arm)   Pulse 80   Temp 98.1  F (36.7  C) (Oral)   Resp 20   Ht 1.626 m (5' 4\")   Wt 66 kg (145 lb 8.1 oz)   SpO2 93%   BMI 24.98 kg/m     "

## 2024-05-03 NOTE — UTILIZATION REVIEW
Concurrent stay review; Secondary Review Determination     [facility]          Under the authority of the Utilization Management Committee, the utilization review process indicated a secondary review on the above patient.  The review outcome is based on review of the medical records, discussions with staff, and applying clinical experience noted on the date of the review.          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  Lactic acid was elevated but normalized with fluid resuscitation.  The patient has erythema around the G-tube site, treated with topical miconazole treatment and p.o. doxycycline.    The patient continues to require IV Dilaudid to control the pain.  However no oral/tube feeding pain medications are prescribed. The patient denies nausea, she is tolerating tube feedings and she was able to tolerate p.o. medications (thiamine, simethicone, pantoprazole, metoclopramide).  Vital signs have been stable.  She has been getting oral potassium supplements.  Chronic anemia.    Patient is clinically improving and there is no clear indication to change patient's status to inpatient. The severity of illness, intensity of service provided, expected LOS and risk for adverse outcome make the care appropriate for observation.      This document was produced using voice recognition software       The information on this document is developed by the utilization review team in order for the business  office to ensure compliance.  This only denotes the appropriateness of proper admission status and does not reflect the quality of care rendered.         The definitions of Inpatient Status and Observation Status used in making the determination above are those provided in the CMS Coverage Manual, Chapter 1 and Chapter 6, section 70.4.      Sincerely,     FADUMO ABBOTT MD   Utilization Review  Physician Advisor  [facility]

## 2024-05-03 NOTE — PLAN OF CARE
"/69 (BP Location: Left arm)   Pulse 71   Temp 98.8  F (37.1  C) (Oral)   Resp 16   Ht 1.626 m (5' 4\")   Wt 68.9 kg (152 lb)   SpO2 96%   BMI 26.09 kg/m      Shift: NOC 4313-3451   Isolation Status: Contact   VS: Stable on RA, afebrile  Neuro: Aox4   Behaviors: Calm   BG: 3x Daily   Labs: none   Respiratory: WDL   Cardiac: WDL   Pain/Nausea: Denies   PRN: None given   Diet: Full liquid; Advance diet when tolerated   IV Access: PIV; Port-a-cath   Infusion(s): G-tube feed running at 20 mL/hr continuous; advance feed at 6pm by 10 mLs   Lines/Drains: none  GI/: Voiding spontaneously; LBM 5/3   Skin: WDL   Mobility: Independent   Events/Education: No new events   Plan: Continue to advance diet and tube feeds as tolerated   Bed is in the low position with the call light within reach. Pt is able to make needs known.     Goal Outcome Evaluation:      Plan of Care Reviewed With: patient    Overall Patient Progress: no changeOverall Patient Progress: no change           "

## 2024-05-03 NOTE — PROGRESS NOTES
Calorie Count  Intake recorded for: 5/2  Total Kcals: 148 Total Protein: 0g  Kcals from Hospital Food: 148   Protein: 0g  Kcals from Outside Food (average):0 Protein: 0g  # Meals Ordered from Kitchen: 1 meal  # Meals Recorded: 1 - 100% 2 broth, orange sherbet  # Supplements Recorded: 0

## 2024-05-03 NOTE — PROGRESS NOTES
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 She was admitted to Medicine Observation with nausea, abdominal pain, and concern for G tube site infection.      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 Presented in February with recurrent GI bleeding and underwent above surgical procedures with Dr. Krishnamurthy, repeat pathology at that time was ultimately negative for adenocarcinoma. Since surgery has been struggling with nausea and intolerance of oral intake. Primarily depends on tube feeds for her nutrition. Now has been off tube feedings with minimal oral intake for ~2 weeks in setting of increased alcohol use and worsening nausea. CT a/p and AXR both reassuring without acute pathology. Suspect nausea related to poor gut motility, constipation, alcohol use. It is unclear why she is unable to tolerate oral nutrition. G-tube site erythematous with ongoing leakage - exam most c/w skin breakdown but given severity reasonable to empirically treat for cutaneous fungal infection and cellulitis, expect culture to be polymicrobial. Had a large BM today.   - Surgical Oncology following   - WOCN consulted   - RD consulted   - Topical miconazole to G tube site   - Continue PO doxycycline 100 mg BID   - Reglan 10  mg QID   - Pantoprazole 40 mg daily   - Continue IV Dilaudid 0.5 - 1 mg q4h PRN   - Advance tube feedings as tolerated   - Oral diet as tolerated   - Calorie counts     Alcohol use: Drinking more heavily recently, at least a few drinks per day, notes she has been self medicating with alcohol to some degree. Not taking care of herself recently. No evidence of withdrawal   - Monitor on CIWA scale. If not scoring can discontinue tomorrow   - Folate, MVI, thiamine supplements    Mild transaminitis - resolved: AST 46, alk phos 155. ALT and T bili wnl. 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      Lactic acidosis - resolved: Lactate 4.1 in ED. Most likely secondary to dehydration. Normalized with IVF.      Migraine headache: Continue PTA nortriptyline.           Observation Goals: -adequate pain control on oral analgesics, -tolerating oral antibiotics or has plans for home infusion setup, - WOCN consult completed, -safe disposition plan has been identified, Nurse to notify provider when observation goals have been met and patient is ready for discharge.  Diet: Calorie Counts  Snacks/Supplements Adult: Other; Allow any supplement or snack within diet order limits PRN by request; Between Meals  Adult Formula Drip Feeding: Continuous [employer] Peptide 1.5; Gastrostomy; Goal Rate: 45 mL/hr (goal rate) initiate at rate of 10 mL/hr and advance rate by 10 mL Q12 hours as tolerated and if K/Mg/phos within acceptable parameters, until reaching...  Post Gastrectomy Diet  Snacks/Supplements Adult: [employer] Standard Oral Supplement; With Meals    DVT Prophylaxis: Pneumatic Compression Devices  Villalba Catheter: Not present  Lines: PRESENT      Port a Cath 05/01/24 Single Lumen Right Chest wall-Site Assessment: WDL      Cardiac Monitoring: None  Code Status: Full Code      Clinically Significant Risk Factors Present on Admission        # Hypokalemia: Lowest K = 3.3 mmol/L in last 2 days, will replace as needed   #  Hypocalcemia: Lowest Ca = 8.4 mg/dL in last 2 days, will monitor and replace as appropriate               # Severe Malnutrition: based on nutrition assessment     # Financial/Environmental Concerns: none         Disposition Plan     Medically Ready for Discharge: Anticipated in 2-4 Days           The patient's care was discussed with the Attending Physician, Dr. Nance and Patient.    Johnna Chandler PA-C  Hospitalist Service  [facility]  Securely message with [employer] (more info)  Text page via [facility] Paging/Directory   ______________________________________________________________________    Interval History   Had a large bowel movement this morning. Nausea improved a bit - tolerating clear liquids. Able to keep PO meds down without vomiting.    Physical Exam   Vital Signs: Temp: 98.1  F (36.7  C) Temp src: Oral BP: 112/82 Pulse: 80   Resp: 20 SpO2: 93 % O2 Device: None (Room air)    Weight: 145 lbs 8.06 oz    Constitutional: Awake and alert, in no apparent distress.   Eyes: Sclera clear, anicteric   Respiratory: Breathing non-labored. CTAB  Cardiovascular:  RRR, normal S1/S2. No rubs or murmurs. No peripheral edema.   GI: Tender throughout, no rebound or guarding. Midline abdominal incision well-healed. G-tube site with surrounding erythema, clear yellowish leakage on dressing. Hypoactive bowel sounds.   Skin:  Good color. No jaundice. No visible rashes, lesions, or bruising of concern.   Neurologic: Alert and oriented.    Medical Decision Making       50 MINUTES SPENT BY ME on the date of service doing chart review, history, exam, documentation & further activities per the note.      Data   ------------------------- PAST 24 HR DATA REVIEWED -----------------------------------------------    I have personally reviewed the following data over the past 24 hrs:    4.8  \   10.0 (L)   / 224     137 99 5.7 (L) /  101 (H)   4.2 26 0.75 \     ALT: 19 AST: 37 AP: 146 TBILI:  0.4   ALB: 4.3 TOT PROTEIN: 6.8 LIPASE: N/A       Imaging results reviewed over the past 24 hrs:   No results found for this or any previous visit (from the past 24 hour(s)).

## 2024-05-03 NOTE — PROGRESS NOTES
Observation Goals:  - adequate pain control on oral analgesics - Not met, using IV pain meds  - tolerating oral antibiotics or has plans for home infusion setup - met  - WOCN consult completed -met  - safe disposition plan has been identified -met     Started tube feeds this evening, monitoring pt tolerance.

## 2024-05-03 NOTE — PROGRESS NOTES
"Observation goals    -adequate pain control on oral analgesics: Met  -tolerating oral antibiotics or has plans for home infusion setup: Met  - WOCN consult completed: MET  -safe disposition plan has been identified: Progressing.      /55 (BP Location: Left arm)   Pulse 73   Temp 98.7  F (37.1  C) (Oral)   Resp 16   Ht 1.626 m (5' 4\")   Wt 66 kg (145 lb 8.1 oz)   SpO2 100%   BMI 24.98 kg/m     "

## 2024-05-04 LAB
A-TOCOPHEROL VIT E SERPL-MCNC: 9.3 MG/L
ANION GAP SERPL CALCULATED.3IONS-SCNC: 7 MMOL/L (ref 7–15)
ANNOTATION COMMENT IMP: NORMAL
ATRIAL RATE - MUSE: 78 BPM
BETA+GAMMA TOCOPHEROL SERPL-MCNC: 0.7 MG/L
BUN SERPL-MCNC: 4.9 MG/DL (ref 6–20)
CALCIUM SERPL-MCNC: 8.2 MG/DL (ref 8.6–10)
CHLORIDE SERPL-SCNC: 105 MMOL/L (ref 98–107)
COPPER SERPL-MCNC: 161.7 UG/DL
CREAT SERPL-MCNC: 0.66 MG/DL (ref 0.51–0.95)
DEPRECATED HCO3 PLAS-SCNC: 27 MMOL/L (ref 22–29)
DIASTOLIC BLOOD PRESSURE - MUSE: NORMAL MMHG
EGFRCR SERPLBLD CKD-EPI 2021: >90 ML/MIN/1.73M2
GLUCOSE BLDC GLUCOMTR-MCNC: 88 MG/DL (ref 70–99)
GLUCOSE BLDC GLUCOMTR-MCNC: 92 MG/DL (ref 70–99)
GLUCOSE SERPL-MCNC: 102 MG/DL (ref 70–99)
HOLD SPECIMEN: NORMAL
INTERPRETATION ECG - MUSE: NORMAL
MAGNESIUM SERPL-MCNC: 1.8 MG/DL (ref 1.7–2.3)
P AXIS - MUSE: NORMAL DEGREES
PHOSPHATE SERPL-MCNC: 3.7 MG/DL (ref 2.5–4.5)
POTASSIUM SERPL-SCNC: 4.2 MMOL/L (ref 3.4–5.3)
PR INTERVAL - MUSE: 88 MS
QRS DURATION - MUSE: 90 MS
QT - MUSE: 392 MS
QTC - MUSE: 446 MS
R AXIS - MUSE: 28 DEGREES
RETINYL PALMITATE SERPL-MCNC: <0.02 MG/L
SODIUM SERPL-SCNC: 139 MMOL/L (ref 135–145)
SYSTOLIC BLOOD PRESSURE - MUSE: NORMAL MMHG
T AXIS - MUSE: 60 DEGREES
VENTRICULAR RATE- MUSE: 78 BPM
VIT A SERPL-MCNC: 0.55 MG/L
ZINC SERPL-MCNC: 59.9 UG/DL

## 2024-05-04 PROCEDURE — 83735 ASSAY OF MAGNESIUM: CPT | Performed by: PHYSICIAN ASSISTANT

## 2024-05-04 PROCEDURE — G0378 HOSPITAL OBSERVATION PER HR: HCPCS

## 2024-05-04 PROCEDURE — 250N000013 HC RX MED GY IP 250 OP 250 PS 637: Performed by: STUDENT IN AN ORGANIZED HEALTH CARE EDUCATION/TRAINING PROGRAM

## 2024-05-04 PROCEDURE — 250N000013 HC RX MED GY IP 250 OP 250 PS 637: Performed by: PHYSICIAN ASSISTANT

## 2024-05-04 PROCEDURE — 84100 ASSAY OF PHOSPHORUS: CPT | Performed by: PHYSICIAN ASSISTANT

## 2024-05-04 PROCEDURE — 250N000011 HC RX IP 250 OP 636: Performed by: STUDENT IN AN ORGANIZED HEALTH CARE EDUCATION/TRAINING PROGRAM

## 2024-05-04 PROCEDURE — 80048 BASIC METABOLIC PNL TOTAL CA: CPT | Performed by: PHYSICIAN ASSISTANT

## 2024-05-04 PROCEDURE — 99233 SBSQ HOSP IP/OBS HIGH 50: CPT | Performed by: PHYSICIAN ASSISTANT

## 2024-05-04 PROCEDURE — 82962 GLUCOSE BLOOD TEST: CPT

## 2024-05-04 PROCEDURE — 250N000011 HC RX IP 250 OP 636: Performed by: PHYSICIAN ASSISTANT

## 2024-05-04 PROCEDURE — 96376 TX/PRO/DX INJ SAME DRUG ADON: CPT

## 2024-05-04 PROCEDURE — 36591 DRAW BLOOD OFF VENOUS DEVICE: CPT | Performed by: PHYSICIAN ASSISTANT

## 2024-05-04 RX ORDER — HYDROMORPHONE HYDROCHLORIDE 1 MG/ML
.5-1 INJECTION, SOLUTION INTRAMUSCULAR; INTRAVENOUS; SUBCUTANEOUS EVERY 4 HOURS PRN
Status: COMPLETED | OUTPATIENT
Start: 2024-05-04 | End: 2024-05-06

## 2024-05-04 RX ORDER — CEFUROXIME AXETIL 500 MG/1
500 TABLET ORAL EVERY 12 HOURS SCHEDULED
Status: DISCONTINUED | OUTPATIENT
Start: 2024-05-04 | End: 2024-05-07 | Stop reason: HOSPADM

## 2024-05-04 RX ORDER — HYDROMORPHONE HYDROCHLORIDE 2 MG/1
2 TABLET ORAL EVERY 8 HOURS PRN
Status: DISCONTINUED | OUTPATIENT
Start: 2024-05-04 | End: 2024-05-07 | Stop reason: HOSPADM

## 2024-05-04 RX ADMIN — SIMETHICONE 80 MG: 80 TABLET, CHEWABLE ORAL at 11:27

## 2024-05-04 RX ADMIN — CEFUROXIME AXETIL 500 MG: 500 TABLET ORAL at 19:58

## 2024-05-04 RX ADMIN — HYDROMORPHONE HYDROCHLORIDE 2 MG: 2 TABLET ORAL at 10:43

## 2024-05-04 RX ADMIN — HYDROMORPHONE HYDROCHLORIDE 1 MG: 1 INJECTION, SOLUTION INTRAMUSCULAR; INTRAVENOUS; SUBCUTANEOUS at 06:43

## 2024-05-04 RX ADMIN — DOXYCYCLINE HYCLATE 100 MG: 100 CAPSULE ORAL at 06:43

## 2024-05-04 RX ADMIN — METOCLOPRAMIDE 10 MG: 5 TABLET ORAL at 06:43

## 2024-05-04 RX ADMIN — MICONAZOLE NITRATE: 20 CREAM TOPICAL at 19:59

## 2024-05-04 RX ADMIN — METHOCARBAMOL 500 MG: 500 TABLET ORAL at 01:18

## 2024-05-04 RX ADMIN — HYDROMORPHONE HYDROCHLORIDE 0.5 MG: 1 INJECTION, SOLUTION INTRAMUSCULAR; INTRAVENOUS; SUBCUTANEOUS at 02:31

## 2024-05-04 RX ADMIN — ONDANSETRON 4 MG: 4 TABLET, ORALLY DISINTEGRATING ORAL at 13:03

## 2024-05-04 RX ADMIN — HYDROMORPHONE HYDROCHLORIDE 2 MG: 2 TABLET ORAL at 19:58

## 2024-05-04 RX ADMIN — SIMETHICONE 80 MG: 80 TABLET, CHEWABLE ORAL at 22:30

## 2024-05-04 RX ADMIN — METOCLOPRAMIDE 10 MG: 5 TABLET ORAL at 11:27

## 2024-05-04 RX ADMIN — METOCLOPRAMIDE 10 MG: 5 TABLET ORAL at 15:00

## 2024-05-04 RX ADMIN — METHOCARBAMOL 500 MG: 500 TABLET ORAL at 15:00

## 2024-05-04 RX ADMIN — METOCLOPRAMIDE 10 MG: 5 TABLET ORAL at 22:30

## 2024-05-04 RX ADMIN — Medication 500 MCG: at 07:28

## 2024-05-04 RX ADMIN — CEFUROXIME AXETIL 500 MG: 500 TABLET ORAL at 12:54

## 2024-05-04 RX ADMIN — NORTRIPTYLINE HYDROCHLORIDE 20 MG: 10 CAPSULE ORAL at 22:30

## 2024-05-04 RX ADMIN — HYDROMORPHONE HYDROCHLORIDE 1 MG: 1 INJECTION, SOLUTION INTRAMUSCULAR; INTRAVENOUS; SUBCUTANEOUS at 15:00

## 2024-05-04 RX ADMIN — FOLIC ACID 1 MG: 1 TABLET ORAL at 07:29

## 2024-05-04 RX ADMIN — METHOCARBAMOL 500 MG: 500 TABLET ORAL at 06:43

## 2024-05-04 RX ADMIN — MICONAZOLE NITRATE: 20 CREAM TOPICAL at 07:31

## 2024-05-04 RX ADMIN — METHOCARBAMOL 500 MG: 500 TABLET ORAL at 19:58

## 2024-05-04 RX ADMIN — THIAMINE HCL TAB 100 MG 100 MG: 100 TAB at 07:27

## 2024-05-04 RX ADMIN — DOXYCYCLINE HYCLATE 100 MG: 100 CAPSULE ORAL at 16:21

## 2024-05-04 RX ADMIN — HYDROMORPHONE HYDROCHLORIDE 0.5 MG: 1 INJECTION, SOLUTION INTRAMUSCULAR; INTRAVENOUS; SUBCUTANEOUS at 01:19

## 2024-05-04 RX ADMIN — ONDANSETRON 4 MG: 4 TABLET, ORALLY DISINTEGRATING ORAL at 19:58

## 2024-05-04 RX ADMIN — SIMETHICONE 80 MG: 80 TABLET, CHEWABLE ORAL at 06:43

## 2024-05-04 RX ADMIN — PANTOPRAZOLE SODIUM 40 MG: 40 TABLET, DELAYED RELEASE ORAL at 06:43

## 2024-05-04 RX ADMIN — LIDOCAINE 4% 1 PATCH: 40 PATCH TOPICAL at 07:29

## 2024-05-04 RX ADMIN — Medication 1 TABLET: at 07:27

## 2024-05-04 RX ADMIN — Medication 5 ML: at 06:45

## 2024-05-04 ASSESSMENT — ACTIVITIES OF DAILY LIVING (ADL)
ADLS_ACUITY_SCORE: 30
ADLS_ACUITY_SCORE: 31
ADLS_ACUITY_SCORE: 30
ADLS_ACUITY_SCORE: 31
ADLS_ACUITY_SCORE: 30
ADLS_ACUITY_SCORE: 31
ADLS_ACUITY_SCORE: 30
ADLS_ACUITY_SCORE: 31
ADLS_ACUITY_SCORE: 30
ADLS_ACUITY_SCORE: 30

## 2024-05-04 NOTE — PROGRESS NOTES
Calorie Count  Intake recorded for: 5/3  Total Kcals: 0 Total Protein: 0g  Kcals from Hospital Food: 0   Protein: 0g  Kcals from Outside Food (average):0 Protein: 0g  # Meals Ordered from Kitchen: 1 meal ordered  # Meals Recorded: No food intake recorded  # Supplements Recorded: No intake recorded

## 2024-05-04 NOTE — PLAN OF CARE
Observation Goals:     -adequate pain control on oral analgesics: Not met. Patient managing pain with both oral and IV analgesics  -tolerating oral antibiotics or has plans for home infusion setup: Met  -WOCN consult completed: Met  -safe disposition plan has been identified: Progressing.

## 2024-05-04 NOTE — PLAN OF CARE
Observation Goals    -adequate pain control on oral analgesics: Not met. Patient managing pain with both oral and IV analgesics  -tolerating oral antibiotics or has plans for home infusion setup: Met  -WOCN consult completed: Met  -safe disposition plan has been identified: Progressing.

## 2024-05-04 NOTE — PROGRESS NOTES
[facility]    Medicine Progress Note - Hospitalist Service    Date of Admission:  5/1/2024    Assessment & Plan   Eduarda Nogueira is a 43 year old woman with a history of Addis-en-Y bypass (2022), bleeding GJ anastomotic ulcer initially c/f adenocarcinoma s/p ex lap with small bowel resection and partial colon resection (2/2024), GJ-tube dependence, CVA, hypothyroidism. She was admitted to Medicine Observation with nausea, abdominal pain, and concern for G tube site infection.      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 Presented in February with recurrent GI bleeding and underwent above surgical procedures with Dr. Krishnamurthy, repeat pathology at that time was ultimately negative for adenocarcinoma. Since surgery has been struggling with nausea and intolerance of oral intake. Primarily depends on tube feeds for her nutrition. Now has been off tube feedings with minimal oral intake for ~2 weeks in setting of increased alcohol use and worsening nausea. CT a/p and AXR both reassuring without acute pathology. Suspect nausea related to poor gut motility, constipation, alcohol use. It is unclear why she is unable to tolerate oral nutrition. G-tube site erythematous with ongoing leakage - exam most c/w skin breakdown but given severity reasonable to empirically treat for cutaneous fungal infection and cellulitis, expect culture to be polymicrobial. Had a large BM today.   - Surgical Oncology following   - WOCN consulted   - RD consulted   - Topical miconazole to G tube site   - Continue PO doxycycline 100 mg BID. Add cefuroxime  for Klebsiella coverage.    - Reglan 10 mg QID   - Pantoprazole 40 mg daily   - Transition to PTA PO Dilaudid 2 mg q8h PRN. Will leave 3 IV doses as needed for breakthrough.   - Advance tube feedings as tolerated   - Oral diet as tolerated   - Calorie counts     Alcohol use: Drinking more heavily recently, at least a few drinks per day, notes she has been self medicating with alcohol to some degree. Not taking care of herself recently. No evidence of withdrawal.    - Folate, MVI, thiamine supplements     Mild transaminitis - resolved: AST 46, alk phos 155. ALT and T bili wnl. 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      Lactic acidosis - resolved: Lactate 4.1 in ED. Most likely secondary to dehydration. Normalized with IVF.      Migraine headache: Continue PTA nortriptyline.        Observation Goals: -adequate pain control on oral analgesics, -tolerating oral antibiotics or has plans for home infusion setup, - WOCN consult completed, -safe disposition plan has been identified, Nurse to notify provider when observation goals have been met and patient is ready for discharge.  Diet: Calorie Counts  Snacks/Supplements Adult: Other; Allow any supplement or snack within diet order limits PRN by request; Between Meals  Adult Formula Drip Feeding: Continuous [employer] Peptide 1.5; Gastrostomy; Goal Rate: 45 mL/hr (goal rate) initiate at rate of 10 mL/hr and advance rate by 10 mL Q12 hours as tolerated and if K/Mg/phos within acceptable parameters, until reaching...  Post Gastrectomy Diet  Snacks/Supplements Adult: [employer] Standard Oral Supplement; With Meals    DVT Prophylaxis: Ambulate every shift  Villalba Catheter: Not present  Lines: PRESENT      Port a Cath 05/01/24 Single Lumen Right Chest wall-Site Assessment: WDL      Cardiac Monitoring: None  Code Status: Full Code      Clinically Significant Risk Factors Present on Admission        # Hypokalemia: Lowest K = 3.3 mmol/L in last 2 days, will replace as  needed   # Hypocalcemia: Lowest Ca = 8.2 mg/dL in last 2 days, will monitor and replace as appropriate               # Severe Malnutrition: based on nutrition assessment     # Financial/Environmental Concerns: none         Disposition Plan     Medically Ready for Discharge: Anticipated Tomorrow           The patient's care was discussed with the Attending Physician, Dr. Nance, Bedside Nurse, and Patient.    Johnna Chandler PA-C  Hospitalist Service  [facility]  Securely message with [employer] (more info)  Text page via [facility] Paging/Directory   ______________________________________________________________________    Interval History   Nausea about the same today. Tolerated advancement in TF to 40 mL/hr. Having ongoing pain at G tube site, pain flares when the tube accidentally gets bumped or snagged.     Physical Exam   Vital Signs: Temp: 98.4  F (36.9  C) Temp src: Oral BP: 116/78 Pulse: 63   Resp: 18 SpO2: 96 % O2 Device: None (Room air)    Weight: 145 lbs 8.06 oz    Constitutional: Awake and alert, in no apparent distress.   Eyes: Sclera clear, anicteric   Respiratory: Breathing non-labored. CTAB  Cardiovascular:  RRR, normal S1/S2. No rubs or murmurs. No peripheral edema.   GI: Mildly tender throughout, no rebound or guarding. Midline abdominal incision well-healed. G-tube site covered with clean and dry dressing.  Skin:  Good color. No jaundice. No visible rashes, lesions, or bruising of concern.   Neurologic: Alert and oriented.    Medical Decision Making       50 MINUTES SPENT BY ME on the date of service doing chart review, history, exam, documentation & further activities per the note.      Data   ------------------------- PAST 24 HR DATA REVIEWED -----------------------------------------------    I have personally reviewed the following data over the past 24 hrs:    N/A  \   N/A   / N/A     139 105 4.9 (L) /  92   4.2 27 0.66 \       Imaging results reviewed  over the past 24 hrs:   No results found for this or any previous visit (from the past 24 hour(s)).

## 2024-05-04 NOTE — PLAN OF CARE
"Goal Outcome Evaluation:  -adequate pain control on oral analgesics: In progress   -tolerating oral antibiotics or has plans for home infusion setup: In progress   - WOCN consult completed: Met   -safe disposition plan has been identified: In progress     /78 (BP Location: Left arm)   Pulse 63   Temp 98.4  F (36.9  C) (Oral)   Resp 18   Ht 1.626 m (5' 4\")   Wt 66 kg (145 lb 8.1 oz)   SpO2 96%   BMI 24.98 kg/m         Patient attempting to utilize oral pain medication instead of IV. Patient continues to tolerate tube feeds @40 with eventual goal of 45.   "

## 2024-05-04 NOTE — PLAN OF CARE
"Goal Outcome Evaluation:  -adequate pain control on oral analgesics: In progress   -tolerating oral antibiotics or has plans for home infusion setup: In progress   - WOCN consult completed: Met   -safe disposition plan has been identified: In progress     /79 (BP Location: Left arm)   Pulse 68   Temp 98.2  F (36.8  C) (Oral)   Resp 18   Ht 1.626 m (5' 4\")   Wt 66 kg (145 lb 8.1 oz)   SpO2 100%   BMI 24.98 kg/m         Patient still needing IV medications for pain control. Tolerating tube feeds @40, goal rate is 45. Skin around G tube appears less red and swollen this AM per patient.   "

## 2024-05-04 NOTE — PLAN OF CARE
Observation Goals:    adequate pain control on oral analgesics: Not met. Patient managing pain with both oral and IV analgesics  -tolerating oral antibiotics or has plans for home infusion setup: Met  -WOCN consult completed: Met  -safe disposition plan has been identified: Progressing.

## 2024-05-04 NOTE — PLAN OF CARE
"Goal Outcome Evaluation:  -adequate pain control on oral analgesics: In progress   -tolerating oral antibiotics or has plans for home infusion setup: In progress   - WOCN consult completed: Met   -safe disposition plan has been identified: In progress     /79 (BP Location: Left arm)   Pulse 66   Temp 98.2  F (36.8  C) (Oral)   Resp 18   Ht 1.626 m (5' 4\")   Wt 66 kg (145 lb 8.1 oz)   SpO2 98%   BMI 24.98 kg/m         Patient will be NPO@midnight for G tube remove/replace tomorrow. Tube feeds will be stopped at midnight. Patient tolerating tube feeds @40ml fairly well. Patient was able to go for a long walk this afternoon.   "

## 2024-05-05 LAB
BACTERIA WND CULT: ABNORMAL
GLUCOSE BLDC GLUCOMTR-MCNC: 102 MG/DL (ref 70–99)
GLUCOSE BLDC GLUCOMTR-MCNC: 84 MG/DL (ref 70–99)
GLUCOSE BLDC GLUCOMTR-MCNC: 92 MG/DL (ref 70–99)
GRAM STAIN RESULT: ABNORMAL
GRAM STAIN RESULT: ABNORMAL
MAGNESIUM SERPL-MCNC: 1.7 MG/DL (ref 1.7–2.3)
PHOSPHATE SERPL-MCNC: 3.5 MG/DL (ref 2.5–4.5)
POTASSIUM SERPL-SCNC: 4.1 MMOL/L (ref 3.4–5.3)

## 2024-05-05 PROCEDURE — 36415 COLL VENOUS BLD VENIPUNCTURE: CPT | Performed by: INTERNAL MEDICINE

## 2024-05-05 PROCEDURE — 82962 GLUCOSE BLOOD TEST: CPT

## 2024-05-05 PROCEDURE — 96376 TX/PRO/DX INJ SAME DRUG ADON: CPT

## 2024-05-05 PROCEDURE — 250N000011 HC RX IP 250 OP 636: Performed by: STUDENT IN AN ORGANIZED HEALTH CARE EDUCATION/TRAINING PROGRAM

## 2024-05-05 PROCEDURE — 250N000011 HC RX IP 250 OP 636: Performed by: PHYSICIAN ASSISTANT

## 2024-05-05 PROCEDURE — 84132 ASSAY OF SERUM POTASSIUM: CPT | Performed by: INTERNAL MEDICINE

## 2024-05-05 PROCEDURE — 250N000013 HC RX MED GY IP 250 OP 250 PS 637: Performed by: PHYSICIAN ASSISTANT

## 2024-05-05 PROCEDURE — G0378 HOSPITAL OBSERVATION PER HR: HCPCS

## 2024-05-05 PROCEDURE — 83735 ASSAY OF MAGNESIUM: CPT | Performed by: INTERNAL MEDICINE

## 2024-05-05 PROCEDURE — 250N000013 HC RX MED GY IP 250 OP 250 PS 637: Performed by: STUDENT IN AN ORGANIZED HEALTH CARE EDUCATION/TRAINING PROGRAM

## 2024-05-05 PROCEDURE — 99233 SBSQ HOSP IP/OBS HIGH 50: CPT | Performed by: PHYSICIAN ASSISTANT

## 2024-05-05 PROCEDURE — 84100 ASSAY OF PHOSPHORUS: CPT | Performed by: INTERNAL MEDICINE

## 2024-05-05 RX ORDER — OLANZAPINE 5 MG/1
5 TABLET, ORALLY DISINTEGRATING ORAL AT BEDTIME
Status: DISCONTINUED | OUTPATIENT
Start: 2024-05-05 | End: 2024-05-07 | Stop reason: HOSPADM

## 2024-05-05 RX ADMIN — METOCLOPRAMIDE 10 MG: 5 TABLET ORAL at 11:26

## 2024-05-05 RX ADMIN — HYDROMORPHONE HYDROCHLORIDE 2 MG: 2 TABLET ORAL at 20:48

## 2024-05-05 RX ADMIN — METHOCARBAMOL 500 MG: 500 TABLET ORAL at 20:48

## 2024-05-05 RX ADMIN — Medication 1 TABLET: at 11:27

## 2024-05-05 RX ADMIN — METHOCARBAMOL 500 MG: 500 TABLET ORAL at 02:26

## 2024-05-05 RX ADMIN — LIDOCAINE 4% 1 PATCH: 40 PATCH TOPICAL at 09:16

## 2024-05-05 RX ADMIN — OLANZAPINE 5 MG: 5 TABLET, ORALLY DISINTEGRATING ORAL at 22:36

## 2024-05-05 RX ADMIN — CEFUROXIME AXETIL 500 MG: 500 TABLET ORAL at 20:48

## 2024-05-05 RX ADMIN — Medication 5 ML: at 06:21

## 2024-05-05 RX ADMIN — Medication 500 MCG: at 09:18

## 2024-05-05 RX ADMIN — METOCLOPRAMIDE 10 MG: 5 TABLET ORAL at 06:21

## 2024-05-05 RX ADMIN — DOXYCYCLINE HYCLATE 100 MG: 100 CAPSULE ORAL at 06:21

## 2024-05-05 RX ADMIN — SIMETHICONE 80 MG: 80 TABLET, CHEWABLE ORAL at 11:27

## 2024-05-05 RX ADMIN — SIMETHICONE 80 MG: 80 TABLET, CHEWABLE ORAL at 20:48

## 2024-05-05 RX ADMIN — THIAMINE HCL TAB 100 MG 100 MG: 100 TAB at 09:16

## 2024-05-05 RX ADMIN — NORTRIPTYLINE HYDROCHLORIDE 20 MG: 10 CAPSULE ORAL at 22:37

## 2024-05-05 RX ADMIN — SIMETHICONE 80 MG: 80 TABLET, CHEWABLE ORAL at 06:21

## 2024-05-05 RX ADMIN — HYDROMORPHONE HYDROCHLORIDE 1 MG: 1 INJECTION, SOLUTION INTRAMUSCULAR; INTRAVENOUS; SUBCUTANEOUS at 06:29

## 2024-05-05 RX ADMIN — METOCLOPRAMIDE 10 MG: 5 TABLET ORAL at 16:49

## 2024-05-05 RX ADMIN — Medication 5 MG: at 22:36

## 2024-05-05 RX ADMIN — MICONAZOLE NITRATE: 20 CREAM TOPICAL at 20:56

## 2024-05-05 RX ADMIN — FOLIC ACID 1 MG: 1 TABLET ORAL at 09:16

## 2024-05-05 RX ADMIN — SIMETHICONE 80 MG: 80 TABLET, CHEWABLE ORAL at 22:36

## 2024-05-05 RX ADMIN — METOCLOPRAMIDE 10 MG: 5 TABLET ORAL at 22:36

## 2024-05-05 RX ADMIN — PANTOPRAZOLE SODIUM 40 MG: 40 TABLET, DELAYED RELEASE ORAL at 09:16

## 2024-05-05 RX ADMIN — HYDROMORPHONE HYDROCHLORIDE 2 MG: 2 TABLET ORAL at 11:27

## 2024-05-05 RX ADMIN — CEFUROXIME AXETIL 500 MG: 500 TABLET ORAL at 09:19

## 2024-05-05 RX ADMIN — Medication 1 TABLET: at 09:19

## 2024-05-05 ASSESSMENT — ACTIVITIES OF DAILY LIVING (ADL)
ADLS_ACUITY_SCORE: 30

## 2024-05-05 NOTE — PROGRESS NOTES
"/81 (BP Location: Left arm, Patient Position: Sitting, Cuff Size: Adult Regular)   Pulse 79   Temp 97.4  F (36.3  C) (Oral)   Resp 16   Ht 1.626 m (5' 4\")   Wt 66 kg (145 lb 8.1 oz)   SpO2 100%   BMI 24.98 kg/m      Observation goals:   -adequate pain control on oral analgesics: pending  -tolerating oral antibiotics or has plans for home infusion setup: met  - WOCN consult completed: met  -safe disposition plan has been identified: pending          "

## 2024-05-05 NOTE — PLAN OF CARE
Observation Goals:    -Adequate pain control on oral analgesics: Progressing  -Tolerating oral antibiotics or has plans for home infusion setup: Met  -WOCN consult completed: Met  -Safe disposition plan has been identified: Progressing

## 2024-05-05 NOTE — UTILIZATION REVIEW
"Admission Status; Secondary Review Determination     Admission Date: 5/1/2024  4:16 PM       Under the authority of the Utilization Management Committee, the utilization review process indicated a secondary review on the above patient.  The review outcome is based on review of the medical records, discussions with staff, and applying clinical experience noted on the date of the review.          (x) Observation Status Appropriate - This patient does not meet hospital inpatient criteria and is placed in observation status. If this patient's primary payer is Medicare and was admitted as an inpatient, Condition Code 44 should be used and patient status changed to \"observation\".       RATIONALE FOR DETERMINATION      Brief clinical presentation, information copied from the chart, abbreviated and edited for relevant content:        Asked by provider to review again for IP. Discussed at length. See previous reviews. No significant clinical  change, just prolonged LOS. No criteria for IP. Likely discharge tomorrow.     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  Lactic acid was elevated but normalized with fluid resuscitation.  The patient has erythema around the G-tube site, treated with topical miconazole treatment and p.o. doxycycline. Mostly now monitoring for refeeding syndrome. Had tube replaced yesterday. Clinically and hemodynamically stable otherwise.            The severity of illness, intensity of cares provided, risk for adverse outcome, and expected LOS make the care appropriate for observation.       The information " on this document is developed by the utilization review team in order for the business office to ensure compliance.  This only denotes the appropriateness of proper admission status and does not reflect the quality of care rendered.         The definitions of Inpatient Status and Observation Status used in making the determination above are those provided in the CMS Coverage Manual, Chapter 1 and Chapter 6, section 70.4.      Sincerely,     Marilee Ge MD   Utilization Review/ Case Management  [facility].

## 2024-05-05 NOTE — PROGRESS NOTES
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 She was admitted to Medicine Observation with nausea, abdominal pain, and concern for G tube site infection. Remains admitted while gradually advancing tube feedings.     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 Presented in February with recurrent GI bleeding and underwent above surgical procedures with Dr. Krishnamurthy, repeat pathology at that time was ultimately negative for adenocarcinoma. Since surgery has been struggling with nausea and intolerance of oral intake. Primarily depends on tube feeds for her nutrition. Now has been off tube feedings with minimal oral intake for ~2 weeks in setting of increased alcohol use and worsening nausea. CT a/p and AXR both reassuring without acute pathology. Suspect nausea related to poor gut motility, constipation, alcohol use. It is unclear why she is unable to tolerate oral nutrition. G-tube site with surrounding erythema with initial c/f cellulitis, though appears more c/w skin breakdown, empirically covering with antimicrobials as below based on wound culture growth. Constipation improved. Gradually up-titrating tube feedings.    - Surgical Oncology following - replaced G tube at bedside today   - WOCN  consulted   - RD consulted   - Topical miconazole to G tube site   - Discontinue doxycycline   - Continue PO cefuroxime for anticipated 7-day course   - Add Zyprexa 5 mg at bedtime    - Reglan 10 mg QID   - Pantoprazole 40 mg daily   - PO Dilaudid 2 mg q8h PRN   - Advance tube feedings as tolerated   - Oral diet as tolerated   - Calorie counts     Alcohol use: Drinking more recently, estimates a few drinks daily and self-medicating with alcohol to some degree. No evidence of withdrawal. Deferred Addiction Medicine involvement.    - Folate, MVI, thiamine supplements   - Encouraged alcohol cessation     Mild transaminitis - resolved: AST 46, alk phos 155. ALT and T bili wnl. 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      Lactic acidosis - resolved: Lactate 4.1 in ED. Most likely secondary to dehydration. Normalized with IVF.      Migraine headache: Continue PTA nortriptyline.         Observation Goals: -adequate pain control on oral analgesics, -tolerating oral antibiotics or has plans for home infusion setup, - WOCN consult completed, -safe disposition plan has been identified, Nurse to notify provider when observation goals have been met and patient is ready for discharge.  Diet: Calorie Counts  Snacks/Supplements Adult: Other; Allow any supplement or snack within diet order limits PRN by request; Between Meals  Adult Formula Drip Feeding: Continuous [employer] Peptide 1.5; Gastrostomy; Goal Rate: 45 mL/hr (goal rate) initiate at rate of 10 mL/hr and advance rate by 10 mL Q12 hours as tolerated and if K/Mg/phos within acceptable parameters, until reaching...  Snacks/Supplements Adult: [employer] Standard Oral Supplement; With Meals  Regular Diet Adult    DVT Prophylaxis: Pneumatic Compression Devices  Villalba Catheter: Not present  Lines: PRESENT      Port a Cath 05/01/24 Single Lumen Right Chest wall-Site Assessment: WDL      Cardiac Monitoring: None  Code Status: Full Code      Clinically Significant Risk  Factors Present on Admission                        # Severe Malnutrition: based on nutrition assessment     # Financial/Environmental Concerns: none         Disposition Plan     Medically Ready for Discharge: Anticipated Tomorrow           The patient's care was discussed with the Attending Physician, Dr. Nance, Bedside Nurse, and Patient.    Johnna Chandler PA-C  Hospitalist Service  [facility]  Securely message with [employer] (more info)  Text page via [facility] Paging/Directory   ______________________________________________________________________    Interval History   G tube replaced by Surg Onc at bedside this morning. Had quite a bit of pain with the feeding tube replacement, which is now subsiding. Skin around the G tube site appears to be healing per Surg Onc report. Having ongoing intermittent nausea. Was tolerating TF at 40 mL/hr last night.     Physical Exam   Vital Signs: Temp: 98.9  F (37.2  C) Temp src: Oral BP: (!) 127/93 Pulse: 79   Resp: 15 SpO2: 99 % O2 Device: None (Room air)    Weight: 145 lbs 8.06 oz    Constitutional: Awake and alert, in no apparent distress.   Eyes: Sclera clear, anicteric   Respiratory: Breathing non-labored. CTAB  Cardiovascular:  RRR, normal S1/S2. No rubs or murmurs. No peripheral edema.   GI: Mildly tender throughout, no rebound or guarding. Midline abdominal incision well-healed. G-tube site covered with clean and dry dressing.  Skin:  Good color. No jaundice. No visible rashes, lesions, or bruising of concern.   Neurologic: Alert and oriented.    Medical Decision Making       50 MINUTES SPENT BY ME on the date of service doing chart review, history, exam, documentation & further activities per the note.      Data   ------------------------- PAST 24 HR DATA REVIEWED -----------------------------------------------        Imaging results reviewed over the past 24 hrs:   No results found for this or any previous visit (from the  past 24 hour(s)).

## 2024-05-05 NOTE — PLAN OF CARE
"Observation Goals    /74 (BP Location: Left arm, Patient Position: Sitting, Cuff Size: Adult Regular)   Pulse 66   Temp 98.6  F (37  C) (Oral)   Resp 18   Ht 1.626 m (5' 4\")   Wt 66 kg (145 lb 8.1 oz)   SpO2 99%   BMI 24.98 kg/m       -adequate pain control on oral analgesics: In progress   -tolerating oral antibiotics or has plans for home infusion setup: Met  - WOCN consult completed: Met   -safe disposition plan has been identified: In progress   "

## 2024-05-05 NOTE — PLAN OF CARE
"Goal Outcome Evaluation:  -adequate pain control on oral analgesics: Progressing   -tolerating oral antibiotics or has plans for home infusion setup: Met   - WOCN consult completed: Met   -safe disposition plan has been identified: Progressing     /70   Pulse 79   Temp 99.2  F (37.3  C) (Oral)   Resp 14   Ht 1.626 m (5' 4\")   Wt 66 kg (145 lb 8.1 oz)   SpO2 100%   BMI 24.98 kg/m           Patient continues to utilize both IV and oral pain medication. G tube was exchanged earlier today. Tube feed running @ 35 and will go back to goal of 45 @ 2100 tonight. Patient tolerating liquids and moderate amounts of solid food.   "

## 2024-05-05 NOTE — PLAN OF CARE
Observation Goals    -Adequate pain control on oral analgesics: Progressing  -Tolerating oral antibiotics or has plans for home infusion setup: Met  -WOCN consult completed: Met  -Safe disposition plan has been identified: Progressing

## 2024-05-05 NOTE — PROGRESS NOTES
Calorie Count  Intake recorded for: 5/4  Total Kcals: 0 Total Protein: 0g  Kcals from Hospital Food: 0   Protein: 0g  Kcals from Outside Food (average):0 Protein: 0g  # Meals Ordered from Kitchen: 2  # Meals Recorded: No meals recorded  # Supplements Recorded: No supplements recorded

## 2024-05-05 NOTE — PROGRESS NOTES
"BP (!) 127/93   Pulse 79   Temp 98.9  F (37.2  C) (Oral)   Resp 15   Ht 1.626 m (5' 4\")   Wt 66 kg (145 lb 8.1 oz)   SpO2 99%   BMI 24.98 kg/m      Observation goals:   -adequate pain control on oral analgesics: not pain. Pain managed with PO dilaudid   -tolerating oral antibiotics or has plans for home infusion setup: met  - WOCN consult completed: met  -safe disposition plan has been identified: pending          "

## 2024-05-05 NOTE — PROGRESS NOTES
Surgical oncology progress note  05/05/2024     Procedure: G tube replacement    18Fr G-tube removed, replaced with 16 Cypriot G-tube.  5 cc sterile water injected into balloon.  Patient had pain with removal, tolerated remainder of procedure well.    Skin surrounding G-tube site looks much improved compared to last week with minimal erythema.    Plan:  -Okay for diet, okay to use new G-tube for feeds/meds  -Okay to discharge from surgery perspective  -Recommend reglan and protonix on discharge  -Follow-up with Dr. Krishnamurthy in 2 weeks (order placed)    Seen and discussed with Dr. Raghu Chi MD  Surgery PGY-3

## 2024-05-06 LAB
ANION GAP SERPL CALCULATED.3IONS-SCNC: 8 MMOL/L (ref 7–15)
BACTERIA BLD CULT: NO GROWTH
BACTERIA BLD CULT: NO GROWTH
BUN SERPL-MCNC: 13.7 MG/DL (ref 6–20)
CALCIUM SERPL-MCNC: 8.2 MG/DL (ref 8.6–10)
CHLORIDE SERPL-SCNC: 106 MMOL/L (ref 98–107)
CREAT SERPL-MCNC: 0.66 MG/DL (ref 0.51–0.95)
DEPRECATED HCO3 PLAS-SCNC: 27 MMOL/L (ref 22–29)
EGFRCR SERPLBLD CKD-EPI 2021: >90 ML/MIN/1.73M2
GLUCOSE SERPL-MCNC: 108 MG/DL (ref 70–99)
HOLD SPECIMEN: NORMAL
MAGNESIUM SERPL-MCNC: 2 MG/DL (ref 1.7–2.3)
PHOSPHATE SERPL-MCNC: 3.6 MG/DL (ref 2.5–4.5)
POTASSIUM SERPL-SCNC: 4.1 MMOL/L (ref 3.4–5.3)
SODIUM SERPL-SCNC: 141 MMOL/L (ref 135–145)

## 2024-05-06 PROCEDURE — 36592 COLLECT BLOOD FROM PICC: CPT | Performed by: PHYSICIAN ASSISTANT

## 2024-05-06 PROCEDURE — 250N000013 HC RX MED GY IP 250 OP 250 PS 637: Performed by: PHYSICIAN ASSISTANT

## 2024-05-06 PROCEDURE — 96376 TX/PRO/DX INJ SAME DRUG ADON: CPT

## 2024-05-06 PROCEDURE — 250N000013 HC RX MED GY IP 250 OP 250 PS 637: Performed by: STUDENT IN AN ORGANIZED HEALTH CARE EDUCATION/TRAINING PROGRAM

## 2024-05-06 PROCEDURE — 250N000011 HC RX IP 250 OP 636: Performed by: PHYSICIAN ASSISTANT

## 2024-05-06 PROCEDURE — 99233 SBSQ HOSP IP/OBS HIGH 50: CPT | Performed by: PHYSICIAN ASSISTANT

## 2024-05-06 PROCEDURE — 83735 ASSAY OF MAGNESIUM: CPT | Performed by: PHYSICIAN ASSISTANT

## 2024-05-06 PROCEDURE — 250N000011 HC RX IP 250 OP 636: Performed by: STUDENT IN AN ORGANIZED HEALTH CARE EDUCATION/TRAINING PROGRAM

## 2024-05-06 PROCEDURE — 84100 ASSAY OF PHOSPHORUS: CPT | Performed by: PHYSICIAN ASSISTANT

## 2024-05-06 PROCEDURE — 80048 BASIC METABOLIC PNL TOTAL CA: CPT | Performed by: PHYSICIAN ASSISTANT

## 2024-05-06 PROCEDURE — G0378 HOSPITAL OBSERVATION PER HR: HCPCS

## 2024-05-06 RX ORDER — BISACODYL 10 MG
10 SUPPOSITORY, RECTAL RECTAL DAILY PRN
Status: DISCONTINUED | OUTPATIENT
Start: 2024-05-06 | End: 2024-05-07 | Stop reason: HOSPADM

## 2024-05-06 RX ADMIN — Medication 1 TABLET: at 12:59

## 2024-05-06 RX ADMIN — BISACODYL 10 MG: 10 SUPPOSITORY RECTAL at 15:40

## 2024-05-06 RX ADMIN — HYDROMORPHONE HYDROCHLORIDE 2 MG: 2 TABLET ORAL at 21:38

## 2024-05-06 RX ADMIN — FOLIC ACID 1 MG: 1 TABLET ORAL at 08:33

## 2024-05-06 RX ADMIN — HYDROMORPHONE HYDROCHLORIDE 2 MG: 2 TABLET ORAL at 12:56

## 2024-05-06 RX ADMIN — METOCLOPRAMIDE 10 MG: 5 TABLET ORAL at 12:56

## 2024-05-06 RX ADMIN — SIMETHICONE 80 MG: 80 TABLET, CHEWABLE ORAL at 06:37

## 2024-05-06 RX ADMIN — Medication 5 ML: at 06:38

## 2024-05-06 RX ADMIN — POLYETHYLENE GLYCOL 3350 17 G: 17 POWDER, FOR SOLUTION ORAL at 10:43

## 2024-05-06 RX ADMIN — SIMETHICONE 80 MG: 80 TABLET, CHEWABLE ORAL at 23:43

## 2024-05-06 RX ADMIN — MICONAZOLE NITRATE: 20 CREAM TOPICAL at 20:24

## 2024-05-06 RX ADMIN — SIMETHICONE 80 MG: 80 TABLET, CHEWABLE ORAL at 12:56

## 2024-05-06 RX ADMIN — METOCLOPRAMIDE 10 MG: 5 TABLET ORAL at 17:11

## 2024-05-06 RX ADMIN — Medication 1 TABLET: at 08:32

## 2024-05-06 RX ADMIN — THIAMINE HCL TAB 100 MG 100 MG: 100 TAB at 08:33

## 2024-05-06 RX ADMIN — PANTOPRAZOLE SODIUM 40 MG: 40 TABLET, DELAYED RELEASE ORAL at 06:37

## 2024-05-06 RX ADMIN — Medication 5 ML: at 08:53

## 2024-05-06 RX ADMIN — Medication 500 MCG: at 08:32

## 2024-05-06 RX ADMIN — METOCLOPRAMIDE 10 MG: 5 TABLET ORAL at 21:39

## 2024-05-06 RX ADMIN — HYDROMORPHONE HYDROCHLORIDE 2 MG: 2 TABLET ORAL at 04:28

## 2024-05-06 RX ADMIN — HYDROMORPHONE HYDROCHLORIDE 0.5 MG: 1 INJECTION, SOLUTION INTRAMUSCULAR; INTRAVENOUS; SUBCUTANEOUS at 08:43

## 2024-05-06 RX ADMIN — CEFUROXIME AXETIL 500 MG: 500 TABLET ORAL at 08:32

## 2024-05-06 RX ADMIN — SIMETHICONE 80 MG: 80 TABLET, CHEWABLE ORAL at 20:21

## 2024-05-06 RX ADMIN — METHOCARBAMOL 500 MG: 500 TABLET ORAL at 04:27

## 2024-05-06 RX ADMIN — OLANZAPINE 5 MG: 5 TABLET, ORALLY DISINTEGRATING ORAL at 21:39

## 2024-05-06 RX ADMIN — MICONAZOLE NITRATE: 20 CREAM TOPICAL at 08:35

## 2024-05-06 RX ADMIN — NORTRIPTYLINE HYDROCHLORIDE 20 MG: 10 CAPSULE ORAL at 21:39

## 2024-05-06 RX ADMIN — METOCLOPRAMIDE 10 MG: 5 TABLET ORAL at 06:37

## 2024-05-06 RX ADMIN — DOCUSATE SODIUM 50 MG AND SENNOSIDES 8.6 MG 2 TABLET: 8.6; 5 TABLET, FILM COATED ORAL at 10:43

## 2024-05-06 RX ADMIN — CEFUROXIME AXETIL 500 MG: 500 TABLET ORAL at 20:21

## 2024-05-06 ASSESSMENT — ACTIVITIES OF DAILY LIVING (ADL)
ADLS_ACUITY_SCORE: 30

## 2024-05-06 NOTE — PROGRESS NOTES
[facility]    Medicine Progress Note - Hospitalist Service    Date of Admission:  5/1/2024    Assessment & Plan   Eduarda Nogueira is a 43 year old woman with a history of Addis-en-Y bypass (2022), bleeding GJ anastomotic ulcer initially c/f adenocarcinoma s/p ex lap with small bowel resection and partial colon resection (2/2024, pathology negative for malignancy), G-tube dependence, CVA, and hypothyroidism. She was admitted to Medicine Observation with nausea, abdominal pain, and concern for G tube site infection. Remains admitted for ongoing pain management and treatment of constipation.     Changes Today:  - Give dulcolax suppository for suspected constipation  - Continue TF at goal rate  - PO nutrition as tolerated  - Continue PO dilaudid for pain       Acute on chronic abdominal pain  G tube leakage  Erythema of G-tube site, suspect contact/chemical dermatitis  Acute on chronic nausea  Constipation  Malnutrition  Hx of Addis-en-Y bypass  S/p small bowel resection, partial colon/liver/gastric resections, G tube placement (2/14/24)  See medicine note 5/5 for historical information. Presented with worsening abdominal pain at G-tube site, nausea, and inability to tolerate PO. CT and AXR both negative for acute pathology. Suspect nausea related to poor gut motility, constipation, recent alcohol use.  It is unclear why she is unable to tolerate oral nutrition. Pain appears to be associated with G-tube. Some surrounding erythema with initial c/f cellulitis, although appears more c/w skin breakdown vs chemical/contact dermatitis. Empirically covered with antimicrobials as below based on wound culture growth. Feeding tube exchanged by surgical oncology 5/5. Reports worsening pain today related to G-tube. Stable nausea. No BM in several days, suspect constipation could be contributing. Tolerating TF at goal rate but still unable to keep down PO liquids.   - Surgical Oncology  following, no additional recommendations today. Feeding tube tract likely very sensitive. May need further time to heal before reassessing.    - WOCN consulted   - RD consulted   - Topical miconazole to G tube site   - Continue PO cefuroxime for anticipated 7-day course   - Zyprexa 5 mg at bedtime (added)   - Reglan 10 mg QID   - Pantoprazole 40 mg daily   - PO Dilaudid 2 mg q8h PRN   - Oral diet as tolerated   - Calorie counts  - Continue TF per RD recs, no evidence of re-feeding at this time     Alcohol use:  Drinking more recently, estimates a few drinks daily and self-medicating with alcohol to some degree. No evidence of withdrawal. Deferred Addiction Medicine involvement.    - Folate, MVI, thiamine supplements   - Encouraged alcohol cessation     Mild transaminitis - resolved:  AST 46, alk phos 155. ALT and T bili wnl. 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 Repeat LFTs normal.      Lactic acidosis - resolved: Lactate 4.1 in ED. Most likely secondary to dehydration. Normalized with IVF.      Migraine headache: Continue PTA nortriptyline.         Observation Goals: -adequate pain control on oral analgesics, -tolerating oral antibiotics or has plans for home infusion setup, - WOCN consult completed, -safe disposition plan has been identified, Nurse to notify provider when observation goals have been met and patient is ready for discharge.  Diet: Snacks/Supplements Adult: Other; Allow any supplement or snack within diet order limits PRN by request; Between Meals  Adult Formula Drip Feeding: Continuous [employer] Peptide 1.5; Gastrostomy; Goal Rate: 45 mL/hr (goal rate) initiate at rate of 10 mL/hr and advance rate by 10 mL Q12 hours as tolerated and if K/Mg/phos within acceptable parameters, until reaching...  Snacks/Supplements Adult: [employer] Standard Oral Supplement; With Meals  Regular Diet Adult    DVT Prophylaxis: Pneumatic Compression Devices  Villalba Catheter: Not present  Lines: PRESENT       Port a Cath 05/01/24 Single Lumen Right Chest wall-Site Assessment: WDL      Cardiac Monitoring: None  Code Status: Full Code      Clinically Significant Risk Factors Present on Admission                        # Severe Malnutrition: based on nutrition assessment     # Financial/Environmental Concerns: none         Disposition Plan     Medically Ready for Discharge: Anticipated Tomorrow           The patient's care was discussed with the Attending Physician, Dr. Alston and Patient.    Immanuel Gramajo PA-C  Hospitalist Service  [facility]  Securely message with [email or web address] (more info)  Text page via [email or web address] Paging/Directory   ______________________________________________________________________    Interval History   Continues to report severe pain at G tube site, especially with positional changes. Ongoing nausea. Unable to keep down liquids. No BM in 3 days.     Physical Exam   Vital Signs: Temp: 98.1  F (36.7  C) Temp src: Oral BP: 110/66 Pulse: 68   Resp: 16 SpO2: 99 % O2 Device: None (Room air)    Weight: 145 lbs 8.06 oz    General Appearance:  Awake. Alert. Oriented x3. NAD.   HEENT:  No scleral icterus. Moist mucous membranes.   Respiratory:  Normal effort. CTAB. No wheezing, rhonchi, rales.   Cardiovascular:  RRR. S1/S2. No murmurs.   GI:  Soft, non-distended. Faint erythema around G tube. No purulent discharge noted. Tender throughout.   Extremity:  No pitting edema. No calf tenderness.   Skin:  No visible rash. No jaundice.   Neuro:  Grossly non-focal.     Medical Decision Making       60 MINUTES SPENT BY ME on the date of service doing chart review, history, exam, documentation & further activities per the note.      Data     I have personally reviewed the following data over the past 24 hrs:    N/A  \   N/A   / N/A     141 106 13.7 /  108 (H)   4.1 27 0.66 \       Imaging results reviewed over the past 24 hrs:   No results found for this or any previous visit (from  the past 24 hour(s)).

## 2024-05-06 NOTE — PLAN OF CARE
"Observation Goals    /72 (BP Location: Left arm)   Pulse 81   Temp 98.7  F (37.1  C) (Oral)   Resp 16   Ht 1.626 m (5' 4\")   Wt 66 kg (145 lb 8.1 oz)   SpO2 99%   BMI 24.98 kg/m      Adequate pain control on oral analgesic: Progressing. Patient has not used IV pain med this shift  Tolerating oral antibiotics or has plans for home infusion setup: Met  WOCN  consult completed: Met  Safe disposition plan has been identified: Not met  "

## 2024-05-06 NOTE — PROGRESS NOTES
Observation goals  PRIOR TO DISCHARGE           Comments:   - adequate pain control on oral analgesics -Not met  - tolerating oral antibiotics or has plans for home infusion setup -met  - WOCN consult completed -met  - safe disposition plan has been identified -met     Nurse to notify provider when observation goals have been met and patient is ready for discharge.             Tube feeding is being held down without nausea or issues.  Drinking clear liquids intermittently.   Patient vomited breakfast (broth) this AM.  Patient asking for IV Dilaudid in between oral Dilaudid doses this AM.  Dc'd IV Dilaudid this AM. Taking oral Dilaudid and using heat packs for pain.   Declining Lidocaine patches or Robaxin.

## 2024-05-06 NOTE — PROGRESS NOTES
Observation goals  PRIOR TO DISCHARGE          Comments:   - adequate pain control on oral analgesics -Not met  - tolerating oral antibiotics or has plans for home infusion setup -met  - WOCN consult completed -met  - safe disposition plan has been identified -met    Nurse to notify provider when observation goals have been met and patient is ready for discharge.

## 2024-05-06 NOTE — PLAN OF CARE
"Observation Goals:    /79 (BP Location: Left arm, Patient Position: Sitting, Cuff Size: Adult Small)   Pulse 74   Temp 98.4  F (36.9  C) (Oral)   Resp 18   Ht 1.626 m (5' 4\")   Wt 66 kg (145 lb 8.1 oz)   SpO2 99%   BMI 24.98 kg/m       -Adequate pain control on oral analgesic: Progressing. Patient has not used IV pain med this shift  -Tolerating oral antibiotics or has plans for home infusion setup: Met  -WOCN  consult completed: Met  -Safe disposition plan has been identified: Not met  "

## 2024-05-06 NOTE — PLAN OF CARE
Tube feeding is running at goal, 45cc/hr.  Feeding is being held down without nausea or issues.  Drinking clear liquids intermittently, which sometimes cause nausea.   Patient vomited breakfast (broth) this AM.  Patient asking for IV Dilaudid in between oral Dilaudid doses this AM.  Dc'd IV Dilaudid this AM. Taking oral Dilaudid and using heat packs for pain.   Declining Lidocaine patches or Robaxin.    Miralax, senna-docusate, and dulcolax suppository given. Moderate amount of soft stool passed this evening.      Problem: Adult Inpatient Plan of Care  Goal: Plan of Care Review  Description: The Plan of Care Review/Shift note should be completed every shift.  The Outcome Evaluation is a brief statement about your assessment that the patient is improving, declining, or no change.  This information will be displayed automatically on your shift  note.  Outcome: Progressing  Goal: Optimal Comfort and Wellbeing  Outcome: Progressing  Intervention: Monitor Pain and Promote Comfort  Recent Flowsheet Documentation  Taken 5/6/2024 0910 by Lita Anderson, RN  Pain Management Interventions: rest  Taken 5/6/2024 0843 by Lita Anderson, RN  Pain Management Interventions:   medication (see MAR)   heat applied  Goal: Readiness for Transition of Care  Outcome: Progressing     Problem: Adult Inpatient Plan of Care  Goal: Absence of Hospital-Acquired Illness or Injury  Intervention: Prevent Skin Injury  Recent Flowsheet Documentation  Taken 5/6/2024 0800 by Lita Anderson, RN  Body Position: position changed independently  Skin Protection: adhesive use limited  Device Skin Pressure Protection: tubing/devices free from skin contact

## 2024-05-06 NOTE — PLAN OF CARE
"Observation Goals    /75 (BP Location: Left arm)   Pulse 74   Temp 98.2  F (36.8  C) (Oral)   Resp 16   Ht 1.626 m (5' 4\")   Wt 66 kg (145 lb 8.1 oz)   SpO2 98%   BMI 24.98 kg/m       -adequate pain control on oral analgesics: Progressing  -tolerating oral antibiotics or has plans for home infusion setup: Met  - WOCN consult completed: Met   -safe disposition plan has been identified: Not met  "

## 2024-05-06 NOTE — PROGRESS NOTES
Care Management Discharge Note    Discharge Date: 05/06/2024       Discharge Disposition: Home Infusion, Home    Discharge Services: None    Discharge DME: None    Discharge Transportation: family or friend will provide    Private pay costs discussed: Not applicable    Does the patient's insurance plan have a 3 day qualifying hospital stay waiver?  No    PAS Confirmation Code: N/A  Patient/family educated on Medicare website which has current facility and service quality ratings:  (N/A)    Education Provided on the Discharge Plan: Yes  Persons Notified of Discharge Plans: [facility] liaison, patient  Patient/Family in Agreement with the Plan: yes    Handoff Referral Completed: Yes    Additional Information:  RNCC updated with [facility] liaison discharge plan, placed resumption of care/TF orders. RNCC to cease following after discharge.       [facility]  Phone # [phone]  Fax # [phone]   Supplies Enteral Feeds      Henri Rogers BSN, BA, RN, CMSRN, RNCC  [facility]  Covering Units [address]C Beds 8271-9511/OBS  Phone: [phone]  Pager: [phone]  After Hours [phone]    6C Beds 0944-9508 SW Ph: [phone]  6C Beds 6630-5451  pager: [phone]    6B/C/D RNCC Weekend/holiday pager: [phone]    6A/ICU RNCC Weekend/holiday pager: [phone]    7A/7B/7C/7D RNCC Weekend/holiday pager: [phone]    5A/B/C RNCC Weekend/holiday pager: [phone]    [facility] RNCC ED/5 Ortho/5 Med/Surg [phone]    [facility] RNCC 6 Med/Surg 8A, [phone]    Observation SW and weekend/after hours phone: [phone]

## 2024-05-07 VITALS
DIASTOLIC BLOOD PRESSURE: 68 MMHG | RESPIRATION RATE: 20 BRPM | WEIGHT: 154.32 LBS | BODY MASS INDEX: 26.35 KG/M2 | TEMPERATURE: 97.8 F | OXYGEN SATURATION: 96 % | SYSTOLIC BLOOD PRESSURE: 96 MMHG | HEART RATE: 63 BPM | HEIGHT: 64 IN

## 2024-05-07 LAB
MAGNESIUM SERPL-MCNC: 2.1 MG/DL (ref 1.7–2.3)
METHYLMALONATE SERPL-SCNC: 0.31 UMOL/L (ref 0–0.4)
PHOSPHATE SERPL-MCNC: 3.7 MG/DL (ref 2.5–4.5)
POTASSIUM SERPL-SCNC: 4.2 MMOL/L (ref 3.4–5.3)

## 2024-05-07 PROCEDURE — 84132 ASSAY OF SERUM POTASSIUM: CPT

## 2024-05-07 PROCEDURE — 250N000013 HC RX MED GY IP 250 OP 250 PS 637: Performed by: PHYSICIAN ASSISTANT

## 2024-05-07 PROCEDURE — 999N000147 HC STATISTIC PT IP EVAL DEFER

## 2024-05-07 PROCEDURE — 250N000011 HC RX IP 250 OP 636: Performed by: PHYSICIAN ASSISTANT

## 2024-05-07 PROCEDURE — G0378 HOSPITAL OBSERVATION PER HR: HCPCS

## 2024-05-07 PROCEDURE — 250N000011 HC RX IP 250 OP 636: Performed by: STUDENT IN AN ORGANIZED HEALTH CARE EDUCATION/TRAINING PROGRAM

## 2024-05-07 PROCEDURE — 99239 HOSP IP/OBS DSCHRG MGMT >30: CPT | Mod: FS | Performed by: PHYSICIAN ASSISTANT

## 2024-05-07 PROCEDURE — 83735 ASSAY OF MAGNESIUM: CPT

## 2024-05-07 PROCEDURE — 96376 TX/PRO/DX INJ SAME DRUG ADON: CPT

## 2024-05-07 PROCEDURE — 36592 COLLECT BLOOD FROM PICC: CPT

## 2024-05-07 PROCEDURE — G0463 HOSPITAL OUTPT CLINIC VISIT: HCPCS

## 2024-05-07 PROCEDURE — 84100 ASSAY OF PHOSPHORUS: CPT

## 2024-05-07 RX ORDER — NALOXONE HYDROCHLORIDE 0.4 MG/ML
0.2 INJECTION, SOLUTION INTRAMUSCULAR; INTRAVENOUS; SUBCUTANEOUS
Status: DISCONTINUED | OUTPATIENT
Start: 2024-05-07 | End: 2024-05-07 | Stop reason: HOSPADM

## 2024-05-07 RX ORDER — METOCLOPRAMIDE 10 MG/1
10 TABLET ORAL
Qty: 120 TABLET | Refills: 0 | Status: SHIPPED | OUTPATIENT
Start: 2024-05-07 | End: 2024-10-05

## 2024-05-07 RX ORDER — FOLIC ACID 1 MG/1
1 TABLET ORAL DAILY
Qty: 30 TABLET | Refills: 0 | Status: SHIPPED | OUTPATIENT
Start: 2024-05-08 | End: 2024-10-05

## 2024-05-07 RX ORDER — AMOXICILLIN 250 MG
1 CAPSULE ORAL 2 TIMES DAILY
Qty: 60 TABLET | Refills: 0 | Status: SHIPPED | OUTPATIENT
Start: 2024-05-07

## 2024-05-07 RX ORDER — HEPARIN SODIUM,PORCINE 10 UNIT/ML
5-10 VIAL (ML) INTRAVENOUS EVERY 24 HOURS
Status: DISCONTINUED | OUTPATIENT
Start: 2024-05-07 | End: 2024-05-07 | Stop reason: HOSPADM

## 2024-05-07 RX ORDER — ALBUTEROL SULFATE 90 UG/1
1-2 AEROSOL, METERED RESPIRATORY (INHALATION) EVERY 4 HOURS PRN
Status: SHIPPED
Start: 2024-05-07

## 2024-05-07 RX ORDER — POLYETHYLENE GLYCOL 3350 17 G/17G
17 POWDER, FOR SOLUTION ORAL DAILY
Qty: 510 G | Refills: 0 | Status: SHIPPED | OUTPATIENT
Start: 2024-05-07

## 2024-05-07 RX ORDER — LANOLIN ALCOHOL/MO/W.PET/CERES
100 CREAM (GRAM) TOPICAL DAILY
Qty: 30 TABLET | Refills: 0 | Status: SHIPPED | OUTPATIENT
Start: 2024-05-08 | End: 2024-10-05

## 2024-05-07 RX ORDER — HYDROMORPHONE HYDROCHLORIDE 2 MG/1
2 TABLET ORAL EVERY 8 HOURS PRN
Qty: 9 TABLET | Refills: 0 | Status: SHIPPED | OUTPATIENT
Start: 2024-05-07 | End: 2024-10-05

## 2024-05-07 RX ORDER — NALOXONE HYDROCHLORIDE 0.4 MG/ML
0.4 INJECTION, SOLUTION INTRAMUSCULAR; INTRAVENOUS; SUBCUTANEOUS
Status: DISCONTINUED | OUTPATIENT
Start: 2024-05-07 | End: 2024-05-07 | Stop reason: HOSPADM

## 2024-05-07 RX ORDER — METHOCARBAMOL 500 MG/1
500 TABLET, FILM COATED ORAL 4 TIMES DAILY PRN
Qty: 40 TABLET | Refills: 0 | Status: SHIPPED | OUTPATIENT
Start: 2024-05-07 | End: 2024-10-05

## 2024-05-07 RX ORDER — SIMETHICONE 80 MG
80 TABLET,CHEWABLE ORAL 4 TIMES DAILY
Qty: 40 TABLET | Refills: 0 | Status: SHIPPED | OUTPATIENT
Start: 2024-05-07 | End: 2024-10-05

## 2024-05-07 RX ORDER — CEFUROXIME AXETIL 500 MG/1
500 TABLET ORAL 2 TIMES DAILY
Qty: 7 TABLET | Refills: 0 | Status: SHIPPED | OUTPATIENT
Start: 2024-05-07

## 2024-05-07 RX ORDER — HEPARIN SODIUM (PORCINE) LOCK FLUSH IV SOLN 100 UNIT/ML 100 UNIT/ML
5-10 SOLUTION INTRAVENOUS
Status: DISCONTINUED | OUTPATIENT
Start: 2024-05-07 | End: 2024-05-07 | Stop reason: HOSPADM

## 2024-05-07 RX ORDER — OLANZAPINE 5 MG/1
5 TABLET, ORALLY DISINTEGRATING ORAL AT BEDTIME
Qty: 30 TABLET | Refills: 0 | Status: SHIPPED | OUTPATIENT
Start: 2024-05-07

## 2024-05-07 RX ORDER — HEPARIN SODIUM,PORCINE 10 UNIT/ML
5-10 VIAL (ML) INTRAVENOUS
Status: DISCONTINUED | OUTPATIENT
Start: 2024-05-07 | End: 2024-05-07 | Stop reason: HOSPADM

## 2024-05-07 RX ORDER — PANTOPRAZOLE SODIUM 40 MG/1
40 TABLET, DELAYED RELEASE ORAL DAILY
Qty: 30 TABLET | Refills: 0 | Status: SHIPPED | OUTPATIENT
Start: 2024-05-07

## 2024-05-07 RX ORDER — MICONAZOLE NITRATE 20 MG/G
CREAM TOPICAL 2 TIMES DAILY
Qty: 15 G | Refills: 0 | Status: SHIPPED | OUTPATIENT
Start: 2024-05-07

## 2024-05-07 RX ADMIN — SIMETHICONE 80 MG: 80 TABLET, CHEWABLE ORAL at 11:38

## 2024-05-07 RX ADMIN — METOCLOPRAMIDE 10 MG: 5 TABLET ORAL at 06:08

## 2024-05-07 RX ADMIN — Medication 500 MCG: at 08:54

## 2024-05-07 RX ADMIN — SIMETHICONE 80 MG: 80 TABLET, CHEWABLE ORAL at 06:08

## 2024-05-07 RX ADMIN — PANTOPRAZOLE SODIUM 40 MG: 40 TABLET, DELAYED RELEASE ORAL at 08:53

## 2024-05-07 RX ADMIN — HYDROMORPHONE HYDROCHLORIDE 2 MG: 2 TABLET ORAL at 06:08

## 2024-05-07 RX ADMIN — MICONAZOLE NITRATE: 20 CREAM TOPICAL at 08:58

## 2024-05-07 RX ADMIN — Medication 1 TABLET: at 11:43

## 2024-05-07 RX ADMIN — HYDROMORPHONE HYDROCHLORIDE 1 MG: 1 INJECTION, SOLUTION INTRAMUSCULAR; INTRAVENOUS; SUBCUTANEOUS at 14:35

## 2024-05-07 RX ADMIN — FOLIC ACID 1 MG: 1 TABLET ORAL at 08:53

## 2024-05-07 RX ADMIN — METOCLOPRAMIDE 10 MG: 5 TABLET ORAL at 11:38

## 2024-05-07 RX ADMIN — CEFUROXIME AXETIL 500 MG: 500 TABLET ORAL at 08:53

## 2024-05-07 RX ADMIN — THIAMINE HCL TAB 100 MG 100 MG: 100 TAB at 08:53

## 2024-05-07 RX ADMIN — Medication 5 ML: at 05:47

## 2024-05-07 RX ADMIN — Medication 5 ML: at 15:36

## 2024-05-07 RX ADMIN — Medication 1 TABLET: at 08:54

## 2024-05-07 ASSESSMENT — ACTIVITIES OF DAILY LIVING (ADL)
ADLS_ACUITY_SCORE: 30

## 2024-05-07 NOTE — PLAN OF CARE
"Outpatient/Observation goals to be met before discharge home:    /62 (BP Location: Left arm)   Pulse 71   Temp 97.6  F (36.4  C) (Oral)   Resp 16   Ht 1.626 m (5' 4\")   Wt 66 kg (145 lb 8.1 oz)   SpO2 98%   BMI 24.98 kg/m      -adequate pain control on oral analgesics: in process, pt still complaining of  pain in abdomen around G tube site   -tolerating oral antibiotics or has plans for home infusion setup: met  - WOCN consult completed: met  -safe disposition plan has been identified: in process   "

## 2024-05-07 NOTE — PROGRESS NOTES
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     Assessment:      Areas visualized during today's visit: Focused: G-tube site          Wound location: G-tube site      Last photo: 5/7  Wound due to:  moisture and hypergranular tissue  Wound history/plan of care: Chronic G-tube placement  Wound base: 100 %  hypergranular tissue ,      Palpation of the wound bed: fluctuance  very tender     Drainage: small     Description of drainage: serous     Measurements (length x width x depth, in cm): 0.9  x 0.5  x  0.5 cm (raised))     Tunneling: N/A     Undermining: N/A  Periwound skin: Intact and resolving MASD rash      Color: pink      Temperature: normal   Odor: none  Pain: moderate, aching  Pain interventions prior to dressing change: patient tolerated well and slow and gentle cares   Treatment goal: Heal  and Decrease moisture  STATUS: healing- plan is to use   Supplies ordered: ordered Hydrofera blue, discussed with RN, and discussed with patient      Treatment Plan:     G-tube site wound(s): Daily or every other day  Cleanse wound bed with NS and pat dry.  Cut a piece of Hydrofera blue (#[id number]) into 4 pieces.  Split a piece in the middle. Dampen it with NS and wring out the excess  Apply under the tube  Change dressing daily.  Plan to use silver nitrate on hypergranular tissue if do not see any improvement with hydrofera blue in a week if agreed by MD in an outpatient settings.      Orders:  Written    RECOMMEND PRIMARY TEAM ORDER: None, at this time  Education provided: plan of care and wound progress discussed in lengthy on importance of using hydrofera blue/how to use it and indication for using silver nitrate if current POC is not effective.  Discussed plan of care with: Nurse  WO nurse follow-up plan: weekly  Notify WOC if wound(s) deteriorate.  Nursing to notify the Provider(s) and re-consult the WOC Nurse if new skin concern.    DATA:       BMI: Body mass index is 26.49 kg/m .   Active diet order: Orders Placed This Encounter      Regular Diet Adult     Output: I/O last 3 completed shifts:  In: 120 [I.V.:30]  Out: -      Labs:   Recent Labs   Lab 05/03/24  0651   ALBUMIN 4.3   HGB 10.0*   WBC 4.8     Pressure injury risk assessment:   Sensory Perception: 3-->slightly limited  Moisture: 4-->rarely moist  Activity: 3-->walks occasionally  Mobility: 4-->no limitation  Nutrition: 3-->adequate  Friction and Shear: 3-->no apparent problem  Meng Score: 20    Yary Patton RN   Pager no longer is use, please contact through [facility]: [facility] Nurse [place]  Dept. Office Number: [phone]

## 2024-05-07 NOTE — PLAN OF CARE
"Outpatient/Observation goals to be met before discharge home:    /74 (BP Location: Left arm, Patient Position: Sitting)   Pulse 71   Temp 97.7  F (36.5  C) (Oral)   Resp 16   Ht 1.626 m (5' 4\")   Wt 66 kg (145 lb 8.1 oz)   SpO2 100%   BMI 24.98 kg/m      -adequate pain control on oral analgesics: not met, pt still complaining of severe pain in abdomen around G tube site   -tolerating oral antibiotics or has plans for home infusion setup: met  - WOCN consult completed: met  -safe disposition plan has been identified: in process   "

## 2024-05-07 NOTE — PLAN OF CARE
PT OBS - cancel/defer    PT order received. Discussed with care team. Pt does not present with acute IP PT needs and appears to mobilize safely for discharge to home with ongoing support from her father/PCA and may benefit from HHPT assessment. In room, pt demonstrates IND with bed mobility, transfers and gait with single UE support of IV pole. Pt reports ambulating outside with nursing staff without concern. Pt has a long history of falls related to BLE neuropathy. Pt reports having adequate equipment for home including a walker and w/c, and reports her dad is available for assist prn.     PT to complete orders and sign off. Please re-consult if status changes.

## 2024-05-07 NOTE — PLAN OF CARE
"Observation Goal Outcome Evaluation:    -adequate pain control on oral analgesics: Met    -tolerating oral antibiotics or has plans for home infusion setup: Met    - WOCN consult completed: Met    -safe disposition plan has been identified: Met    BP 96/68 (BP Location: Left arm)   Pulse 63   Temp 97.8  F (36.6  C) (Oral)   Resp 20   Ht 1.626 m (5' 4\")   Wt 70 kg (154 lb 5.2 oz)   SpO2 96%   BMI 26.49 kg/m      Port de-accessed and locked with Heparin 28 days.      "

## 2024-05-07 NOTE — DISCHARGE SUMMARY
Patient's After Visit Summary was reviewed with patient.   Patient verbalized understanding of After Visit Summary, recommended follow up and was given an opportunity to ask questions.   Discharge medications sent home with patient/family: YES   Discharged with father.    Pt port de-accessed and locked with heparin 28 days.

## 2024-05-07 NOTE — PROGRESS NOTES
Care Management Discharge Note     Discharge Date: 05/07/2024        Discharge Disposition: Home Infusion, Home     Discharge Services: None     Discharge DME: None     Discharge Transportation: family or friend will provide     Private pay costs discussed: Not applicable     Does the patient's insurance plan have a 3 day qualifying hospital stay waiver?  No     PAS Confirmation Code: N/A  Patient/family educated on Medicare website which has current facility and service quality ratings:  (N/A)     Education Provided on the Discharge Plan: Yes  Persons Notified of Discharge Plans: [facility] liaison, patient  Patient/Family in Agreement with the Plan: yes     Handoff Referral Completed: Yes     Additional Information:  RNCC updated with [facility] liaison discharge plan, placed resumption of care/TF orders. RNCC to cease following after discharge.         [facility]  Phone # [phone]  Fax # [phone]   Supplies Enteral Feeds        Henri Rogers BSN, BA, RN, CMSRN, RNCC  [facility]  Covering Units [address] Beds 7001-5443/OBS  Phone: [phone]  Pager: [phone]  After Hours [phone]     [address]eds 9524-3401 SW Ph: [phone]  6C Beds 8630-4802  pager: [phone]     6B/C/D RNCC Weekend/holiday pager: [phone]     6A/ICU RNCC Weekend/holiday pager: [phone]     7A/7B/7C/7D RNCC Weekend/holiday pager: [phone]     5A/B/C RNCC Weekend/holiday pager: [phone]     [facility] ED/5 Ortho/5 Med/Surg [phone]     [facility] [place] [facility] 6 Med/Surg 8A, [phone]     Observation SW and weekend/after hours phone: [phone]

## 2024-05-07 NOTE — DISCHARGE SUMMARY
"[facility]  Hospitalist Discharge Summary      Date of Admission:  5/1/2024  Date of Discharge:  5/7/2024  Discharging Provider: Immanuel Gramajo PA-C  Discharge Service: Hospitalist Service    Discharge Diagnoses   # Acute on chronic abdominal pain  # G-tube malfunction s/p exchange  # Abdominal wall erythema, possible cellulitis  # Acute on chronic nausea  # Constipation  # Moderate malnutrition  # Hx RYNGB  # Hx Ex-lap with small bowel resection, partial colon/liver/gastric resection (2/14/14)  # Alcohol use  # Transaminitis, possibly alcohol-induced, resolved     Clinically Significant Risk Factors     # Overweight: Estimated body mass index is 26.49 kg/m  as calculated from the following:    Height as of this encounter: 1.626 m (5' 4\").    Weight as of this encounter: 70 kg (154 lb 5.2 oz).  # Severe Malnutrition: based on nutrition assessment      Follow-ups Needed After Discharge   Follow-up Appointments     Adult [facility] Follow-up and recommended labs and tests      Follow up with primary care provider, Sussy Myers, within 7 days for   hospital follow- up.  The following labs/tests are recommended: CBC, CMP,   Mg, Phos.      Follow up with Dr. Krishnamurthy of surgical oncology as scheduled on 5/13/24.     Appointments on [place] and/or [facility] (with [facility] or [facility]   provider or service). Call [phone] if you haven't heard regarding   these appointments within 7 days of discharge.        Follow Up ([facility])      Follow up with Dr. Krishnamurthy in 2 weeks.     Appointments on [place] and/or [facility] (with [facility] or [facility]   provider or service). Call [phone] if you haven't heard regarding   these appointments within 7 days of discharge.          Discharge Disposition   Discharged to home  Condition at discharge: Stable    Hospital Course   Eduarda Nogueira is a 43 year old woman with a history of CVA, hypothyroidism, and obesity s/p RNYGB (2022) c/b " recurrent GI bleed from GJ anastomotic ulcer with pathology initially c/f adenocarcinoma. S/p ex-lap with small bowel resection and partial colon resection in 2/2024, with pathology ultimately negative for malignancy. Now G-tube dependent for nutrition. Presented with nausea, abdominal pain and leaking from G-tube. Admitted to Medicine Observation for symptoms management and concern for G tube site infection.     On arrival patient was afebrile, WBC normal, and CRP <3.00. CT showed subcutaneous soft tissue thickening along course of G-tube. No evidence of abscess. No other acute pathology noted on imaging. Surgical oncology was consulted. Mild erythema noted around G-tube, which was empirically treated for possible cellulitis, although appearence was more consistent with contact/chemical dermatitis from G-tube leaking. WOCN consulted. Erythema improved with better skin cares. G-tube was exchanged at bedside by surgical team. Patient noted worsening abdominal pain after. Minimal leaking following tube exchange. Patient continued to report moderate to severe pain, which prolonged her hospital stay. Pain is likely secondary to movement from G-tube causing irritation along the tract. This will take time to heal. Patient found some benefit from abdominal binder and foam dressings provided by WO. She will complete a 7 day course of PO cefuroxime for possible abdominal wall cellulitis.     Patient had minimal improvement in nausea and PO intake. Patient had stopped tube feeds prior to arrival. Tube feeds were restarted and advanced slowly. She did not have any evidence refeeding syndrome. She had minimal stool output. Moderate stool burden noted on imaging. Constipation was felt to be contributing to symptoms. We started an aggressive bowel regimen. Noted to have improvement in symptoms after having multiple large BM. Bowel regimen will be continued on discharge.     Patient reported increased alcohol intake prior to  admission, primarily to cope with abdominal pain. Pain has since improved. She will be discharged home with small prescription of PO dilaudid. She did not feel that her alcohol use was problematic. She deferred addiction medicine consult during her stay.     Her hospital stay was otherwise uncomplicated. She will be discharged home in stable condition. She lives with her father, who is also her PCA. She will continue tube feeds and dressing changes per WOCN recommendations. She has follow up with Dr. Krishnamurthy of surgical oncology in coming weeks, at which time her above symptoms can be readdressed.     Consultations This Hospital Stay   PHARMACY TO DOSE VANCO  SURGICAL ONCOLOGY ADULT IP CONSULT  WOUND OSTOMY CONTINENCE NURSE  IP CONSULT  CARE MANAGEMENT / SOCIAL WORK IP CONSULT  NUTRITION SERVICES ADULT IP CONSULT  PHARMACY IP CONSULT  PHYSICAL THERAPY ADULT IP CONSULT    Code Status   Full Code    Time Spent on this Encounter   I, Immanuel Gramajo PA-C, personally saw the patient today and spent greater than 30 minutes discharging this patient.       Immanuel Gramajo PA-C  [facility] [address] [facility]  [address] [place] [id number]  Phone: [phone]  Fax: [phone]  ______________________________________________________________________    Physical Exam   Vital Signs: Temp: 97.8  F (36.6  C) Temp src: Oral BP: 96/68 Pulse: 63   Resp: 20 SpO2: 96 % O2 Device: None (Room air)    Weight: 154 lbs 5.15 oz    General Appearance:  Awake. Alert. Oriented x3. NAD.   HEENT:  No scleral icterus. Moist mucous membranes.   Respiratory:  Normal effort. CTAB. No wheezing, rhonchi, rales.   Cardiovascular:  RRR. S1/S2. No murmurs.   GI:  Soft, non-distended, +BS. Tender around G tube. No surrounding erythema. No drainage.  Extremity:  No pitting edema. No calf tenderness.   Skin:  No visible rash. No jaundice.   Neuro:  Grossly non-focal.        Primary Care Physician   Sussy  Louis    Discharge Orders      Wound Care Referral      Home Infusion Referral      Home Care Referral      Follow Up ([facility])    Follow up with Dr. Krishnamurthy in 2 weeks.     Appointments on [place] and/or [facility] (with [facility] or [facility] provider or service). Call [phone] if you haven't heard regarding these appointments within 7 days of discharge.     Reason for your hospital stay    Admitted to Medicine Observation for evaluation of pain secondary to feeding tube. Surgical oncology team consulted. Started on antibiotics for possible skin infection. Feeding tube was exchanged at bedside. Tube feeds were advanced without difficulty. Treated for constipation, which resulted in improvement in symptoms. Anticipate ongoing improvement in pain symptoms as site heals. Follow up with surgical team for ongoing management.     Activity    Your activity upon discharge: activity as tolerated     Adult [facility] Follow-up and recommended labs and tests    Follow up with primary care provider, Sussy Myers, within 7 days for hospital follow- up.  The following labs/tests are recommended: CBC, CMP, Mg, Phos.      Follow up with Dr. Krishnamurthy of surgical oncology as scheduled on 5/13/24.     Appointments on [place] and/or [facility] (with [facility] or [facility] provider or service). Call [phone] if you haven't heard regarding these appointments within 7 days of discharge.     Tubes and drains    You are going home with the following tubes or drains: feeding tube G-Tube.   Tube cares per hospital or home care instructions     Diet    Follow this diet upon discharge:       Adult Formula Drip Feeding: Continuous [employer] Peptide 1.5; Gastrostomy; Goal Rate: 45 mL/hr (goal rate) initiate at rate of 10 mL/hr and advance rate by 10 mL Q12 hours as tolerated and if K/Mg/phos within acceptable parameters, until reaching...      Snacks/Supplements Adult: [employer] Standard Oral Supplement; With Meals      Regular Diet Adult        Significant Results and Procedures   Most Recent 3 CBC's:  Recent Labs   Lab Test 05/03/24  0651 05/02/24  0642 05/01/24  1654   WBC 4.8 8.8 9.5   HGB 10.0* 9.6* 11.3*   MCV 82 81 81    244 320     Most Recent 3 BMP's:  Recent Labs   Lab Test 05/07/24  0553 05/06/24  0717 05/05/24  1651 05/05/24  1358 05/05/24  1303 05/04/24  1232 05/04/24  0630 05/03/24  1326 05/03/24  0651   NA  --  141  --   --   --   --  139  --  137   POTASSIUM 4.2 4.1  --  4.1  --   --  4.2   < > 3.3*   CHLORIDE  --  106  --   --   --   --  105  --  99   CO2  --  27  --   --   --   --  27  --  26   BUN  --  13.7  --   --   --   --  4.9*  --  5.7*   CR  --  0.66  --   --   --   --  0.66  --  0.75   ANIONGAP  --  8  --   --   --   --  7  --  12   TAVO  --  8.2*  --   --   --   --  8.2*  --  8.7   GLC  --  108* 92  --  102*   < > 102*   < > 87    < > = values in this interval not displayed.     Most Recent 2 LFT's:  Recent Labs   Lab Test 05/03/24  0651 05/02/24  0642   AST 37 46*   ALT 19 14   ALKPHOS 146 155*   BILITOTAL 0.4 0.5     Most Recent 3 INR's:  Recent Labs   Lab Test 02/11/24  1149 02/09/24  1331   INR 1.09 0.99     Most Recent TSH and T4:  Recent Labs   Lab Test 05/01/24  1654   TSH 1.38     Most Recent 6 glucoses:  Recent Labs   Lab Test 05/06/24  0717 05/05/24  1651 05/05/24  1303 05/05/24  0818 05/04/24  1932 05/04/24  1232   * 92 102* 84 88 92   ,   Results for orders placed or performed during the hospital encounter of 05/01/24   CT Abdomen Pelvis w Contrast    Narrative    EXAMINATION: CT ABDOMEN PELVIS W CONTRAST, 5/1/2024 6:28 PM    INDICATION: pain, swelling, drainage from g tube site    COMPARISON STUDY: CT 2/25/2024    TECHNIQUE: CT scan of the abdomen and pelvis was performed on  multidetector CT scanner using volumetric acquisition technique and  images were reconstructed in multiple planes with variable thickness  and reviewed on dedicated workstations.     CONTRAST: iopamidol (ISOVUE-370) solution 92  mL injected IV without  oral contrast    CT scan radiation dose is optimized to minimum requisite dose using  automated dose modulation techniques.    FINDINGS:    Lower thorax: The visualized lung bases are clear.    Liver: Diffuse hypoattenuation of the liver. Multiple simple cysts  throughout the liver are not significantly changed from prior. Focal  hepatic steatosis along the falciform ligament. Decreased size of the  cystic collection on the medial aspect of the left hepatic lobe  containing a punctate calcification, with resolved gaseous component.  Stable flash filling hemangioma in hepatic segment 2. Stable  postsurgical changes of partial left hepatic lobe resection. No  intrahepatic biliary ductal dilation.    Biliary System: Status post cholecystectomy. No extrahepatic biliary  ductal dilation.    Pancreas: No mass or pancreatic ductal dilation.    Adrenal glands: No mass or nodules    Spleen: Normal.    Kidneys: Polycystic kidneys with cysts throughout the kidneys. No  suspicious mass, obstructing calculus or hydronephrosis.    Gastrointestinal tract: Percutaneous gastrostomy tube terminates in  the stomach. Postsurgical changes of a small bowel resection, partial  colonic resection, partial gastrectomy with Addis-en-Y gastric bypass.  No abnormally dilated loops of bowel.    Mesentery/peritoneum/retroperitoneum: No mass. No free fluid or air.    Lymph nodes: No significant lymphadenopathy.    Vasculature: Patent major abdominal vasculature.    Pelvis: Urinary bladder is normal.   Uterus and adnexa within normal  limits.    Osseous structures: No aggressive or acute osseous lesion.      Soft tissues: Left ventral abdominal wall skin thickening with  subcutaneous fat stranding along the course of percutaneous  gastrostomy tube. No focal fluid collection. No soft tissue gas.   Annual ventral abdominal wall incision with resolved subcutaneous  tissue collection deep to the incision.      Impression     IMPRESSION:   1. Subcutaneous soft tissue fat stranding along the course of the left  upper quadrant percutaneous gastrostomy tube with surrounding skin  thickening, suspicious for infection/inflammation. No focal fluid  collection. No soft tissue gas.  2. No acute intra-abdominal pathology. Stable postoperative changes of  partial gastrectomy, partial hepatectomy, small bowel resection,  cholecystectomy.  3. Additional chronic and incidental findings as detailed in the body  of the report.    I have personally reviewed the examination and initial interpretation  and I agree with the findings.    DAJA JOSE MD         SYSTEM ID:  [id number]   XR Abdomen Port 1 View    Narrative    EXAMINATION:  XR ABDOMEN PORT 1 VIEW 5/2/2024 11:11 AM     COMPARISON: CT 5/1/2024.    HISTORY: abdominal pain, c/f ileus.    TECHNIQUE: Frontal view of the abdomen.    FINDINGS: Scattered surgical clips about the upper abdomen and right  lower quadrant. No abnormally dilated loops of bowel.No pneumatosis or  portal venous gas.  The lung bases are unremarkable. Metallic  appearing opacities projecting over the central abdomen and right  upper quadrant, possibly external to the patient, consider  correlation. Contrast opacification of the urinary bladder, possibly  from previously administered IV contrast.      Impression    IMPRESSION: No significantly dilated air-filled bowel loops.    I have personally reviewed the examination and initial interpretation  and I agree with the findings.    EMILY ALEGRIA MD         SYSTEM ID:  [id number]       Discharge Medications   Current Discharge Medication List        START taking these medications    Details   cefuroxime (CEFTIN) 500 MG tablet Take 1 tablet (500 mg) by mouth 2 times daily  Qty: 7 tablet, Refills: 0    Associated Diagnoses: Cellulitis of abdominal wall      cyanocobalamin (VITAMIN B-12) 500 MCG SUBL sublingual tablet Place 1 tablet (500 mcg) under the tongue daily  Qty: 30 tablet,  Refills: 0    Associated Diagnoses: H/O gastric bypass      folic acid (FOLVITE) 1 MG tablet Take 1 tablet (1 mg) by mouth daily  Qty: 30 tablet, Refills: 0    Associated Diagnoses: H/O gastric bypass      folic acid-vit B6-vit B12 (FOLGARD) 0.8-10-0.115 MG TABS per tablet 1 tablet by Per G Tube route daily  Qty: 30 tablet, Refills: 0    Associated Diagnoses: H/O gastric bypass      metoclopramide (REGLAN) 10 MG tablet Take 1 tablet (10 mg) by mouth 4 times daily (before meals and nightly)  Qty: 120 tablet, Refills: 0    Associated Diagnoses: Regurgitation of food      miconazole (MICATIN) 2 % external cream Apply topically 2 times daily  Qty: 15 g, Refills: 0    Associated Diagnoses: Cellulitis of abdominal wall      OLANZapine zydis (ZYPREXA) 5 MG ODT Take 1 tablet (5 mg) by mouth at bedtime  Qty: 30 tablet, Refills: 0    Associated Diagnoses: Regurgitation of food      senna-docusate (SENOKOT-S/PERICOLACE) 8.6-50 MG tablet Take 1 tablet by mouth 2 times daily  Qty: 60 tablet, Refills: 0    Associated Diagnoses: Drug-induced constipation      simethicone (MYLICON) 80 MG chewable tablet Take 1 tablet (80 mg) by mouth 4 times daily  Qty: 40 tablet, Refills: 0    Associated Diagnoses: Abdominal pain, unspecified abdominal location      thiamine (B-1) 100 MG tablet 1 tablet (100 mg) by Per G Tube route daily  Qty: 30 tablet, Refills: 0    Associated Diagnoses: H/O gastric bypass           CONTINUE these medications which have CHANGED    Details   albuterol (PROAIR HFA/PROVENTIL HFA/VENTOLIN HFA) 108 (90 Base) MCG/ACT inhaler Inhale 1-2 puffs into the lungs every 4 hours as needed for shortness of breath, wheezing or cough 2 - 4 times daily    Comments: Pharmacy may dispense brand covered by insurance (Proair, or proventil or ventolin or generic albuterol inhaler)  Associated Diagnoses: Uncomplicated asthma, unspecified asthma severity, unspecified whether persistent      HYDROmorphone (DILAUDID) 2 MG tablet Take 1  tablet (2 mg) by mouth every 8 hours as needed for moderate to severe pain  Qty: 9 tablet, Refills: 0    Associated Diagnoses: Postoperative pain      methocarbamol (ROBAXIN) 500 MG tablet Take 1 tablet (500 mg) by mouth 4 times daily as needed for muscle spasms  Qty: 40 tablet, Refills: 0    Associated Diagnoses: Post-operative pain      pantoprazole (PROTONIX) 40 MG EC tablet Take 1 tablet (40 mg) by mouth daily  Qty: 30 tablet, Refills: 0    Associated Diagnoses: Upper GI bleed      polyethylene glycol (MIRALAX) 17 GM/Dose powder Take 17 g by mouth daily  Qty: 510 g, Refills: 0    Associated Diagnoses: Drug-induced constipation           CONTINUE these medications which have NOT CHANGED    Details   ammonium lactate (AMLACTIN) 12 % external cream Apply topically daily as needed for dry skin      Cranberry 400 MG CAPS Take 400 mg by mouth daily as needed      ipratropium - albuterol 0.5 mg/2.5 mg/3 mL (DUONEB) 0.5-2.5 (3) MG/3ML neb solution Inhale 1 Neb via a nebulizer 4 times daily if needed for Shortness Of Breath or Wheezing.      Lidocaine (LIDOCARE) 4 % Patch Apply 1-3 patches to intact skin to cover most painful area of back pain for max 12hr per 24hr period.      multivitamins w/minerals liquid 15 mLs by Per Feeding Tube route daily  Qty: 450 mL, Refills: 3    Comments: Please mail to patients home  Associated Diagnoses: H/O gastric bypass      nortriptyline (PAMELOR) 10 MG capsule Take 20 mg by mouth at bedtime           STOP taking these medications       clobetasol (TEMOVATE) 0.05 % external ointment Comments:   Reason for Stopping:         clobetasol propionate (CLOBEX) 0.05 % external shampoo Comments:   Reason for Stopping:         diphenhydrAMINE-zinc acetate (BENADRYL ITCH STOPPING) 1-0.1 % external cream Comments:   Reason for Stopping:         HYOSCYAMINE SL 0.125 MG sublingual tablet Comments:   Reason for Stopping:         nystatin (MYCOSTATIN) 003590 UNIT/GM external cream Comments:   Reason  "for Stopping:         ondansetron (ZOFRAN) 4 MG tablet Comments:   Reason for Stopping:         propranolol (INDERAL) 40 MG tablet Comments:   Reason for Stopping:         Sulfacetamide Sodium-Sulfur 10-5 % SUSP Comments:   Reason for Stopping:         tretinoin (RETIN-A) 0.05 % external cream Comments:   Reason for Stopping:         triamcinolone (KENALOG) 0.1 % external cream Comments:   Reason for Stopping:             Allergies   Allergies   Allergen Reactions    Acetaminophen Hives, Itching, Rash and Shortness Of Breath     Several Rx's for Norco in 2021 & 22    No Clinical Screening - See Comments Anaphylaxis     steroids    Gabapentin Unknown and Itching    Hydrocodone-Acetaminophen Itching     Several Rx's for Norco in 2021 & 22    Iodinated Contrast Media Unknown and Nausea and Vomiting     Extremely anxious, vomited once after CT. No wheezing or SOB.    Latex Itching    Morphine And Related Itching     Several Rx's for Norco in 2021 & 22    Nsaids Other (See Comments)     H/o german-n-y gastric bypass\". AVOID NSAIDs and aspirin due to risk of gastric and/or G-J anastomotic ulcers. If Eduarda must be on short course of NSAIDs or aspirin, use enteric coated if possible and use PPI // Ira Hung RN, Bariatric Nurse Clinician, [facility] 3/23/2022    Oxycodone-Acetaminophen Itching     Several Rx's for Norco in 2021 & 22     "

## 2024-05-07 NOTE — PROGRESS NOTES
Overnight CC  Paged about BP of 91/61 -> 99/67, Map 70 & 71.     Plan: will closely monitor, day team updated   Jaime Méndez PA-C on 5/7/2024 at 7:42 AM

## 2024-05-07 NOTE — PROGRESS NOTES
Care Management Discharge Note    Discharge Date: 05/07/2024       Discharge Disposition: Home, Home Care, Home Infusion    Discharge Services: None    Discharge DME: None    Discharge Transportation: family or friend will provide    Private pay costs discussed: Not applicable    Does the patient's insurance plan have a 3 day qualifying hospital stay waiver?  No    PAS Confirmation Code: N/A  Patient/family educated on Medicare website which has current facility and service quality ratings:  (N/A)    Education Provided on the Discharge Plan: Yes  Persons Notified of Discharge Plans: Pt; PA; RNCC; RN; FHI  Patient/Family in Agreement with the Plan: yes    Handoff Referral Completed: No    Additional Information:  Chart reviewed. Case discussed with bedside RN and medical provider. Orders PT [facility] are being entered. Referral made to [facility] central intake.     Writer received call from [facility]. Pt was accepted for [facility] PT services. Writer faxed orders.    ________________    ARIEL Polanco, [facility]  ED/Observation   [facility]  Phone: [phone]  Pager: [phone]  Fax: [phone]    On-call pager, [phone], 4:00pm to midnight     After hours [employer] and After Hours [email or web address]

## 2024-05-07 NOTE — DISCHARGE INSTRUCTIONS
G-tube site wound(s): Daily or every other day  Cleanse wound bed with NS and pat dry.  Cut a piece of Hydrofera blue into 4 pieces.  Split a piece in the middle. Dampen it with NS and wring out the excess  Apply under the tube  Change dressing daily.  Plan to use silver nitrate on hypergranular tissue if do not see any improvement with hydrofera blue in a week if agreed by MD in an outpatient settings.

## 2024-05-07 NOTE — PLAN OF CARE
"Outpatient/Observation goals to be met before discharge home:    /73 (BP Location: Left arm)   Pulse 69   Temp 97.7  F (36.5  C) (Oral)   Resp 16   Ht 1.626 m (5' 4\")   Wt 66 kg (145 lb 8.1 oz)   SpO2 100%   BMI 24.98 kg/m      -adequate pain control on oral analgesics: not met, pt still complaining of severe pain in abdomen around G tube site   -tolerating oral antibiotics or has plans for home infusion setup: met  - WOCN consult completed: met  -safe disposition plan has been identified: in process  "

## 2024-05-07 NOTE — PLAN OF CARE
"Observation Goal Outcome Evaluation:    -adequate pain control on oral analgesics: Progressing    -tolerating oral antibiotics or has plans for home infusion setup: Met, unsure about home infusion setup.    - WOCN consult completed: Met    -safe disposition plan has been identified: Not met    BP 96/68 (BP Location: Left arm)   Pulse 63   Temp 97.8  F (36.6  C) (Oral)   Resp 20   Ht 1.626 m (5' 4\")   Wt 70 kg (154 lb 5.2 oz)   SpO2 96%   BMI 26.49 kg/m      "

## 2024-05-09 ENCOUNTER — HOSPITAL ENCOUNTER (EMERGENCY)
Facility: CLINIC | Age: 44
Discharge: HOME | End: 2024-05-10
Attending: STUDENT IN AN ORGANIZED HEALTH CARE EDUCATION/TRAINING PROGRAM | Admitting: STUDENT IN AN ORGANIZED HEALTH CARE EDUCATION/TRAINING PROGRAM
Payer: COMMERCIAL

## 2024-05-09 ENCOUNTER — PATIENT OUTREACH (OUTPATIENT)
Dept: CARE COORDINATION | Facility: CLINIC | Age: 44
End: 2024-05-09
Payer: COMMERCIAL

## 2024-05-09 ENCOUNTER — APPOINTMENT (OUTPATIENT)
Dept: CT IMAGING | Facility: CLINIC | Age: 44
End: 2024-05-09
Attending: STUDENT IN AN ORGANIZED HEALTH CARE EDUCATION/TRAINING PROGRAM
Payer: COMMERCIAL

## 2024-05-09 VITALS
OXYGEN SATURATION: 94 % | DIASTOLIC BLOOD PRESSURE: 73 MMHG | SYSTOLIC BLOOD PRESSURE: 103 MMHG | HEART RATE: 90 BPM | TEMPERATURE: 98 F | RESPIRATION RATE: 18 BRPM

## 2024-05-09 DIAGNOSIS — Z93.4 GASTROJEJUNOSTOMY TUBE STATUS (H): ICD-10-CM

## 2024-05-09 LAB
ALBUMIN SERPL BCG-MCNC: 4.1 G/DL (ref 3.5–5.2)
ALP SERPL-CCNC: 71 U/L (ref 40–150)
ALT SERPL W P-5'-P-CCNC: 9 U/L (ref 0–50)
ANION GAP SERPL CALCULATED.3IONS-SCNC: 13 MMOL/L (ref 7–15)
AST SERPL W P-5'-P-CCNC: 18 U/L (ref 0–45)
BASOPHILS # BLD AUTO: 0.1 10E3/UL (ref 0–0.2)
BASOPHILS NFR BLD AUTO: 2 %
BILIRUB SERPL-MCNC: <0.2 MG/DL
BUN SERPL-MCNC: 10.1 MG/DL (ref 6–20)
CALCIUM SERPL-MCNC: 8.5 MG/DL (ref 8.6–10)
CHLORIDE SERPL-SCNC: 103 MMOL/L (ref 98–107)
CREAT SERPL-MCNC: 0.72 MG/DL (ref 0.51–0.95)
DEPRECATED HCO3 PLAS-SCNC: 26 MMOL/L (ref 22–29)
EGFRCR SERPLBLD CKD-EPI 2021: >90 ML/MIN/1.73M2
EOSINOPHIL # BLD AUTO: 0.1 10E3/UL (ref 0–0.7)
EOSINOPHIL NFR BLD AUTO: 2 %
ERYTHROCYTE [DISTWIDTH] IN BLOOD BY AUTOMATED COUNT: 19.9 % (ref 10–15)
GLUCOSE SERPL-MCNC: 111 MG/DL (ref 70–99)
HCT VFR BLD AUTO: 35 % (ref 35–47)
HGB BLD-MCNC: 10.6 G/DL (ref 11.7–15.7)
IMM GRANULOCYTES # BLD: 0 10E3/UL
IMM GRANULOCYTES NFR BLD: 1 %
INR PPP: 1.04 (ref 0.85–1.15)
LACTATE SERPL-SCNC: 2.4 MMOL/L (ref 0.7–2)
LYMPHOCYTES # BLD AUTO: 3.1 10E3/UL (ref 0.8–5.3)
LYMPHOCYTES NFR BLD AUTO: 52 %
MCH RBC QN AUTO: 25.1 PG (ref 26.5–33)
MCHC RBC AUTO-ENTMCNC: 30.3 G/DL (ref 31.5–36.5)
MCV RBC AUTO: 83 FL (ref 78–100)
MONOCYTES # BLD AUTO: 0.4 10E3/UL (ref 0–1.3)
MONOCYTES NFR BLD AUTO: 6 %
NEUTROPHILS # BLD AUTO: 2.2 10E3/UL (ref 1.6–8.3)
NEUTROPHILS NFR BLD AUTO: 37 %
NRBC # BLD AUTO: 0 10E3/UL
NRBC BLD AUTO-RTO: 0 /100
PLATELET # BLD AUTO: 292 10E3/UL (ref 150–450)
POTASSIUM SERPL-SCNC: 3.6 MMOL/L (ref 3.4–5.3)
PROT SERPL-MCNC: 6.8 G/DL (ref 6.4–8.3)
RBC # BLD AUTO: 4.22 10E6/UL (ref 3.8–5.2)
SODIUM SERPL-SCNC: 142 MMOL/L (ref 135–145)
WBC # BLD AUTO: 5.9 10E3/UL (ref 4–11)

## 2024-05-09 PROCEDURE — 99285 EMERGENCY DEPT VISIT HI MDM: CPT | Mod: 25 | Performed by: STUDENT IN AN ORGANIZED HEALTH CARE EDUCATION/TRAINING PROGRAM

## 2024-05-09 PROCEDURE — 74177 CT ABD & PELVIS W/CONTRAST: CPT

## 2024-05-09 PROCEDURE — 74177 CT ABD & PELVIS W/CONTRAST: CPT | Mod: 26 | Performed by: RADIOLOGY

## 2024-05-09 PROCEDURE — 80053 COMPREHEN METABOLIC PANEL: CPT | Performed by: STUDENT IN AN ORGANIZED HEALTH CARE EDUCATION/TRAINING PROGRAM

## 2024-05-09 PROCEDURE — 96374 THER/PROPH/DIAG INJ IV PUSH: CPT | Mod: 59 | Performed by: STUDENT IN AN ORGANIZED HEALTH CARE EDUCATION/TRAINING PROGRAM

## 2024-05-09 PROCEDURE — 87040 BLOOD CULTURE FOR BACTERIA: CPT | Performed by: STUDENT IN AN ORGANIZED HEALTH CARE EDUCATION/TRAINING PROGRAM

## 2024-05-09 PROCEDURE — 96361 HYDRATE IV INFUSION ADD-ON: CPT | Performed by: STUDENT IN AN ORGANIZED HEALTH CARE EDUCATION/TRAINING PROGRAM

## 2024-05-09 PROCEDURE — 83605 ASSAY OF LACTIC ACID: CPT | Performed by: STUDENT IN AN ORGANIZED HEALTH CARE EDUCATION/TRAINING PROGRAM

## 2024-05-09 PROCEDURE — 85610 PROTHROMBIN TIME: CPT | Performed by: STUDENT IN AN ORGANIZED HEALTH CARE EDUCATION/TRAINING PROGRAM

## 2024-05-09 PROCEDURE — 85025 COMPLETE CBC W/AUTO DIFF WBC: CPT | Performed by: STUDENT IN AN ORGANIZED HEALTH CARE EDUCATION/TRAINING PROGRAM

## 2024-05-09 PROCEDURE — 99284 EMERGENCY DEPT VISIT MOD MDM: CPT | Performed by: STUDENT IN AN ORGANIZED HEALTH CARE EDUCATION/TRAINING PROGRAM

## 2024-05-09 PROCEDURE — 36415 COLL VENOUS BLD VENIPUNCTURE: CPT | Performed by: STUDENT IN AN ORGANIZED HEALTH CARE EDUCATION/TRAINING PROGRAM

## 2024-05-09 RX ORDER — IOPAMIDOL 755 MG/ML
95 INJECTION, SOLUTION INTRAVASCULAR ONCE
Status: COMPLETED | OUTPATIENT
Start: 2024-05-09 | End: 2024-05-10

## 2024-05-09 ASSESSMENT — ACTIVITIES OF DAILY LIVING (ADL)
ADLS_ACUITY_SCORE: 35
ADLS_ACUITY_SCORE: 35

## 2024-05-09 NOTE — PROGRESS NOTES
[facility] Care Resource Center Contact  [facility]/Voicemail     Clinical Data: Post-Discharge Outreach     Outreach attempted x 2.  Left message on patient's voicemail, providing [facility]'s central phone number of [phone] ([phone]) for questions/concerns and/or to schedule an appt with an [facility] provider, if they do not have a PCP.      Plan:  [facility] will do no further outreaches at this time.     KATHERYN Giron  [phone]  [facility]     *Connected Care Resource Team does NOT follow patient ongoing. Referrals are identified based on internal discharge reports and the outreach is to ensure patient has an understanding of their discharge instructions.

## 2024-05-10 ENCOUNTER — TELEPHONE (OUTPATIENT)
Dept: SURGERY | Facility: CLINIC | Age: 44
End: 2024-05-10
Payer: COMMERCIAL

## 2024-05-10 LAB — LACTATE SERPL-SCNC: 1.4 MMOL/L (ref 0.7–2)

## 2024-05-10 PROCEDURE — 250N000011 HC RX IP 250 OP 636: Performed by: STUDENT IN AN ORGANIZED HEALTH CARE EDUCATION/TRAINING PROGRAM

## 2024-05-10 PROCEDURE — 250N000009 HC RX 250: Performed by: STUDENT IN AN ORGANIZED HEALTH CARE EDUCATION/TRAINING PROGRAM

## 2024-05-10 PROCEDURE — 36415 COLL VENOUS BLD VENIPUNCTURE: CPT | Performed by: STUDENT IN AN ORGANIZED HEALTH CARE EDUCATION/TRAINING PROGRAM

## 2024-05-10 PROCEDURE — 83605 ASSAY OF LACTIC ACID: CPT | Performed by: STUDENT IN AN ORGANIZED HEALTH CARE EDUCATION/TRAINING PROGRAM

## 2024-05-10 PROCEDURE — 258N000003 HC RX IP 258 OP 636: Performed by: STUDENT IN AN ORGANIZED HEALTH CARE EDUCATION/TRAINING PROGRAM

## 2024-05-10 RX ORDER — HEPARIN SODIUM (PORCINE) LOCK FLUSH IV SOLN 100 UNIT/ML 100 UNIT/ML
100 SOLUTION INTRAVENOUS ONCE
Status: COMPLETED | OUTPATIENT
Start: 2024-05-10 | End: 2024-05-10

## 2024-05-10 RX ADMIN — SODIUM CHLORIDE, PRESERVATIVE FREE 100 UNITS: 5 INJECTION INTRAVENOUS at 03:05

## 2024-05-10 RX ADMIN — SODIUM CHLORIDE, PRESERVATIVE FREE 74 ML: 5 INJECTION INTRAVENOUS at 00:10

## 2024-05-10 RX ADMIN — IOPAMIDOL 95 ML: 755 INJECTION, SOLUTION INTRAVENOUS at 00:10

## 2024-05-10 RX ADMIN — SODIUM CHLORIDE 1000 ML: 9 INJECTION, SOLUTION INTRAVENOUS at 01:49

## 2024-05-10 ASSESSMENT — ACTIVITIES OF DAILY LIVING (ADL)
ADLS_ACUITY_SCORE: 37

## 2024-05-10 NOTE — ED PROVIDER NOTES
[place] EMERGENCY DEPARTMENT ([facility])    5/09/24       ED PROVIDER NOTE       History     Chief Complaint   Patient presents with    Gtube Problem     The history is provided by the patient and medical records.     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     Patient reports drainage and pain around her g-tube. Patient states the g-tube was replaced 6 days ago with a smaller tube and it is leaking again. Patient has redness around the site that she is currently taking antibiotics for and states it has worsened since she was discharged.  Patient endorses fevers and chills, but is unsure whether it is due to this.      Per [facility] Records,  Patient was admitted on 5/1/24 and discharged on 5/7/24 for possible cellulitis at G-tube site.     Past Medical History  Past Medical History:   Diagnosis Date    Anemia     No Comments Provided    Anxiety state     No Comments Provided    Arthropathy     No Comments Provided    Asthma     No Comments Provided    Backache     No Comments Provided    Edema     No Comments Provided    Esophageal reflux     No Comments Provided    Female stress incontinence     No Comments Provided    Hypothyroidism     No Comments Provided    Irregular menstrual cycle     No Comments Provided    Irritable colon     No Comments Provided    Migraine     No Comments Provided    Morbid obesity (H)     No Comments Provided    Other chronic nonalcoholic liver disease     No Comments Provided    Other depressive disorder     No Comments Provided    Other malaise and fatigue     No Comments Provided    Pain in joint     hips,  knees, ankles, feet, neck    Restless legs syndrome     self-diagnosed    Shortness of breath     No Comments Provided    Synovial cyst of popliteal space     No Comments Provided    Vitamin D deficiency     Rx 3/14, re check 6/14     Past Surgical History:   Procedure Laterality Date    ATTEMPTED ARTHROSCOPY      x3, rt knee    COLONOSCOPY N/A 2/13/2024    Procedure: Colonoscopy;  Surgeon: Cuauhtemoc Denis MD;  Location: [facility]    ESOPHAGOSCOPY, GASTROSCOPY, DUODENOSCOPY (EGD), COMBINED N/A 1/30/2024    Procedure: ESOPHAGOGASTRODUODENOSCOPY, WITH BIOPSY;  Surgeon: Precious Albarran MD;  Location: [facility]    EXTRACTION(S) DENTAL      No Comments Provided    IR FINE NEEDLE ASPIRATION W ULTRASOUND  2/26/2024    LAPAROSCOPIC CHOLECYSTECTOMY      2010    LAPAROTOMY EXPLORATORY N/A 2/14/2024    Procedure: EXPLORATORY LAPAROTOMY;  Surgeon: Christoph Krishnamurthy MD;  Location: [facility] OR    OSTEOTOMY FEMUR DISTAL      right    RESECT SMALL BOWEL WITHOUT OSTOMY N/A 2/14/2024    Procedure: SMALL BOWEL RESECTION, PARTIAL COLON RESECTION, PARTIAL LIVER RESECTION, PARTIAL GASTRIC RESECTION, GASTROSTOMY TUBE PLACEMENT;  Surgeon: Christoph Krishnamurthy MD;  Location: [facility] OR     albuterol (PROAIR HFA/PROVENTIL HFA/VENTOLIN HFA) 108 (90 Base) MCG/ACT inhaler  ammonium lactate (AMLACTIN) 12 % external cream  cefuroxime (CEFTIN) 500 MG tablet  Cranberry 400 MG CAPS  cyanocobalamin (VITAMIN B-12) 500 MCG SUBL sublingual tablet  folic acid (FOLVITE) 1 MG tablet  folic acid-vit B6-vit B12 (FOLGARD) 0.8-10-0.115 MG TABS per tablet  HYDROmorphone (DILAUDID) 2 MG tablet  ipratropium - albuterol 0.5 mg/2.5 mg/3 mL (DUONEB) 0.5-2.5 (3) MG/3ML neb solution  Lidocaine (LIDOCARE) 4 % Patch  methocarbamol (ROBAXIN) 500 MG tablet  metoclopramide (REGLAN) 10 MG tablet  miconazole (MICATIN) 2 % external cream  multivitamins w/minerals liquid  nortriptyline (PAMELOR) 10 MG capsule  OLANZapine zydis (ZYPREXA) 5 MG ODT  pantoprazole (PROTONIX) 40 MG EC  "tablet  polyethylene glycol (MIRALAX) 17 GM/Dose powder  senna-docusate (SENOKOT-S/PERICOLACE) 8.6-50 MG tablet  simethicone (MYLICON) 80 MG chewable tablet  thiamine (B-1) 100 MG tablet      Allergies   Allergen Reactions    Acetaminophen Hives, Itching, Rash and Shortness Of Breath     Several Rx's for Norco in 2021 & 22    No Clinical Screening - See Comments Anaphylaxis     steroids    Gabapentin Unknown and Itching    Hydrocodone-Acetaminophen Itching     Several Rx's for Norco in 2021 & 22    Iodinated Contrast Media Unknown and Nausea and Vomiting     Extremely anxious, vomited once after CT. No wheezing or SOB.    Latex Itching    Morphine And Codeine Itching     Several Rx's for Norco in 2021 & 22    Nsaids Other (See Comments)     H/o german-n-y gastric bypass\". AVOID NSAIDs and aspirin due to risk of gastric and/or G-J anastomotic ulcers. If Eduarda must be on short course of NSAIDs or aspirin, use enteric coated if possible and use PPI // Ira Hung RN, Bariatric Nurse Clinician, [facility] 3/23/2022    Oxycodone-Acetaminophen Itching     Several Rx's for Norco in 2021 & 22     Family History  Family History   Problem Relation Age of Onset    Diabetes Mother         Diabetes    Diabetes Father         Diabetes    Other - See Comments Father         colon clot then DVT inpt    Other - See Comments Paternal Grandfather         PE     Social History   Social History     Tobacco Use    Smoking status: Every Day     Current packs/day: 0.30     Average packs/day: 0.3 packs/day for 11.1 years (3.3 ttl pk-yrs)     Types: Cigarettes     Start date: 3/18/2013     Passive exposure: Current    Smokeless tobacco: Never    Tobacco comments:     3 cigarettes per day    Substance Use Topics    Alcohol use: Yes     Comment: Alcoholic Drinks/day: Quit 3/2013    Drug use: Never     Types: Other     Comment: Drug use: No         A medically appropriate review of systems was performed with pertinent " positives and negatives noted in the HPI, and all other systems negative.    Physical Exam   BP: 103/73  Pulse: 90  Temp: 98  F (36.7  C)  Resp: 18  SpO2: 94 %  Physical exam  Constitutional:       General: She is not in acute distress.     Appearance: normal appearance. She is not toxic-appearing.   Hent:      Head: atraumatic.   Eyes:      General: no scleral icterus.     Conjunctiva/sclera: conjunctivae normal.   Cardiovascular:      Rate and rhythm: normal rate.      Heart sounds: normal heart sounds.   Pulmonary:      Effort: pulmonary effort is normal. No respiratory distress.      Breath sounds: normal breath sounds.   Abdominal:      Palpations: abdomen is soft.      Tenderness: Minimal tenderness  G-tube in place with surrounding erythema consistent with irritation, does not appear to be grossly infected, no fluctuance appreciated  Musculoskeletal:         General: no deformity.      Cervical back: neck supple.   Skin:     General: skin is warm.   Neurological:      Mental status: She is alert.         ED Course, Procedures, & Data      Procedures               No results found for any visits on 05/09/24.  Medications - No data to display  Labs Ordered and Resulted from Time of ED Arrival to Time of ED Departure - No data to display  No orders to display          Critical care was not performed.     Medical Decision Making  The patient's presentation was of low complexity (a stable chronic illness).    The patient's evaluation involved:  review of external note(s) from 3+ sources (see separate area of note for details)  review of 3+ test result(s) ordered prior to this encounter (see separate area of note for details)  ordering and/or review of 3+ test(s) in this encounter (see separate area of note for details)  discussion of management or test interpretation with another health professional (general surgery)    The patient's management necessitated further care after sign-out to Dr. Talamantes pending final  surgery recs and repeat lactate (see their note for further management).    Assessment & Plan    Workup here in the emergency room significant for reassuring labs other than elevated lactate of 2.4, reassuring CT scan.  Consulted strict surgical oncology/general surgery as they know the patient well, and they said they will come down to see the patient  Spoke with general surgery who agreed the patient looks well, and likely elevated likely secondary to dehydration, but will need to staff with their attending, likely discharge home.  Patient signed out to incoming provider Dr. Talamantes pending final surgery recommendations, but likely will discharge home    I have reviewed the nursing notes. I have reviewed the findings, diagnosis, plan and need for follow up with the patient.    New Prescriptions    No medications on file       Final diagnoses:   None   I, Farideh Grider, am serving as a trained medical scribe to document services personally performed by Hugo Bradshaw MD, based on the provider's statements to me.     IHugo MD, was physically present and have reviewed and verified the accuracy of this note documented by Farideh Grider.     Hugo Bradshaw MD  [facility] EMERGENCY DEPARTMENT  5/9/2024     Hugo Bradshaw MD  05/10/24 0154

## 2024-05-10 NOTE — CONFIDENTIAL NOTE
FOLLOW-UP  May 10, 2024    Eduarda Nogueira is a 43 year old year old female with history of asthma, left parietal ischemic embolic stroke  in 2022, RYGB, and GI bleed. She underwent exploratory laparotomy, excisional liver biopsy, small bowel resection, partial colon resection, partial liver resection, partial gastric resection, and gtube placement with Dr. Krishnamurthy 2/14/24.     She was recently admitted with dehydration and leakage around her Gtube 5/7.     She was seen in the ER yesterday for pain around her Gtube. No concerning findings on CT. Lactic acid return to normal after fluid resuscitation.     She was called today to follow up on how she is doing. She was able to get some sleep overnight. She continues to have random stabbing pain at the Gtube site. She skin around the drain continue to be red and irritation. She is apply an antifungal cream but not a barrier cream.     She reports she is able to eat and drink. She can drink 3 protein supplements in the day but doesn't currently have any and plans to go to the store. She reports yesterday she was able to eat a street taco, 4 spoonfuls of macaroni and cheese, crackers, 2 cups of water, 2 bottles of water, 3 glasses of orange juice. She is on continuous tube feedings.     - Recommended she use barrier cream around the drain.   - She will continue to work on her PO intake.   - She will continue continuous tube feedings.   - She will follow up with Dr. Krishnamurthy as scheduled.     Discussed with Dr. Krishnamurthy.     Gerda Lopez PA-C

## 2024-05-10 NOTE — ED TRIAGE NOTES
Pt presents to ED with concerns for drainage around G tube. Pt states g-tube was replaced 6 days ago. Pt endorses pain and excoriation around g-tube.      Triage Assessment (Adult)       Row Name 05/09/24 0288          Triage Assessment    Airway WDL WDL        Respiratory WDL    Respiratory WDL WDL        Skin Circulation/Temperature WDL    Skin Circulation/Temperature WDL WDL        Cardiac WDL    Cardiac WDL WDL     Cardiac Rhythm NSR        Peripheral/Neurovascular WDL    Peripheral Neurovascular WDL WDL        Cognitive/Neuro/Behavioral WDL    Cognitive/Neuro/Behavioral WDL WDL

## 2024-05-10 NOTE — DISCHARGE INSTRUCTIONS
You were seen in the emergency room and evaluated for a problem with your G-tube  Here in the emergency room you had reassuring labs and a reassuring CT scan, and the surgeon saw you  We are discharging home with new dressings to place over your G-tube, and I encourage you to follow-up with your surgeons as scheduled  If you develop any new or worsening symptoms including but not limited to abdominal pain, nausea, vomiting, fevers, chills please return immediately to the emergency room

## 2024-05-10 NOTE — CONSULTS
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 She presented to [facility] on 5/9/2024 with continued left-sided abdominal pain, leakage from around her G-tube and LA up to 2.4. Physical exam demonstrates skin excoriation 2/2 gastric juices and CT imaging is reassuring with no evidence of intra-abdominal pathology. Her LA improved to 1.4 after fluid bolus. She may require up-sizing of her tube considering the increased leakage of gastric output and the secondary effects on her skin. Patient preferred discharging home. I will contact Dr Krishnamurthy's clinic coordinator for Ms Nogueira to receive a follow-up call as to the plan moving forward regarding keeping the current G-tube vs upsizing it.           Clinically Significant Risk Factors Present on Admission                       # Overweight: Estimated body mass index is 26.49 kg/m  as calculated from the following:    Height as of 5/1/24: 1.626 m (5' 4\").    Weight as of 5/7/24: 70 kg (154 lb 5.2 oz).         # Financial/Environmental Concerns:         The patient's care was discussed with the Attending Physician, Dr. Herrera.    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 Surgical pathology from surgery revealed no adenocarcinoma. Patient was recently admitted to hospital after presenting with a 2-week h/o abdominal pain associated with leakage around G-tube, nausea, poor PO intake, and elevated LA to 4.1 with concerns of G-tube site infection. She underwent G-tube replacement from 18Fr to 16Fr G-tube. She was tolerating TF and some minimal PO intake in hospital. She was discharged home on 5/07.     She presents to [facility] ED this evening with continued, sharp, left-sided abdominal pain both at the skin level and intra-abdominal. She endorses nausea, occasional retching which she partially attributes to her cyclic vomiting syndrome, chills, and intermittent fevers at home. She is unsure whether her fevers and chills are due to menopause. In regard to enteral intake, she tolerates her TFs without issue. She has a protein shake daily which she also tolerates very well. She'll occasionally have solid food (e.g. mashed potatoes today) and sometimes will tolerate this, however she notes a pulling sensation in her abdomen when eating orally. She also has noted increased leakage from around her tube since the downsizing of her tube on 5/05. She does wound care with skin cleansing, application of anti-fungal lotion and gauze twice daily, and will replace the overlying gauze 4-5 additional times throughout the day. She notes having ruined many shirts due to the continued leakage. The stopper is very irritating whenever touching her skin. She is scheduled for follow-up with Dr Krishnamurthy on 5/23.     ED workup demonstrates normal vitals, afebrile, normal CMP and  CBC, but LA elevated to 2.4, and CT imaging without evidence of intra-abdominal pathology, showing appropriate positioning of her G-tube, and improvement in skin thickening surrounding her G-tube.         Past Medical History    Past Medical History:   Diagnosis Date    Anemia     No Comments Provided    Anxiety state     No Comments Provided    Arthropathy     No Comments Provided    Asthma     No Comments Provided    Backache     No Comments Provided    Edema     No Comments Provided    Esophageal reflux     No Comments Provided    Female stress incontinence     No Comments Provided    Hypothyroidism     No Comments Provided    Irregular menstrual cycle     No Comments Provided    Irritable colon     No Comments Provided    Migraine     No Comments Provided    Morbid obesity (H)     No Comments Provided    Other chronic nonalcoholic liver disease     No Comments Provided    Other depressive disorder     No Comments Provided    Other malaise and fatigue     No Comments Provided    Pain in joint     hips, knees, ankles, feet, neck    Restless legs syndrome     self-diagnosed    Shortness of breath     No Comments Provided    Synovial cyst of popliteal space     No Comments Provided    Vitamin D deficiency     Rx 3/14, re check 6/14       Past Surgical History   Past Surgical History:   Procedure Laterality Date    ATTEMPTED ARTHROSCOPY      x3, rt knee    COLONOSCOPY N/A 2/13/2024    Procedure: Colonoscopy;  Surgeon: Cuauhtemoc Denis MD;  Location: [facility]    ESOPHAGOSCOPY, GASTROSCOPY, DUODENOSCOPY (EGD), COMBINED N/A 1/30/2024    Procedure: ESOPHAGOGASTRODUODENOSCOPY, WITH BIOPSY;  Surgeon: Precious Albarran MD;  Location: [facility]    EXTRACTION(S) DENTAL      No Comments Provided    IR FINE NEEDLE ASPIRATION W ULTRASOUND  2/26/2024    LAPAROSCOPIC CHOLECYSTECTOMY      2010    LAPAROTOMY EXPLORATORY N/A 2/14/2024    Procedure: EXPLORATORY LAPAROTOMY;  Surgeon: Christoph Krishnamurthy MD;  Location: [facility] OR    OSTEOTOMY  "FEMUR DISTAL      right    RESECT SMALL BOWEL WITHOUT OSTOMY N/A 2/14/2024    Procedure: SMALL BOWEL RESECTION, PARTIAL COLON RESECTION, PARTIAL LIVER RESECTION, PARTIAL GASTRIC RESECTION, GASTROSTOMY TUBE PLACEMENT;  Surgeon: Christoph Krishnamurthy MD;  Location: [facility] OR       Prior to Admission Medications   I have reviewed this patient's current medications  (Not in a hospital admission)         Review of Systems    The 10 point Review of Systems is negative other than noted in the HPI or here.     Social History   I have reviewed this patient's social history and updated it with pertinent information if needed.  Social History     Tobacco Use    Smoking status: Every Day     Current packs/day: 0.30     Average packs/day: 0.3 packs/day for 11.1 years (3.3 ttl pk-yrs)     Types: Cigarettes     Start date: 3/18/2013     Passive exposure: Current    Smokeless tobacco: Never    Tobacco comments:     3 cigarettes per day    Substance Use Topics    Alcohol use: Yes     Comment: Alcoholic Drinks/day: Quit 3/2013    Drug use: Never     Types: Other     Comment: Drug use: No         Allergies   Allergies   Allergen Reactions    Acetaminophen Hives, Itching, Rash and Shortness Of Breath     Several Rx's for Norco in 2021 & 22    No Clinical Screening - See Comments Anaphylaxis     steroids    Gabapentin Unknown and Itching    Hydrocodone-Acetaminophen Itching     Several Rx's for Norco in 2021 & 22    Iodinated Contrast Media Unknown and Nausea and Vomiting     Extremely anxious, vomited once after CT. No wheezing or SOB.    Latex Itching    Morphine And Codeine Itching     Several Rx's for Norco in 2021 & 22    Nsaids Other (See Comments)     H/o german-n-y gastric bypass\". AVOID NSAIDs and aspirin due to risk of gastric and/or G-J anastomotic ulcers. If Eduarda must be on short course of NSAIDs or aspirin, use enteric coated if possible and use PPI // Ira Hung RN, Bariatric Nurse Clinician, [facility] " 3/23/2022    Oxycodone-Acetaminophen Itching     Several Rx's for Norco in 2021 & 22        Physical Exam   Vital Signs: Temp: 98  F (36.7  C) Temp src: Temporal BP: 103/73 Pulse: 90   Resp: 18 SpO2: 94 %      Weight: 0 lbs 0 ozNo intake or output data in the 24 hours ending 05/09/24 2208    General Appearance:  Appears older than stated age, NAD, sitting up in chair, awake, alert  Respiratory: NLB on RA  Cardiovascular: RRR on radial palpation  GI: soft, non-distended, tender in left abdomen around G-tub. Skin around G-tube excoriated, irritated, and red. Yellow gastric juice leaking from around tube. No evidence of purulence. No fluctuance on palpation. Upper midline incision healing well   Skin: warm, well perfused          Data     I have personally reviewed the following data over the past 24 hrs:    5.9  \   10.6 (L)   / 292     142 103 10.1 /  111 (H)   3.6 26 0.72 \     ALT: 9 AST: 18 AP: 71 TBILI: <0.2   ALB: 4.1 TOT PROTEIN: 6.8 LIPASE: N/A     Procal: N/A CRP: N/A Lactic Acid: 1.4       INR:  1.04 PTT:  N/A   D-dimer:  N/A Fibrinogen:  N/A       Imaging results reviewed over the past 24 hrs:   Recent Results (from the past 24 hour(s))   CT Abdomen Pelvis w Contrast    Narrative    EXAM: CT ABDOMEN PELVIS W CONTRAST  LOCATION: [facility]  DATE: 5/10/2024    INDICATION: Abdominal pain and drainage from G-tube.  COMPARISON: CT abdomen and pelvis.  TECHNIQUE: CT scan of the abdomen and pelvis was performed after the injection of 95 mL [id number] intravenously. Multiplanar reformats were obtained. Dose reduction techniques were used.    FINDINGS:   LOWER CHEST: Lung bases are clear.    ABDOMEN/PELVIS:    HEPATOBILIARY: Postsurgical changes of partial left hepatectomy. There is approximately 2.3 cm hypodense focus in the left hepatic lobe which contains small peripheral calcification and small focus of gas, not significantly changed in size as compared to   5/1/2024  exam. Multiple hypodense cystic foci in the liver, also appear stable as compared to 5/1/2024 exam. Probably small flash-filled hemangioma along the lateral aspect of the left hepatic lobe (series 4 image 88). Right upper quadrant   postcholecystectomy clips.    PANCREAS: No main pancreatic ductal dilatation or definite solid pancreatic mass.    SPLEEN: No splenomegaly.    ADRENAL GLANDS: No adrenal nodules.    KIDNEYS/BLADDER: No radiodense kidney/ureteral stones or hydronephrosis in either kidney. Numerous bilateral renal cysts.    BOWEL: Postsurgical changes of gastric bypass. Patient has gastrostomy tubes in the stomach. The appendix is visualized and appears normal.    PERITONEUM: No evidence of free fluid in the abdomen and pelvis. No free peritoneal or portal venous gas.    PELVIC ORGANS: Multiple pelvic phleboliths.    VASCULATURE: Unremarkable.    LYMPH NODES: No significant abdominopelvic lymphadenopathy.    MUSCULOSKELETAL: Multilevel degenerative changes of the spine. No suspicious osseous lesion. Slight improvement in the cutaneous thickening along the gastrostomy tube as compared to 5/1/2024 exam.      Impression    IMPRESSION:   1. No evidence of acute pathology in the abdomen and pelvis.  2. Stable postsurgical changes of partial hepatectomy, gastric bypass, and cholecystectomy.  3. No significant change in the size of the approximately 2.3 cm hypodense focus in the left hepatic lobe containing small peripheral calcification and small focus of gas, as compared to 5/1/2024 exam; remains indeterminate.  4. Slight interval improvement in the cutaneous thickening along the gastrostomy tube as compared to 5/1/2024 exam.  5. Numerous liver and renal cysts.

## 2024-05-12 ENCOUNTER — NURSE TRIAGE (OUTPATIENT)
Dept: NURSING | Facility: CLINIC | Age: 44
End: 2024-05-12

## 2024-05-13 ENCOUNTER — TELEPHONE (OUTPATIENT)
Dept: SURGERY | Facility: CLINIC | Age: 44
End: 2024-05-13
Payer: COMMERCIAL

## 2024-05-13 NOTE — CONFIDENTIAL NOTE
FOLLOW-UP  May 13, 2024    Eduarda Nogueira is a 43 year old year old female with history of asthma, left parietal ischemic embolic stroke  in 2022, RYGB, and GI bleed. She underwent exploratory laparotomy, excisional liver biopsy, small bowel resection, partial colon resection, partial liver resection, partial gastric resection, and gtube placement with Dr. Krishnamurthy 2/14/24.     She was recently admitted with dehydration and leakage around her Gtube 5/7. She was seen in the ER 5/9/24 for pain around her Gtube. No concerning findings on CT. Lactic acid return to normal after fluid resuscitation.     Over the weekend she was able to eat some but not a lot.   Saturday she ate biscuits and gravy 1/2 c  Yesterday she ate 1/22 c chicken philippe, 5 cheese puffs, 4-5 bottles of water, 2 c orange juice. She had a bowel movement yesterday.     The skin around the Gtube is red, stable. She is using the barrier cream and antifungal cream.     She will continue to work on her diet and nutrition. She is scheduled to see Dr. Krishnamurthy. She will keep us updated on her progress.     Gerda Lopez PA-C

## 2024-05-13 NOTE — TELEPHONE ENCOUNTER
Nurse Triage SBAR    Situation: G-tube output concerns.     Background: Patient calling. Pt has a G tube.     Assessment: Pt states there is a foul smelling green output coming from the G-tube. Abdominal pain - states its always painful but today it is worse. Pain level is 9/10.    Recommendation: According to the protocol, Patient should go to the ED now. Advised Patient that the patient needs to go to the ED now. Care advice given. Patient verbalizes understanding and agrees with plan of care. Reviewed concerning symptoms and when to call back.     Pt stated she wants to update her providers nurse - Meghana GRACE Will route to RN as a fyi.     Natalie Cardoza, RN Nursing Advisor 5/12/2024 9:38 PM     Reason for Disposition   SEVERE abdominal pain    Additional Information   Negative: Shock suspected (e.g., cold/pale/clammy skin, too weak to stand, low BP, rapid pulse)   Negative: [1] Shortness of breath AND [2] new-onset   Negative: Bluish (or gray) lips or face now   Negative: Sounds like a life-threatening emergency to the triager    Protocols used: Feeding Tube Symptoms and Helvxxqre-I-WA

## 2024-05-14 LAB
BACTERIA BLD CULT: NO GROWTH
BACTERIA BLD CULT: NO GROWTH

## 2024-05-24 ENCOUNTER — ONCOLOGY VISIT (OUTPATIENT)
Dept: ONCOLOGY | Facility: CLINIC | Age: 44
End: 2024-05-24
Attending: SURGERY
Payer: COMMERCIAL

## 2024-05-24 VITALS
BODY MASS INDEX: 24.12 KG/M2 | WEIGHT: 140.5 LBS | TEMPERATURE: 98.6 F | DIASTOLIC BLOOD PRESSURE: 65 MMHG | SYSTOLIC BLOOD PRESSURE: 95 MMHG | OXYGEN SATURATION: 100 % | RESPIRATION RATE: 16 BRPM | HEART RATE: 97 BPM

## 2024-05-24 DIAGNOSIS — Z90.3 H/O RESECTION OF STOMACH: Primary | ICD-10-CM

## 2024-05-24 RX ORDER — FLUCONAZOLE 150 MG/1
150 TABLET ORAL
COMMUNITY
Start: 2024-05-15

## 2024-05-24 RX ORDER — POLYETHYLENE GLYCOL AND PROPYLENE GLYCOL 4; 3 MG/ML; MG/ML
1-2 SOLUTION/ DROPS OPHTHALMIC
COMMUNITY
Start: 2024-05-15

## 2024-05-24 RX ORDER — NYSTATIN 100000 U/G
CREAM TOPICAL
COMMUNITY
Start: 2024-05-15

## 2024-05-24 ASSESSMENT — PAIN SCALES - GENERAL: PAINLEVEL: SEVERE PAIN (7)

## 2024-05-24 NOTE — NURSING NOTE
"Oncology Rooming Note    May 24, 2024 11:29 AM   Eduarda Nogueira is a 43 year old female who presents for:    Chief Complaint   Patient presents with    Oncology Clinic Visit     Initial Vitals: BP 95/65 (BP Location: Right arm, Patient Position: Sitting, Cuff Size: Adult Regular)   Pulse 97   Temp 98.6  F (37  C) (Oral)   Resp 16   Wt 63.7 kg (140 lb 8 oz)   SpO2 100%   BMI 24.12 kg/m   Estimated body mass index is 24.12 kg/m  as calculated from the following:    Height as of 5/1/24: 1.626 m (5' 4\").    Weight as of this encounter: 63.7 kg (140 lb 8 oz). Body surface area is 1.7 meters squared.  Severe Pain (7) Comment: Data Unavailable   No LMP recorded. (Menstrual status: Irregular Periods).  Allergies reviewed: Yes  Medications reviewed: Yes    Medications: Medication refills not needed today.  Pharmacy name entered into [employer]: [facility] - [place], [place] - [address] [place] [address]    Frailty Screening:   Is the patient here for a new oncology consult visit in cancer care? 2. No      Clinical concerns: Provider came in before rooming process could be completed.       Christa Stevenson, EMT   "

## 2024-05-24 NOTE — LETTER
5/24/2024         RE: Eduarda Nogueira  [address] [place] [address]        Dear Colleague,    Thank you for referring your patient, Eduarda Nogueira, to the [facility]. Please see a copy of my visit note below.    Eduarda is here for postoperative visit.  Since I have seen her last she stopped her tube feeding about 3 weeks ago.  She states that she is taking 3 protein drinks a day with some food.  She still complaining of a lot of pain in her G-tube site.  She has been well-hydrated without the tube feeds.  We decided to remove the G-tube today because of her symptoms.  This was done and she tolerated it well.  I have given her very strict instructions about eating.  I will also talk to her father.  She will contact me if she has any further problems.      Sincerely,        Christoph Krishnamurthy MD

## 2024-05-24 NOTE — PROGRESS NOTES
Eduarda is here for postoperative visit.  Since I have seen her last she stopped her tube feeding about 3 weeks ago.  She states that she is taking 3 protein drinks a day with some food.  She still complaining of a lot of pain in her G-tube site.  She has been well-hydrated without the tube feeds.  We decided to remove the G-tube today because of her symptoms.  This was done and she tolerated it well.  I have given her very strict instructions about eating.  I will also talk to her father.  She will contact me if she has any further problems.

## 2024-05-25 ENCOUNTER — NURSE TRIAGE (OUTPATIENT)
Dept: NURSING | Facility: CLINIC | Age: 44
End: 2024-05-25
Payer: COMMERCIAL

## 2024-05-25 NOTE — TELEPHONE ENCOUNTER
"  Nurse Triage SBAR  Is this a 2nd Level Triage? NO    Situation:  Pain.    Background:  Per pt/chart, her G-tube was removed 05/24/2024 and she has pain to G-tube site.    Assessment:  Per pt, she normally has chronic pain but her pain is \"through the roof now\" at 10/10.    Protocol Recommended Disposition:   Go to ED Now Patient declined protocol recommendation disposition and wants Dr. Krishnamurthy and his nurse called now. Pt is informed that its after hours and MD is not available and per after hours FNA/on call policy on call providers will not prescribe pain medication over the phone as pt requested. At 2:14 PM, Oncology Resident IN/OUTPTS Surgery is paged. Hina at answering service is spoken with after no call back from MD  at 2:34 PM  Per Hina, she spoke with MARCOS Prado, who informed her that  covering resident will call FNA nurse back. At 3:05 PM MD:Mirta Freeman  called back and verbalized she is not able to prescribe any narcotics outside of hospital and pt can be prescribed OTC Tylenol or Ibuprofen is she wants. At 3:26 PM: Pt is called back, pt verbalized understanding of MD not able to prescribe pain medication over the phone. Per pt, she is allergic to Tylenol and ibuprofen. Pt verbalized understanding of Go to ED Now protocol recommendation once reinforced again.    Recommendation:      Paged to provider    Does the patient meet one of the following criteria for ADS visit consideration? 16+ years old, no PCP (internal or external) but seen at [facility]     TIP  Providers, please consider if this condition is appropriate for management at one of our Acute and Diagnostic Services sites.     If patient is a good candidate, please use dotphrase <dot>triageresponse and select Refer to ADS to document.    Reason for Disposition   [1] SEVERE pain (e.g., excruciating) AND [2] present > 1 hour    Additional Information   Negative: SEVERE difficulty breathing (e.g., struggling for each " breath, speaks in single words)   Negative: Shock suspected (e.g., cold/pale/clammy skin, too weak to stand, low BP, rapid pulse)   Negative: Difficult to awaken or acting confused (e.g., disoriented, slurred speech)   Negative: Passed out (i.e., lost consciousness, collapsed and was not responding)   Negative: Visible sweat on face or sweat dripping down face   Negative: Sounds like a life-threatening emergency to the triager    Protocols used: Abdominal Pain - Upper-A-AH

## 2024-05-29 ENCOUNTER — PRE VISIT (OUTPATIENT)
Dept: NEUROLOGY | Facility: CLINIC | Age: 44
End: 2024-05-29

## 2024-06-04 ENCOUNTER — PATIENT OUTREACH (OUTPATIENT)
Dept: ONCOLOGY | Facility: CLINIC | Age: 44
End: 2024-06-04
Payer: COMMERCIAL

## 2024-06-04 NOTE — PROGRESS NOTES
[facility]: Surgical Oncology Cancer Care Short Note                                     Discussion with Patient:                                                       INBOUND CALL:     Received call from Eduarda Jimenez's  at [facility] She stated she spoke with Eduarda and Eduarda's PCP is declining to refer her to a Nutritionist or help facilitate supplements. Discussed that we would not be the best team to do this since Eduarda has been discharged from Surgical Oncology unless new symptoms arise.     Discussed with Joanne that it would be best for Eduarda stay within the [facility] system.I placed a call to the Weight Management Team at [facility] who previously saw Eduarda at the time of her sleeve placement in 2022. Spoke with Chelsea, in Intake, and reviewed Eduarda's more recent health history including a partial gastrectomy with G-tube placement. Informed her the G-tube has been removed. Chelsea confirmed it would be appropriate for Eduarda to be seen by their team and that Eduarda would need to update her health history form. She will place it in the mail for Eduarda. Once Eduarda fills this out and returns it to them, they will call her to schedule a visit.     I spoke with Eduarda to review the plan. She verbalized understanding and will complete paperwork when she receives it and return it to [facility].       Meghana Mazariegos, RNCC  [facility]   Surgical Oncology

## 2024-06-24 ENCOUNTER — PATIENT OUTREACH (OUTPATIENT)
Dept: ONCOLOGY | Facility: CLINIC | Age: 44
End: 2024-06-24
Payer: COMMERCIAL

## 2024-06-24 NOTE — PROGRESS NOTES
"[facility]: Surgical Oncology Cancer Care Short Note                                     Discussion with Patient:                                                       INBOUND CALL:     Received call from Eduarda. She states she \"has a couple concerns\" she would like to have reviewed by Dr. Krishnamurthy. She stated these concerns \"are similar to the issues that she experienced prior to her surgery in February.\"     - Eduarda stated she has a lump in the back of throat. She first noticed the lump one week ago. It went away for a day and then came back the next day larger than the previous day.     - Eduarda is experiencing abdominal pain and cramps in her stomach and back. She feels distended and has minimal appetite. She is passing gas. Her last BM was 2-3 days ago.     - Eduarda is vomiting food 1-2 times daily following eating. She denies vomiting blood.     - Eduarda is experiencing body aches and pains, as well as headaches.     Message sent to Dr. Krishnamurthy for his review and recommendations.      Meghana Mazariegos RNCC  [facility]   Surgical Oncology     Approximately 10 minutes was spent in conversation with the patient/caregiver.    "

## 2024-06-25 NOTE — PROGRESS NOTES
[facility]: Surgical Oncology Cancer Care Short Note                                     Discussion with Patient:                                                          OUTBOUND CALL:     Spoke with Eduarda.Relayed Dr. Krishnamurthy's recommendation that she see her PCP for these symptoms to be assessed and worked up per his discretion.    Eduarda verbalized understanding to the plan.     Encouraged Eduarda to call with any further questions or concerns. Direct contact number provided.     Meghana Mazariegos, RNCC  [facility]   Surgical Oncology

## 2024-08-12 ENCOUNTER — PATIENT OUTREACH (OUTPATIENT)
Dept: ONCOLOGY | Facility: CLINIC | Age: 44
End: 2024-08-12
Payer: COMMERCIAL

## 2024-08-12 NOTE — PROGRESS NOTES
"[facility]: Surgical Oncology Cancer Care Short Note                                     Discussion with Patient:                                                       INBOUND CALL:     Received call from Eduarda. She stated her PCP is referring her back to Dr. Krishnamurthy due to severe malnutrition. Eduarda stated she recently spent six days at [facility] for \"fluid overload and severe malnutrition.\" She stated she continues to have pain at her G-tube insertion site and seems to have swelling in that location. She is nauseated with PO intake. She is requesting a follow-up visit with Dr. Krishnamurthy to discuss his recommendations.     Plan made for next available phone visit on 8/22 at 7:40 am to discuss options. Eduarda verbalized understanding of the plan.     Encouraged Eduarda to call with any further questions or concerns. Direct contact number provided.     Meghana Mazariegos RNCC  [facility]   Surgical Oncology     Approximately 10 minutes was spent in conversation with the patient/caregiver.    "

## 2024-08-29 ENCOUNTER — VIRTUAL VISIT (OUTPATIENT)
Dept: ONCOLOGY | Facility: CLINIC | Age: 44
End: 2024-08-29
Attending: ORTHOPAEDIC SURGERY
Payer: COMMERCIAL

## 2024-08-29 VITALS — HEIGHT: 63 IN | BODY MASS INDEX: 23.92 KG/M2 | WEIGHT: 135 LBS

## 2024-08-29 DIAGNOSIS — Z90.3 H/O RESECTION OF STOMACH: Primary | ICD-10-CM

## 2024-08-29 PROCEDURE — 99441 PR PHYSICIAN TELEPHONE EVALUATION 5-10 MIN: CPT | Mod: 93 | Performed by: SURGERY

## 2024-08-29 ASSESSMENT — PAIN SCALES - GENERAL: PAINLEVEL: EXTREME PAIN (9)

## 2024-08-29 NOTE — PROGRESS NOTES
Today I had a phone visit with Eduarda.  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  She has had abdominal pain before and after that procedure.  She has had multiple hospital visits.  I have evaluated her extensively and can find no cause of her pain.  Her pathology did not demonstrate any evidence of malignancy.  I previously discharged her from clinic but she came back today.  Her symptoms are abdominal pain.  I have asked her to see her surgeon that did the original gastric bypass to determine whether that is related.  I do not think it is.  I think Eduarda has been having chronic abdominal pain that is going to have to be symptomatically managed.  If there is any issues related to a possible cancer would be glad to see her in the future.  This phone visit lasted 7 minutes.

## 2024-08-29 NOTE — NURSING NOTE
Current patient location: [address] [place] [address]    Is the patient currently in the state of [place]? YES    Visit mode:TELEPHONE    If the visit is dropped, the patient can be reconnected by: TELEPHONE VISIT: Phone number:   Telephone Information:   Mobile [phone]       Will anyone else be joining the visit? NO  (If patient encounters technical issues they should call [phone]    How would you like to obtain your AVS? MyChart    Are changes needed to the allergy or medication list? No    Are refills needed on medications prescribed by this physician? NO    Rooming Documentation:  Not applicable      Reason for visit: DAIN Cruz VVF

## 2024-08-29 NOTE — LETTER
8/29/2024      Eduarda Nogueira  [address] [place] [address]      Dear Colleague,    Thank you for referring your patient, Eduarda Nogueira, to the [facility]. Please see a copy of my visit note below.    Virtual Visit Details    Today I had a phone visit with Eduarda.  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  She has had abdominal pain before and after that procedure.  She has had multiple hospital visits.  I have evaluated her extensively and can find no cause of her pain.  Her pathology did not demonstrate any evidence of malignancy.  I previously discharged her from clinic but she came back today.  Her symptoms are abdominal pain.  I have asked her to see her surgeon that did the original gastric bypass to determine whether that is related.  I do not think it is.  I think Eduarda has been having chronic abdominal pain that is going to have to be symptomatically managed.  If there is any issues related to a possible cancer would be glad to see her in the future.  This phone visit lasted 7 minutes.      Again, thank you for allowing me to participate in the care of your patient.        Sincerely,        Christoph Krishnamurthy MD

## 2024-09-19 NOTE — PROGRESS NOTES
__________________________________  ESTABLISHED PATIENT NEUROLOGY NOTE    DATE OF VISIT: 9/20/2024  MRN: [id number]  PATIENT NAME: Eduarda Nogueira  YOB: 1980    Chief Complaint   Patient presents with    Headache     Patient stated she is having Chronic migraines with nausea and vomiting daily.     SUBJECTIVE:                                                      HISTORY OF PRESENT ILLNESS:  Eduarda is here for follow up regarding headache    Eduarda Nogueira is a 44 year old female with a PMH of L parietal embolic stroke, asthma, alcohol abuse, marijuana dependence, chronic pain syndrome, RYGB and jejunal adenocarcinoma  frequent migraines (on scheduled IV meds three times per week (decadron, toradol, zofran +2L IVF)), and recent abdominal surgery for jejunal ulcer. Patient presented to the ED on 8/1/2024 with progressive shortness of breath and lower extremity edema.     Workup unrevealing (TTE and renal workup normal), volume overload likely due to her frequent IV migraine meds (IVF, steroids) in the setting of low albumin dt dietary issues. Volume status normalized with diuresis (removed 12.5L). Pain team and neurology consulted. Plan made to restart notrtiptyline (pt had stopped) and slowly titrate up to 30mg/night over the next 2wk. Also started Depakene and Candesartan. Increasing Cymbalta had also been discussed, but this was decided against dt the use of nortriptyline as well.     02/25/24 ED visit:  symptoms is consistent with her daily headaches making chronic migraine headaches most likely. Given extreme pain and reported shooting pains with palpation of occiput, occipital neuralgia may be co-occurring. Lack of papilledema on ocular exam is reassuring, however to new onset jejunal adenocarcinoma, increased symptoms with bearing down, and pain waking from sleep, MRI with and w/out contrast i showed hyperintensity in the left mesial temporal lobe which could be nonspecific, we do have concerns  for limbic encephalitis when we see this area light up on MRI. Regarding her headaches we will plan to increase nortriptyline but discussed with patient that this will likely take weeks to feel effect. In short term, would recommend IV migraine cocktail of compazine and benadryl with fluid bolus as needed for pain while inpatient. Okay with limited use of sumatriptan for abortive therapy given embolic nature of prior stroke in 2022. She would benefit from outpatient headache neurology follow up for consideration of an occipital nerve block vs botox. particularly given her limited options for medications     09/20/24: Eduarda presents to the clinic today to establish care for headaches.  She reports that she seen a neurologist while in the hospital and followed up with the [facility] recently.  She states that the onset of her headaches was when she was 12 years old.  She ran into a tree while sledding and suffered a concussion.  Since that timeframe she states that she has had multiple strokes which have worsened her headaches.  She describes her headaches as starting in her shoulder, radiating up to the base of her neck and into the left side of her head.  She describes this as throbbing 7 out of 10 pain.  She is experiencing headaches daily that last the full duration of the day.  Associated symptoms include white or colorful orbs in her visual field, photophobia, phonophobia, nausea, vomiting, diarrhea, feeling hot or cold and blurred vision.  She denies any double vision.  Patient states that she has numbness related to neuropathy and past strokes.  No acute numbness or weakness during her headache.  Triggers include, loud noises, bright lights dehydration, poor nutrition and adequate sleep patient states she typically gets 4 hours of sleep at night before waking up due to pain of her shoulder, neck or head.  Denies history of sleep apnea.    She was recently put on Depakene, candesartan  and nortriptyline.  She states that this is brought her 10 out of 10 headaches down to 7 out of 10.  She is also on gabapentin.    She has tried Topamax in the past.  She has failed multiple triptans.  Ubrelvy was not beneficial.    Eduarda spoke to a neurologist at [facility] neurolog[facility] who recommended Botox or a CGRP inhibitor.  She is interested in starting a CGRP inhibitor.  We discussed following up with a neurologist in the clinic due to the complexity of her history.  We will start Aimovig and have patient follow-up in the next 3 months and discuss if this has been beneficial with a neurologist.  Or if they would like to trial Botox.      Current Medications:   Current Outpatient Medications   Medication Sig Dispense Refill    albuterol (PROAIR HFA/PROVENTIL HFA/VENTOLIN HFA) 108 (90 Base) MCG/ACT inhaler Inhale 1-2 puffs into the lungs every 4 hours as needed for shortness of breath, wheezing or cough 2 - 4 times daily      ammonium lactate (AMLACTIN) 12 % external cream Apply topically daily as needed for dry skin      cefuroxime (CEFTIN) 500 MG tablet Take 1 tablet (500 mg) by mouth 2 times daily 7 tablet 0    Cranberry 400 MG CAPS Take 400 mg by mouth daily as needed      cyanocobalamin (VITAMIN B-12) 500 MCG SUBL sublingual tablet Place 1 tablet (500 mcg) under the tongue daily 30 tablet 0    erenumab-aooe (AIMOVIG) 70 MG/ML injection Inject 1 mL (70 mg) subcutaneously every 30 days. 1 mL 5    fluconazole (DIFLUCAN) 150 MG tablet Take 150 mg by mouth      folic acid-vit B6-vit B12 (FOLGARD) 0.8-10-0.115 MG TABS per tablet 1 tablet by Per G Tube route daily 30 tablet 0    ipratropium - albuterol 0.5 mg/2.5 mg/3 mL (DUONEB) 0.5-2.5 (3) MG/3ML neb solution Inhale 1 Neb via a nebulizer 4 times daily if needed for Shortness Of Breath or Wheezing.      miconazole (MICATIN) 2 % external cream Apply topically 2 times daily 15 g 0    multivitamins w/minerals liquid 15 mLs by Per Feeding Tube route daily 450 mL 3     nortriptyline (PAMELOR) 10 MG capsule Take 20 mg by mouth at bedtime      nystatin (MYCOSTATIN) 167396 UNIT/GM external cream Apply topically to affected area(s) two times daily.      OLANZapine zydis (ZYPREXA) 5 MG ODT Take 1 tablet (5 mg) by mouth at bedtime 30 tablet 0    pantoprazole (PROTONIX) 40 MG EC tablet Take 1 tablet (40 mg) by mouth daily 30 tablet 0    polyethylene glycol (MIRALAX) 17 GM/Dose powder Take 17 g by mouth daily 510 g 0    polyethylene glycol-propylene glycol PF (SYSTANE PRESERVATIVE FREE) 0.4-0.3 % SOLN opthalmic solution Apply 1-2 drops to eye      senna-docusate (SENOKOT-S/PERICOLACE) 8.6-50 MG tablet Take 1 tablet by mouth 2 times daily 60 tablet 0    Buprenorphine HCl (BELBUCA) 150 MCG FILM buccal film 150 mcg (Patient not taking: Reported on 9/20/2024)      folic acid (FOLVITE) 1 MG tablet Take 1 tablet (1 mg) by mouth daily (Patient not taking: Reported on 9/20/2024) 30 tablet 0    HYDROmorphone (DILAUDID) 2 MG tablet Take 1 tablet (2 mg) by mouth every 8 hours as needed for moderate to severe pain (Patient not taking: Reported on 9/20/2024) 9 tablet 0    hyoscyamine (LEVSIN/SL) 0.125 MG sublingual tablet 0.125 mg (Patient not taking: Reported on 9/20/2024)      Lidocaine (LIDOCARE) 4 % Patch Apply 1-3 patches to intact skin to cover most painful area of back pain for max 12hr per 24hr period. (Patient not taking: Reported on 9/20/2024)      methocarbamol (ROBAXIN) 500 MG tablet Take 1 tablet (500 mg) by mouth 4 times daily as needed for muscle spasms (Patient not taking: Reported on 9/20/2024) 40 tablet 0    metoclopramide (REGLAN) 10 MG tablet Take 1 tablet (10 mg) by mouth 4 times daily (before meals and nightly) (Patient not taking: Reported on 9/20/2024) 120 tablet 0    simethicone (MYLICON) 80 MG chewable tablet Take 1 tablet (80 mg) by mouth 4 times daily (Patient not taking: Reported on 9/20/2024) 40 tablet 0    thiamine (B-1) 100 MG tablet 1 tablet (100 mg) by Per G Tube  route daily (Patient not taking: Reported on 9/20/2024) 30 tablet 0     No current facility-administered medications for this visit.     Past Medical History:   Patient  has a past medical history of Anemia, Anxiety state, Arthropathy, Asthma, Backache, Edema, Esophageal reflux, Female stress incontinence, Hypothyroidism, Irregular menstrual cycle, Irritable colon, Migraine, Morbid obesity (H), Other chronic nonalcoholic liver disease, Other depressive disorder, Other malaise and fatigue, Pain in joint, Restless legs syndrome, Shortness of breath, Synovial cyst of popliteal space, and Vitamin D deficiency.  Surgical History:  She  has a past surgical history that includes Osteotomy femur distal; Laparoscopic cholecystectomy; Extraction(s) dental; Attempted arthroscopy; Esophagoscopy, gastroscopy, duodenoscopy (EGD), combined (N/A, 1/30/2024); Colonoscopy (N/A, 2/13/2024); Resect small bowel without ostomy (N/A, 2/14/2024); Laparotomy exploratory (N/A, 2/14/2024); and IR Fine Needle Aspiration w Ultrasound (2/26/2024).  Family and Social History:  Reviewed, and she  reports that she has been smoking cigarettes. She started smoking about 11 years ago. She has a 3.5 pack-year smoking history. She has been exposed to tobacco smoke. She has never used smokeless tobacco. She reports current alcohol use. She reports that she does not use drugs.  Reviewed, and family history includes Diabetes in her father and mother; Other - See Comments in her father and paternal grandfather.    RECENT DIAGNOSTIC STUDIES:     Imaging:    MR BRAIN W/O & W CONTRAST 2/24/2024 10:01 AM     Provided History: pt with ongoing migraine with red flag symptoms. mri  per neurology.  Impression:  1. No acute intracranial pathology.  2. Subtle edema in the mesial left temporal lobe is nonspecific but  could represent early mesial temporal sclerosis or very mild  paraneoplastic autoimmune encephalitis given the patient's recently  resected jejunal  "adenocarcinoma. If there is suspicion of seizure  activity, consider correlation with EEG.  3. Left parietal encephalomalacia.          No data to display                  REVIEW OF SYSTEMS:                                                      10-point review of systems is negative except as mentioned above in HPI.    EXAM:                                                      Physical Exam:   Vitals: /86   Pulse 75   Ht 1.6 m (5' 3\")   Wt 61.2 kg (135 lb)   BMI 23.91 kg/m    BMI= Body mass index is 23.91 kg/m .  GENERAL: NAD.  HEENT: NC/AT.  PULM: Non-labored breathing.   Neurologic:  MENTAL STATUS: Alert, attentive. Speech is fluent. Normal comprehension. Normal concentration. Adequate fund of knowledge.   CRANIAL NERVES:  Visual fields intact to confrontation. Pupils equally, round and reactive to light. Facial sensation and movement normal. EOM full. Hearing intact to conversation. Trapezius strength intact. Palate moves symmetrically. Tongue midline.  MOTOR: 4/5 in proximal and distal muscle groups of upper and lower extremities. Tone and bulk normal.   SENSATION: Normal light touch throughout.   COORDINATION: Normal finger nose finger. Finger tapping normal. Knee heel shin normal.  STATION AND GAIT: Romberg Positive for sway. Good postural reflexes. Casual gait normal  right hand-dominant.      ASSESSMENT and PLAN:                                                      Assessment:    ICD-10-CM    1. Intractable chronic migraine with aura and without status migrainosus  G43.E19 Adult Neurology  Referral     erenumab-aooe (AIMOVIG) 70 MG/ML injection          Eduarda Nogueira is a 44 year old female with a PMH of L parietal embolic stroke, asthma, alcohol abuse, marijuana dependence, chronic pain syndrome, RYGB and jejunal adenocarcinoma  frequent migraines presents to the clinic to establish care for chronic migraine.  Eduarda noted mild benefit from current regimen of Depakene, candesartan and " nortriptyline.  She was referred to the clinic to further discuss CGRP inhibitors or Botox.  We will trial Aimovig and follow-up in the clinic in the next 3 months with a neurologist.  At that time patient can determine if she would prefer to continue with Aimovig or switch to Botox.  Eduarda understands and agrees with this plan.    Plan:  --- Continue Preventive therapy: Depakene, candesartan, nortriptyline and gabapentin  --- Start monthly Aimovig injection  --- Try Nonpharmacological interventions like heat or cold, massage, or rest  --- Practice good headache hygiene: Avoid known triggers, get adequate sleep, manage stress levels, stay hydrated and eat nutritious meals  --- Plan on follow up in the Neurology Clinic in 3 months with neurologist.  --- Please feel free to reach out if you have any further questions or concerns.  --- Seek immediate medical attention if an emergency arises or if your health becomes progressively worse.     It was a pleasure meeting you today!     Total Time: Total time spent for face to face visit, reviewing labs/imaging studies, counseling and coordination of care was: 45 Minutes spent on the date of the encounter doing chart review, review of outside records, review of test results, patient visit, and documentation     This note was dictated using voice recognition software.  Any grammatical or context distortions are unintentional and inherent to the software.    Bel Schaefer, DNP, APRN, CNP  [facility]

## 2024-09-20 ENCOUNTER — OFFICE VISIT (OUTPATIENT)
Dept: NEUROLOGY | Facility: CLINIC | Age: 44
End: 2024-09-20
Payer: COMMERCIAL

## 2024-09-20 VITALS
WEIGHT: 135 LBS | DIASTOLIC BLOOD PRESSURE: 86 MMHG | HEART RATE: 75 BPM | HEIGHT: 63 IN | SYSTOLIC BLOOD PRESSURE: 121 MMHG | BODY MASS INDEX: 23.92 KG/M2

## 2024-09-20 DIAGNOSIS — G43.E19 INTRACTABLE CHRONIC MIGRAINE WITH AURA AND WITHOUT STATUS MIGRAINOSUS: ICD-10-CM

## 2024-09-20 PROCEDURE — 99204 OFFICE O/P NEW MOD 45 MIN: CPT

## 2024-09-20 NOTE — LETTER
9/20/2024      Eduarda Nogueira  [address] [place] [address]      Dear Colleague,    Thank you for referring your patient, Eduarda Nogueira, to the [facility] [place]. Please see a copy of my visit note below.      __________________________________  ESTABLISHED PATIENT NEUROLOGY NOTE    DATE OF VISIT: 9/20/2024  MRN: [id number]  PATIENT NAME: Eduarda Nogueira  YOB: 1980    Chief Complaint   Patient presents with     Headache     Patient stated she is having Chronic migraines with nausea and vomiting daily.     SUBJECTIVE:                                                      HISTORY OF PRESENT ILLNESS:  Eduarda is here for follow up regarding headache    Eduarda Nogueira is a 44 year old female with a PMH of L parietal embolic stroke, asthma, alcohol abuse, marijuana dependence, chronic pain syndrome, RYGB and jejunal adenocarcinoma  frequent migraines (on scheduled IV meds three times per week (decadron, toradol, zofran +2L IVF)), and recent abdominal surgery for jejunal ulcer. Patient presented to the ED on 8/1/2024 with progressive shortness of breath and lower extremity edema.     Workup unrevealing (TTE and renal workup normal), volume overload likely due to her frequent IV migraine meds (IVF, steroids) in the setting of low albumin dt dietary issues. Volume status normalized with diuresis (removed 12.5L). Pain team and neurology consulted. Plan made to restart notrtiptyline (pt had stopped) and slowly titrate up to 30mg/night over the next 2wk. Also started Depakene and Candesartan. Increasing Cymbalta had also been discussed, but this was decided against dt the use of nortriptyline as well.     02/25/24 ED visit:  symptoms is consistent with her daily headaches making chronic migraine headaches most likely. Given extreme pain and reported shooting pains with palpation of occiput, occipital neuralgia may be co-occurring. Lack of papilledema on ocular exam is  reassuring, however to new onset jejunal adenocarcinoma, increased symptoms with bearing down, and pain waking from sleep, MRI with and w/out contrast i showed hyperintensity in the left mesial temporal lobe which could be nonspecific, we do have concerns for limbic encephalitis when we see this area light up on MRI. Regarding her headaches we will plan to increase nortriptyline but discussed with patient that this will likely take weeks to feel effect. In short term, would recommend IV migraine cocktail of compazine and benadryl with fluid bolus as needed for pain while inpatient. Okay with limited use of sumatriptan for abortive therapy given embolic nature of prior stroke in 2022. She would benefit from outpatient headache neurology follow up for consideration of an occipital nerve block vs botox. particularly given her limited options for medications     09/20/24: Eduarda presents to the clinic today to establish care for headaches.  She reports that she seen a neurologist while in the hospital and followed up with the [facility] of neurology recently.  She states that the onset of her headaches was when she was 12 years old.  She ran into a tree while sledding and suffered a concussion.  Since that timeframe she states that she has had multiple strokes which have worsened her headaches.  She describes her headaches as starting in her shoulder, radiating up to the base of her neck and into the left side of her head.  She describes this as throbbing 7 out of 10 pain.  She is experiencing headaches daily that last the full duration of the day.  Associated symptoms include white or colorful orbs in her visual field, photophobia, phonophobia, nausea, vomiting, diarrhea, feeling hot or cold and blurred vision.  She denies any double vision.  Patient states that she has numbness related to neuropathy and past strokes.  No acute numbness or weakness during her headache.  Triggers include, loud noises, bright  lights dehydration, poor nutrition and adequate sleep patient states she typically gets 4 hours of sleep at night before waking up due to pain of her shoulder, neck or head.  Denies history of sleep apnea.    She was recently put on Depakene, candesartan and nortriptyline.  She states that this is brought her 10 out of 10 headaches down to 7 out of 10.  She is also on gabapentin.    She has tried Topamax in the past.  She has failed multiple triptans.  Ubrelvy was not beneficial.    Eduarda spoke to a neurologist at [place] of neurology who recommended Botox or a CGRP inhibitor.  She is interested in starting a CGRP inhibitor.  We discussed following up with a neurologist in the clinic due to the complexity of her history.  We will start Aimovig and have patient follow-up in the next 3 months and discuss if this has been beneficial with a neurologist.  Or if they would like to trial Botox.      Current Medications:   Current Outpatient Medications   Medication Sig Dispense Refill     albuterol (PROAIR HFA/PROVENTIL HFA/VENTOLIN HFA) 108 (90 Base) MCG/ACT inhaler Inhale 1-2 puffs into the lungs every 4 hours as needed for shortness of breath, wheezing or cough 2 - 4 times daily       ammonium lactate (AMLACTIN) 12 % external cream Apply topically daily as needed for dry skin       cefuroxime (CEFTIN) 500 MG tablet Take 1 tablet (500 mg) by mouth 2 times daily 7 tablet 0     Cranberry 400 MG CAPS Take 400 mg by mouth daily as needed       cyanocobalamin (VITAMIN B-12) 500 MCG SUBL sublingual tablet Place 1 tablet (500 mcg) under the tongue daily 30 tablet 0     erenumab-aooe (AIMOVIG) 70 MG/ML injection Inject 1 mL (70 mg) subcutaneously every 30 days. 1 mL 5     fluconazole (DIFLUCAN) 150 MG tablet Take 150 mg by mouth       folic acid-vit B6-vit B12 (FOLGARD) 0.8-10-0.115 MG TABS per tablet 1 tablet by Per G Tube route daily 30 tablet 0     ipratropium - albuterol 0.5 mg/2.5 mg/3 mL (DUONEB) 0.5-2.5 (3) MG/3ML neb  solution Inhale 1 Neb via a nebulizer 4 times daily if needed for Shortness Of Breath or Wheezing.       miconazole (MICATIN) 2 % external cream Apply topically 2 times daily 15 g 0     multivitamins w/minerals liquid 15 mLs by Per Feeding Tube route daily 450 mL 3     nortriptyline (PAMELOR) 10 MG capsule Take 20 mg by mouth at bedtime       nystatin (MYCOSTATIN) 202751 UNIT/GM external cream Apply topically to affected area(s) two times daily.       OLANZapine zydis (ZYPREXA) 5 MG ODT Take 1 tablet (5 mg) by mouth at bedtime 30 tablet 0     pantoprazole (PROTONIX) 40 MG EC tablet Take 1 tablet (40 mg) by mouth daily 30 tablet 0     polyethylene glycol (MIRALAX) 17 GM/Dose powder Take 17 g by mouth daily 510 g 0     polyethylene glycol-propylene glycol PF (SYSTANE PRESERVATIVE FREE) 0.4-0.3 % SOLN opthalmic solution Apply 1-2 drops to eye       senna-docusate (SENOKOT-S/PERICOLACE) 8.6-50 MG tablet Take 1 tablet by mouth 2 times daily 60 tablet 0     Buprenorphine HCl (BELBUCA) 150 MCG FILM buccal film 150 mcg (Patient not taking: Reported on 9/20/2024)       folic acid (FOLVITE) 1 MG tablet Take 1 tablet (1 mg) by mouth daily (Patient not taking: Reported on 9/20/2024) 30 tablet 0     HYDROmorphone (DILAUDID) 2 MG tablet Take 1 tablet (2 mg) by mouth every 8 hours as needed for moderate to severe pain (Patient not taking: Reported on 9/20/2024) 9 tablet 0     hyoscyamine (LEVSIN/SL) 0.125 MG sublingual tablet 0.125 mg (Patient not taking: Reported on 9/20/2024)       Lidocaine (LIDOCARE) 4 % Patch Apply 1-3 patches to intact skin to cover most painful area of back pain for max 12hr per 24hr period. (Patient not taking: Reported on 9/20/2024)       methocarbamol (ROBAXIN) 500 MG tablet Take 1 tablet (500 mg) by mouth 4 times daily as needed for muscle spasms (Patient not taking: Reported on 9/20/2024) 40 tablet 0     metoclopramide (REGLAN) 10 MG tablet Take 1 tablet (10 mg) by mouth 4 times daily (before meals and  nightly) (Patient not taking: Reported on 9/20/2024) 120 tablet 0     simethicone (MYLICON) 80 MG chewable tablet Take 1 tablet (80 mg) by mouth 4 times daily (Patient not taking: Reported on 9/20/2024) 40 tablet 0     thiamine (B-1) 100 MG tablet 1 tablet (100 mg) by Per G Tube route daily (Patient not taking: Reported on 9/20/2024) 30 tablet 0     No current facility-administered medications for this visit.     Past Medical History:   Patient  has a past medical history of Anemia, Anxiety state, Arthropathy, Asthma, Backache, Edema, Esophageal reflux, Female stress incontinence, Hypothyroidism, Irregular menstrual cycle, Irritable colon, Migraine, Morbid obesity (H), Other chronic nonalcoholic liver disease, Other depressive disorder, Other malaise and fatigue, Pain in joint, Restless legs syndrome, Shortness of breath, Synovial cyst of popliteal space, and Vitamin D deficiency.  Surgical History:  She  has a past surgical history that includes Osteotomy femur distal; Laparoscopic cholecystectomy; Extraction(s) dental; Attempted arthroscopy; Esophagoscopy, gastroscopy, duodenoscopy (EGD), combined (N/A, 1/30/2024); Colonoscopy (N/A, 2/13/2024); Resect small bowel without ostomy (N/A, 2/14/2024); Laparotomy exploratory (N/A, 2/14/2024); and IR Fine Needle Aspiration w Ultrasound (2/26/2024).  Family and Social History:  Reviewed, and she  reports that she has been smoking cigarettes. She started smoking about 11 years ago. She has a 3.5 pack-year smoking history. She has been exposed to tobacco smoke. She has never used smokeless tobacco. She reports current alcohol use. She reports that she does not use drugs.  Reviewed, and family history includes Diabetes in her father and mother; Other - See Comments in her father and paternal grandfather.    RECENT DIAGNOSTIC STUDIES:     Imaging:    MR BRAIN W/O & W CONTRAST 2/24/2024 10:01 AM     Provided History: pt with ongoing migraine with red flag symptoms. mri  per  "neurology.  Impression:  1. No acute intracranial pathology.  2. 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 If there is suspicion of seizure  activity, consider correlation with EEG.  3. Left parietal encephalomalacia.          No data to display                  REVIEW OF SYSTEMS:                                                      10-point review of systems is negative except as mentioned above in HPI.    EXAM:                                                      Physical Exam:   Vitals: /86   Pulse 75   Ht 1.6 m (5' 3\")   Wt 61.2 kg (135 lb)   BMI 23.91 kg/m    BMI= Body mass index is 23.91 kg/m .  GENERAL: NAD.  HEENT: NC/AT.  PULM: Non-labored breathing.   Neurologic:  MENTAL STATUS: Alert, attentive. Speech is fluent. Normal comprehension. Normal concentration. Adequate fund of knowledge.   CRANIAL NERVES:  Visual fields intact to confrontation. Pupils equally, round and reactive to light. Facial sensation and movement normal. EOM full. Hearing intact to conversation. Trapezius strength intact. Palate moves symmetrically. Tongue midline.  MOTOR: 4/5 in proximal and distal muscle groups of upper and lower extremities. Tone and bulk normal.   SENSATION: Normal light touch throughout.   COORDINATION: Normal finger nose finger. Finger tapping normal. Knee heel shin normal.  STATION AND GAIT: Romberg Positive for sway. Good postural reflexes. Casual gait normal  right hand-dominant.      ASSESSMENT and PLAN:                                                      Assessment:    ICD-10-CM    1. Intractable chronic migraine with aura and without status migrainosus  G43.E19 Adult Neurology  Referral     erenumab-aooe (AIMOVIG) 70 MG/ML injection          Eduarda Nogueira is a 44 year old female with a PMH of L parietal " embolic stroke, asthma, alcohol abuse, marijuana dependence, chronic pain syndrome, RYGB and jejunal adenocarcinoma  frequent migraines presents to the clinic to establish care for chronic migraine.  Eduarda noted mild benefit from current regimen of Depakene, candesartan and nortriptyline.  She was referred to the clinic to further discuss CGRP inhibitors or Botox.  We will trial Aimovig and follow-up in the clinic in the next 3 months with a neurologist.  At that time patient can determine if she would prefer to continue with Aimovig or switch to Botox.  Eduarda understands and agrees with this plan.    Plan:  --- Continue Preventive therapy: Depakene, candesartan, nortriptyline and gabapentin  --- Start monthly Aimovig injection  --- Try Nonpharmacological interventions like heat or cold, massage, or rest  --- Practice good headache hygiene: Avoid known triggers, get adequate sleep, manage stress levels, stay hydrated and eat nutritious meals  --- Plan on follow up in the Neurology Clinic in 3 months with neurologist.  --- Please feel free to reach out if you have any further questions or concerns.  --- Seek immediate medical attention if an emergency arises or if your health becomes progressively worse.     It was a pleasure meeting you today!     Total Time: Total time spent for face to face visit, reviewing labs/imaging studies, counseling and coordination of care was: 45 Minutes spent on the date of the encounter doing chart review, review of outside records, review of test results, patient visit, and documentation     This note was dictated using voice recognition software.  Any grammatical or context distortions are unintentional and inherent to the software.    Bel Schaefer, SANDRA, APRN, CNP  [facility]           Again, thank you for allowing me to participate in the care of your patient.        Sincerely,        LUCIA Majano CNP

## 2024-09-20 NOTE — PATIENT INSTRUCTIONS
Plan:  --- Continue Preventive therapy: Depakene, candesartan, nortriptyline and gabapentin  --- Start monthly Aimovig injection  --- Try Nonpharmacological interventions like heat or cold, massage, or rest  --- Practice good headache hygiene: Avoid known triggers, get adequate sleep, manage stress levels, stay hydrated and eat nutritious meals  --- Plan on follow up in the Neurology Clinic in 3 months with neurologist.  --- Please feel free to reach out if you have any further questions or concerns.  --- Seek immediate medical attention if an emergency arises or if your health becomes progressively worse.     It was a pleasure meeting you today!

## 2024-09-20 NOTE — NURSING NOTE
Chief Complaint   Patient presents with    Headache     Patient stated she is having Chronic migraines with nausea and vomiting daily.     Sonia Marin on 9/20/2024 at 10:54 AM

## 2024-09-21 ENCOUNTER — TELEPHONE (OUTPATIENT)
Dept: NEUROLOGY | Facility: CLINIC | Age: 44
End: 2024-09-21
Payer: COMMERCIAL

## 2024-09-21 DIAGNOSIS — G43.E19 INTRACTABLE CHRONIC MIGRAINE WITH AURA AND WITHOUT STATUS MIGRAINOSUS: Primary | ICD-10-CM

## 2024-09-23 NOTE — TELEPHONE ENCOUNTER
Retail Pharmacy Prior Authorization Team   Phone: [phone]    PRIOR AUTHORIZATION DENIED    Medication: AIMOVIG 70 MG/ML SC SOAJ  Insurance Company: [employer] - Phone [phone] Fax [phone]  Denial Date: 9/20/2024  Denial Reason(s): MUST TRY/FAIL AJOVY AND EMGALITY        Appeal Information: IF THE PROVIDER WOULD LIKE TO APPEAL THIS DECISION PLEASE PROVIDE THE PA TEAM WITH A LETTER OF MEDICAL NECESSITY      Patient Notified: NO

## 2024-09-25 ENCOUNTER — TELEPHONE (OUTPATIENT)
Dept: NEUROLOGY | Facility: CLINIC | Age: 44
End: 2024-09-25
Payer: COMMERCIAL

## 2024-09-25 DIAGNOSIS — G43.E19 INTRACTABLE CHRONIC MIGRAINE WITH AURA AND WITHOUT STATUS MIGRAINOSUS: Primary | ICD-10-CM

## 2024-09-25 NOTE — TELEPHONE ENCOUNTER
Received faxed refill request from the pharmacy:    Refill request for: valproic acid (DEPAKENE) 250 mg/5 mL oral solution  Directions: Take 5 mL (250 mg) by mouth two times daily      Refill request for: candesartan cilexetil 4mg   Directions: Take 0.5 Tablets (2 mg) by mouth once daily     LOV: 09/20/24  NOV: 10/07/24    30 day supply with 0 refills Medication T'd for review and signature    Bel- This was previously prescribed by Dr. Myers. Pt saw you on 9/20/24. Will you be taking over these medications? I do not see medications on pt's med list. Your last note states:    Continue Preventive therapy: Depakene, candesartan, nortriptyline and gabapentin     Lourdes Matthews LPN on 9/25/2024 at 1:04 PM

## 2024-09-30 NOTE — TELEPHONE ENCOUNTER
Emgality ordered per request of insurance.  Patient agreeable to try Emgality versus Aimovig.    LUCIA Majano CNP

## 2024-09-30 NOTE — TELEPHONE ENCOUNTER
Spoke with Eduarda and she is agreeable to try Emgality. Please send to pharmacy on file ([facility])  Yumiko Turk CMA on 9/30/2024 at 11:51 AM  [facility]

## 2024-10-05 PROBLEM — Z86.73 HISTORY OF EMBOLIC STROKE: Status: ACTIVE | Noted: 2023-04-08

## 2024-10-05 PROBLEM — Q21.12 PATENT FORAMEN OVALE: Status: ACTIVE | Noted: 2022-08-22

## 2024-10-05 PROBLEM — F41.8 MIXED ANXIETY DEPRESSIVE DISORDER: Status: ACTIVE | Noted: 2023-04-08

## 2024-10-05 PROBLEM — G89.18 POSTOPERATIVE PAIN: Status: RESOLVED | Noted: 2024-03-05 | Resolved: 2024-10-05

## 2024-10-05 PROBLEM — D68.62 LUPUS ANTICOAGULANT DISORDER (H): Status: ACTIVE | Noted: 2022-08-22

## 2024-10-05 PROBLEM — S06.5XAA SDH (SUBDURAL HEMATOMA) (H): Status: ACTIVE | Noted: 2023-06-19

## 2024-10-05 PROBLEM — F12.20 CANNABIS DEPENDENCE (H): Status: ACTIVE | Noted: 2022-07-07

## 2024-10-05 PROBLEM — F10.10 ALCOHOL ABUSE: Status: ACTIVE | Noted: 2023-10-27

## 2024-10-05 PROBLEM — R10.9 ABDOMINAL PAIN, UNSPECIFIED ABDOMINAL LOCATION: Status: RESOLVED | Noted: 2024-05-01 | Resolved: 2024-10-05

## 2024-10-05 NOTE — PROGRESS NOTES
NEUROLOGY CONSULTATION NOTE       [facility] NEUROLOGY [place]  [address] [place] [address]  Tel: [phone]  Fax: [phone]  [email or web address]     Eduarda Nogueira,  1980, MRN [id number]  PCP: Sussy Myers  Date: 10/07/2024     ASSESSMENT & PLAN     Visit Diagnosis  Intractable chronic migraine with aura and without status migrainosus     R/O nonconvulsive seizures, autoimmune encephalitis  44-year-old female with history of alcohol abuse, marijuana dependence, chronic pain, gastric bypass, depression and anxiety, left parietal embolic infarction who was for evaluation of abnormal imaging studies.  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  I have recommended:    1.  Repeat MRI brain  2.  Sleep deprived EEG  3.  Lumbar puncture under fluoroscopy send CSF for routine studies in addition to cytology and encephalopathy autoimmune/paraneoplastic evaluation  4.  Serum encephalopathy autoimmune/paraneoplastic evaluation,  5.  Follow-up the day she is scheduled for EEG and    Migraine headache with aura  44-year-old female with history of alcohol abuse, marijuana dependence, chronic pain, gastric bypass, depression and anxiety, left parietal embolic infarction who was for evaluation of abnormal imaging studies and intractable migraine.  She is in the process of getting Emgality approval but in the meantime I recommended:    1.  Increase Depakene to 500 mg twice daily  2.  Increase nortriptyline to 50 mg at bedtime  3.  Check free and bound valproic acid level  4.  Once she has an adequate trial of Emgality we can also consider Botox injection    Thank you again for this referral, please feel free to contact me if you have any questions.    Shiva Harding MD  [facility]  NEUROLOGY, [place]     REASON FOR CONSULTATION Headache        HISTORY OF PRESENT ILLNESS     We have been requested by Dr. Myers to evaluate Eduarda Nogueira who is a 44 year old  female for headache    Patient is a 44-year-old female with history of alcohol abuse, marijuana dependence, chronic pain, gastric bypass, depression with anxiety, left parietal embolic infarction who was referred for evaluation and management of migraine headaches.  According to patient her headaches started at the age of 12 when she ran into a tree while sliding and suffered a concussion.  Since then she had multiple stroke that progressively made her headaches worse.  These headaches started in her shoulder radiating to the back of the neck and she describes it as a throbbing headache associated with visual symptoms, photophobia, phonophobia, nausea, vomiting, diarrhea feeling hot and cold and blurred vision.  Her headaches are triggered by loud noises, bright lights, dehydration and inadequate sleep.  Previously she was tried on nortriptyline, Depakote but still remained symptomatic.  Also Topamax was tried but failed and Ubrelvy was not very effective.  Previously Botox or CGRP antagonist was recommended and she was started on Aimovig.  Workup in the past included MRI brain that showed subtle edema in the mesial left temporal lobe could suggest early mesial temporal sclerosis or paraneoplastic autoimmune encephalitis.    Currently patient she had multiple strokes due to DVT and during 1 hospitalization at [facility] she was found to have above-noted abnormality on MRI scan and was told she will need further workup but no one called her.  She denies any seizures, but feels her headaches are getting out of control.  Emgality was prescribed but she is still in the process of getting it approved she denies any seizures     PROBLEM LIST   Patient Active Problem List   Diagnosis     Regurgitation of food     H/O gastric bypass      Globus sensation     Upper GI bleed     Anemia     Cellulitis of abdominal wall     Lactate blood increase     G tube feedings (H)     Alcohol abuse     Cannabis dependence (H)     GERD (gastroesophageal reflux disease)     History of embolic stroke     Lupus anticoagulant disorder (H)     Mixed anxiety depressive disorder     Patent foramen ovale     SDH (subdural hematoma) (H)         PAST MEDICAL & SURGICAL HISTORY     Past Medical History:   Patient  has a past medical history of Anemia, Anxiety state, Arthropathy, Asthma, Backache, Edema, Esophageal reflux, Female stress incontinence, Hypothyroidism, Irregular menstrual cycle, Irritable colon, Migraine, Morbid obesity (H), Other chronic nonalcoholic liver disease, Other depressive disorder, Other malaise and fatigue, Pain in joint, Restless legs syndrome, Shortness of breath, Synovial cyst of popliteal space, and Vitamin D deficiency.    Surgical History:  She  has a past surgical history that includes Osteotomy femur distal; Laparoscopic cholecystectomy; Extraction(s) dental; Attempted arthroscopy; Esophagoscopy, gastroscopy, duodenoscopy (EGD), combined (N/A, 1/30/2024); Colonoscopy (N/A, 2/13/2024); Resect small bowel without ostomy (N/A, 2/14/2024); Laparotomy exploratory (N/A, 2/14/2024); and IR Fine Needle Aspiration w Ultrasound (2/26/2024).     SOCIAL HISTORY     Reviewed, and she  reports that she has been smoking cigarettes. She started smoking about 11 years ago. She has a 3.5 pack-year smoking history. She has been exposed to tobacco smoke. She has never used smokeless tobacco. She reports current alcohol use. She reports that she does not use drugs.     FAMILY HISTORY     Reviewed, and family history includes Diabetes in her father and mother; Other - See Comments in her father and paternal grandfather.     ALLERGIES     Allergies   Allergen Reactions     Acetaminophen Hives, Itching, Rash and Shortness Of Breath     Several Rx's for Norco in  "2021 & 22     No Clinical Screening - See Comments Anaphylaxis     steroids     Gabapentin Unknown and Itching     Hydrocodone-Acetaminophen Itching     Several Rx's for Norco in 2021 & 22     Iodinated Contrast Media Unknown and Nausea and Vomiting     Extremely anxious, vomited once after CT. No wheezing or SOB.     Latex Itching     Morphine And Codeine Itching     Several Rx's for Norco in 2021 & 22     Nsaids Other (See Comments)     H/o german-n-y gastric bypass\". AVOID NSAIDs and aspirin due to risk of gastric and/or G-J anastomotic ulcers. If Eduarda must be on short course of NSAIDs or aspirin, use enteric coated if possible and use PPI // Ira Hung RN, Bariatric Nurse Clinician, [facility] 3/23/2022     Oxycodone-Acetaminophen Itching     Several Rx's for Norco in 2021 & 22         REVIEW OF SYSTEMS     A 12 point review of system was performed and was negative except as outlined in the history of present illness.     HOME MEDICATIONS     Current Outpatient Rx   Medication Sig Dispense Refill     albuterol (PROAIR HFA/PROVENTIL HFA/VENTOLIN HFA) 108 (90 Base) MCG/ACT inhaler Inhale 1-2 puffs into the lungs every 4 hours as needed for shortness of breath, wheezing or cough 2 - 4 times daily       ammonium lactate (AMLACTIN) 12 % external cream Apply topically daily as needed for dry skin       cefuroxime (CEFTIN) 500 MG tablet Take 1 tablet (500 mg) by mouth 2 times daily 7 tablet 0     Cranberry 400 MG CAPS Take 400 mg by mouth daily as needed       cyanocobalamin (VITAMIN B-12) 500 MCG SUBL sublingual tablet Place 1 tablet (500 mcg) under the tongue daily 30 tablet 0     diphenhydrAMINE (BENADRYL ALLERGY) 25 MG capsule Take 1 capsule (25 mg) by mouth every 6 hours as needed for itching or allergies. Administer 30 min - 2 hours pre - IV contrast injection and Patient must have a . 15 capsule 0     fluconazole (DIFLUCAN) 150 MG tablet Take 150 mg by mouth       folic acid-vit " "B6-vit B12 (FOLGARD) 0.8-10-0.115 MG TABS per tablet 1 tablet by Per G Tube route daily 30 tablet 0     galcanezumab-gnlm (EMGALITY) 120 MG/ML injection Inject 2 mLs (240 mg) subcutaneously every 28 days. 2 mL 0     galcanezumab-gnlm (EMGALITY) 120 MG/ML injection Inject 1 mL (120 mg) subcutaneously every 28 days. 1 mL 4     ipratropium - albuterol 0.5 mg/2.5 mg/3 mL (DUONEB) 0.5-2.5 (3) MG/3ML neb solution Inhale 1 Neb via a nebulizer 4 times daily if needed for Shortness Of Breath or Wheezing.       methylPREDNISolone (MEDROL) 32 MG tablet Take 1 tablet (32 mg) by mouth daily. 12 hours prior to the procedure with IV contrast 1 tablet 0     miconazole (MICATIN) 2 % external cream Apply topically 2 times daily 15 g 0     multivitamins w/minerals liquid 15 mLs by Per Feeding Tube route daily 450 mL 3     nortriptyline (PAMELOR) 50 MG capsule Take 1 capsule (50 mg) by mouth at bedtime. 90 capsule 3     nystatin (MYCOSTATIN) 423365 UNIT/GM external cream Apply topically to affected area(s) two times daily.       OLANZapine zydis (ZYPREXA) 5 MG ODT Take 1 tablet (5 mg) by mouth at bedtime 30 tablet 0     pantoprazole (PROTONIX) 40 MG EC tablet Take 1 tablet (40 mg) by mouth daily 30 tablet 0     polyethylene glycol (MIRALAX) 17 GM/Dose powder Take 17 g by mouth daily 510 g 0     polyethylene glycol-propylene glycol PF (SYSTANE PRESERVATIVE FREE) 0.4-0.3 % SOLN opthalmic solution Apply 1-2 drops to eye       senna-docusate (SENOKOT-S/PERICOLACE) 8.6-50 MG tablet Take 1 tablet by mouth 2 times daily 60 tablet 0     valproic acid (DEPAKENE) 250 MG/5ML syrup Take 10 mLs (500 mg) by mouth 2 times daily. 600 mL 11         PHYSICAL EXAM     Vital signs  /73 (BP Location: Right arm, Patient Position: Sitting)   Pulse 93   Ht 1.6 m (5' 3\")   Wt 61.2 kg (135 lb)   BMI 23.91 kg/m      Weight:   135 lbs 0 oz    Middle-age female who is alert and oriented vital signs are reviewed and documented in electronic medical " record.  Neck supple.  Neurologically speech was normal.  Cranial nerves II through XII are intact.  Motor strength 5/5 reflexes 1+ toes equivocal.  Minimal dysmetria and finger-nose testing gait normal     PERTINENT DIAGNOSTIC STUDIES     Following studies were reviewed:     MR BRAIN 2/24/2024  1. No acute intracranial pathology.  2. 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 If there is suspicion of seizure  activity, consider correlation with EEG.  3. Left parietal encephalomalacia.     PERTINENT LABS  Following labs were reviewed:  No visits with results within 3 Month(s) from this visit.   Latest known visit with results is:   Admission on 05/09/2024, Discharged on 05/10/2024   Component Date Value Ref Range Status     INR 05/09/2024 1.04  0.85 - 1.15 Final     Sodium 05/09/2024 142  135 - 145 mmol/L Final     Potassium 05/09/2024 3.6  3.4 - 5.3 mmol/L Final     Carbon Dioxide (CO2) 05/09/2024 26  22 - 29 mmol/L Final     Anion Gap 05/09/2024 13  7 - 15 mmol/L Final     Urea Nitrogen 05/09/2024 10.1  6.0 - 20.0 mg/dL Final     Creatinine 05/09/2024 0.72  0.51 - 0.95 mg/dL Final     GFR Estimate 05/09/2024 >90  >60 mL/min/1.73m2 Final     Calcium 05/09/2024 8.5 (L)  8.6 - 10.0 mg/dL Final     Chloride 05/09/2024 103  98 - 107 mmol/L Final     Glucose 05/09/2024 111 (H)  70 - 99 mg/dL Final     Alkaline Phosphatase 05/09/2024 71  40 - 150 U/L Final     AST 05/09/2024 18  0 - 45 U/L Final     ALT 05/09/2024 9  0 - 50 U/L Final     Protein Total 05/09/2024 6.8  6.4 - 8.3 g/dL Final     Albumin 05/09/2024 4.1  3.5 - 5.2 g/dL Final     Bilirubin Total 05/09/2024 <0.2  <=1.2 mg/dL Final     Culture 05/09/2024 No Growth   Final     Culture 05/09/2024 No Growth   Final     Lactic Acid 05/09/2024 2.4 (H)  0.7 - 2.0 mmol/L Final     WBC  Count 05/09/2024 5.9  4.0 - 11.0 10e3/uL Final     RBC Count 05/09/2024 4.22  3.80 - 5.20 10e6/uL Final     Hemoglobin 05/09/2024 10.6 (L)  11.7 - 15.7 g/dL Final     Hematocrit 05/09/2024 35.0  35.0 - 47.0 % Final     MCV 05/09/2024 83  78 - 100 fL Final     MCH 05/09/2024 25.1 (L)  26.5 - 33.0 pg Final     MCHC 05/09/2024 30.3 (L)  31.5 - 36.5 g/dL Final     RDW 05/09/2024 19.9 (H)  10.0 - 15.0 % Final     Platelet Count 05/09/2024 292  150 - 450 10e3/uL Final     % Neutrophils 05/09/2024 37  % Final     % Lymphocytes 05/09/2024 52  % Final     % Monocytes 05/09/2024 6  % Final     % Eosinophils 05/09/2024 2  % Final     % Basophils 05/09/2024 2  % Final     % Immature Granulocytes 05/09/2024 1  % Final     NRBCs per 100 WBC 05/09/2024 0  <1 /100 Final     Absolute Neutrophils 05/09/2024 2.2  1.6 - 8.3 10e3/uL Final     Absolute Lymphocytes 05/09/2024 3.1  0.8 - 5.3 10e3/uL Final     Absolute Monocytes 05/09/2024 0.4  0.0 - 1.3 10e3/uL Final     Absolute Eosinophils 05/09/2024 0.1  0.0 - 0.7 10e3/uL Final     Absolute Basophils 05/09/2024 0.1  0.0 - 0.2 10e3/uL Final     Absolute Immature Granulocytes 05/09/2024 0.0  <=0.4 10e3/uL Final     Absolute NRBCs 05/09/2024 0.0  10e3/uL Final     Lactic Acid 05/10/2024 1.4  0.7 - 2.0 mmol/L Final        Total time spent for face to face visit, reviewing labs/imaging studies, counseling and coordination of care was: 1 Hour spent on the date of the encounter doing chart review, review of outside records, review of test results, interpretation of tests, patient visit, and documentation     This note was dictated using voice recognition software.  Any grammatical or context distortions are unintentional and inherent to the software.    Orders Placed This Encounter   Procedures     MR Brain w/o & w Contrast     XR Lumbar Puncture Spinal Tap Diagnostic     Paraneoplastic, Autoantibody Evaluation [place]     [facility]; ENS2; Encephalopathy,  Autoimmune/Paraneoplastic Evaluation, Serum (Laboratory Miscellaneous Order)     Glucose CSF:     [facility]; EPC2; Epilepsy, Autoimmune/Paraneoplastic Evaluation, Spinal Fluid (Laboratory Miscellaneous Order)     Valproic Acid Free & Total     Cytology, non-gynecologic     Cerebrospinal fluid Aerobic Bacterial Culture Routine With Gram Stain     Gram stain     CSF Cell Count with Differential:     CSF Cell Count with Differential:     EEG      New Prescriptions    DIPHENHYDRAMINE (BENADRYL ALLERGY) 25 MG CAPSULE    Take 1 capsule (25 mg) by mouth every 6 hours as needed for itching or allergies. Administer 30 min - 2 hours pre - IV contrast injection and Patient must have a .    METHYLPREDNISOLONE (MEDROL) 32 MG TABLET    Take 1 tablet (32 mg) by mouth daily. 12 hours prior to the procedure with IV contrast      Modified Medications    Modified Medication Previous Medication    NORTRIPTYLINE (PAMELOR) 50 MG CAPSULE nortriptyline (PAMELOR) 10 MG capsule       Take 1 capsule (50 mg) by mouth at bedtime.    Take 20 mg by mouth at bedtime    VALPROIC ACID (DEPAKENE) 250 MG/5ML SYRUP valproic acid (DEPAKENE) 250 MG/5ML syrup       Take 10 mLs (500 mg) by mouth 2 times daily.    Take 5 mLs (250 mg) by mouth 2 times daily.

## 2024-10-07 ENCOUNTER — OFFICE VISIT (OUTPATIENT)
Dept: NEUROLOGY | Facility: CLINIC | Age: 44
End: 2024-10-07
Payer: COMMERCIAL

## 2024-10-07 VITALS
SYSTOLIC BLOOD PRESSURE: 100 MMHG | DIASTOLIC BLOOD PRESSURE: 73 MMHG | HEART RATE: 93 BPM | WEIGHT: 135 LBS | HEIGHT: 63 IN | BODY MASS INDEX: 23.92 KG/M2

## 2024-10-07 DIAGNOSIS — G43.E19 INTRACTABLE CHRONIC MIGRAINE WITH AURA AND WITHOUT STATUS MIGRAINOSUS: Primary | ICD-10-CM

## 2024-10-07 DIAGNOSIS — G04.81 AUTOIMMUNE ENCEPHALITIS: ICD-10-CM

## 2024-10-07 DIAGNOSIS — R56.9 SEIZURE-LIKE ACTIVITY (H): ICD-10-CM

## 2024-10-07 PROCEDURE — 99215 OFFICE O/P EST HI 40 MIN: CPT | Performed by: PSYCHIATRY & NEUROLOGY

## 2024-10-07 PROCEDURE — G2211 COMPLEX E/M VISIT ADD ON: HCPCS | Performed by: PSYCHIATRY & NEUROLOGY

## 2024-10-07 RX ORDER — METHYLPREDNISOLONE 32 MG/1
32 TABLET ORAL DAILY
Qty: 1 TABLET | Refills: 0 | Status: SHIPPED | OUTPATIENT
Start: 2024-10-07

## 2024-10-07 RX ORDER — DIPHENHYDRAMINE HCL 25 MG
25 CAPSULE ORAL EVERY 6 HOURS PRN
Qty: 15 CAPSULE | Refills: 0 | Status: SHIPPED | OUTPATIENT
Start: 2024-10-07

## 2024-10-07 RX ORDER — NORTRIPTYLINE HYDROCHLORIDE 50 MG/1
50 CAPSULE ORAL AT BEDTIME
Qty: 90 CAPSULE | Refills: 3 | Status: SHIPPED | OUTPATIENT
Start: 2024-10-07

## 2024-10-07 ASSESSMENT — HEADACHE IMPACT TEST (HIT 6)
HOW OFTEN DO HEADACHES LIMIT YOUR DAILY ACTIVITIES: VERY OFTEN
HOW OFTEN HAVE YOU FELT FED UP OR IRRITATED BECAUSE OF YOUR HEADACHES: VERY OFTEN
HOW OFTEN HAVE YOU FELT FED UP OR IRRITATED BECAUSE OF YOUR HEADACHES: VERY OFTEN
HOW OFTEN DID HEADACHS LIMIT CONCENTRATION ON WORK OR DAILY ACTIVITY: VERY OFTEN
WHEN YOU HAVE A HEADACHE HOW OFTEN DO YOU WISH YOU COULD LIE DOWN: VERY OFTEN
WHEN YOU HAVE A HEADACHE HOW OFTEN DO YOU WISH YOU COULD LIE DOWN: VERY OFTEN
HIT6 TOTAL SCORE: 66
WHEN YOU HAVE HEADACHES HOW OFTEN IS THE PAIN SEVERE: VERY OFTEN
HOW OFTEN HAVE YOU FELT TOO TIRED TO WORK BECAUSE OF YOUR HEADACHES: VERY OFTEN
HOW OFTEN HAVE YOU FELT TOO TIRED TO WORK BECAUSE OF YOUR HEADACHES: VERY OFTEN
WHEN YOU HAVE HEADACHES HOW OFTEN IS THE PAIN SEVERE: VERY OFTEN
HIT6 TOTAL SCORE: 66
HOW OFTEN DID HEADACHS LIMIT CONCENTRATION ON WORK OR DAILY ACTIVITY: VERY OFTEN
HOW OFTEN DO HEADACHES LIMIT YOUR DAILY ACTIVITIES: VERY OFTEN

## 2024-10-07 NOTE — NURSING NOTE
Chief Complaint   Patient presents with    Headache     Follow up- has not started Emgality yet      Last Patient-Answered HIT-6 Questionnaire      10/7/2024    11:34 AM   HIT-6   When you have headaches, how often is the pain severe 11   How often do headaches limit your ability to do usual daily activities including household work, work, school, or social activities? 11   When you have a headache, how often do you wish you could lie down? 11   In the past 4 weeks, how often have you felt too tired to do work or daily activities because of your headaches 11   In the past 4 weeks, how often have you felt fed up or irritated because of your headaches 11   In the past 4 weeks, how often did headaches limit your ability to concentrate on work or daily activities 11   HIT-6 Total Score 66

## 2024-10-07 NOTE — LETTER
10/7/2024      Eduarda Nogueira  [address] [place] [address]      Dear Colleague,    Thank you for referring your patient, Eduarda Nogueira, to the [facility] [place]. Please see a copy of my visit note below.    NEUROLOGY CONSULTATION NOTE       [facility] [place]  [address] [place] [address]  Tel: [phone]  Fax: [phone]  [email or web address]     Eduarda Nogueira,  1980, MRN [id number]  PCP: Sussy Myers  Date: 10/07/2024     ASSESSMENT & PLAN     Visit Diagnosis  Intractable chronic migraine with aura and without status migrainosus     R/O nonconvulsive seizures, autoimmune encephalitis  44-year-old female with history of alcohol abuse, marijuana dependence, chronic pain, gastric bypass, depression and anxiety, left parietal embolic infarction who was for evaluation of abnormal imaging studies.  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  I have recommended:    1.  Repeat MRI brain  2.  Sleep deprived EEG  3.  Lumbar puncture under fluoroscopy send CSF for routine studies in addition to cytology and encephalopathy autoimmune/paraneoplastic evaluation  4.  Serum encephalopathy autoimmune/paraneoplastic evaluation,  5.  Follow-up the day she is scheduled for EEG and    Migraine headache with aura  44-year-old female with history of alcohol abuse, marijuana dependence, chronic pain, gastric bypass, depression and anxiety, left parietal embolic infarction who was for evaluation of abnormal imaging studies and intractable migraine.  She is in the process of getting Emgality approval but in the meantime I recommended:    1.  Increase Depakene to 500 mg twice daily  2.  Increase nortriptyline to 50 mg at bedtime  3.  Check free and  bound valproic acid level  4.  Once she has an adequate trial of Emgality we can also consider Botox injection    Thank you again for this referral, please feel free to contact me if you have any questions.    Shiva Harding MD  [facility]     REASON FOR CONSULTATION Headache        HISTORY OF PRESENT ILLNESS     We have been requested by Dr. Myers to evaluate Eduarda Nogueira who is a 44 year old  female for headache    Patient is a 44-year-old female with history of alcohol abuse, marijuana dependence, chronic pain, gastric bypass, depression with anxiety, left parietal embolic infarction who was referred for evaluation and management of migraine headaches.  According to patient her headaches started at the age of 12 when she ran into a tree while sliding and suffered a concussion.  Since then she had multiple stroke that progressively made her headaches worse.  These headaches started in her shoulder radiating to the back of the neck and she describes it as a throbbing headache associated with visual symptoms, photophobia, phonophobia, nausea, vomiting, diarrhea feeling hot and cold and blurred vision.  Her headaches are triggered by loud noises, bright lights, dehydration and inadequate sleep.  Previously she was tried on nortriptyline, Depakote but still remained symptomatic.  Also Topamax was tried but failed and Ubrelvy was not very effective.  Previously Botox or CGRP antagonist was recommended and she was started on Aimovig.  Workup in the past included MRI brain that showed subtle edema in the mesial left temporal lobe could suggest early mesial temporal sclerosis or paraneoplastic autoimmune encephalitis.    Currently patient she had multiple strokes due to DVT and during 1 hospitalization at [facility] she was found to have above-noted abnormality on MRI scan and was told she will need further workup but no one called her.  She denies any seizures, but feels her  headaches are getting out of control.  Emgality was prescribed but she is still in the process of getting it approved she denies any seizures     PROBLEM LIST   Patient Active Problem List   Diagnosis     Regurgitation of food     H/O gastric bypass     Globus sensation     Upper GI bleed     Anemia     Cellulitis of abdominal wall     Lactate blood increase     G tube feedings (H)     Alcohol abuse     Cannabis dependence (H)     GERD (gastroesophageal reflux disease)     History of embolic stroke     Lupus anticoagulant disorder (H)     Mixed anxiety depressive disorder     Patent foramen ovale     SDH (subdural hematoma) (H)         PAST MEDICAL & SURGICAL HISTORY     Past Medical History:   Patient  has a past medical history of Anemia, Anxiety state, Arthropathy, Asthma, Backache, Edema, Esophageal reflux, Female stress incontinence, Hypothyroidism, Irregular menstrual cycle, Irritable colon, Migraine, Morbid obesity (H), Other chronic nonalcoholic liver disease, Other depressive disorder, Other malaise and fatigue, Pain in joint, Restless legs syndrome, Shortness of breath, Synovial cyst of popliteal space, and Vitamin D deficiency.    Surgical History:  She  has a past surgical history that includes Osteotomy femur distal; Laparoscopic cholecystectomy; Extraction(s) dental; Attempted arthroscopy; Esophagoscopy, gastroscopy, duodenoscopy (EGD), combined (N/A, 1/30/2024); Colonoscopy (N/A, 2/13/2024); Resect small bowel without ostomy (N/A, 2/14/2024); Laparotomy exploratory (N/A, 2/14/2024); and IR Fine Needle Aspiration w Ultrasound (2/26/2024).     SOCIAL HISTORY     Reviewed, and she  reports that she has been smoking cigarettes. She started smoking about 11 years ago. She has a 3.5 pack-year smoking history. She has been exposed to tobacco smoke. She has never used smokeless tobacco. She reports current alcohol use. She reports that she does not use drugs.     FAMILY HISTORY     Reviewed, and family  "history includes Diabetes in her father and mother; Other - See Comments in her father and paternal grandfather.     ALLERGIES     Allergies   Allergen Reactions     Acetaminophen Hives, Itching, Rash and Shortness Of Breath     Several Rx's for Norco in 2021 & 22     No Clinical Screening - See Comments Anaphylaxis     steroids     Gabapentin Unknown and Itching     Hydrocodone-Acetaminophen Itching     Several Rx's for Norco in 2021 & 22     Iodinated Contrast Media Unknown and Nausea and Vomiting     Extremely anxious, vomited once after CT. No wheezing or SOB.     Latex Itching     Morphine And Codeine Itching     Several Rx's for Norco in 2021 & 22     Nsaids Other (See Comments)     H/o german-n-y gastric bypass\". AVOID NSAIDs and aspirin due to risk of gastric and/or G-J anastomotic ulcers. If Eduarda must be on short course of NSAIDs or aspirin, use enteric coated if possible and use PPI // Ira Hung RN, Bariatric Nurse Clinician, [facility] 3/23/2022     Oxycodone-Acetaminophen Itching     Several Rx's for Norco in 2021 & 22         REVIEW OF SYSTEMS     A 12 point review of system was performed and was negative except as outlined in the history of present illness.     HOME MEDICATIONS     Current Outpatient Rx   Medication Sig Dispense Refill     albuterol (PROAIR HFA/PROVENTIL HFA/VENTOLIN HFA) 108 (90 Base) MCG/ACT inhaler Inhale 1-2 puffs into the lungs every 4 hours as needed for shortness of breath, wheezing or cough 2 - 4 times daily       ammonium lactate (AMLACTIN) 12 % external cream Apply topically daily as needed for dry skin       cefuroxime (CEFTIN) 500 MG tablet Take 1 tablet (500 mg) by mouth 2 times daily 7 tablet 0     Cranberry 400 MG CAPS Take 400 mg by mouth daily as needed       cyanocobalamin (VITAMIN B-12) 500 MCG SUBL sublingual tablet Place 1 tablet (500 mcg) under the tongue daily 30 tablet 0     diphenhydrAMINE (BENADRYL ALLERGY) 25 MG capsule Take 1 " capsule (25 mg) by mouth every 6 hours as needed for itching or allergies. Administer 30 min - 2 hours pre - IV contrast injection and Patient must have a . 15 capsule 0     fluconazole (DIFLUCAN) 150 MG tablet Take 150 mg by mouth       folic acid-vit B6-vit B12 (FOLGARD) 0.8-10-0.115 MG TABS per tablet 1 tablet by Per G Tube route daily 30 tablet 0     galcanezumab-gnlm (EMGALITY) 120 MG/ML injection Inject 2 mLs (240 mg) subcutaneously every 28 days. 2 mL 0     galcanezumab-gnlm (EMGALITY) 120 MG/ML injection Inject 1 mL (120 mg) subcutaneously every 28 days. 1 mL 4     ipratropium - albuterol 0.5 mg/2.5 mg/3 mL (DUONEB) 0.5-2.5 (3) MG/3ML neb solution Inhale 1 Neb via a nebulizer 4 times daily if needed for Shortness Of Breath or Wheezing.       methylPREDNISolone (MEDROL) 32 MG tablet Take 1 tablet (32 mg) by mouth daily. 12 hours prior to the procedure with IV contrast 1 tablet 0     miconazole (MICATIN) 2 % external cream Apply topically 2 times daily 15 g 0     multivitamins w/minerals liquid 15 mLs by Per Feeding Tube route daily 450 mL 3     nortriptyline (PAMELOR) 50 MG capsule Take 1 capsule (50 mg) by mouth at bedtime. 90 capsule 3     nystatin (MYCOSTATIN) 012837 UNIT/GM external cream Apply topically to affected area(s) two times daily.       OLANZapine zydis (ZYPREXA) 5 MG ODT Take 1 tablet (5 mg) by mouth at bedtime 30 tablet 0     pantoprazole (PROTONIX) 40 MG EC tablet Take 1 tablet (40 mg) by mouth daily 30 tablet 0     polyethylene glycol (MIRALAX) 17 GM/Dose powder Take 17 g by mouth daily 510 g 0     polyethylene glycol-propylene glycol PF (SYSTANE PRESERVATIVE FREE) 0.4-0.3 % SOLN opthalmic solution Apply 1-2 drops to eye       senna-docusate (SENOKOT-S/PERICOLACE) 8.6-50 MG tablet Take 1 tablet by mouth 2 times daily 60 tablet 0     valproic acid (DEPAKENE) 250 MG/5ML syrup Take 10 mLs (500 mg) by mouth 2 times daily. 600 mL 11         PHYSICAL EXAM     Vital signs  /73 (BP  "Location: Right arm, Patient Position: Sitting)   Pulse 93   Ht 1.6 m (5' 3\")   Wt 61.2 kg (135 lb)   BMI 23.91 kg/m      Weight:   135 lbs 0 oz    Middle-age female who is alert and oriented vital signs are reviewed and documented in electronic medical record.  Neck supple.  Neurologically speech was normal.  Cranial nerves II through XII are intact.  Motor strength 5/5 reflexes 1+ toes equivocal.  Minimal dysmetria and finger-nose testing gait normal     PERTINENT DIAGNOSTIC STUDIES     Following studies were reviewed:     MR BRAIN 2/24/2024  1. No acute intracranial pathology.  2. 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 If there is suspicion of seizure  activity, consider correlation with EEG.  3. Left parietal encephalomalacia.     PERTINENT LABS  Following labs were reviewed:  No visits with results within 3 Month(s) from this visit.   Latest known visit with results is:   Admission on 05/09/2024, Discharged on 05/10/2024   Component Date Value Ref Range Status     INR 05/09/2024 1.04  0.85 - 1.15 Final     Sodium 05/09/2024 142  135 - 145 mmol/L Final     Potassium 05/09/2024 3.6  3.4 - 5.3 mmol/L Final     Carbon Dioxide (CO2) 05/09/2024 26  22 - 29 mmol/L Final     Anion Gap 05/09/2024 13  7 - 15 mmol/L Final     Urea Nitrogen 05/09/2024 10.1  6.0 - 20.0 mg/dL Final     Creatinine 05/09/2024 0.72  0.51 - 0.95 mg/dL Final     GFR Estimate 05/09/2024 >90  >60 mL/min/1.73m2 Final     Calcium 05/09/2024 8.5 (L)  8.6 - 10.0 mg/dL Final     Chloride 05/09/2024 103  98 - 107 mmol/L Final     Glucose 05/09/2024 111 (H)  70 - 99 mg/dL Final     Alkaline Phosphatase 05/09/2024 71  40 - 150 U/L Final     AST 05/09/2024 18  0 - 45 U/L Final     ALT 05/09/2024 9  0 - 50 U/L Final     Protein Total 05/09/2024 6.8  6.4 - 8.3 g/dL " Final     Albumin 05/09/2024 4.1  3.5 - 5.2 g/dL Final     Bilirubin Total 05/09/2024 <0.2  <=1.2 mg/dL Final     Culture 05/09/2024 No Growth   Final     Culture 05/09/2024 No Growth   Final     Lactic Acid 05/09/2024 2.4 (H)  0.7 - 2.0 mmol/L Final     WBC Count 05/09/2024 5.9  4.0 - 11.0 10e3/uL Final     RBC Count 05/09/2024 4.22  3.80 - 5.20 10e6/uL Final     Hemoglobin 05/09/2024 10.6 (L)  11.7 - 15.7 g/dL Final     Hematocrit 05/09/2024 35.0  35.0 - 47.0 % Final     MCV 05/09/2024 83  78 - 100 fL Final     MCH 05/09/2024 25.1 (L)  26.5 - 33.0 pg Final     MCHC 05/09/2024 30.3 (L)  31.5 - 36.5 g/dL Final     RDW 05/09/2024 19.9 (H)  10.0 - 15.0 % Final     Platelet Count 05/09/2024 292  150 - 450 10e3/uL Final     % Neutrophils 05/09/2024 37  % Final     % Lymphocytes 05/09/2024 52  % Final     % Monocytes 05/09/2024 6  % Final     % Eosinophils 05/09/2024 2  % Final     % Basophils 05/09/2024 2  % Final     % Immature Granulocytes 05/09/2024 1  % Final     NRBCs per 100 WBC 05/09/2024 0  <1 /100 Final     Absolute Neutrophils 05/09/2024 2.2  1.6 - 8.3 10e3/uL Final     Absolute Lymphocytes 05/09/2024 3.1  0.8 - 5.3 10e3/uL Final     Absolute Monocytes 05/09/2024 0.4  0.0 - 1.3 10e3/uL Final     Absolute Eosinophils 05/09/2024 0.1  0.0 - 0.7 10e3/uL Final     Absolute Basophils 05/09/2024 0.1  0.0 - 0.2 10e3/uL Final     Absolute Immature Granulocytes 05/09/2024 0.0  <=0.4 10e3/uL Final     Absolute NRBCs 05/09/2024 0.0  10e3/uL Final     Lactic Acid 05/10/2024 1.4  0.7 - 2.0 mmol/L Final        Total time spent for face to face visit, reviewing labs/imaging studies, counseling and coordination of care was: 1 Hour spent on the date of the encounter doing chart review, review of outside records, review of test results, interpretation of tests, patient visit, and documentation     This note was dictated using voice recognition software.  Any grammatical or context distortions are unintentional and inherent to  the software.    Orders Placed This Encounter   Procedures     MR Brain w/o & w Contrast     XR Lumbar Puncture Spinal Tap Diagnostic     Paraneoplastic, Autoantibody Evaluation [place]     [facility]; ENS2; Encephalopathy, Autoimmune/Paraneoplastic Evaluation, Serum (Laboratory Miscellaneous Order)     Glucose CSF:     [facility]; EPC2; Epilepsy, Autoimmune/Paraneoplastic Evaluation, Spinal Fluid (Laboratory Miscellaneous Order)     Valproic Acid Free & Total     Cytology, non-gynecologic     Cerebrospinal fluid Aerobic Bacterial Culture Routine With Gram Stain     Gram stain     CSF Cell Count with Differential:     CSF Cell Count with Differential:     EEG      New Prescriptions    DIPHENHYDRAMINE (BENADRYL ALLERGY) 25 MG CAPSULE    Take 1 capsule (25 mg) by mouth every 6 hours as needed for itching or allergies. Administer 30 min - 2 hours pre - IV contrast injection and Patient must have a .    METHYLPREDNISOLONE (MEDROL) 32 MG TABLET    Take 1 tablet (32 mg) by mouth daily. 12 hours prior to the procedure with IV contrast      Modified Medications    Modified Medication Previous Medication    NORTRIPTYLINE (PAMELOR) 50 MG CAPSULE nortriptyline (PAMELOR) 10 MG capsule       Take 1 capsule (50 mg) by mouth at bedtime.    Take 20 mg by mouth at bedtime    VALPROIC ACID (DEPAKENE) 250 MG/5ML SYRUP valproic acid (DEPAKENE) 250 MG/5ML syrup       Take 10 mLs (500 mg) by mouth 2 times daily.    Take 5 mLs (250 mg) by mouth 2 times daily.              Again, thank you for allowing me to participate in the care of your patient.        Sincerely,        Shiva Harding MD

## 2024-11-18 DIAGNOSIS — G89.18 POSTOPERATIVE PAIN: ICD-10-CM

## 2024-11-19 ENCOUNTER — TELEPHONE (OUTPATIENT)
Dept: NEUROLOGY | Facility: CLINIC | Age: 44
End: 2024-11-19
Payer: COMMERCIAL

## 2024-11-19 RX ORDER — HYDROMORPHONE HYDROCHLORIDE 2 MG/1
2 TABLET ORAL EVERY 8 HOURS PRN
Qty: 40 TABLET | Refills: 0 | OUTPATIENT
Start: 2024-11-19

## 2024-11-19 NOTE — TELEPHONE ENCOUNTER
Retail Pharmacy Prior Authorization Team   Phone: [phone]    Prior Authorization Approval    Medication: EMGALITY 120 MG/ML SC SOAJ  Authorization Effective Date: 8/21/2024  Authorization Expiration Date: 12/19/2024   Insurance Company: [employer] Gaitan - Phone [phone] Fax [phone]  Which Pharmacy is filling the prescription: [facility] [place] - [place], [place] - [address] [place] [address]  Pharmacy Notified: YES  Patient Notified: YES (faxed approval letter to pharmacy and notified patient via mychart message)

## 2024-11-26 DIAGNOSIS — G89.18 POSTOPERATIVE PAIN: ICD-10-CM

## 2025-02-24 RX ORDER — HYDROMORPHONE HYDROCHLORIDE 2 MG/1
2 TABLET ORAL EVERY 8 HOURS PRN
Qty: 40 TABLET | Refills: 0 | OUTPATIENT
Start: 2025-02-24

## 2025-03-09 ENCOUNTER — HEALTH MAINTENANCE LETTER (OUTPATIENT)
Age: 45
End: 2025-03-09

## 2025-03-20 DIAGNOSIS — G89.18 POSTOPERATIVE PAIN: ICD-10-CM

## 2025-03-24 RX ORDER — HYDROMORPHONE HYDROCHLORIDE 2 MG/1
2 TABLET ORAL EVERY 8 HOURS PRN
Qty: 40 TABLET | Refills: 0 | OUTPATIENT
Start: 2025-03-24

## 2025-03-24 NOTE — TELEPHONE ENCOUNTER
"If appropriate, please DENY request for Dilaudid    Per chart review:    \"Returned call and spoke with Eduarda regarding medication refill.     Eduarda reached out asking for a refill of Dilaudid 2 mg and Candesartan. Reports to RN that she was hospitalized back in September for stomach ulcer and these medications were given to her upon discharge. RN not able to find any recent scripts for Candesartan on file.     Per chart review, long standing hx of chronic pain. Per discharge note from Dr. Tina Diehl on 9/28 from [facility]:     Ongoing d/with Pain NP who in contact with her primary physician who has concerns over somatization of symptoms.   -decreased valium to 5 mg twice daily from 10 as patient quite sedated after rec'd 10 mg but also rec'd dilaudid and tizanidine  -decr vistaril to 10-20 mg every 6 hours as needed   -oral dilaudid at 2 mg  being weaned to off, every 8 hours today then stop   -decr tizanidine to 2 mg every 6 hours as needed      RN informed Eduarda that Dr. Harding doesn't prescribe Dilaudid and she will need to reach out to her primary provider or pain team for any pain medications. Eduarda expressed understanding of recommendation.     Elvira Hurtado RN, BSN  [facility]\"  "

## 2025-04-23 NOTE — PROGRESS NOTES
NEUROLOGY FOLLOW UP VISIT  NOTE       [facility] NEUROLOGY [place]  [address] [place] [address]  Tel: [phone]  Fax: [phone]  [email or web address]     Eduarda Nogueira,  1980, MRN [id number]  PCP: Sussy Myers  Date: 2025      ASSESSMENT & PLAN     Visit Diagnosis  Intractable chronic migraine with aura and without status migrainosus  Autoimmune encephalitis     R/O nonconvulsive seizures, autoimmune encephalitis  44-year-old female with history of alcohol abuse, marijuana dependence, chronic pain, gastric bypass, depression and anxiety, left parietal embolic infarction who was for evaluation of abnormal imaging studies.  She was admitted to [facility] in 2024 and her MRI scan showed subtle edema in the mesial left temporal lobe which was nonspecific but could suggest early mesial temporal sclerosis or paraneoplastic autoimmune encephalitis.  I had recommended repeating MRI brain, sleep deprived EEG and lumbar puncture for autoimmune encephalitis panel but she never followed through on my recommendation and claims no one called her.  I have recommended:    1.  Repeat MRI brain with and without contrast.  She is allergic to IV dye and will need premedicated  2.  Sleep deprived EEG  3.  Lumbar puncture under fluoroscopy.  Send CSF for routine studies in addition to cytology and encephalopathy autoimmune/paraneoplastic evaluation.  I will order lumbar puncture if MRI scan is unremarkable  4.  Check serum encephalopathy autoimmune/paraneoplastic panel  5. Follow-up the day she is scheduled for EEG    Migraine headache with aura  44-year-old female with alcohol abuse, marijuana dependence, chronic pain, gastric bypass, depression and anxiety, left parietal embolic infarction who is been followed in our clinic for migraine headache with aura.  Previously I had prescribed Emgality that was not approved by her insurance.  I have recommended:    1.   Increase Depakene to 500 mg 3 times daily  2.  Increase nortriptyline to 75 mg daily.  3.  Check free and bound valproic acid level  5.  If she fails to show improvement with above measures we can consider Botox  5.  Follow-up today she is scheduled for EEG      Thank you again for this referral, please feel free to contact me if you have any questions.    Shiva Harding MD  [facility]     HISTORY OF PRESENT ILLNESS     Patient is a 44-year-old female with history of alcohol abuse, marijuana dependence, chronic pain syndrome, gastric bypass, depression and anxiety, left parietal embolic infarction who was last seen on October 7, 2024 for worsening headaches.      According to patient her headache started at the age of 12 when she ran into a tree while sliding and suffered a concussion.  She describes these headaches starting in her neck and shoulder radiating to the back of her neck and are throbbing associated with visual symptoms, photophobia, phonophobia, nausea, vomiting, diarrhea and feeling hot and cold with blurred vision.  She has noticed that loud noises, bright lights, dehydration and inadequate sleep is also a trigger.  She was tried on nortriptyline, Depakote but remained symptomatic & she failed Ubrelvy and Topamax.  Workup included MRI brain that showed subtle edema in the mesial left temporal lobe that could suggest early mesial temporal sclerosis or paraneoplastic autoimmune encephalitis.  During the last visit MRI brain was repeated that showed no acute abnormality chronic left parietal lobe encephalomalacia was noted.  EEG is still pending.  Also I had recommended lumbar puncture under fluoroscopy and to check autoimmune/paraneoplastic panel that was not done.  Patient reports that no one called her and was not aware that she was supposed to get lumbar puncture, EEG and additional lab    For her migraine headaches she was told to increase the dose of Depakote to 500 mg  twice daily and nortriptyline to 50 mg at bedtime.  She subsequently was switched to Emgality that was not approved by her insurance and continues on Depakote.  She continues to be symptomatic almost on daily basis.     PROBLEM LIST   Patient Active Problem List   Diagnosis    Regurgitation of food    H/O gastric bypass    Globus sensation    Upper GI bleed    Anemia    Cellulitis of abdominal wall    Lactate blood increase    G tube feedings (H)    Alcohol abuse    Cannabis dependence (H)    GERD (gastroesophageal reflux disease)    History of embolic stroke    Lupus anticoagulant disorder    Mixed anxiety depressive disorder    Patent foramen ovale    SDH (subdural hematoma) (H)    HTN (hypertension)    Iron deficiency anemia after gastrectomy    Malignant neoplasm of gastrointestinal tract (H)    Polycystic kidney         PAST MEDICAL & SURGICAL HISTORY     Past Medical History:   Patient  has a past medical history of Anemia, Anxiety state, Arthropathy, Asthma, Backache, Edema, Esophageal reflux, Female stress incontinence, Hypothyroidism, Irregular menstrual cycle, Irritable colon, Migraine, Morbid obesity (H), Other chronic nonalcoholic liver disease, Other depressive disorder, Other malaise and fatigue, Pain in joint, Restless legs syndrome, Shortness of breath, Synovial cyst of popliteal space, and Vitamin D deficiency.    Surgical History:  She  has a past surgical history that includes Osteotomy femur distal; Laparoscopic cholecystectomy; Extraction(s) dental; Attempted arthroscopy; Esophagoscopy, gastroscopy, duodenoscopy (EGD), combined (N/A, 1/30/2024); Colonoscopy (N/A, 2/13/2024); Resect small bowel without ostomy (N/A, 2/14/2024); Laparotomy exploratory (N/A, 2/14/2024); IR Fine Needle Aspiration w Ultrasound (2/26/2024); and IR Lumbar Puncture (7/8/2022).     SOCIAL HISTORY     Reviewed, and she  reports that she has been smoking cigarettes. She started smoking about 12 years ago. She has a 3.6  "pack-year smoking history. She has been exposed to tobacco smoke. She has never used smokeless tobacco. She reports current alcohol use. She reports that she does not use drugs.     FAMILY HISTORY     Reviewed, and family history includes Diabetes in her father and mother; Other - See Comments in her father and paternal grandfather.     ALLERGIES     Allergies   Allergen Reactions    Acetaminophen Hives, Itching, Rash and Shortness Of Breath     Several Rx's for Norco in 2021 & 22    No Clinical Screening - See Comments Anaphylaxis     steroids    Gabapentin Unknown and Itching    Hydrocodone-Acetaminophen Itching     Several Rx's for Norco in 2021 & 22    Iodinated Contrast Media Unknown and Nausea and Vomiting     Extremely anxious, vomited once after CT. No wheezing or SOB.    Latex Itching    Morphine And Codeine Itching     Several Rx's for Norco in 2021 & 22    Nsaids Other (See Comments)     H/o german-n-y gastric bypass\". AVOID NSAIDs and aspirin due to risk of gastric and/or G-J anastomotic ulcers. If Eduarda must be on short course of NSAIDs or aspirin, use enteric coated if possible and use PPI // Ira Hung RN, Bariatric Nurse Clinician, [facility] 3/23/2022    Oxycodone-Acetaminophen Itching     Several Rx's for Norco in 2021 & 22         REVIEW OF SYSTEMS     A 12 point review of system was performed and was negative except as outlined in the history of present illness.     HOME MEDICATIONS     Current Outpatient Rx   Medication Sig Dispense Refill    albuterol (PROAIR HFA/PROVENTIL HFA/VENTOLIN HFA) 108 (90 Base) MCG/ACT inhaler Inhale 1-2 puffs into the lungs every 4 hours as needed for shortness of breath, wheezing or cough 2 - 4 times daily      ammonium lactate (AMLACTIN) 12 % external cream Apply topically daily as needed for dry skin      CARAFATE 1 g tablet Take 1 tablet by mouth 4 times a day.      Cranberry 400 MG CAPS Take 400 mg by mouth daily as needed      " cyanocobalamin (VITAMIN B-12) 500 MCG SUBL sublingual tablet Place 1 tablet (500 mcg) under the tongue daily 30 tablet 0    diazepam (VALIUM) 10 MG tablet TAKE 10 MG BY MOUTH 3-4 TIMES A DAY      diphenhydrAMINE (BENADRYL ALLERGY) 25 MG capsule Take 1 capsule (25 mg) by mouth every 6 hours as needed for itching or allergies. Administer 30 min - 2 hours pre - IV contrast injection and Patient must have a . 15 capsule 0    DULoxetine (CYMBALTA) 60 MG capsule Take 60 mg by mouth daily.      ipratropium - albuterol 0.5 mg/2.5 mg/3 mL (DUONEB) 0.5-2.5 (3) MG/3ML neb solution Inhale 1 Neb via a nebulizer 4 times daily if needed for Shortness Of Breath or Wheezing.      LORazepam (ATIVAN) 1 MG tablet Take 1 tablet 30 minutes before MRI scan 1 tablet 0    methylPREDNISolone (MEDROL) 32 MG tablet Take 1 tablet (32 mg) by mouth daily. 12 hours prior to the procedure with IV contrast 1 tablet 0    miconazole (MICATIN) 2 % external cream Apply topically 2 times daily 15 g 0    nortriptyline (PAMELOR) 75 MG capsule Take 1 capsule (75 mg) by mouth at bedtime. 30 capsule 11    nystatin (MYCOSTATIN) 215579 UNIT/GM external cream Apply topically to affected area(s) two times daily.      OLANZapine zydis (ZYPREXA) 5 MG ODT Take 1 tablet (5 mg) by mouth at bedtime 30 tablet 0    omeprazole (PRILOSEC) 20 MG DR capsule Take 20 mg by mouth daily.      ondansetron (ZOFRAN ODT) 8 MG ODT tab Place 8 mg under the tongue every 8 hours as needed for nausea or other (vomiting).      pantoprazole (PROTONIX) 40 MG EC tablet Take 1 tablet (40 mg) by mouth daily 30 tablet 0    polyethylene glycol (MIRALAX) 17 GM/Dose powder Take 17 g by mouth daily 510 g 0    polyethylene glycol-propylene glycol PF (SYSTANE PRESERVATIVE FREE) 0.4-0.3 % SOLN opthalmic solution Apply 1-2 drops to eye      prochlorperazine (COMPAZINE) 10 MG tablet Take 1 Tablet (10 mg) by mouth every 6 hours if needed for Nausea/Vomiting.      promethazine (PHENERGAN) 12.5 MG  tablet Take 1 tablet by mouth 3 times daily.      propranolol (INDERAL) 10 MG tablet Take 10 mg by mouth 2 times daily.      tiZANidine (ZANAFLEX) 2 MG tablet Take 1 to 2 Tablets (2-4 mg) by mouth every 6 hours if needed for Muscle Spasm.      valproic acid (DEPAKENE) 250 MG/5ML syrup Take 10 mLs (500 mg) by mouth 3 times daily. 900 mL 11         PHYSICAL EXAM     Vital signs  BP (!) 149/92 (BP Location: Left arm, Patient Position: Sitting)   Pulse 83   Wt 62.7 kg (138 lb 3.2 oz)   BMI 24.48 kg/m      Weight:   138 lbs 3.2 oz    Middle-age female who is alert and oriented vital signs are reviewed and documented in electronic medical record. Neck supple. Neurologically speech was normal. Cranial nerves II through XII are intact. Motor strength 5/5 reflexes 1+ toes equivocal. Minimal dysmetria and finger-nose testing gait normal      PERTINENT DIAGNOSTIC STUDIES     Following studies were reviewed:     MR BRAIN 2/24/2024  1. No acute intracranial pathology.  2. 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 If there is suspicion of seizure  activity, consider correlation with EEG.  3. Left parietal encephalomalacia.     PERTINENT LABS  Following labs were reviewed:  No visits with results within 3 Month(s) from this visit.   Latest known visit with results is:   Admission on 05/09/2024, Discharged on 05/10/2024   Component Date Value Ref Range Status    INR 05/09/2024 1.04  0.85 - 1.15 Final    Sodium 05/09/2024 142  135 - 145 mmol/L Final    Potassium 05/09/2024 3.6  3.4 - 5.3 mmol/L Final    Carbon Dioxide (CO2) 05/09/2024 26  22 - 29 mmol/L Final    Anion Gap 05/09/2024 13  7 - 15 mmol/L Final    Urea Nitrogen 05/09/2024 10.1  6.0 - 20.0 mg/dL Final    Creatinine 05/09/2024 0.72  0.51 - 0.95 mg/dL Final    GFR Estimate 05/09/2024  >90  >60 mL/min/1.73m2 Final    Calcium 05/09/2024 8.5 (L)  8.6 - 10.0 mg/dL Final    Chloride 05/09/2024 103  98 - 107 mmol/L Final    Glucose 05/09/2024 111 (H)  70 - 99 mg/dL Final    Alkaline Phosphatase 05/09/2024 71  40 - 150 U/L Final    AST 05/09/2024 18  0 - 45 U/L Final    ALT 05/09/2024 9  0 - 50 U/L Final    Protein Total 05/09/2024 6.8  6.4 - 8.3 g/dL Final    Albumin 05/09/2024 4.1  3.5 - 5.2 g/dL Final    Bilirubin Total 05/09/2024 <0.2  <=1.2 mg/dL Final    Culture 05/09/2024 No Growth   Final    Culture 05/09/2024 No Growth   Final    Lactic Acid 05/09/2024 2.4 (H)  0.7 - 2.0 mmol/L Final    WBC Count 05/09/2024 5.9  4.0 - 11.0 10e3/uL Final    RBC Count 05/09/2024 4.22  3.80 - 5.20 10e6/uL Final    Hemoglobin 05/09/2024 10.6 (L)  11.7 - 15.7 g/dL Final    Hematocrit 05/09/2024 35.0  35.0 - 47.0 % Final    MCV 05/09/2024 83  78 - 100 fL Final    MCH 05/09/2024 25.1 (L)  26.5 - 33.0 pg Final    MCHC 05/09/2024 30.3 (L)  31.5 - 36.5 g/dL Final    RDW 05/09/2024 19.9 (H)  10.0 - 15.0 % Final    Platelet Count 05/09/2024 292  150 - 450 10e3/uL Final    % Neutrophils 05/09/2024 37  % Final    % Lymphocytes 05/09/2024 52  % Final    % Monocytes 05/09/2024 6  % Final    % Eosinophils 05/09/2024 2  % Final    % Basophils 05/09/2024 2  % Final    % Immature Granulocytes 05/09/2024 1  % Final    NRBCs per 100 WBC 05/09/2024 0  <1 /100 Final    Absolute Neutrophils 05/09/2024 2.2  1.6 - 8.3 10e3/uL Final    Absolute Lymphocytes 05/09/2024 3.1  0.8 - 5.3 10e3/uL Final    Absolute Monocytes 05/09/2024 0.4  0.0 - 1.3 10e3/uL Final    Absolute Eosinophils 05/09/2024 0.1  0.0 - 0.7 10e3/uL Final    Absolute Basophils 05/09/2024 0.1  0.0 - 0.2 10e3/uL Final    Absolute Immature Granulocytes 05/09/2024 0.0  <=0.4 10e3/uL Final    Absolute NRBCs 05/09/2024 0.0  10e3/uL Final    Lactic Acid 05/10/2024 1.4  0.7 - 2.0 mmol/L Final         Total time spent for face to face visit, reviewing labs/imaging studies, counseling  and coordination of care was: 45 Minutes spent on the date of the encounter doing chart review, review of outside records, review of test results, interpretation of tests, patient visit, and documentation     The longitudinal plan of care for the diagnosis(es)/condition(s) as documented were addressed during this visit. Due to the added complexity in care, I will continue to support Eduarda in the subsequent management and with ongoing continuity of care.    This note was dictated using voice recognition software.  Any grammatical or context distortions are unintentional and inherent to the software.    Orders Placed This Encounter   Procedures    MR Brain w/o & w Contrast    Valproic Acid Free & Total    Valproic Acid Free & Total    [facility]; ENS2; Encephalopathy, Autoimmune/Paraneoplastic Evaluation, Serum (Laboratory Miscellaneous Order)    EEG      New Prescriptions    DIPHENHYDRAMINE (BENADRYL ALLERGY) 25 MG CAPSULE    Take 1 capsule (25 mg) by mouth every 6 hours as needed for itching or allergies. Administer 30 min - 2 hours pre - IV contrast injection and Patient must have a .    LORAZEPAM (ATIVAN) 1 MG TABLET    Take 1 tablet 30 minutes before MRI scan    METHYLPREDNISOLONE (MEDROL) 32 MG TABLET    Take 1 tablet (32 mg) by mouth daily. 12 hours prior to the procedure with IV contrast     Modified Medications    Modified Medication Previous Medication    NORTRIPTYLINE (PAMELOR) 75 MG CAPSULE nortriptyline (PAMELOR) 50 MG capsule       Take 1 capsule (75 mg) by mouth at bedtime.    Take 1 capsule (50 mg) by mouth at bedtime.    VALPROIC ACID (DEPAKENE) 250 MG/5ML SYRUP valproic acid (DEPAKENE) 250 MG/5ML syrup       Take 10 mLs (500 mg) by mouth 3 times daily.    Take 10 mLs (500 mg) by mouth 2 times daily.

## 2025-04-29 ENCOUNTER — OFFICE VISIT (OUTPATIENT)
Dept: NEUROLOGY | Facility: CLINIC | Age: 45
End: 2025-04-29
Payer: COMMERCIAL

## 2025-04-29 ENCOUNTER — LAB (OUTPATIENT)
Dept: LAB | Facility: HOSPITAL | Age: 45
End: 2025-04-29
Payer: COMMERCIAL

## 2025-04-29 VITALS
SYSTOLIC BLOOD PRESSURE: 149 MMHG | BODY MASS INDEX: 24.48 KG/M2 | DIASTOLIC BLOOD PRESSURE: 92 MMHG | HEART RATE: 83 BPM | WEIGHT: 138.2 LBS

## 2025-04-29 DIAGNOSIS — G04.81 AUTOIMMUNE ENCEPHALITIS: ICD-10-CM

## 2025-04-29 DIAGNOSIS — R56.9 SEIZURE-LIKE ACTIVITY (H): ICD-10-CM

## 2025-04-29 DIAGNOSIS — G43.E19 INTRACTABLE CHRONIC MIGRAINE WITH AURA AND WITHOUT STATUS MIGRAINOSUS: Primary | ICD-10-CM

## 2025-04-29 DIAGNOSIS — G43.E19 INTRACTABLE CHRONIC MIGRAINE WITH AURA AND WITHOUT STATUS MIGRAINOSUS: ICD-10-CM

## 2025-04-29 PROBLEM — D50.8 IRON DEFICIENCY ANEMIA AFTER GASTRECTOMY: Status: ACTIVE | Noted: 2024-09-27

## 2025-04-29 PROBLEM — I10 HTN (HYPERTENSION): Status: ACTIVE | Noted: 2024-09-21

## 2025-04-29 PROBLEM — K91.89 IRON DEFICIENCY ANEMIA AFTER GASTRECTOMY: Status: ACTIVE | Noted: 2024-09-27

## 2025-04-29 PROBLEM — C26.9: Status: ACTIVE | Noted: 2024-11-08

## 2025-04-29 PROBLEM — Q61.3 POLYCYSTIC KIDNEY: Status: ACTIVE | Noted: 2025-02-25

## 2025-04-29 LAB
Lab: NORMAL
PERFORMING LABORATORY: NORMAL
SPECIMEN STATUS: NORMAL
TEST NAME: NORMAL

## 2025-04-29 PROCEDURE — 80164 ASSAY DIPROPYLACETIC ACD TOT: CPT

## 2025-04-29 PROCEDURE — 99215 OFFICE O/P EST HI 40 MIN: CPT | Performed by: PSYCHIATRY & NEUROLOGY

## 2025-04-29 PROCEDURE — 36415 COLL VENOUS BLD VENIPUNCTURE: CPT

## 2025-04-29 PROCEDURE — G2211 COMPLEX E/M VISIT ADD ON: HCPCS | Performed by: PSYCHIATRY & NEUROLOGY

## 2025-04-29 RX ORDER — METHYLPREDNISOLONE 32 MG/1
32 TABLET ORAL DAILY
Qty: 1 TABLET | Refills: 0 | Status: SHIPPED | OUTPATIENT
Start: 2025-04-29

## 2025-04-29 RX ORDER — NORTRIPTYLINE HYDROCHLORIDE 75 MG/1
75 CAPSULE ORAL AT BEDTIME
Qty: 30 CAPSULE | Refills: 11 | Status: SHIPPED | OUTPATIENT
Start: 2025-04-29

## 2025-04-29 RX ORDER — OMEPRAZOLE 20 MG/1
20 CAPSULE, DELAYED RELEASE ORAL DAILY
COMMUNITY
Start: 2024-09-28

## 2025-04-29 RX ORDER — DULOXETIN HYDROCHLORIDE 60 MG/1
60 CAPSULE, DELAYED RELEASE ORAL DAILY
COMMUNITY
Start: 2025-03-04

## 2025-04-29 RX ORDER — PROCHLORPERAZINE MALEATE 10 MG
TABLET ORAL
COMMUNITY
Start: 2025-02-27

## 2025-04-29 RX ORDER — PROPRANOLOL HYDROCHLORIDE 10 MG/1
10 TABLET ORAL 2 TIMES DAILY
COMMUNITY
Start: 2025-02-27 | End: 2025-05-28

## 2025-04-29 RX ORDER — DIPHENHYDRAMINE HCL 25 MG
25 CAPSULE ORAL EVERY 6 HOURS PRN
Qty: 15 CAPSULE | Refills: 0 | Status: SHIPPED | OUTPATIENT
Start: 2025-04-29

## 2025-04-29 RX ORDER — ONDANSETRON 8 MG/1
8 TABLET, ORALLY DISINTEGRATING ORAL EVERY 8 HOURS PRN
COMMUNITY
Start: 2025-02-27

## 2025-04-29 RX ORDER — TIZANIDINE 2 MG/1
TABLET ORAL
COMMUNITY
Start: 2025-04-21

## 2025-04-29 RX ORDER — SUCRALFATE 1 G
TABLET ORAL
COMMUNITY
Start: 2025-02-25

## 2025-04-29 RX ORDER — LORAZEPAM 1 MG/1
TABLET ORAL
Qty: 1 TABLET | Refills: 0 | Status: SHIPPED | OUTPATIENT
Start: 2025-04-29

## 2025-04-29 RX ORDER — DIAZEPAM 10 MG/1
TABLET ORAL
COMMUNITY

## 2025-04-29 RX ORDER — PROMETHAZINE HYDROCHLORIDE 12.5 MG/1
1 TABLET ORAL 3 TIMES DAILY
COMMUNITY
Start: 2024-12-04

## 2025-04-29 ASSESSMENT — PATIENT HEALTH QUESTIONNAIRE - PHQ9: SUM OF ALL RESPONSES TO PHQ QUESTIONS 1-9: 19

## 2025-04-29 NOTE — LETTER
2025      Eduarda Nogueira  [address] [place] [address]      Dear Colleague,    Thank you for referring your patient, Eduarda Nogueira, to the [facility] [place]. Please see a copy of my visit note below.    NEUROLOGY FOLLOW UP VISIT  NOTE       [facility] [place]  [address] [place] [address]  Tel: [phone]  Fax: [phone]  [email or web address]     Eduarda Nogueira,  1980, MRN [id number]  PCP: Sussy Myers  Date: 2025      ASSESSMENT & PLAN     Visit Diagnosis  Intractable chronic migraine with aura and without status migrainosus  Autoimmune encephalitis     R/O nonconvulsive seizures, autoimmune encephalitis  44-year-old female with history of alcohol abuse, marijuana dependence, chronic pain, gastric bypass, depression and anxiety, left parietal embolic infarction who was for evaluation of abnormal imaging studies.  She was admitted to [facility] in 2024 and her MRI scan showed subtle edema in the mesial left temporal lobe which was nonspecific but could suggest early mesial temporal sclerosis or paraneoplastic autoimmune encephalitis.  I had recommended repeating MRI brain, sleep deprived EEG and lumbar puncture for autoimmune encephalitis panel but she never followed through on my recommendation and claims no one called her.  I have recommended:    1.  Repeat MRI brain with and without contrast.  She is allergic to IV dye and will need premedicated  2.  Sleep deprived EEG  3.  Lumbar puncture under fluoroscopy.  Send CSF for routine studies in addition to cytology and encephalopathy autoimmune/paraneoplastic evaluation.  I will order lumbar puncture if MRI scan is unremarkable  4.  Check serum encephalopathy autoimmune/paraneoplastic panel  5. Follow-up the day she is scheduled for EEG    Migraine headache with aura  44-year-old female with alcohol abuse, marijuana dependence, chronic pain,  gastric bypass, depression and anxiety, left parietal embolic infarction who is been followed in our clinic for migraine headache with aura.  Previously I had prescribed Emgality that was not approved by her insurance.  I have recommended:    1.  Increase Depakene to 500 mg 3 times daily  2.  Increase nortriptyline to 75 mg daily.  3.  Check free and bound valproic acid level  5.  If she fails to show improvement with above measures we can consider Botox  5.  Follow-up today she is scheduled for EEG      Thank you again for this referral, please feel free to contact me if you have any questions.    Shiva Harding MD  [facility]     HISTORY OF PRESENT ILLNESS     Patient is a 44-year-old female with history of alcohol abuse, marijuana dependence, chronic pain syndrome, gastric bypass, depression and anxiety, left parietal embolic infarction who was last seen on October 7, 2024 for worsening headaches.      According to patient her headache started at the age of 12 when she ran into a tree while sliding and suffered a concussion.  She describes these headaches starting in her neck and shoulder radiating to the back of her neck and are throbbing associated with visual symptoms, photophobia, phonophobia, nausea, vomiting, diarrhea and feeling hot and cold with blurred vision.  She has noticed that loud noises, bright lights, dehydration and inadequate sleep is also a trigger.  She was tried on nortriptyline, Depakote but remained symptomatic & she failed Ubrelvy and Topamax.  Workup included MRI brain that showed subtle edema in the mesial left temporal lobe that could suggest early mesial temporal sclerosis or paraneoplastic autoimmune encephalitis.  During the last visit MRI brain was repeated that showed no acute abnormality chronic left parietal lobe encephalomalacia was noted.  EEG is still pending.  Also I had recommended lumbar puncture under fluoroscopy and to check  autoimmune/paraneoplastic panel that was not done.  Patient reports that no one called her and was not aware that she was supposed to get lumbar puncture, EEG and additional lab    For her migraine headaches she was told to increase the dose of Depakote to 500 mg twice daily and nortriptyline to 50 mg at bedtime.  She subsequently was switched to Emgality that was not approved by her insurance and continues on Depakote.  She continues to be symptomatic almost on daily basis.     PROBLEM LIST   Patient Active Problem List   Diagnosis     Regurgitation of food     H/O gastric bypass     Globus sensation     Upper GI bleed     Anemia     Cellulitis of abdominal wall     Lactate blood increase     G tube feedings (H)     Alcohol abuse     Cannabis dependence (H)     GERD (gastroesophageal reflux disease)     History of embolic stroke     Lupus anticoagulant disorder     Mixed anxiety depressive disorder     Patent foramen ovale     SDH (subdural hematoma) (H)     HTN (hypertension)     Iron deficiency anemia after gastrectomy     Malignant neoplasm of gastrointestinal tract (H)     Polycystic kidney         PAST MEDICAL & SURGICAL HISTORY     Past Medical History:   Patient  has a past medical history of Anemia, Anxiety state, Arthropathy, Asthma, Backache, Edema, Esophageal reflux, Female stress incontinence, Hypothyroidism, Irregular menstrual cycle, Irritable colon, Migraine, Morbid obesity (H), Other chronic nonalcoholic liver disease, Other depressive disorder, Other malaise and fatigue, Pain in joint, Restless legs syndrome, Shortness of breath, Synovial cyst of popliteal space, and Vitamin D deficiency.    Surgical History:  She  has a past surgical history that includes Osteotomy femur distal; Laparoscopic cholecystectomy; Extraction(s) dental; Attempted arthroscopy; Esophagoscopy, gastroscopy, duodenoscopy (EGD), combined (N/A, 1/30/2024); Colonoscopy (N/A, 2/13/2024); Resect small bowel without ostomy (N/A,  "2/14/2024); Laparotomy exploratory (N/A, 2/14/2024); IR Fine Needle Aspiration w Ultrasound (2/26/2024); and IR Lumbar Puncture (7/8/2022).     SOCIAL HISTORY     Reviewed, and she  reports that she has been smoking cigarettes. She started smoking about 12 years ago. She has a 3.6 pack-year smoking history. She has been exposed to tobacco smoke. She has never used smokeless tobacco. She reports current alcohol use. She reports that she does not use drugs.     FAMILY HISTORY     Reviewed, and family history includes Diabetes in her father and mother; Other - See Comments in her father and paternal grandfather.     ALLERGIES     Allergies   Allergen Reactions     Acetaminophen Hives, Itching, Rash and Shortness Of Breath     Several Rx's for Norco in 2021 & 22     No Clinical Screening - See Comments Anaphylaxis     steroids     Gabapentin Unknown and Itching     Hydrocodone-Acetaminophen Itching     Several Rx's for Norco in 2021 & 22     Iodinated Contrast Media Unknown and Nausea and Vomiting     Extremely anxious, vomited once after CT. No wheezing or SOB.     Latex Itching     Morphine And Codeine Itching     Several Rx's for Norco in 2021 & 22     Nsaids Other (See Comments)     H/o german-n-y gastric bypass\". AVOID NSAIDs and aspirin due to risk of gastric and/or G-J anastomotic ulcers. If Eduarda must be on short course of NSAIDs or aspirin, use enteric coated if possible and use PPI // Ira Hung RN, Bariatric Nurse Clinician, [facility] 3/23/2022     Oxycodone-Acetaminophen Itching     Several Rx's for Norco in 2021 & 22         REVIEW OF SYSTEMS     A 12 point review of system was performed and was negative except as outlined in the history of present illness.     HOME MEDICATIONS     Current Outpatient Rx   Medication Sig Dispense Refill     albuterol (PROAIR HFA/PROVENTIL HFA/VENTOLIN HFA) 108 (90 Base) MCG/ACT inhaler Inhale 1-2 puffs into the lungs every 4 hours as needed for " shortness of breath, wheezing or cough 2 - 4 times daily       ammonium lactate (AMLACTIN) 12 % external cream Apply topically daily as needed for dry skin       CARAFATE 1 g tablet Take 1 tablet by mouth 4 times a day.       Cranberry 400 MG CAPS Take 400 mg by mouth daily as needed       cyanocobalamin (VITAMIN B-12) 500 MCG SUBL sublingual tablet Place 1 tablet (500 mcg) under the tongue daily 30 tablet 0     diazepam (VALIUM) 10 MG tablet TAKE 10 MG BY MOUTH 3-4 TIMES A DAY       diphenhydrAMINE (BENADRYL ALLERGY) 25 MG capsule Take 1 capsule (25 mg) by mouth every 6 hours as needed for itching or allergies. Administer 30 min - 2 hours pre - IV contrast injection and Patient must have a . 15 capsule 0     DULoxetine (CYMBALTA) 60 MG capsule Take 60 mg by mouth daily.       ipratropium - albuterol 0.5 mg/2.5 mg/3 mL (DUONEB) 0.5-2.5 (3) MG/3ML neb solution Inhale 1 Neb via a nebulizer 4 times daily if needed for Shortness Of Breath or Wheezing.       LORazepam (ATIVAN) 1 MG tablet Take 1 tablet 30 minutes before MRI scan 1 tablet 0     methylPREDNISolone (MEDROL) 32 MG tablet Take 1 tablet (32 mg) by mouth daily. 12 hours prior to the procedure with IV contrast 1 tablet 0     miconazole (MICATIN) 2 % external cream Apply topically 2 times daily 15 g 0     nortriptyline (PAMELOR) 75 MG capsule Take 1 capsule (75 mg) by mouth at bedtime. 30 capsule 11     nystatin (MYCOSTATIN) 303158 UNIT/GM external cream Apply topically to affected area(s) two times daily.       OLANZapine zydis (ZYPREXA) 5 MG ODT Take 1 tablet (5 mg) by mouth at bedtime 30 tablet 0     omeprazole (PRILOSEC) 20 MG DR capsule Take 20 mg by mouth daily.       ondansetron (ZOFRAN ODT) 8 MG ODT tab Place 8 mg under the tongue every 8 hours as needed for nausea or other (vomiting).       pantoprazole (PROTONIX) 40 MG EC tablet Take 1 tablet (40 mg) by mouth daily 30 tablet 0     polyethylene glycol (MIRALAX) 17 GM/Dose powder Take 17 g by mouth  daily 510 g 0     polyethylene glycol-propylene glycol PF (SYSTANE PRESERVATIVE FREE) 0.4-0.3 % SOLN opthalmic solution Apply 1-2 drops to eye       prochlorperazine (COMPAZINE) 10 MG tablet Take 1 Tablet (10 mg) by mouth every 6 hours if needed for Nausea/Vomiting.       promethazine (PHENERGAN) 12.5 MG tablet Take 1 tablet by mouth 3 times daily.       propranolol (INDERAL) 10 MG tablet Take 10 mg by mouth 2 times daily.       tiZANidine (ZANAFLEX) 2 MG tablet Take 1 to 2 Tablets (2-4 mg) by mouth every 6 hours if needed for Muscle Spasm.       valproic acid (DEPAKENE) 250 MG/5ML syrup Take 10 mLs (500 mg) by mouth 3 times daily. 900 mL 11         PHYSICAL EXAM     Vital signs  BP (!) 149/92 (BP Location: Left arm, Patient Position: Sitting)   Pulse 83   Wt 62.7 kg (138 lb 3.2 oz)   BMI 24.48 kg/m      Weight:   138 lbs 3.2 oz    Middle-age female who is alert and oriented vital signs are reviewed and documented in electronic medical record. Neck supple. Neurologically speech was normal. Cranial nerves II through XII are intact. Motor strength 5/5 reflexes 1+ toes equivocal. Minimal dysmetria and finger-nose testing gait normal      PERTINENT DIAGNOSTIC STUDIES     Following studies were reviewed:     MR BRAIN 2/24/2024  1. No acute intracranial pathology.  2. 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 If there is suspicion of seizure  activity, consider correlation with EEG.  3. Left parietal encephalomalacia.     PERTINENT LABS  Following labs were reviewed:  No visits with results within 3 Month(s) from this visit.   Latest known visit with results is:   Admission on 05/09/2024, Discharged on 05/10/2024   Component Date Value Ref Range Status     INR 05/09/2024 1.04  0.85 - 1.15 Final     Sodium 05/09/2024 142  135 - 145  mmol/L Final     Potassium 05/09/2024 3.6  3.4 - 5.3 mmol/L Final     Carbon Dioxide (CO2) 05/09/2024 26  22 - 29 mmol/L Final     Anion Gap 05/09/2024 13  7 - 15 mmol/L Final     Urea Nitrogen 05/09/2024 10.1  6.0 - 20.0 mg/dL Final     Creatinine 05/09/2024 0.72  0.51 - 0.95 mg/dL Final     GFR Estimate 05/09/2024 >90  >60 mL/min/1.73m2 Final     Calcium 05/09/2024 8.5 (L)  8.6 - 10.0 mg/dL Final     Chloride 05/09/2024 103  98 - 107 mmol/L Final     Glucose 05/09/2024 111 (H)  70 - 99 mg/dL Final     Alkaline Phosphatase 05/09/2024 71  40 - 150 U/L Final     AST 05/09/2024 18  0 - 45 U/L Final     ALT 05/09/2024 9  0 - 50 U/L Final     Protein Total 05/09/2024 6.8  6.4 - 8.3 g/dL Final     Albumin 05/09/2024 4.1  3.5 - 5.2 g/dL Final     Bilirubin Total 05/09/2024 <0.2  <=1.2 mg/dL Final     Culture 05/09/2024 No Growth   Final     Culture 05/09/2024 No Growth   Final     Lactic Acid 05/09/2024 2.4 (H)  0.7 - 2.0 mmol/L Final     WBC Count 05/09/2024 5.9  4.0 - 11.0 10e3/uL Final     RBC Count 05/09/2024 4.22  3.80 - 5.20 10e6/uL Final     Hemoglobin 05/09/2024 10.6 (L)  11.7 - 15.7 g/dL Final     Hematocrit 05/09/2024 35.0  35.0 - 47.0 % Final     MCV 05/09/2024 83  78 - 100 fL Final     MCH 05/09/2024 25.1 (L)  26.5 - 33.0 pg Final     MCHC 05/09/2024 30.3 (L)  31.5 - 36.5 g/dL Final     RDW 05/09/2024 19.9 (H)  10.0 - 15.0 % Final     Platelet Count 05/09/2024 292  150 - 450 10e3/uL Final     % Neutrophils 05/09/2024 37  % Final     % Lymphocytes 05/09/2024 52  % Final     % Monocytes 05/09/2024 6  % Final     % Eosinophils 05/09/2024 2  % Final     % Basophils 05/09/2024 2  % Final     % Immature Granulocytes 05/09/2024 1  % Final     NRBCs per 100 WBC 05/09/2024 0  <1 /100 Final     Absolute Neutrophils 05/09/2024 2.2  1.6 - 8.3 10e3/uL Final     Absolute Lymphocytes 05/09/2024 3.1  0.8 - 5.3 10e3/uL Final     Absolute Monocytes 05/09/2024 0.4  0.0 - 1.3 10e3/uL Final     Absolute Eosinophils 05/09/2024 0.1   0.0 - 0.7 10e3/uL Final     Absolute Basophils 05/09/2024 0.1  0.0 - 0.2 10e3/uL Final     Absolute Immature Granulocytes 05/09/2024 0.0  <=0.4 10e3/uL Final     Absolute NRBCs 05/09/2024 0.0  10e3/uL Final     Lactic Acid 05/10/2024 1.4  0.7 - 2.0 mmol/L Final         Total time spent for face to face visit, reviewing labs/imaging studies, counseling and coordination of care was: 45 Minutes spent on the date of the encounter doing chart review, review of outside records, review of test results, interpretation of tests, patient visit, and documentation     The longitudinal plan of care for the diagnosis(es)/condition(s) as documented were addressed during this visit. Due to the added complexity in care, I will continue to support Eduarda in the subsequent management and with ongoing continuity of care.    This note was dictated using voice recognition software.  Any grammatical or context distortions are unintentional and inherent to the software.    Orders Placed This Encounter   Procedures     MR Brain w/o & w Contrast     Valproic Acid Free & Total     Valproic Acid Free & Total     [facility]; ENS2; Encephalopathy, Autoimmune/Paraneoplastic Evaluation, Serum (Laboratory Miscellaneous Order)     EEG      New Prescriptions    DIPHENHYDRAMINE (BENADRYL ALLERGY) 25 MG CAPSULE    Take 1 capsule (25 mg) by mouth every 6 hours as needed for itching or allergies. Administer 30 min - 2 hours pre - IV contrast injection and Patient must have a .    LORAZEPAM (ATIVAN) 1 MG TABLET    Take 1 tablet 30 minutes before MRI scan    METHYLPREDNISOLONE (MEDROL) 32 MG TABLET    Take 1 tablet (32 mg) by mouth daily. 12 hours prior to the procedure with IV contrast     Modified Medications    Modified Medication Previous Medication    NORTRIPTYLINE (PAMELOR) 75 MG CAPSULE nortriptyline (PAMELOR) 50 MG capsule       Take 1 capsule (75 mg) by mouth at bedtime.    Take 1 capsule (50 mg) by mouth at bedtime.     VALPROIC ACID (DEPAKENE) 250 MG/5ML SYRUP valproic acid (DEPAKENE) 250 MG/5ML syrup       Take 10 mLs (500 mg) by mouth 3 times daily.    Take 10 mLs (500 mg) by mouth 2 times daily.                 Again, thank you for allowing me to participate in the care of your patient.        Sincerely,        Shiva Harding MD    Electronically signed

## 2025-05-02 LAB
VALPROATE FREE MFR SERPL: 17 %
VALPROATE FREE SERPL-MCNC: 9 UG/ML
VALPROATE SERPL-MCNC: 53 UG/ML

## 2025-05-07 LAB — MAYO MISC RESULT: NORMAL

## 2025-06-17 ENCOUNTER — HOSPITAL ENCOUNTER (OUTPATIENT)
Dept: MRI IMAGING | Facility: CLINIC | Age: 45
Discharge: HOME | End: 2025-06-17
Attending: PSYCHIATRY & NEUROLOGY
Payer: COMMERCIAL

## 2025-06-17 DIAGNOSIS — G04.81 AUTOIMMUNE ENCEPHALITIS: ICD-10-CM

## 2025-06-17 DIAGNOSIS — R56.9 SEIZURE-LIKE ACTIVITY (H): ICD-10-CM

## 2025-06-17 PROCEDURE — 255N000002 HC RX 255 OP 636: Performed by: PSYCHIATRY & NEUROLOGY

## 2025-06-17 PROCEDURE — 70553 MRI BRAIN STEM W/O & W/DYE: CPT

## 2025-06-17 PROCEDURE — A9585 GADOBUTROL INJECTION: HCPCS | Performed by: PSYCHIATRY & NEUROLOGY

## 2025-06-17 RX ORDER — HEPARIN SODIUM (PORCINE) LOCK FLUSH IV SOLN 100 UNIT/ML 100 UNIT/ML
5 SOLUTION INTRAVENOUS ONCE
Status: COMPLETED | OUTPATIENT
Start: 2025-06-17 | End: 2025-06-17

## 2025-06-17 RX ORDER — GADOBUTROL 604.72 MG/ML
6.5 INJECTION INTRAVENOUS ONCE
Status: COMPLETED | OUTPATIENT
Start: 2025-06-17 | End: 2025-06-17

## 2025-06-17 RX ADMIN — GADOBUTROL 6.5 ML: 604.72 INJECTION INTRAVENOUS at 13:15

## 2025-06-17 RX ADMIN — HEPARIN SODIUM (PORCINE) LOCK FLUSH IV SOLN 100 UNIT/ML 5 ML: 100 SOLUTION at 14:01

## 2025-06-18 ENCOUNTER — RESULTS FOLLOW-UP (OUTPATIENT)
Dept: NEUROLOGY | Facility: CLINIC | Age: 45
End: 2025-06-18

## 2025-08-13 ENCOUNTER — TELEPHONE (OUTPATIENT)
Dept: NEUROLOGY | Facility: CLINIC | Age: 45
End: 2025-08-13
Payer: COMMERCIAL

## 2025-08-14 ENCOUNTER — OFFICE VISIT (OUTPATIENT)
Dept: NEUROLOGY | Facility: CLINIC | Age: 45
End: 2025-08-14
Payer: COMMERCIAL

## 2025-08-14 VITALS
BODY MASS INDEX: 25.87 KG/M2 | HEIGHT: 63 IN | SYSTOLIC BLOOD PRESSURE: 107 MMHG | DIASTOLIC BLOOD PRESSURE: 71 MMHG | WEIGHT: 146 LBS | HEART RATE: 74 BPM

## 2025-08-14 DIAGNOSIS — G40.209 COMPLEX PARTIAL SEIZURE (H): ICD-10-CM

## 2025-08-14 DIAGNOSIS — G04.81 AUTOIMMUNE ENCEPHALITIS: Primary | ICD-10-CM

## (undated) DEVICE — SU SILK 3-0 TIE 12X30" A304H

## (undated) DEVICE — SU PDS II 2-0 CT-1 27" Z339H

## (undated) DEVICE — SU SILK 3-0 SH CR 8X18" C013D

## (undated) DEVICE — LINEN TOWEL PACK X30 5481

## (undated) DEVICE — SU PROLENE 3-0 SH-1 30" 8762H

## (undated) DEVICE — STPL LINEAR CUT 100MM TLC10

## (undated) DEVICE — SYR PISTON IRRIGATION 60 ML DYND20325

## (undated) DEVICE — SYR 10ML SLIP TIP W/O NDL 303134

## (undated) DEVICE — WIPES FOLEY CARE SURESTEP PROVON DFC100

## (undated) DEVICE — STPL RELOAD LINEAR CUT 100X3.8MM TCR10

## (undated) DEVICE — SU SILK 0 TIE 6X30" A306H

## (undated) DEVICE — SU PDS II 3-0 SH 27" Z316H

## (undated) DEVICE — INSERT FOGARTY 61MM TRACTION HYDRAJAW HYDRA61

## (undated) DEVICE — SUCTION MANIFOLD NEPTUNE 2 SYS 4 PORT 0702-020-000

## (undated) DEVICE — SU PDS II 4-0 FS-2 27" Z422H

## (undated) DEVICE — CATH FOLEY 18FR 5ML SILICONE LUBRI-SIL 175818

## (undated) DEVICE — SU SILK 2-0 SH 30" K833H

## (undated) DEVICE — STPL LINEAR CUT 100MM THICK TCT10

## (undated) DEVICE — GLOVE BIOGEL PI ULTRATOUCH SZ 8.0 41180

## (undated) DEVICE — CLIP HORIZON MED BLUE 002200

## (undated) DEVICE — SOL WATER IRRIG 1000ML BOTTLE 2F7114

## (undated) DEVICE — SU VICRYL 3-0 SH 27" UND J416H

## (undated) DEVICE — STPL RELOAD LINEAR CUT 100X4.5MM TRT10

## (undated) DEVICE — CLIP HORIZON LG ORANGE 004200

## (undated) DEVICE — ESU ELEC BLADE 2.75" COATED/INSULATED E1455

## (undated) DEVICE — Device

## (undated) DEVICE — DRSG DRAIN 2X2" 7087

## (undated) DEVICE — SU DERMABOND ADVANCED .7ML DNX12

## (undated) DEVICE — DRAIN PENROSE 5/8X18" LATEX 30416-058

## (undated) DEVICE — SU SILK 1 TIE 6X30" A307H

## (undated) DEVICE — NDL COUNTER 20CT 31142493

## (undated) DEVICE — TUBING SMOKE EVAC 2.2CMX3M SEA3715

## (undated) DEVICE — SPONGE LAP 18X18" X8435

## (undated) DEVICE — DRSG PRIMAPORE 02X3" 7133

## (undated) DEVICE — ESU LIGASURE IMPACT OPEN SEALER/DVDR CVD LG JAW LF4418

## (undated) DEVICE — PREP CHLORAPREP 26ML TINTED HI-LITE ORANGE 930815

## (undated) DEVICE — DRAPE IOBAN INCISE 23X17" 6650EZ

## (undated) DEVICE — SURGICEL ABSORBABLE HEMOSTAT SNOW 4"X4" 2083

## (undated) DEVICE — SU SILK 4-0 TIE 12X30" A303H

## (undated) DEVICE — STPL LINEAR 90X4.8MM TA9048S

## (undated) DEVICE — SU SILK 2-0 TIE 12X30" A305H

## (undated) DEVICE — LINEN TOWEL PACK X6 WHITE 5487

## (undated) DEVICE — STPL CIRCULAR 29MM CVD CDH29P

## (undated) DEVICE — SOL NACL 0.9% IRRIG 1000ML BOTTLE 2F7124

## (undated) DEVICE — ESU PENCIL W/COATED BLADE E2450H

## (undated) RX ORDER — CEFAZOLIN SODIUM/WATER 2 G/20 ML
SYRINGE (ML) INTRAVENOUS
Status: DISPENSED
Start: 2024-02-14

## (undated) RX ORDER — BUPIVACAINE HYDROCHLORIDE 5 MG/ML
INJECTION, SOLUTION EPIDURAL; INTRACAUDAL
Status: DISPENSED
Start: 2024-02-14

## (undated) RX ORDER — HYDROMORPHONE HCL IN WATER/PF 6 MG/30 ML
PATIENT CONTROLLED ANALGESIA SYRINGE INTRAVENOUS
Status: DISPENSED
Start: 2024-02-14

## (undated) RX ORDER — ONDANSETRON 2 MG/ML
INJECTION INTRAMUSCULAR; INTRAVENOUS
Status: DISPENSED
Start: 2024-02-14

## (undated) RX ORDER — FENTANYL CITRATE 50 UG/ML
INJECTION, SOLUTION INTRAMUSCULAR; INTRAVENOUS
Status: DISPENSED
Start: 2024-02-13

## (undated) RX ORDER — SODIUM CHLORIDE, SODIUM LACTATE, POTASSIUM CHLORIDE, CALCIUM CHLORIDE 600; 310; 30; 20 MG/100ML; MG/100ML; MG/100ML; MG/100ML
INJECTION, SOLUTION INTRAVENOUS
Status: DISPENSED
Start: 2024-02-14

## (undated) RX ORDER — HYDROMORPHONE HYDROCHLORIDE 1 MG/ML
INJECTION, SOLUTION INTRAMUSCULAR; INTRAVENOUS; SUBCUTANEOUS
Status: DISPENSED
Start: 2024-02-14

## (undated) RX ORDER — FENTANYL CITRATE 50 UG/ML
INJECTION, SOLUTION INTRAMUSCULAR; INTRAVENOUS
Status: DISPENSED
Start: 2024-02-14

## (undated) RX ORDER — HEPARIN SODIUM (PORCINE) LOCK FLUSH IV SOLN 100 UNIT/ML 100 UNIT/ML
SOLUTION INTRAVENOUS
Status: DISPENSED
Start: 2025-06-17

## (undated) RX ORDER — LIDOCAINE HYDROCHLORIDE 10 MG/ML
INJECTION, SOLUTION EPIDURAL; INFILTRATION; INTRACAUDAL; PERINEURAL
Status: DISPENSED
Start: 2024-02-26

## (undated) RX ORDER — ENOXAPARIN SODIUM 100 MG/ML
INJECTION SUBCUTANEOUS
Status: DISPENSED
Start: 2024-02-14

## (undated) RX ORDER — HYDROXYZINE HYDROCHLORIDE 25 MG/1
TABLET, FILM COATED ORAL
Status: DISPENSED
Start: 2024-02-14